# Patient Record
Sex: FEMALE | Race: WHITE | NOT HISPANIC OR LATINO | URBAN - METROPOLITAN AREA
[De-identification: names, ages, dates, MRNs, and addresses within clinical notes are randomized per-mention and may not be internally consistent; named-entity substitution may affect disease eponyms.]

---

## 2018-01-01 ENCOUNTER — INPATIENT (INPATIENT)
Facility: HOSPITAL | Age: 78
LOS: 15 days | Discharge: HOSPICE | DRG: 480 | End: 2019-01-11
Attending: INTERNAL MEDICINE | Admitting: INTERNAL MEDICINE
Payer: MEDICARE

## 2018-01-01 VITALS
HEIGHT: 61 IN | HEART RATE: 70 BPM | SYSTOLIC BLOOD PRESSURE: 112 MMHG | RESPIRATION RATE: 16 BRPM | DIASTOLIC BLOOD PRESSURE: 71 MMHG | TEMPERATURE: 98 F | OXYGEN SATURATION: 99 % | WEIGHT: 181 LBS

## 2018-01-01 DIAGNOSIS — A41.9 SEPSIS, UNSPECIFIED ORGANISM: ICD-10-CM

## 2018-01-01 DIAGNOSIS — S72.002A FRACTURE OF UNSPECIFIED PART OF NECK OF LEFT FEMUR, INITIAL ENCOUNTER FOR CLOSED FRACTURE: ICD-10-CM

## 2018-01-01 DIAGNOSIS — K72.00 ACUTE AND SUBACUTE HEPATIC FAILURE WITHOUT COMA: ICD-10-CM

## 2018-01-01 DIAGNOSIS — I10 ESSENTIAL (PRIMARY) HYPERTENSION: ICD-10-CM

## 2018-01-01 DIAGNOSIS — I95.9 HYPOTENSION, UNSPECIFIED: ICD-10-CM

## 2018-01-01 DIAGNOSIS — I51.9 HEART DISEASE, UNSPECIFIED: ICD-10-CM

## 2018-01-01 DIAGNOSIS — I47.1 SUPRAVENTRICULAR TACHYCARDIA: ICD-10-CM

## 2018-01-01 DIAGNOSIS — Z29.9 ENCOUNTER FOR PROPHYLACTIC MEASURES, UNSPECIFIED: ICD-10-CM

## 2018-01-01 DIAGNOSIS — I50.21 ACUTE SYSTOLIC (CONGESTIVE) HEART FAILURE: ICD-10-CM

## 2018-01-01 DIAGNOSIS — I50.20 UNSPECIFIED SYSTOLIC (CONGESTIVE) HEART FAILURE: ICD-10-CM

## 2018-01-01 DIAGNOSIS — G89.18 OTHER ACUTE POSTPROCEDURAL PAIN: ICD-10-CM

## 2018-01-01 DIAGNOSIS — R71.0 PRECIPITOUS DROP IN HEMATOCRIT: ICD-10-CM

## 2018-01-01 DIAGNOSIS — R57.0 CARDIOGENIC SHOCK: ICD-10-CM

## 2018-01-01 DIAGNOSIS — R50.9 FEVER, UNSPECIFIED: ICD-10-CM

## 2018-01-01 DIAGNOSIS — N17.9 ACUTE KIDNEY FAILURE, UNSPECIFIED: ICD-10-CM

## 2018-01-01 DIAGNOSIS — E11.9 TYPE 2 DIABETES MELLITUS WITHOUT COMPLICATIONS: ICD-10-CM

## 2018-01-01 DIAGNOSIS — I73.9 PERIPHERAL VASCULAR DISEASE, UNSPECIFIED: ICD-10-CM

## 2018-01-01 LAB
24R-OH-CALCIDIOL SERPL-MCNC: 15 NG/ML — LOW (ref 30–80)
ABO RH CONFIRMATION: SIGNIFICANT CHANGE UP
ALBUMIN SERPL ELPH-MCNC: 2.5 G/DL — LOW (ref 3.5–5)
ALBUMIN SERPL ELPH-MCNC: 2.6 G/DL — LOW (ref 3.5–5)
ALBUMIN SERPL ELPH-MCNC: 2.8 G/DL — LOW (ref 3.5–5)
ALBUMIN SERPL ELPH-MCNC: 3.5 G/DL — SIGNIFICANT CHANGE UP (ref 3.5–5)
ALBUMIN SERPL ELPH-MCNC: 3.7 G/DL — SIGNIFICANT CHANGE UP (ref 3.5–5)
ALP SERPL-CCNC: 79 U/L — SIGNIFICANT CHANGE UP (ref 40–120)
ALP SERPL-CCNC: 81 U/L — SIGNIFICANT CHANGE UP (ref 40–120)
ALP SERPL-CCNC: 81 U/L — SIGNIFICANT CHANGE UP (ref 40–120)
ALP SERPL-CCNC: 90 U/L — SIGNIFICANT CHANGE UP (ref 40–120)
ALP SERPL-CCNC: 98 U/L — SIGNIFICANT CHANGE UP (ref 40–120)
ALT FLD-CCNC: 13 U/L DA — SIGNIFICANT CHANGE UP (ref 10–60)
ALT FLD-CCNC: 1531 U/L DA — HIGH (ref 10–60)
ALT FLD-CCNC: 16 U/L DA — SIGNIFICANT CHANGE UP (ref 10–60)
ALT FLD-CCNC: 2372 U/L DA — HIGH (ref 10–60)
ALT FLD-CCNC: 2974 U/L DA — HIGH (ref 10–60)
ANION GAP SERPL CALC-SCNC: 10 MMOL/L — SIGNIFICANT CHANGE UP (ref 5–17)
ANION GAP SERPL CALC-SCNC: 10 MMOL/L — SIGNIFICANT CHANGE UP (ref 5–17)
ANION GAP SERPL CALC-SCNC: 14 MMOL/L — SIGNIFICANT CHANGE UP (ref 5–17)
ANION GAP SERPL CALC-SCNC: 21 MMOL/L — HIGH (ref 5–17)
ANION GAP SERPL CALC-SCNC: 22 MMOL/L — HIGH (ref 5–17)
ANION GAP SERPL CALC-SCNC: 22 MMOL/L — HIGH (ref 5–17)
ANION GAP SERPL CALC-SCNC: 8 MMOL/L — SIGNIFICANT CHANGE UP (ref 5–17)
ANION GAP SERPL CALC-SCNC: 9 MMOL/L — SIGNIFICANT CHANGE UP (ref 5–17)
APPEARANCE UR: ABNORMAL
APTT BLD: 24.6 SEC — LOW (ref 27.5–36.3)
APTT BLD: 29.2 SEC — SIGNIFICANT CHANGE UP (ref 27.5–36.3)
APTT BLD: 29.3 SEC — SIGNIFICANT CHANGE UP (ref 27.5–36.3)
APTT BLD: 29.3 SEC — SIGNIFICANT CHANGE UP (ref 27.5–36.3)
APTT BLD: 31.1 SEC — SIGNIFICANT CHANGE UP (ref 27.5–36.3)
APTT BLD: 31.4 SEC — SIGNIFICANT CHANGE UP (ref 27.5–36.3)
APTT BLD: 34 SEC — SIGNIFICANT CHANGE UP (ref 27.5–36.3)
AST SERPL-CCNC: 13 U/L — SIGNIFICANT CHANGE UP (ref 10–40)
AST SERPL-CCNC: 1949 U/L — HIGH (ref 10–40)
AST SERPL-CCNC: 5339 U/L — HIGH (ref 10–40)
AST SERPL-CCNC: 6372 U/L — HIGH (ref 10–40)
AST SERPL-CCNC: 9 U/L — LOW (ref 10–40)
BASE EXCESS BLDA CALC-SCNC: -14.9 MMOL/L — LOW (ref -2–2)
BASE EXCESS BLDA CALC-SCNC: -15.1 MMOL/L — LOW (ref -2–2)
BASE EXCESS BLDA CALC-SCNC: -19.4 MMOL/L — LOW (ref -2–2)
BASE EXCESS BLDA CALC-SCNC: -2.1 MMOL/L — LOW (ref -2–2)
BASE EXCESS BLDA CALC-SCNC: -3.6 MMOL/L — LOW (ref -2–2)
BASE EXCESS BLDA CALC-SCNC: -4.7 MMOL/L — LOW (ref -2–2)
BASE EXCESS BLDA CALC-SCNC: -5.7 MMOL/L — LOW (ref -2–2)
BASE EXCESS BLDA CALC-SCNC: -7.5 MMOL/L — LOW (ref -2–2)
BASE EXCESS BLDA CALC-SCNC: -8.7 MMOL/L — LOW (ref -2–2)
BASE EXCESS BLDV CALC-SCNC: -8 MMOL/L — LOW (ref -2–2)
BASOPHILS # BLD AUTO: 0 K/UL — SIGNIFICANT CHANGE UP (ref 0–0.2)
BASOPHILS # BLD AUTO: 0.1 K/UL — SIGNIFICANT CHANGE UP (ref 0–0.2)
BASOPHILS NFR BLD AUTO: 0.3 % — SIGNIFICANT CHANGE UP (ref 0–2)
BASOPHILS NFR BLD AUTO: 0.5 % — SIGNIFICANT CHANGE UP (ref 0–2)
BASOPHILS NFR BLD AUTO: 0.6 % — SIGNIFICANT CHANGE UP (ref 0–2)
BASOPHILS NFR BLD AUTO: 1 % — SIGNIFICANT CHANGE UP (ref 0–2)
BILIRUB SERPL-MCNC: 0.7 MG/DL — SIGNIFICANT CHANGE UP (ref 0.2–1.2)
BILIRUB SERPL-MCNC: 1.3 MG/DL — HIGH (ref 0.2–1.2)
BILIRUB SERPL-MCNC: 1.6 MG/DL — HIGH (ref 0.2–1.2)
BILIRUB SERPL-MCNC: 2 MG/DL — HIGH (ref 0.2–1.2)
BILIRUB SERPL-MCNC: 4.1 MG/DL — HIGH (ref 0.2–1.2)
BILIRUB UR-MCNC: NEGATIVE — SIGNIFICANT CHANGE UP
BLOOD GAS COMMENTS ARTERIAL: SIGNIFICANT CHANGE UP
BLOOD GAS COMMENTS, VENOUS: SIGNIFICANT CHANGE UP
BUN SERPL-MCNC: 32 MG/DL — HIGH (ref 7–18)
BUN SERPL-MCNC: 33 MG/DL — HIGH (ref 7–18)
BUN SERPL-MCNC: 34 MG/DL — HIGH (ref 7–18)
BUN SERPL-MCNC: 37 MG/DL — HIGH (ref 7–18)
BUN SERPL-MCNC: 47 MG/DL — HIGH (ref 7–18)
BUN SERPL-MCNC: 48 MG/DL — HIGH (ref 7–18)
BUN SERPL-MCNC: 51 MG/DL — HIGH (ref 7–18)
BUN SERPL-MCNC: 57 MG/DL — HIGH (ref 7–18)
CALCIUM SERPL-MCNC: 7.7 MG/DL — LOW (ref 8.4–10.5)
CALCIUM SERPL-MCNC: 8 MG/DL — LOW (ref 8.4–10.5)
CALCIUM SERPL-MCNC: 8 MG/DL — LOW (ref 8.4–10.5)
CALCIUM SERPL-MCNC: 8.2 MG/DL — LOW (ref 8.4–10.5)
CALCIUM SERPL-MCNC: 8.4 MG/DL — SIGNIFICANT CHANGE UP (ref 8.4–10.5)
CALCIUM SERPL-MCNC: 8.5 MG/DL — SIGNIFICANT CHANGE UP (ref 8.4–10.5)
CALCIUM SERPL-MCNC: 8.6 MG/DL — SIGNIFICANT CHANGE UP (ref 8.4–10.5)
CALCIUM SERPL-MCNC: 8.6 MG/DL — SIGNIFICANT CHANGE UP (ref 8.4–10.5)
CHLORIDE SERPL-SCNC: 102 MMOL/L — SIGNIFICANT CHANGE UP (ref 96–108)
CHLORIDE SERPL-SCNC: 107 MMOL/L — SIGNIFICANT CHANGE UP (ref 96–108)
CHLORIDE SERPL-SCNC: 109 MMOL/L — HIGH (ref 96–108)
CHLORIDE SERPL-SCNC: 110 MMOL/L — HIGH (ref 96–108)
CHLORIDE SERPL-SCNC: 112 MMOL/L — HIGH (ref 96–108)
CHLORIDE SERPL-SCNC: 112 MMOL/L — HIGH (ref 96–108)
CHLORIDE SERPL-SCNC: 116 MMOL/L — HIGH (ref 96–108)
CHLORIDE SERPL-SCNC: 98 MMOL/L — SIGNIFICANT CHANGE UP (ref 96–108)
CHOLEST SERPL-MCNC: 82 MG/DL — SIGNIFICANT CHANGE UP (ref 10–199)
CK MB BLD-MCNC: 4.4 % — HIGH (ref 0–3.5)
CK MB BLD-MCNC: 4.4 % — HIGH (ref 0–3.5)
CK MB CFR SERPL CALC: 2.1 NG/ML — SIGNIFICANT CHANGE UP (ref 0–3.6)
CK MB CFR SERPL CALC: 2.2 NG/ML — SIGNIFICANT CHANGE UP (ref 0–3.6)
CK SERPL-CCNC: 48 U/L — SIGNIFICANT CHANGE UP (ref 21–215)
CK SERPL-CCNC: 50 U/L — SIGNIFICANT CHANGE UP (ref 21–215)
CO2 SERPL-SCNC: 13 MMOL/L — LOW (ref 22–31)
CO2 SERPL-SCNC: 14 MMOL/L — LOW (ref 22–31)
CO2 SERPL-SCNC: 17 MMOL/L — LOW (ref 22–31)
CO2 SERPL-SCNC: 19 MMOL/L — LOW (ref 22–31)
CO2 SERPL-SCNC: 21 MMOL/L — LOW (ref 22–31)
CO2 SERPL-SCNC: 21 MMOL/L — LOW (ref 22–31)
CO2 SERPL-SCNC: 23 MMOL/L — SIGNIFICANT CHANGE UP (ref 22–31)
CO2 SERPL-SCNC: 23 MMOL/L — SIGNIFICANT CHANGE UP (ref 22–31)
COLOR SPEC: YELLOW — SIGNIFICANT CHANGE UP
CREAT SERPL-MCNC: 0.9 MG/DL — SIGNIFICANT CHANGE UP (ref 0.5–1.3)
CREAT SERPL-MCNC: 0.96 MG/DL — SIGNIFICANT CHANGE UP (ref 0.5–1.3)
CREAT SERPL-MCNC: 0.99 MG/DL — SIGNIFICANT CHANGE UP (ref 0.5–1.3)
CREAT SERPL-MCNC: 1.17 MG/DL — SIGNIFICANT CHANGE UP (ref 0.5–1.3)
CREAT SERPL-MCNC: 2.48 MG/DL — HIGH (ref 0.5–1.3)
CREAT SERPL-MCNC: 3.04 MG/DL — HIGH (ref 0.5–1.3)
CREAT SERPL-MCNC: 3.25 MG/DL — HIGH (ref 0.5–1.3)
CREAT SERPL-MCNC: 4.15 MG/DL — HIGH (ref 0.5–1.3)
DIFF PNL FLD: ABNORMAL
DIGOXIN SERPL-MCNC: 4.3 NG/ML — CRITICAL HIGH (ref 0.8–2)
EOSINOPHIL # BLD AUTO: 0.1 K/UL — SIGNIFICANT CHANGE UP (ref 0–0.5)
EOSINOPHIL NFR BLD AUTO: 0.4 % — SIGNIFICANT CHANGE UP (ref 0–6)
EOSINOPHIL NFR BLD AUTO: 0.5 % — SIGNIFICANT CHANGE UP (ref 0–6)
EOSINOPHIL NFR BLD AUTO: 0.9 % — SIGNIFICANT CHANGE UP (ref 0–6)
EOSINOPHIL NFR BLD AUTO: 0.9 % — SIGNIFICANT CHANGE UP (ref 0–6)
EOSINOPHIL NFR BLD AUTO: 1 % — SIGNIFICANT CHANGE UP (ref 0–6)
EOSINOPHIL NFR BLD AUTO: 2 % — SIGNIFICANT CHANGE UP (ref 0–6)
FERRITIN SERPL-MCNC: 19 NG/ML — SIGNIFICANT CHANGE UP (ref 15–150)
FIBRINOGEN PPP-MCNC: 377 MG/DL — SIGNIFICANT CHANGE UP (ref 350–510)
FIBRINOGEN PPP-MCNC: 382 MG/DL — SIGNIFICANT CHANGE UP (ref 350–510)
FOLATE SERPL-MCNC: 7.5 NG/ML — SIGNIFICANT CHANGE UP
GLUCOSE BLDC GLUCOMTR-MCNC: 156 MG/DL — HIGH (ref 70–99)
GLUCOSE BLDC GLUCOMTR-MCNC: 165 MG/DL — HIGH (ref 70–99)
GLUCOSE BLDC GLUCOMTR-MCNC: 166 MG/DL — HIGH (ref 70–99)
GLUCOSE SERPL-MCNC: 158 MG/DL — HIGH (ref 70–99)
GLUCOSE SERPL-MCNC: 161 MG/DL — HIGH (ref 70–99)
GLUCOSE SERPL-MCNC: 199 MG/DL — HIGH (ref 70–99)
GLUCOSE SERPL-MCNC: 217 MG/DL — HIGH (ref 70–99)
GLUCOSE SERPL-MCNC: 237 MG/DL — HIGH (ref 70–99)
GLUCOSE SERPL-MCNC: 310 MG/DL — HIGH (ref 70–99)
GLUCOSE SERPL-MCNC: 321 MG/DL — HIGH (ref 70–99)
GLUCOSE SERPL-MCNC: 329 MG/DL — HIGH (ref 70–99)
GLUCOSE UR QL: 100 MG/DL
HAPTOGLOB SERPL-MCNC: 61 MG/DL — SIGNIFICANT CHANGE UP (ref 34–200)
HAV IGM SER-ACNC: SIGNIFICANT CHANGE UP
HBA1C BLD-MCNC: 6.1 % — HIGH (ref 4–5.6)
HBA1C BLD-MCNC: 6.2 % — HIGH (ref 4–5.6)
HBV CORE IGM SER-ACNC: SIGNIFICANT CHANGE UP
HBV SURFACE AG SER-ACNC: SIGNIFICANT CHANGE UP
HCO3 BLDA-SCNC: 10 MMOL/L — LOW (ref 23–27)
HCO3 BLDA-SCNC: 11 MMOL/L — LOW (ref 23–27)
HCO3 BLDA-SCNC: 13 MMOL/L — LOW (ref 23–27)
HCO3 BLDA-SCNC: 15 MMOL/L — LOW (ref 23–27)
HCO3 BLDA-SCNC: 18 MMOL/L — LOW (ref 23–27)
HCO3 BLDA-SCNC: 18 MMOL/L — LOW (ref 23–27)
HCO3 BLDA-SCNC: 20 MMOL/L — LOW (ref 23–27)
HCO3 BLDA-SCNC: 21 MMOL/L — LOW (ref 23–27)
HCO3 BLDA-SCNC: 22 MMOL/L — LOW (ref 23–27)
HCO3 BLDV-SCNC: 20 MMOL/L — LOW (ref 21–29)
HCT VFR BLD CALC: 21.7 % — LOW (ref 34.5–45)
HCT VFR BLD CALC: 23.4 % — LOW (ref 34.5–45)
HCT VFR BLD CALC: 25.1 % — LOW (ref 34.5–45)
HCT VFR BLD CALC: 25.2 % — LOW (ref 34.5–45)
HCT VFR BLD CALC: 30.1 % — LOW (ref 34.5–45)
HCT VFR BLD CALC: 33.7 % — LOW (ref 34.5–45)
HCT VFR BLD CALC: 35.3 % — SIGNIFICANT CHANGE UP (ref 34.5–45)
HCT VFR BLD CALC: 35.5 % — SIGNIFICANT CHANGE UP (ref 34.5–45)
HCT VFR BLD CALC: 37.1 % — SIGNIFICANT CHANGE UP (ref 34.5–45)
HCT VFR BLD CALC: 39.5 % — SIGNIFICANT CHANGE UP (ref 34.5–45)
HCV AB S/CO SERPL IA: 0.3 S/CO — SIGNIFICANT CHANGE UP
HCV AB SERPL-IMP: SIGNIFICANT CHANGE UP
HDLC SERPL-MCNC: 40 MG/DL — LOW
HGB BLD-MCNC: 10.1 G/DL — LOW (ref 11.5–15.5)
HGB BLD-MCNC: 10.3 G/DL — LOW (ref 11.5–15.5)
HGB BLD-MCNC: 10.9 G/DL — LOW (ref 11.5–15.5)
HGB BLD-MCNC: 11.2 G/DL — LOW (ref 11.5–15.5)
HGB BLD-MCNC: 6.5 G/DL — CRITICAL LOW (ref 11.5–15.5)
HGB BLD-MCNC: 6.7 G/DL — CRITICAL LOW (ref 11.5–15.5)
HGB BLD-MCNC: 7.4 G/DL — LOW (ref 11.5–15.5)
HGB BLD-MCNC: 7.7 G/DL — LOW (ref 11.5–15.5)
HGB BLD-MCNC: 9.4 G/DL — LOW (ref 11.5–15.5)
HGB BLD-MCNC: 9.8 G/DL — LOW (ref 11.5–15.5)
HOROWITZ INDEX BLDA+IHG-RTO: 21 — SIGNIFICANT CHANGE UP
HOROWITZ INDEX BLDA+IHG-RTO: 50 — SIGNIFICANT CHANGE UP
HOROWITZ INDEX BLDA+IHG-RTO: 60 — SIGNIFICANT CHANGE UP
HOROWITZ INDEX BLDA+IHG-RTO: 80 — SIGNIFICANT CHANGE UP
HOROWITZ INDEX BLDV+IHG-RTO: 60 — SIGNIFICANT CHANGE UP
INR BLD: 1.09 RATIO — SIGNIFICANT CHANGE UP (ref 0.88–1.16)
INR BLD: 1.83 RATIO — HIGH (ref 0.88–1.16)
INR BLD: 1.86 RATIO — HIGH (ref 0.88–1.16)
INR BLD: 2.04 RATIO — HIGH (ref 0.88–1.16)
INR BLD: 2.28 RATIO — HIGH (ref 0.88–1.16)
INR BLD: 2.5 RATIO — HIGH (ref 0.88–1.16)
INR BLD: 2.77 RATIO — HIGH (ref 0.88–1.16)
INR BLD: 3.38 RATIO — HIGH (ref 0.88–1.16)
INR BLD: 3.64 RATIO — HIGH (ref 0.88–1.16)
IRON SATN MFR SERPL: 200 UG/DL — HIGH (ref 40–150)
IRON SATN MFR SERPL: 55 % — HIGH (ref 15–50)
KETONES UR-MCNC: ABNORMAL
LACTATE SERPL-SCNC: 10.3 MMOL/L — CRITICAL HIGH (ref 0.7–2)
LACTATE SERPL-SCNC: 5.3 MMOL/L — CRITICAL HIGH (ref 0.7–2)
LACTATE SERPL-SCNC: 6 MMOL/L — CRITICAL HIGH (ref 0.7–2)
LACTATE SERPL-SCNC: 8.9 MMOL/L — CRITICAL HIGH (ref 0.7–2)
LDH SERPL L TO P-CCNC: 178 U/L — SIGNIFICANT CHANGE UP (ref 120–225)
LEUKOCYTE ESTERASE UR-ACNC: ABNORMAL
LIPID PNL WITH DIRECT LDL SERPL: 22 MG/DL — SIGNIFICANT CHANGE UP
LYMPHOCYTES # BLD AUTO: 1.1 K/UL — SIGNIFICANT CHANGE UP (ref 1–3.3)
LYMPHOCYTES # BLD AUTO: 1.2 K/UL — SIGNIFICANT CHANGE UP (ref 1–3.3)
LYMPHOCYTES # BLD AUTO: 1.2 K/UL — SIGNIFICANT CHANGE UP (ref 1–3.3)
LYMPHOCYTES # BLD AUTO: 1.5 K/UL — SIGNIFICANT CHANGE UP (ref 1–3.3)
LYMPHOCYTES # BLD AUTO: 11 % — LOW (ref 13–44)
LYMPHOCYTES # BLD AUTO: 12.6 % — LOW (ref 13–44)
LYMPHOCYTES # BLD AUTO: 13.6 % — SIGNIFICANT CHANGE UP (ref 13–44)
LYMPHOCYTES # BLD AUTO: 15 % — SIGNIFICANT CHANGE UP (ref 13–44)
LYMPHOCYTES # BLD AUTO: 2 K/UL — SIGNIFICANT CHANGE UP (ref 1–3.3)
LYMPHOCYTES # BLD AUTO: 7.3 % — LOW (ref 13–44)
LYMPHOCYTES # BLD AUTO: 8.5 % — LOW (ref 13–44)
LYMPHOCYTES # BLD AUTO: 9.5 % — LOW (ref 13–44)
MAGNESIUM SERPL-MCNC: 1.8 MG/DL — SIGNIFICANT CHANGE UP (ref 1.6–2.6)
MAGNESIUM SERPL-MCNC: 1.9 MG/DL — SIGNIFICANT CHANGE UP (ref 1.6–2.6)
MAGNESIUM SERPL-MCNC: 2 MG/DL — SIGNIFICANT CHANGE UP (ref 1.6–2.6)
MAGNESIUM SERPL-MCNC: 2.1 MG/DL — SIGNIFICANT CHANGE UP (ref 1.6–2.6)
MAGNESIUM SERPL-MCNC: 2.3 MG/DL — SIGNIFICANT CHANGE UP (ref 1.6–2.6)
MCHC RBC-ENTMCNC: 18.2 PG — LOW (ref 27–34)
MCHC RBC-ENTMCNC: 18.3 PG — LOW (ref 27–34)
MCHC RBC-ENTMCNC: 18.5 PG — LOW (ref 27–34)
MCHC RBC-ENTMCNC: 18.5 PG — LOW (ref 27–34)
MCHC RBC-ENTMCNC: 18.6 PG — LOW (ref 27–34)
MCHC RBC-ENTMCNC: 18.7 PG — LOW (ref 27–34)
MCHC RBC-ENTMCNC: 18.8 PG — LOW (ref 27–34)
MCHC RBC-ENTMCNC: 18.9 PG — LOW (ref 27–34)
MCHC RBC-ENTMCNC: 20.5 PG — LOW (ref 27–34)
MCHC RBC-ENTMCNC: 20.8 PG — LOW (ref 27–34)
MCHC RBC-ENTMCNC: 28.3 GM/DL — LOW (ref 32–36)
MCHC RBC-ENTMCNC: 28.6 GM/DL — LOW (ref 32–36)
MCHC RBC-ENTMCNC: 28.7 GM/DL — LOW (ref 32–36)
MCHC RBC-ENTMCNC: 29 GM/DL — LOW (ref 32–36)
MCHC RBC-ENTMCNC: 29.2 GM/DL — LOW (ref 32–36)
MCHC RBC-ENTMCNC: 29.2 GM/DL — LOW (ref 32–36)
MCHC RBC-ENTMCNC: 29.5 GM/DL — LOW (ref 32–36)
MCHC RBC-ENTMCNC: 30 GM/DL — LOW (ref 32–36)
MCHC RBC-ENTMCNC: 30.8 GM/DL — LOW (ref 32–36)
MCHC RBC-ENTMCNC: 31.2 GM/DL — LOW (ref 32–36)
MCV RBC AUTO: 62.3 FL — LOW (ref 80–100)
MCV RBC AUTO: 63.2 FL — LOW (ref 80–100)
MCV RBC AUTO: 63.7 FL — LOW (ref 80–100)
MCV RBC AUTO: 63.8 FL — LOW (ref 80–100)
MCV RBC AUTO: 63.9 FL — LOW (ref 80–100)
MCV RBC AUTO: 63.9 FL — LOW (ref 80–100)
MCV RBC AUTO: 64.6 FL — LOW (ref 80–100)
MCV RBC AUTO: 65.7 FL — LOW (ref 80–100)
MCV RBC AUTO: 66.2 FL — LOW (ref 80–100)
MCV RBC AUTO: 67.3 FL — LOW (ref 80–100)
MONOCYTES # BLD AUTO: 0.5 K/UL — SIGNIFICANT CHANGE UP (ref 0–0.9)
MONOCYTES # BLD AUTO: 0.6 K/UL — SIGNIFICANT CHANGE UP (ref 0–0.9)
MONOCYTES # BLD AUTO: 0.6 K/UL — SIGNIFICANT CHANGE UP (ref 0–0.9)
MONOCYTES # BLD AUTO: 0.7 K/UL — SIGNIFICANT CHANGE UP (ref 0–0.9)
MONOCYTES # BLD AUTO: 0.9 K/UL — SIGNIFICANT CHANGE UP (ref 0–0.9)
MONOCYTES NFR BLD AUTO: 3.6 % — SIGNIFICANT CHANGE UP (ref 2–14)
MONOCYTES NFR BLD AUTO: 4 % — SIGNIFICANT CHANGE UP (ref 2–14)
MONOCYTES NFR BLD AUTO: 4.4 % — SIGNIFICANT CHANGE UP (ref 2–14)
MONOCYTES NFR BLD AUTO: 4.4 % — SIGNIFICANT CHANGE UP (ref 2–14)
MONOCYTES NFR BLD AUTO: 5 % — SIGNIFICANT CHANGE UP (ref 2–14)
MONOCYTES NFR BLD AUTO: 5.7 % — SIGNIFICANT CHANGE UP (ref 2–14)
MONOCYTES NFR BLD AUTO: 6.7 % — SIGNIFICANT CHANGE UP (ref 2–14)
NEUTROPHILS # BLD AUTO: 11.9 K/UL — HIGH (ref 1.8–7.4)
NEUTROPHILS # BLD AUTO: 13.1 K/UL — HIGH (ref 1.8–7.4)
NEUTROPHILS # BLD AUTO: 14.1 K/UL — HIGH (ref 1.8–7.4)
NEUTROPHILS # BLD AUTO: 8.6 K/UL — HIGH (ref 1.8–7.4)
NEUTROPHILS # BLD AUTO: 9.1 K/UL — HIGH (ref 1.8–7.4)
NEUTROPHILS NFR BLD AUTO: 72 % — SIGNIFICANT CHANGE UP (ref 43–77)
NEUTROPHILS NFR BLD AUTO: 80.1 % — HIGH (ref 43–77)
NEUTROPHILS NFR BLD AUTO: 81.7 % — HIGH (ref 43–77)
NEUTROPHILS NFR BLD AUTO: 83 % — HIGH (ref 43–77)
NEUTROPHILS NFR BLD AUTO: 85 % — HIGH (ref 43–77)
NEUTROPHILS NFR BLD AUTO: 85.5 % — HIGH (ref 43–77)
NEUTROPHILS NFR BLD AUTO: 86.2 % — HIGH (ref 43–77)
NITRITE UR-MCNC: NEGATIVE — SIGNIFICANT CHANGE UP
PCO2 BLDA: 26 MMHG — LOW (ref 32–46)
PCO2 BLDA: 27 MMHG — LOW (ref 32–46)
PCO2 BLDA: 28 MMHG — LOW (ref 32–46)
PCO2 BLDA: 35 MMHG — SIGNIFICANT CHANGE UP (ref 32–46)
PCO2 BLDA: 38 MMHG — SIGNIFICANT CHANGE UP (ref 32–46)
PCO2 BLDA: 38 MMHG — SIGNIFICANT CHANGE UP (ref 32–46)
PCO2 BLDA: 41 MMHG — SIGNIFICANT CHANGE UP (ref 32–46)
PCO2 BLDA: 41 MMHG — SIGNIFICANT CHANGE UP (ref 32–46)
PCO2 BLDA: 42 MMHG — SIGNIFICANT CHANGE UP (ref 32–46)
PCO2 BLDV: 52 MMHG — HIGH (ref 35–50)
PH BLDA: 7.07 — CRITICAL LOW (ref 7.35–7.45)
PH BLDA: 7.12 — CRITICAL LOW (ref 7.35–7.45)
PH BLDA: 7.23 — LOW (ref 7.35–7.45)
PH BLDA: 7.28 — LOW (ref 7.35–7.45)
PH BLDA: 7.3 — LOW (ref 7.35–7.45)
PH BLDA: 7.36 — SIGNIFICANT CHANGE UP (ref 7.35–7.45)
PH BLDA: 7.37 — SIGNIFICANT CHANGE UP (ref 7.35–7.45)
PH BLDA: 7.38 — SIGNIFICANT CHANGE UP (ref 7.35–7.45)
PH BLDA: 7.46 — HIGH (ref 7.35–7.45)
PH BLDV: 7.2 — CRITICAL LOW (ref 7.35–7.45)
PH UR: 5 — SIGNIFICANT CHANGE UP (ref 5–8)
PHOSPHATE SERPL-MCNC: 3.3 MG/DL — SIGNIFICANT CHANGE UP (ref 2.5–4.5)
PHOSPHATE SERPL-MCNC: 3.4 MG/DL — SIGNIFICANT CHANGE UP (ref 2.5–4.5)
PHOSPHATE SERPL-MCNC: 4.7 MG/DL — HIGH (ref 2.5–4.5)
PHOSPHATE SERPL-MCNC: 5 MG/DL — HIGH (ref 2.5–4.5)
PHOSPHATE SERPL-MCNC: 7.2 MG/DL — HIGH (ref 2.5–4.5)
PLATELET # BLD AUTO: 184 K/UL — SIGNIFICANT CHANGE UP (ref 150–400)
PLATELET # BLD AUTO: 187 K/UL — SIGNIFICANT CHANGE UP (ref 150–400)
PLATELET # BLD AUTO: 188 K/UL — SIGNIFICANT CHANGE UP (ref 150–400)
PLATELET # BLD AUTO: 191 K/UL — SIGNIFICANT CHANGE UP (ref 150–400)
PLATELET # BLD AUTO: 212 K/UL — SIGNIFICANT CHANGE UP (ref 150–400)
PLATELET # BLD AUTO: 48 K/UL — LOW (ref 150–400)
PLATELET # BLD AUTO: 55 K/UL — LOW (ref 150–400)
PLATELET # BLD AUTO: 64 K/UL — LOW (ref 150–400)
PLATELET # BLD AUTO: 71 K/UL — LOW (ref 150–400)
PLATELET # BLD AUTO: 77 K/UL — LOW (ref 150–400)
PO2 BLDA: 115 MMHG — HIGH (ref 74–108)
PO2 BLDA: 43 MMHG — CRITICAL LOW (ref 74–108)
PO2 BLDA: 56 MMHG — LOW (ref 74–108)
PO2 BLDA: 77 MMHG — SIGNIFICANT CHANGE UP (ref 74–108)
PO2 BLDA: 79 MMHG — SIGNIFICANT CHANGE UP (ref 74–108)
PO2 BLDA: 89 MMHG — SIGNIFICANT CHANGE UP (ref 74–108)
PO2 BLDA: 90 MMHG — SIGNIFICANT CHANGE UP (ref 74–108)
PO2 BLDA: 92 MMHG — SIGNIFICANT CHANGE UP (ref 74–108)
PO2 BLDA: 94 MMHG — SIGNIFICANT CHANGE UP (ref 74–108)
PO2 BLDV: 38 MMHG — SIGNIFICANT CHANGE UP (ref 25–45)
POTASSIUM SERPL-MCNC: 3.9 MMOL/L — SIGNIFICANT CHANGE UP (ref 3.5–5.3)
POTASSIUM SERPL-MCNC: 4.2 MMOL/L — SIGNIFICANT CHANGE UP (ref 3.5–5.3)
POTASSIUM SERPL-MCNC: 4.2 MMOL/L — SIGNIFICANT CHANGE UP (ref 3.5–5.3)
POTASSIUM SERPL-MCNC: 4.4 MMOL/L — SIGNIFICANT CHANGE UP (ref 3.5–5.3)
POTASSIUM SERPL-MCNC: 4.5 MMOL/L — SIGNIFICANT CHANGE UP (ref 3.5–5.3)
POTASSIUM SERPL-MCNC: 4.9 MMOL/L — SIGNIFICANT CHANGE UP (ref 3.5–5.3)
POTASSIUM SERPL-MCNC: 5.1 MMOL/L — SIGNIFICANT CHANGE UP (ref 3.5–5.3)
POTASSIUM SERPL-MCNC: 5.5 MMOL/L — HIGH (ref 3.5–5.3)
POTASSIUM SERPL-SCNC: 3.9 MMOL/L — SIGNIFICANT CHANGE UP (ref 3.5–5.3)
POTASSIUM SERPL-SCNC: 4.2 MMOL/L — SIGNIFICANT CHANGE UP (ref 3.5–5.3)
POTASSIUM SERPL-SCNC: 4.2 MMOL/L — SIGNIFICANT CHANGE UP (ref 3.5–5.3)
POTASSIUM SERPL-SCNC: 4.4 MMOL/L — SIGNIFICANT CHANGE UP (ref 3.5–5.3)
POTASSIUM SERPL-SCNC: 4.5 MMOL/L — SIGNIFICANT CHANGE UP (ref 3.5–5.3)
POTASSIUM SERPL-SCNC: 4.9 MMOL/L — SIGNIFICANT CHANGE UP (ref 3.5–5.3)
POTASSIUM SERPL-SCNC: 5.1 MMOL/L — SIGNIFICANT CHANGE UP (ref 3.5–5.3)
POTASSIUM SERPL-SCNC: 5.5 MMOL/L — HIGH (ref 3.5–5.3)
PROT SERPL-MCNC: 5.3 G/DL — LOW (ref 6–8.3)
PROT SERPL-MCNC: 5.6 G/DL — LOW (ref 6–8.3)
PROT SERPL-MCNC: 5.7 G/DL — LOW (ref 6–8.3)
PROT SERPL-MCNC: 6.6 G/DL — SIGNIFICANT CHANGE UP (ref 6–8.3)
PROT SERPL-MCNC: 6.8 G/DL — SIGNIFICANT CHANGE UP (ref 6–8.3)
PROT UR-MCNC: 500 MG/DL
PROTHROM AB SERPL-ACNC: 12.1 SEC — SIGNIFICANT CHANGE UP (ref 10–12.9)
PROTHROM AB SERPL-ACNC: 20.7 SEC — HIGH (ref 10–12.9)
PROTHROM AB SERPL-ACNC: 21.1 SEC — HIGH (ref 10–12.9)
PROTHROM AB SERPL-ACNC: 23.1 SEC — HIGH (ref 10–12.9)
PROTHROM AB SERPL-ACNC: 26 SEC — HIGH (ref 10–12.9)
PROTHROM AB SERPL-ACNC: 28.5 SEC — HIGH (ref 10–12.9)
PROTHROM AB SERPL-ACNC: 31.7 SEC — HIGH (ref 10–12.9)
PROTHROM AB SERPL-ACNC: 38.9 SEC — HIGH (ref 10–12.9)
PROTHROM AB SERPL-ACNC: 42.1 SEC — HIGH (ref 10–12.9)
RBC # BLD: 3.43 M/UL — LOW (ref 3.8–5.2)
RBC # BLD: 3.67 M/UL — LOW (ref 3.8–5.2)
RBC # BLD: 3.73 M/UL — LOW (ref 3.8–5.2)
RBC # BLD: 3.96 M/UL — SIGNIFICANT CHANGE UP (ref 3.8–5.2)
RBC # BLD: 4.58 M/UL — SIGNIFICANT CHANGE UP (ref 3.8–5.2)
RBC # BLD: 5.41 M/UL — HIGH (ref 3.8–5.2)
RBC # BLD: 5.42 M/UL — HIGH (ref 3.8–5.2)
RBC # BLD: 5.46 M/UL — HIGH (ref 3.8–5.2)
RBC # BLD: 5.56 M/UL — HIGH (ref 3.8–5.2)
RBC # BLD: 5.82 M/UL — HIGH (ref 3.8–5.2)
RBC # BLD: 5.96 M/UL — HIGH (ref 3.8–5.2)
RBC # FLD: 13.6 % — SIGNIFICANT CHANGE UP (ref 10.3–14.5)
RBC # FLD: 14.1 % — SIGNIFICANT CHANGE UP (ref 10.3–14.5)
RBC # FLD: 14.2 % — SIGNIFICANT CHANGE UP (ref 10.3–14.5)
RBC # FLD: 14.3 % — SIGNIFICANT CHANGE UP (ref 10.3–14.5)
RBC # FLD: 14.4 % — SIGNIFICANT CHANGE UP (ref 10.3–14.5)
RBC # FLD: 14.5 % — SIGNIFICANT CHANGE UP (ref 10.3–14.5)
RBC # FLD: 14.7 % — HIGH (ref 10.3–14.5)
RBC # FLD: 14.8 % — HIGH (ref 10.3–14.5)
RBC # FLD: 18.5 % — HIGH (ref 10.3–14.5)
RBC # FLD: 18.5 % — HIGH (ref 10.3–14.5)
REPTILASE TIME: 19.1 SEC — SIGNIFICANT CHANGE UP
RETICS #: 80.2 K/UL — SIGNIFICANT CHANGE UP (ref 25–125)
RETICS/RBC NFR: 1.5 % — SIGNIFICANT CHANGE UP (ref 0.5–2.5)
SAO2 % BLDA: 76 % — LOW (ref 92–96)
SAO2 % BLDA: 80 % — LOW (ref 92–96)
SAO2 % BLDA: 91 % — LOW (ref 92–96)
SAO2 % BLDA: 93 % — SIGNIFICANT CHANGE UP (ref 92–96)
SAO2 % BLDA: 94 % — SIGNIFICANT CHANGE UP (ref 92–96)
SAO2 % BLDA: 96 % — SIGNIFICANT CHANGE UP (ref 92–96)
SAO2 % BLDA: 97 % — HIGH (ref 92–96)
SAO2 % BLDA: 97 % — HIGH (ref 92–96)
SAO2 % BLDA: 98 % — HIGH (ref 92–96)
SAO2 % BLDV: 58 % — LOW (ref 67–88)
SODIUM SERPL-SCNC: 139 MMOL/L — SIGNIFICANT CHANGE UP (ref 135–145)
SODIUM SERPL-SCNC: 140 MMOL/L — SIGNIFICANT CHANGE UP (ref 135–145)
SODIUM SERPL-SCNC: 141 MMOL/L — SIGNIFICANT CHANGE UP (ref 135–145)
SODIUM SERPL-SCNC: 142 MMOL/L — SIGNIFICANT CHANGE UP (ref 135–145)
SODIUM SERPL-SCNC: 143 MMOL/L — SIGNIFICANT CHANGE UP (ref 135–145)
SODIUM SERPL-SCNC: 144 MMOL/L — SIGNIFICANT CHANGE UP (ref 135–145)
SP GR SPEC: 1.02 — SIGNIFICANT CHANGE UP (ref 1.01–1.02)
THROMBIN TIME: 30 SEC — HIGH (ref 16–25)
TIBC SERPL-MCNC: 363 UG/DL — SIGNIFICANT CHANGE UP (ref 250–450)
TOTAL CHOLESTEROL/HDL RATIO MEASUREMENT: 2 RATIO — LOW (ref 3.3–7.1)
TRANSFERRIN SERPL-MCNC: 301 MG/DL — SIGNIFICANT CHANGE UP (ref 200–360)
TRIGL SERPL-MCNC: 101 MG/DL — SIGNIFICANT CHANGE UP (ref 10–149)
TRIGL SERPL-MCNC: 165 MG/DL — HIGH (ref 10–149)
TROPONIN I SERPL-MCNC: <0.015 NG/ML — SIGNIFICANT CHANGE UP (ref 0–0.04)
TROPONIN I SERPL-MCNC: <0.015 NG/ML — SIGNIFICANT CHANGE UP (ref 0–0.04)
TSH SERPL-MCNC: 1.19 UU/ML — SIGNIFICANT CHANGE UP (ref 0.34–4.82)
UIBC SERPL-MCNC: 163 UG/DL — SIGNIFICANT CHANGE UP (ref 110–370)
UROBILINOGEN FLD QL: NEGATIVE — SIGNIFICANT CHANGE UP
VANCOMYCIN TROUGH SERPL-MCNC: 10.8 UG/ML — SIGNIFICANT CHANGE UP (ref 10–20)
VANCOMYCIN TROUGH SERPL-MCNC: 16.8 UG/ML — SIGNIFICANT CHANGE UP (ref 10–20)
VIT B12 SERPL-MCNC: 211 PG/ML — LOW (ref 232–1245)
WBC # BLD: 10.7 K/UL — HIGH (ref 3.8–10.5)
WBC # BLD: 11.1 K/UL — HIGH (ref 3.8–10.5)
WBC # BLD: 12.1 K/UL — HIGH (ref 3.8–10.5)
WBC # BLD: 13.6 K/UL — HIGH (ref 3.8–10.5)
WBC # BLD: 13.8 K/UL — HIGH (ref 3.8–10.5)
WBC # BLD: 15.8 K/UL — HIGH (ref 3.8–10.5)
WBC # BLD: 16.5 K/UL — HIGH (ref 3.8–10.5)
WBC # BLD: 18.9 K/UL — HIGH (ref 3.8–10.5)
WBC # BLD: 26.1 K/UL — HIGH (ref 3.8–10.5)
WBC # BLD: 26.4 K/UL — HIGH (ref 3.8–10.5)
WBC # FLD AUTO: 10.7 K/UL — HIGH (ref 3.8–10.5)
WBC # FLD AUTO: 11.1 K/UL — HIGH (ref 3.8–10.5)
WBC # FLD AUTO: 12.1 K/UL — HIGH (ref 3.8–10.5)
WBC # FLD AUTO: 13.6 K/UL — HIGH (ref 3.8–10.5)
WBC # FLD AUTO: 13.8 K/UL — HIGH (ref 3.8–10.5)
WBC # FLD AUTO: 15.8 K/UL — HIGH (ref 3.8–10.5)
WBC # FLD AUTO: 16.5 K/UL — HIGH (ref 3.8–10.5)
WBC # FLD AUTO: 18.9 K/UL — HIGH (ref 3.8–10.5)
WBC # FLD AUTO: 26.1 K/UL — HIGH (ref 3.8–10.5)
WBC # FLD AUTO: 26.4 K/UL — HIGH (ref 3.8–10.5)

## 2018-01-01 PROCEDURE — 71045 X-RAY EXAM CHEST 1 VIEW: CPT | Mod: 26,77

## 2018-01-01 PROCEDURE — 99285 EMERGENCY DEPT VISIT HI MDM: CPT

## 2018-01-01 PROCEDURE — 73552 X-RAY EXAM OF FEMUR 2/>: CPT | Mod: 26,LT

## 2018-01-01 PROCEDURE — 71045 X-RAY EXAM CHEST 1 VIEW: CPT | Mod: 26

## 2018-01-01 PROCEDURE — 73502 X-RAY EXAM HIP UNI 2-3 VIEWS: CPT | Mod: 26,LT

## 2018-01-01 PROCEDURE — 93010 ELECTROCARDIOGRAM REPORT: CPT

## 2018-01-01 PROCEDURE — 93306 TTE W/DOPPLER COMPLETE: CPT | Mod: 26

## 2018-01-01 RX ORDER — PHYTONADIONE (VIT K1) 5 MG
10 TABLET ORAL ONCE
Qty: 0 | Refills: 0 | Status: COMPLETED | OUTPATIENT
Start: 2018-01-01 | End: 2018-01-01

## 2018-01-01 RX ORDER — VANCOMYCIN HCL 1 G
1000 VIAL (EA) INTRAVENOUS EVERY 12 HOURS
Qty: 0 | Refills: 0 | Status: DISCONTINUED | OUTPATIENT
Start: 2018-01-01 | End: 2018-01-01

## 2018-01-01 RX ORDER — ACETYLCYSTEINE 200 MG/ML
4.1 VIAL (ML) MISCELLANEOUS ONCE
Qty: 0 | Refills: 0 | Status: COMPLETED | OUTPATIENT
Start: 2018-01-01 | End: 2018-01-01

## 2018-01-01 RX ORDER — INSULIN LISPRO 100/ML
VIAL (ML) SUBCUTANEOUS
Qty: 0 | Refills: 0 | Status: DISCONTINUED | OUTPATIENT
Start: 2018-01-01 | End: 2018-01-01

## 2018-01-01 RX ORDER — VANCOMYCIN HCL 1 G
1000 VIAL (EA) INTRAVENOUS EVERY 24 HOURS
Qty: 0 | Refills: 0 | Status: DISCONTINUED | OUTPATIENT
Start: 2018-01-01 | End: 2018-01-01

## 2018-01-01 RX ORDER — FENTANYL CITRATE 50 UG/ML
0.5 INJECTION INTRAVENOUS
Qty: 2500 | Refills: 0 | Status: DISCONTINUED | OUTPATIENT
Start: 2018-01-01 | End: 2019-01-01

## 2018-01-01 RX ORDER — ERYTHROPOIETIN 10000 [IU]/ML
10000 INJECTION, SOLUTION INTRAVENOUS; SUBCUTANEOUS
Qty: 0 | Refills: 0 | Status: DISCONTINUED | OUTPATIENT
Start: 2018-01-01 | End: 2019-01-01

## 2018-01-01 RX ORDER — OXYCODONE HYDROCHLORIDE 5 MG/1
5 TABLET ORAL EVERY 4 HOURS
Qty: 0 | Refills: 0 | Status: DISCONTINUED | OUTPATIENT
Start: 2018-01-01 | End: 2018-01-01

## 2018-01-01 RX ORDER — SODIUM CHLORIDE 9 MG/ML
1000 INJECTION INTRAMUSCULAR; INTRAVENOUS; SUBCUTANEOUS ONCE
Qty: 0 | Refills: 0 | Status: COMPLETED | OUTPATIENT
Start: 2018-01-01 | End: 2018-01-01

## 2018-01-01 RX ORDER — METOPROLOL TARTRATE 50 MG
5 TABLET ORAL ONCE
Qty: 0 | Refills: 0 | Status: COMPLETED | OUTPATIENT
Start: 2018-01-01 | End: 2018-01-01

## 2018-01-01 RX ORDER — DIGOXIN 250 MCG
0.12 TABLET ORAL DAILY
Qty: 0 | Refills: 0 | Status: DISCONTINUED | OUTPATIENT
Start: 2018-01-01 | End: 2018-01-01

## 2018-01-01 RX ORDER — NOREPINEPHRINE BITARTRATE/D5W 8 MG/250ML
0.05 PLASTIC BAG, INJECTION (ML) INTRAVENOUS
Qty: 32 | Refills: 0 | Status: DISCONTINUED | OUTPATIENT
Start: 2018-01-01 | End: 2019-01-01

## 2018-01-01 RX ORDER — SODIUM POLYSTYRENE SULFONATE 4.1 MEQ/G
30 POWDER, FOR SUSPENSION ORAL ONCE
Qty: 0 | Refills: 0 | Status: COMPLETED | OUTPATIENT
Start: 2018-01-01 | End: 2018-01-01

## 2018-01-01 RX ORDER — LINEZOLID 600 MG/300ML
600 INJECTION, SOLUTION INTRAVENOUS EVERY 12 HOURS
Qty: 0 | Refills: 0 | Status: DISCONTINUED | OUTPATIENT
Start: 2018-01-01 | End: 2019-01-01

## 2018-01-01 RX ORDER — SODIUM CHLORIDE 9 MG/ML
500 INJECTION, SOLUTION INTRAVENOUS ONCE
Qty: 0 | Refills: 0 | Status: COMPLETED | OUTPATIENT
Start: 2018-01-01 | End: 2018-01-01

## 2018-01-01 RX ORDER — PROTHROMBIN COMPLEX CONCENTRATE (HUMAN) 25.5; 16.5; 24; 22; 22; 26 [IU]/ML; [IU]/ML; [IU]/ML; [IU]/ML; [IU]/ML; [IU]/ML
2000 POWDER, FOR SOLUTION INTRAVENOUS ONCE
Qty: 0 | Refills: 0 | Status: COMPLETED | OUTPATIENT
Start: 2018-01-01 | End: 2018-01-01

## 2018-01-01 RX ORDER — VASOPRESSIN 20 [USP'U]/ML
0.04 INJECTION INTRAVENOUS
Qty: 100 | Refills: 0 | Status: DISCONTINUED | OUTPATIENT
Start: 2018-01-01 | End: 2018-01-01

## 2018-01-01 RX ORDER — DOCUSATE SODIUM 100 MG
100 CAPSULE ORAL
Qty: 0 | Refills: 0 | Status: DISCONTINUED | OUTPATIENT
Start: 2018-01-01 | End: 2019-01-01

## 2018-01-01 RX ORDER — ENOXAPARIN SODIUM 100 MG/ML
30 INJECTION SUBCUTANEOUS EVERY 12 HOURS
Qty: 0 | Refills: 0 | Status: DISCONTINUED | OUTPATIENT
Start: 2018-01-01 | End: 2018-01-01

## 2018-01-01 RX ORDER — VANCOMYCIN HCL 1 G
1000 VIAL (EA) INTRAVENOUS ONCE
Qty: 0 | Refills: 0 | Status: COMPLETED | OUTPATIENT
Start: 2018-01-01 | End: 2018-01-01

## 2018-01-01 RX ORDER — INSULIN LISPRO 100/ML
VIAL (ML) SUBCUTANEOUS EVERY 6 HOURS
Qty: 0 | Refills: 0 | Status: DISCONTINUED | OUTPATIENT
Start: 2018-01-01 | End: 2019-01-01

## 2018-01-01 RX ORDER — ACETAMINOPHEN 500 MG
650 TABLET ORAL EVERY 6 HOURS
Qty: 0 | Refills: 0 | Status: DISCONTINUED | OUTPATIENT
Start: 2018-01-01 | End: 2018-01-01

## 2018-01-01 RX ORDER — INSULIN GLARGINE 100 [IU]/ML
15 INJECTION, SOLUTION SUBCUTANEOUS AT BEDTIME
Qty: 0 | Refills: 0 | Status: DISCONTINUED | OUTPATIENT
Start: 2018-01-01 | End: 2019-01-01

## 2018-01-01 RX ORDER — INSULIN HUMAN 100 [IU]/ML
10 INJECTION, SOLUTION SUBCUTANEOUS ONCE
Qty: 0 | Refills: 0 | Status: COMPLETED | OUTPATIENT
Start: 2018-01-01 | End: 2018-01-01

## 2018-01-01 RX ORDER — SODIUM CHLORIDE 9 MG/ML
1000 INJECTION, SOLUTION INTRAVENOUS
Qty: 0 | Refills: 0 | Status: DISCONTINUED | OUTPATIENT
Start: 2018-01-01 | End: 2018-01-01

## 2018-01-01 RX ORDER — DOCUSATE SODIUM 100 MG
100 CAPSULE ORAL THREE TIMES A DAY
Qty: 0 | Refills: 0 | Status: DISCONTINUED | OUTPATIENT
Start: 2018-01-01 | End: 2018-01-01

## 2018-01-01 RX ORDER — FERROUS SULFATE 325(65) MG
300 TABLET ORAL
Qty: 0 | Refills: 0 | Status: DISCONTINUED | OUTPATIENT
Start: 2018-01-01 | End: 2018-01-01

## 2018-01-01 RX ORDER — SODIUM CHLORIDE 9 MG/ML
1000 INJECTION INTRAMUSCULAR; INTRAVENOUS; SUBCUTANEOUS
Qty: 0 | Refills: 0 | Status: DISCONTINUED | OUTPATIENT
Start: 2018-01-01 | End: 2018-01-01

## 2018-01-01 RX ORDER — OXYCODONE HYDROCHLORIDE 5 MG/1
10 TABLET ORAL EVERY 6 HOURS
Qty: 0 | Refills: 0 | Status: DISCONTINUED | OUTPATIENT
Start: 2018-01-01 | End: 2018-01-01

## 2018-01-01 RX ORDER — PHENYLEPHRINE HYDROCHLORIDE 10 MG/ML
1 INJECTION INTRAVENOUS
Qty: 160 | Refills: 0 | Status: DISCONTINUED | OUTPATIENT
Start: 2018-01-01 | End: 2018-01-01

## 2018-01-01 RX ORDER — SODIUM CHLORIDE 9 MG/ML
1000 INJECTION, SOLUTION INTRAVENOUS ONCE
Qty: 0 | Refills: 0 | Status: COMPLETED | OUTPATIENT
Start: 2018-01-01 | End: 2018-01-01

## 2018-01-01 RX ORDER — ONDANSETRON 8 MG/1
4 TABLET, FILM COATED ORAL EVERY 6 HOURS
Qty: 0 | Refills: 0 | Status: DISCONTINUED | OUTPATIENT
Start: 2018-01-01 | End: 2019-01-01

## 2018-01-01 RX ORDER — SODIUM POLYSTYRENE SULFONATE 4.1 MEQ/G
15 POWDER, FOR SUSPENSION ORAL ONCE
Qty: 0 | Refills: 0 | Status: COMPLETED | OUTPATIENT
Start: 2018-01-01 | End: 2018-01-01

## 2018-01-01 RX ORDER — FENTANYL CITRATE 50 UG/ML
0.5 INJECTION INTRAVENOUS
Qty: 5000 | Refills: 0 | Status: DISCONTINUED | OUTPATIENT
Start: 2018-01-01 | End: 2018-01-01

## 2018-01-01 RX ORDER — ACETAMINOPHEN 500 MG
1000 TABLET ORAL ONCE
Qty: 0 | Refills: 0 | Status: COMPLETED | OUTPATIENT
Start: 2018-01-01 | End: 2018-01-01

## 2018-01-01 RX ORDER — MEROPENEM 1 G/30ML
500 INJECTION INTRAVENOUS EVERY 12 HOURS
Qty: 0 | Refills: 0 | Status: DISCONTINUED | OUTPATIENT
Start: 2018-01-01 | End: 2019-01-01

## 2018-01-01 RX ORDER — CHLORHEXIDINE GLUCONATE 213 G/1000ML
1 SOLUTION TOPICAL
Qty: 0 | Refills: 0 | Status: DISCONTINUED | OUTPATIENT
Start: 2018-01-01 | End: 2019-01-01

## 2018-01-01 RX ORDER — FOLIC ACID 0.8 MG
1 TABLET ORAL DAILY
Qty: 0 | Refills: 0 | Status: DISCONTINUED | OUTPATIENT
Start: 2018-01-01 | End: 2019-01-01

## 2018-01-01 RX ORDER — FERROUS SULFATE 325(65) MG
325 TABLET ORAL
Qty: 0 | Refills: 0 | Status: DISCONTINUED | OUTPATIENT
Start: 2018-01-01 | End: 2018-01-01

## 2018-01-01 RX ORDER — FENTANYL CITRATE 50 UG/ML
0.5 INJECTION INTRAVENOUS
Qty: 2500 | Refills: 0 | Status: DISCONTINUED | OUTPATIENT
Start: 2018-01-01 | End: 2018-01-01

## 2018-01-01 RX ORDER — TRAMADOL HYDROCHLORIDE 50 MG/1
25 TABLET ORAL THREE TIMES A DAY
Qty: 0 | Refills: 0 | Status: DISCONTINUED | OUTPATIENT
Start: 2018-01-01 | End: 2018-01-01

## 2018-01-01 RX ORDER — NOREPINEPHRINE BITARTRATE/D5W 8 MG/250ML
0.05 PLASTIC BAG, INJECTION (ML) INTRAVENOUS
Qty: 16 | Refills: 0 | Status: DISCONTINUED | OUTPATIENT
Start: 2018-01-01 | End: 2018-01-01

## 2018-01-01 RX ORDER — DIGOXIN 250 MCG
0.12 TABLET ORAL EVERY OTHER DAY
Qty: 0 | Refills: 0 | Status: DISCONTINUED | OUTPATIENT
Start: 2018-01-01 | End: 2018-01-01

## 2018-01-01 RX ORDER — SODIUM BICARBONATE 1 MEQ/ML
0.18 SYRINGE (ML) INTRAVENOUS
Qty: 150 | Refills: 0 | Status: DISCONTINUED | OUTPATIENT
Start: 2018-01-01 | End: 2019-01-01

## 2018-01-01 RX ORDER — ASPIRIN/CALCIUM CARB/MAGNESIUM 324 MG
81 TABLET ORAL DAILY
Qty: 0 | Refills: 0 | Status: DISCONTINUED | OUTPATIENT
Start: 2018-01-01 | End: 2018-01-01

## 2018-01-01 RX ORDER — DEXTROSE 50 % IN WATER 50 %
15 SYRINGE (ML) INTRAVENOUS ONCE
Qty: 0 | Refills: 0 | Status: DISCONTINUED | OUTPATIENT
Start: 2018-01-01 | End: 2018-01-01

## 2018-01-01 RX ORDER — VASOPRESSIN 20 [USP'U]/ML
0.02 INJECTION INTRAVENOUS
Qty: 100 | Refills: 0 | Status: DISCONTINUED | OUTPATIENT
Start: 2018-01-01 | End: 2018-01-01

## 2018-01-01 RX ORDER — SODIUM CHLORIDE 9 MG/ML
500 INJECTION INTRAMUSCULAR; INTRAVENOUS; SUBCUTANEOUS ONCE
Qty: 0 | Refills: 0 | Status: COMPLETED | OUTPATIENT
Start: 2018-01-01 | End: 2018-01-01

## 2018-01-01 RX ORDER — ACETAMINOPHEN 500 MG
650 TABLET ORAL ONCE
Qty: 0 | Refills: 0 | Status: COMPLETED | OUTPATIENT
Start: 2018-01-01 | End: 2018-01-01

## 2018-01-01 RX ORDER — METOPROLOL TARTRATE 50 MG
2.5 TABLET ORAL ONCE
Qty: 0 | Refills: 0 | Status: DISCONTINUED | OUTPATIENT
Start: 2018-01-01 | End: 2018-01-01

## 2018-01-01 RX ORDER — MORPHINE SULFATE 50 MG/1
2 CAPSULE, EXTENDED RELEASE ORAL ONCE
Qty: 0 | Refills: 0 | Status: DISCONTINUED | OUTPATIENT
Start: 2018-01-01 | End: 2018-01-01

## 2018-01-01 RX ORDER — PIPERACILLIN AND TAZOBACTAM 4; .5 G/20ML; G/20ML
3.38 INJECTION, POWDER, LYOPHILIZED, FOR SOLUTION INTRAVENOUS EVERY 8 HOURS
Qty: 0 | Refills: 0 | Status: DISCONTINUED | OUTPATIENT
Start: 2018-01-01 | End: 2018-01-01

## 2018-01-01 RX ORDER — DEXTROSE 50 % IN WATER 50 %
50 SYRINGE (ML) INTRAVENOUS ONCE
Qty: 0 | Refills: 0 | Status: COMPLETED | OUTPATIENT
Start: 2018-01-01 | End: 2018-01-01

## 2018-01-01 RX ORDER — DIGOXIN 250 MCG
0.5 TABLET ORAL ONCE
Qty: 0 | Refills: 0 | Status: COMPLETED | OUTPATIENT
Start: 2018-01-01 | End: 2018-01-01

## 2018-01-01 RX ORDER — METOPROLOL TARTRATE 50 MG
12.5 TABLET ORAL ONCE
Qty: 0 | Refills: 0 | Status: COMPLETED | OUTPATIENT
Start: 2018-01-01 | End: 2018-01-01

## 2018-01-01 RX ORDER — PREGABALIN 225 MG/1
1000 CAPSULE ORAL DAILY
Qty: 0 | Refills: 0 | Status: COMPLETED | OUTPATIENT
Start: 2018-01-01 | End: 2019-01-01

## 2018-01-01 RX ORDER — FAMOTIDINE 10 MG/ML
20 INJECTION INTRAVENOUS EVERY 12 HOURS
Qty: 0 | Refills: 0 | Status: DISCONTINUED | OUTPATIENT
Start: 2018-01-01 | End: 2018-01-01

## 2018-01-01 RX ORDER — METOPROLOL TARTRATE 50 MG
2.5 TABLET ORAL ONCE
Qty: 0 | Refills: 0 | Status: COMPLETED | OUTPATIENT
Start: 2018-01-01 | End: 2018-01-01

## 2018-01-01 RX ORDER — DEXTROSE 50 % IN WATER 50 %
25 SYRINGE (ML) INTRAVENOUS ONCE
Qty: 0 | Refills: 0 | Status: DISCONTINUED | OUTPATIENT
Start: 2018-01-01 | End: 2018-01-01

## 2018-01-01 RX ORDER — ACETYLCYSTEINE 200 MG/ML
8 VIAL (ML) MISCELLANEOUS ONCE
Qty: 0 | Refills: 0 | Status: COMPLETED | OUTPATIENT
Start: 2018-01-01 | End: 2018-01-01

## 2018-01-01 RX ORDER — INSULIN HUMAN 100 [IU]/ML
10 INJECTION, SOLUTION SUBCUTANEOUS ONCE
Qty: 0 | Refills: 0 | Status: DISCONTINUED | OUTPATIENT
Start: 2018-01-01 | End: 2018-01-01

## 2018-01-01 RX ORDER — MORPHINE SULFATE 50 MG/1
2 CAPSULE, EXTENDED RELEASE ORAL EVERY 4 HOURS
Qty: 0 | Refills: 0 | Status: DISCONTINUED | OUTPATIENT
Start: 2018-01-01 | End: 2019-01-01

## 2018-01-01 RX ORDER — DIGOXIN 250 MCG
0.12 TABLET ORAL EVERY OTHER DAY
Qty: 0 | Refills: 0 | Status: DISCONTINUED | OUTPATIENT
Start: 2018-01-01 | End: 2019-01-01

## 2018-01-01 RX ORDER — PHYTONADIONE (VIT K1) 5 MG
10 TABLET ORAL DAILY
Qty: 0 | Refills: 0 | Status: COMPLETED | OUTPATIENT
Start: 2018-01-01 | End: 2019-01-01

## 2018-01-01 RX ORDER — GLUCAGON INJECTION, SOLUTION 0.5 MG/.1ML
1 INJECTION, SOLUTION SUBCUTANEOUS ONCE
Qty: 0 | Refills: 0 | Status: DISCONTINUED | OUTPATIENT
Start: 2018-01-01 | End: 2018-01-01

## 2018-01-01 RX ORDER — PIPERACILLIN AND TAZOBACTAM 4; .5 G/20ML; G/20ML
3.38 INJECTION, POWDER, LYOPHILIZED, FOR SOLUTION INTRAVENOUS EVERY 12 HOURS
Qty: 0 | Refills: 0 | Status: DISCONTINUED | OUTPATIENT
Start: 2018-01-01 | End: 2018-01-01

## 2018-01-01 RX ORDER — METOPROLOL TARTRATE 50 MG
25 TABLET ORAL
Qty: 0 | Refills: 0 | Status: DISCONTINUED | OUTPATIENT
Start: 2018-01-01 | End: 2018-01-01

## 2018-01-01 RX ORDER — CEFAZOLIN SODIUM 1 G
1000 VIAL (EA) INJECTION EVERY 8 HOURS
Qty: 0 | Refills: 0 | Status: COMPLETED | OUTPATIENT
Start: 2018-01-01 | End: 2018-01-01

## 2018-01-01 RX ORDER — PROPOFOL 10 MG/ML
5 INJECTION, EMULSION INTRAVENOUS
Qty: 1000 | Refills: 0 | Status: DISCONTINUED | OUTPATIENT
Start: 2018-01-01 | End: 2018-01-01

## 2018-01-01 RX ORDER — PANTOPRAZOLE SODIUM 20 MG/1
40 TABLET, DELAYED RELEASE ORAL DAILY
Qty: 0 | Refills: 0 | Status: DISCONTINUED | OUTPATIENT
Start: 2018-01-01 | End: 2019-01-01

## 2018-01-01 RX ORDER — METOPROLOL TARTRATE 50 MG
12.5 TABLET ORAL
Qty: 0 | Refills: 0 | Status: DISCONTINUED | OUTPATIENT
Start: 2018-01-01 | End: 2018-01-01

## 2018-01-01 RX ORDER — DIGOXIN 250 MCG
0.25 TABLET ORAL ONCE
Qty: 0 | Refills: 0 | Status: COMPLETED | OUTPATIENT
Start: 2018-01-01 | End: 2018-01-01

## 2018-01-01 RX ORDER — DEXTROSE 50 % IN WATER 50 %
12.5 SYRINGE (ML) INTRAVENOUS ONCE
Qty: 0 | Refills: 0 | Status: DISCONTINUED | OUTPATIENT
Start: 2018-01-01 | End: 2018-01-01

## 2018-01-01 RX ORDER — SODIUM BICARBONATE 1 MEQ/ML
50 SYRINGE (ML) INTRAVENOUS ONCE
Qty: 0 | Refills: 0 | Status: COMPLETED | OUTPATIENT
Start: 2018-01-01 | End: 2018-01-01

## 2018-01-01 RX ORDER — ASCORBIC ACID 60 MG
500 TABLET,CHEWABLE ORAL
Qty: 0 | Refills: 0 | Status: DISCONTINUED | OUTPATIENT
Start: 2018-01-01 | End: 2019-01-01

## 2018-01-01 RX ORDER — IRON SUCROSE 20 MG/ML
200 INJECTION, SOLUTION INTRAVENOUS EVERY 24 HOURS
Qty: 0 | Refills: 0 | Status: COMPLETED | OUTPATIENT
Start: 2018-01-01 | End: 2019-01-01

## 2018-01-01 RX ORDER — ACETYLCYSTEINE 200 MG/ML
12 VIAL (ML) MISCELLANEOUS ONCE
Qty: 0 | Refills: 0 | Status: COMPLETED | OUTPATIENT
Start: 2018-01-01 | End: 2018-01-01

## 2018-01-01 RX ADMIN — Medication 12.5 MILLIGRAM(S): at 05:36

## 2018-01-01 RX ADMIN — PIPERACILLIN AND TAZOBACTAM 25 GRAM(S): 4; .5 INJECTION, POWDER, LYOPHILIZED, FOR SOLUTION INTRAVENOUS at 23:17

## 2018-01-01 RX ADMIN — Medication 100 MILLIGRAM(S): at 05:41

## 2018-01-01 RX ADMIN — Medication 1: at 12:32

## 2018-01-01 RX ADMIN — Medication 400 MILLIGRAM(S): at 22:02

## 2018-01-01 RX ADMIN — Medication 0.12 MILLIGRAM(S): at 12:29

## 2018-01-01 RX ADMIN — Medication 100 MILLIGRAM(S): at 00:30

## 2018-01-01 RX ADMIN — PIPERACILLIN AND TAZOBACTAM 25 GRAM(S): 4; .5 INJECTION, POWDER, LYOPHILIZED, FOR SOLUTION INTRAVENOUS at 06:31

## 2018-01-01 RX ADMIN — Medication 0.12 MILLIGRAM(S): at 21:31

## 2018-01-01 RX ADMIN — FENTANYL CITRATE 4.11 MICROGRAM(S)/KG/HR: 50 INJECTION INTRAVENOUS at 18:28

## 2018-01-01 RX ADMIN — Medication 100 MILLIGRAM(S): at 08:01

## 2018-01-01 RX ADMIN — Medication 1: at 17:30

## 2018-01-01 RX ADMIN — Medication 300 MILLIGRAM(S): at 08:06

## 2018-01-01 RX ADMIN — INSULIN HUMAN 10 UNIT(S): 100 INJECTION, SOLUTION SUBCUTANEOUS at 11:17

## 2018-01-01 RX ADMIN — TRAMADOL HYDROCHLORIDE 25 MILLIGRAM(S): 50 TABLET ORAL at 03:30

## 2018-01-01 RX ADMIN — SODIUM CHLORIDE 250 MILLILITER(S): 9 INJECTION, SOLUTION INTRAVENOUS at 20:30

## 2018-01-01 RX ADMIN — Medication 3: at 10:49

## 2018-01-01 RX ADMIN — ENOXAPARIN SODIUM 30 MILLIGRAM(S): 100 INJECTION SUBCUTANEOUS at 05:28

## 2018-01-01 RX ADMIN — Medication 3.85 MICROGRAM(S)/KG/MIN: at 09:52

## 2018-01-01 RX ADMIN — Medication 1 MILLIGRAM(S): at 12:02

## 2018-01-01 RX ADMIN — Medication 1: at 05:26

## 2018-01-01 RX ADMIN — CHLORHEXIDINE GLUCONATE 1 APPLICATION(S): 213 SOLUTION TOPICAL at 05:42

## 2018-01-01 RX ADMIN — Medication 400 MILLIGRAM(S): at 05:34

## 2018-01-01 RX ADMIN — Medication 0.12 MILLIGRAM(S): at 11:57

## 2018-01-01 RX ADMIN — FAMOTIDINE 20 MILLIGRAM(S): 10 INJECTION INTRAVENOUS at 05:41

## 2018-01-01 RX ADMIN — Medication 3.85 MICROGRAM(S)/KG/MIN: at 16:12

## 2018-01-01 RX ADMIN — Medication 5 MILLIGRAM(S): at 22:02

## 2018-01-01 RX ADMIN — Medication 500 MILLIGRAM(S): at 17:33

## 2018-01-01 RX ADMIN — CHLORHEXIDINE GLUCONATE 1 APPLICATION(S): 213 SOLUTION TOPICAL at 17:00

## 2018-01-01 RX ADMIN — Medication 100 MILLIGRAM(S): at 17:31

## 2018-01-01 RX ADMIN — Medication 4: at 05:41

## 2018-01-01 RX ADMIN — IRON SUCROSE 110 MILLIGRAM(S): 20 INJECTION, SOLUTION INTRAVENOUS at 17:30

## 2018-01-01 RX ADMIN — Medication 1000 MILLIGRAM(S): at 22:50

## 2018-01-01 RX ADMIN — TRAMADOL HYDROCHLORIDE 25 MILLIGRAM(S): 50 TABLET ORAL at 20:30

## 2018-01-01 RX ADMIN — Medication 250 MILLIGRAM(S): at 05:27

## 2018-01-01 RX ADMIN — Medication 4: at 23:40

## 2018-01-01 RX ADMIN — PREGABALIN 1000 MICROGRAM(S): 225 CAPSULE ORAL at 12:02

## 2018-01-01 RX ADMIN — PHENYLEPHRINE HYDROCHLORIDE 15.39 MICROGRAM(S)/KG/MIN: 10 INJECTION INTRAVENOUS at 07:07

## 2018-01-01 RX ADMIN — Medication 10 MILLIGRAM(S): at 11:58

## 2018-01-01 RX ADMIN — Medication 400 MILLIGRAM(S): at 22:27

## 2018-01-01 RX ADMIN — SODIUM CHLORIDE 75 MILLILITER(S): 9 INJECTION, SOLUTION INTRAVENOUS at 08:02

## 2018-01-01 RX ADMIN — Medication 10 MILLIGRAM(S): at 23:39

## 2018-01-01 RX ADMIN — Medication 4: at 06:31

## 2018-01-01 RX ADMIN — Medication 1: at 12:08

## 2018-01-01 RX ADMIN — Medication 300 MILLIGRAM(S): at 11:59

## 2018-01-01 RX ADMIN — Medication 250 MILLIGRAM(S): at 21:55

## 2018-01-01 RX ADMIN — Medication 100 MILLIGRAM(S): at 14:34

## 2018-01-01 RX ADMIN — Medication 100 MEQ/KG/HR: at 09:52

## 2018-01-01 RX ADMIN — FENTANYL CITRATE 0.82 MICROGRAM(S)/KG/HR: 50 INJECTION INTRAVENOUS at 05:27

## 2018-01-01 RX ADMIN — PIPERACILLIN AND TAZOBACTAM 25 GRAM(S): 4; .5 INJECTION, POWDER, LYOPHILIZED, FOR SOLUTION INTRAVENOUS at 05:26

## 2018-01-01 RX ADMIN — PROTHROMBIN COMPLEX CONCENTRATE (HUMAN) 400 INTERNATIONAL UNIT(S): 25.5; 16.5; 24; 22; 22; 26 POWDER, FOR SOLUTION INTRAVENOUS at 16:51

## 2018-01-01 RX ADMIN — Medication 1000 MILLIGRAM(S): at 23:00

## 2018-01-01 RX ADMIN — SODIUM CHLORIDE 1000 MILLILITER(S): 9 INJECTION INTRAMUSCULAR; INTRAVENOUS; SUBCUTANEOUS at 22:00

## 2018-01-01 RX ADMIN — Medication 650 MILLIGRAM(S): at 13:50

## 2018-01-01 RX ADMIN — Medication 310 GRAM(S): at 13:15

## 2018-01-01 RX ADMIN — INSULIN GLARGINE 15 UNIT(S): 100 INJECTION, SOLUTION SUBCUTANEOUS at 22:01

## 2018-01-01 RX ADMIN — Medication 500 MILLIGRAM(S): at 18:09

## 2018-01-01 RX ADMIN — ENOXAPARIN SODIUM 30 MILLIGRAM(S): 100 INJECTION SUBCUTANEOUS at 05:34

## 2018-01-01 RX ADMIN — TRAMADOL HYDROCHLORIDE 25 MILLIGRAM(S): 50 TABLET ORAL at 20:45

## 2018-01-01 RX ADMIN — Medication 3.85 MICROGRAM(S)/KG/MIN: at 18:29

## 2018-01-01 RX ADMIN — Medication 100 MILLIGRAM(S): at 13:16

## 2018-01-01 RX ADMIN — Medication 0.25 MILLIGRAM(S): at 08:10

## 2018-01-01 RX ADMIN — LINEZOLID 300 MILLIGRAM(S): 600 INJECTION, SOLUTION INTRAVENOUS at 17:31

## 2018-01-01 RX ADMIN — SODIUM POLYSTYRENE SULFONATE 15 GRAM(S): 4.1 POWDER, FOR SUSPENSION ORAL at 23:40

## 2018-01-01 RX ADMIN — CHLORHEXIDINE GLUCONATE 1 APPLICATION(S): 213 SOLUTION TOPICAL at 05:28

## 2018-01-01 RX ADMIN — Medication 325 MILLIGRAM(S): at 05:36

## 2018-01-01 RX ADMIN — Medication 500 MILLIGRAM(S): at 06:31

## 2018-01-01 RX ADMIN — Medication 100 MEQ/KG/HR: at 09:21

## 2018-01-01 RX ADMIN — SODIUM CHLORIDE 1333.33 MILLILITER(S): 9 INJECTION, SOLUTION INTRAVENOUS at 09:52

## 2018-01-01 RX ADMIN — Medication 500 MILLIGRAM(S): at 05:41

## 2018-01-01 RX ADMIN — Medication 1 MILLIGRAM(S): at 12:28

## 2018-01-01 RX ADMIN — Medication 2.5 MILLIGRAM(S): at 05:35

## 2018-01-01 RX ADMIN — Medication 50 MILLILITER(S): at 09:51

## 2018-01-01 RX ADMIN — SODIUM POLYSTYRENE SULFONATE 30 GRAM(S): 4.1 POWDER, FOR SUSPENSION ORAL at 11:57

## 2018-01-01 RX ADMIN — ERYTHROPOIETIN 10000 UNIT(S): 10000 INJECTION, SOLUTION INTRAVENOUS; SUBCUTANEOUS at 12:27

## 2018-01-01 RX ADMIN — ENOXAPARIN SODIUM 30 MILLIGRAM(S): 100 INJECTION SUBCUTANEOUS at 17:25

## 2018-01-01 RX ADMIN — Medication 4: at 18:10

## 2018-01-01 RX ADMIN — Medication 300 MILLIGRAM(S): at 12:01

## 2018-01-01 RX ADMIN — Medication 300 MILLIGRAM(S): at 18:08

## 2018-01-01 RX ADMIN — Medication 650 MILLIGRAM(S): at 05:15

## 2018-01-01 RX ADMIN — PIPERACILLIN AND TAZOBACTAM 25 GRAM(S): 4; .5 INJECTION, POWDER, LYOPHILIZED, FOR SOLUTION INTRAVENOUS at 17:37

## 2018-01-01 RX ADMIN — IRON SUCROSE 110 MILLIGRAM(S): 20 INJECTION, SOLUTION INTRAVENOUS at 18:29

## 2018-01-01 RX ADMIN — Medication 130.13 GRAM(S): at 14:15

## 2018-01-01 RX ADMIN — Medication 2: at 16:53

## 2018-01-01 RX ADMIN — PREGABALIN 1000 MICROGRAM(S): 225 CAPSULE ORAL at 12:08

## 2018-01-01 RX ADMIN — CHLORHEXIDINE GLUCONATE 1 APPLICATION(S): 213 SOLUTION TOPICAL at 06:32

## 2018-01-01 RX ADMIN — Medication 325 MILLIGRAM(S): at 19:36

## 2018-01-01 RX ADMIN — Medication 12.5 MILLIGRAM(S): at 22:52

## 2018-01-01 RX ADMIN — FENTANYL CITRATE 0.82 MICROGRAM(S)/KG/HR: 50 INJECTION INTRAVENOUS at 18:04

## 2018-01-01 RX ADMIN — Medication 1 MILLIGRAM(S): at 12:14

## 2018-01-01 RX ADMIN — FENTANYL CITRATE 4.11 MICROGRAM(S)/KG/HR: 50 INJECTION INTRAVENOUS at 18:09

## 2018-01-01 RX ADMIN — Medication 650 MILLIGRAM(S): at 04:44

## 2018-01-01 RX ADMIN — SODIUM CHLORIDE 1000 MILLILITER(S): 9 INJECTION INTRAMUSCULAR; INTRAVENOUS; SUBCUTANEOUS at 22:52

## 2018-01-01 RX ADMIN — PIPERACILLIN AND TAZOBACTAM 25 GRAM(S): 4; .5 INJECTION, POWDER, LYOPHILIZED, FOR SOLUTION INTRAVENOUS at 18:11

## 2018-01-01 RX ADMIN — SODIUM CHLORIDE 500 MILLILITER(S): 9 INJECTION, SOLUTION INTRAVENOUS at 01:38

## 2018-01-01 RX ADMIN — CHLORHEXIDINE GLUCONATE 1 APPLICATION(S): 213 SOLUTION TOPICAL at 17:36

## 2018-01-01 RX ADMIN — Medication 500 MILLIGRAM(S): at 17:36

## 2018-01-01 RX ADMIN — Medication 10 MILLIGRAM(S): at 16:53

## 2018-01-01 RX ADMIN — PANTOPRAZOLE SODIUM 40 MILLIGRAM(S): 20 TABLET, DELAYED RELEASE ORAL at 12:27

## 2018-01-01 RX ADMIN — SODIUM CHLORIDE 60 MILLILITER(S): 9 INJECTION INTRAMUSCULAR; INTRAVENOUS; SUBCUTANEOUS at 19:44

## 2018-01-01 RX ADMIN — Medication 5 MILLIGRAM(S): at 09:50

## 2018-01-01 RX ADMIN — PREGABALIN 1000 MICROGRAM(S): 225 CAPSULE ORAL at 12:37

## 2018-01-01 RX ADMIN — MEROPENEM 100 MILLIGRAM(S): 1 INJECTION INTRAVENOUS at 17:30

## 2018-01-01 RX ADMIN — Medication 65 GRAM(S): at 18:15

## 2018-01-01 RX ADMIN — Medication 0.5 MILLIGRAM(S): at 13:12

## 2018-01-01 RX ADMIN — Medication 50 MILLIEQUIVALENT(S): at 09:51

## 2018-01-01 RX ADMIN — Medication 0.25 MILLIGRAM(S): at 16:42

## 2018-01-01 RX ADMIN — FENTANYL CITRATE 4.11 MICROGRAM(S)/KG/HR: 50 INJECTION INTRAVENOUS at 08:06

## 2018-01-01 RX ADMIN — CHLORHEXIDINE GLUCONATE 1 APPLICATION(S): 213 SOLUTION TOPICAL at 17:31

## 2018-01-01 RX ADMIN — SODIUM CHLORIDE 250 MILLILITER(S): 9 INJECTION, SOLUTION INTRAVENOUS at 05:36

## 2018-01-01 RX ADMIN — CHLORHEXIDINE GLUCONATE 1 APPLICATION(S): 213 SOLUTION TOPICAL at 18:11

## 2018-01-01 RX ADMIN — SODIUM CHLORIDE 2000 MILLILITER(S): 9 INJECTION INTRAMUSCULAR; INTRAVENOUS; SUBCUTANEOUS at 07:45

## 2018-01-01 RX ADMIN — Medication 3: at 17:36

## 2018-01-01 RX ADMIN — Medication 250 MILLIGRAM(S): at 22:00

## 2018-01-01 RX ADMIN — Medication 3: at 12:02

## 2018-01-01 RX ADMIN — FENTANYL CITRATE 0.82 MICROGRAM(S)/KG/HR: 50 INJECTION INTRAVENOUS at 05:37

## 2018-01-01 RX ADMIN — Medication 4: at 23:46

## 2018-01-01 RX ADMIN — PANTOPRAZOLE SODIUM 40 MILLIGRAM(S): 20 TABLET, DELAYED RELEASE ORAL at 16:52

## 2018-01-01 RX ADMIN — VASOPRESSIN 2.4 UNIT(S)/MIN: 20 INJECTION INTRAVENOUS at 17:37

## 2018-01-01 RX ADMIN — Medication 10 MILLIGRAM(S): at 12:26

## 2018-01-01 RX ADMIN — PREGABALIN 1000 MICROGRAM(S): 225 CAPSULE ORAL at 12:26

## 2018-01-01 RX ADMIN — FAMOTIDINE 20 MILLIGRAM(S): 10 INJECTION INTRAVENOUS at 18:10

## 2018-01-01 RX ADMIN — PIPERACILLIN AND TAZOBACTAM 25 GRAM(S): 4; .5 INJECTION, POWDER, LYOPHILIZED, FOR SOLUTION INTRAVENOUS at 05:41

## 2018-12-26 NOTE — ED PROVIDER NOTE - MEDICAL DECISION MAKING DETAILS
79 y/o F pt with probably hip fx. Pt does not want anything stronger than Tylenol at this time. Will check X-ray and preop labs.

## 2018-12-26 NOTE — H&P ADULT - COMMENTS
EYES: No acute visual disturbances  RESPIRATORY: No cough; No shortness of breath  CARDIOVASCULAR: No chest pain, no palpitations  GASTROINTESTINAL: No pain. No nausea or vomiting; No diarrhea   NEUROLOGICAL: No headache or numbness, no tremors  MUSCULOSKELETAL: L hip per HPI

## 2018-12-26 NOTE — ED PROVIDER NOTE - OBJECTIVE STATEMENT
79 y/o F pt with no significant PMHx reported presents to ED c/o L hip pain s/p trip and fall from a curb x PTA. Pt describes pain as sharp, mild and constant. Patient denies LOC, chest pain, shortness of breath, or any other complaints. 77 y/o F pt with no significant PMHx reported presents to ED c/o L hip pain s/p trip and fall from a curb x PTA. Pt describes pain as sharp, mild and constant. Patient denies head strike, LOC, chest pain, shortness of breath, or any other complaints. 79 y/o F pt with htn, dm2, PMHx reported presents to ED c/o L hip pain s/p trip and fall from a curb x PTA. Pt describes pain as sharp, mild and constant. Patient denies head strike, LOC, chest pain, shortness of breath, or any other complaints.

## 2018-12-26 NOTE — H&P ADULT - HISTORY OF PRESENT ILLNESS
77 y/o F pt with HTN, DM2, PMHx presents to ED c/o L hip pain s/p trip and fall from a curb today. Pt describes pain as sharp, mild and constant, 10/10 in severity. Pain of the left hip increases with ROM. Pt unable to ambulate nor bear weight. Patient denies head strike, LOC, N/V, vision changes, neurological deficits chest pain, palpitations, shortness of breath, or any other complaints. XR hip showed nondisplaced intertrochanteric fracture left femoral neck and marked osteoporosis. XR hip showed nondisplaced intertrochanteric fracture left femoral neck and marked osteoporosis.

## 2018-12-26 NOTE — ED ADULT NURSE REASSESSMENT NOTE - GENERAL PATIENT STATE
patient care endorsed to Rn Elysse/resting/sleeping/smiling/interactive/comfortable appearance/cooperative

## 2018-12-26 NOTE — H&P ADULT - PROBLEM SELECTOR PLAN 1
-XR hip showed nondisplaced intertrochanteric fracture left femoral neck and marked osteoporosis.  -Pain management  -DVT prophylaxis with SCD  -NPO after midnight for possible L hip IM nailing by Ortho tomorrow by Dr. Bruce  -Dc planning tomorrow after procedure

## 2018-12-26 NOTE — CONSULT NOTE ADULT - SUBJECTIVE AND OBJECTIVE BOX
MARICEL SLAUGHTER  MRN-[id number]     ORTHOPEDIC CONSULT:    Diagnosis:  Left_ Hip Intertrochanteric Fracture     78yFemale was seen s/p trip and fall on curb this morning around 10am where she sustained a left hip fracture.   community ambulator, uses no assisted device. Pain of the left hip increases with ROM. Pt unable to ambulate nor bear weight.  Patient was seen and evaluated at bedside. Patient with no acute complaints.   Denies CP/SOB, dyspnea, paresthesias, N/V/D, palpitations.     Vital Signs Last 24 Hrs  T(C): 36.7 (26 Dec 2018 12:22), Max: 36.7 (26 Dec 2018 12:22)  T(F): 98.1 (26 Dec 2018 12:22), Max: 98.1 (26 Dec 2018 12:22)  HR: 70 (26 Dec 2018 12:22) (70 - 70)  BP: 112/71 (26 Dec 2018 12:22) (112/71 - 112/71)  BP(mean): --  RR: 16 (26 Dec 2018 12:22) (16 - 16)  SpO2: 99% (26 Dec 2018 12:22) (99% - 99%)  I&O's Detail      Physical Exam:    General: AAOx3, NAD, resting comfortably in bed.    Left_ Hip:   Skin is pink and warm. Tender at the fracture site. Decreased ROM of the affected hip due to pain. SILT.  Positive logroll test.  Lower extremity:  No calf tenderness, calves are soft. 2+pulses. NVI. 5/5 Strength of EHL/TA/gastrocnemius B/L.  Good capillary refill. Warm, well-perfused.                           10.9   10.7  )-----------( 184      ( 26 Dec 2018 13:10 )             37.1     12-26    141  |  110<H>  |  33<H>  ----------------------------<  161<H>  4.5   |  23  |  0.90    Ca    8.6      26 Dec 2018 13:10    TPro  6.8  /  Alb  3.7  /  TBili  0.7  /  DBili  x   /  AST  13  /  ALT  16  /  AlkPhos  81  12-26    PT/INR - ( 26 Dec 2018 14:06 )   PT: 12.1 sec;   INR: 1.09 ratio         PTT - ( 26 Dec 2018 14:06 )  PTT:24.6 sec    < from: Xray Hip 2-3 Views, Left (12.26.18 @ 13:32) >    EXAM:  XR FEMUR 2 VIEWS LT                          EXAM:  XR HIP 2-3V LT                            PROCEDURE DATE:  12/26/2018          INTERPRETATION:  LEFT HIP AND LEFT FEMUR X-RAY:    CLINICAL HISTORY: 78 years year old Female with l hip pain.    COMPARISON: None    AP and frog-leg lateral views of the left hip were obtained with AP and   lateral views of the left femur and demonstrate a nondisplaced   intertrochanteric fracture of the left femoral neck. No other fracture or   lytic or blastic lesion is seen. Acetabular osteophytes are present   secondary to hip osteoarthritis.    Dense vascular calcifications are present throughout the soft tissues of   the leg.    There are degenerative changes about the knee incidentally noted with   narrowing of the medial lateral compartments and lateral osteophytes as   well as chondrocalcinosis of the medial meniscus.    Bones are diffusely osteoporotic    IMPRESSION:    Nondisplaced intertrochanteric fracture left femoral neck. Marked   osteoporosis.    Other nonurgent findings as above.    Findings discussed with Dr. Hoang Tong in the emergency room at   12/26/2018 2:38 PM with readback.                 MELVINA MARTEL M.D., ATTENDING RADIOLOGIST  This document has been electronically signed. Dec 26 2018  2:42PM                < end of copied text >      Impression:  78yFemale with Left Hip Intertrochanteric Fracture    Plan:  -  Pain management  -  Dvt prophylaxis with Venodynes  -  Keep NPO after midnight tonight  -  Surgeon:  Kendrick Yi (ortho)  -  Procedure:  _Let hip IM nailing  -  For Surgery on 12/27/2018  -  Medical Optimization  -  Consent: Pending  -  Case d/w   - Fracture care, bedrest. NWB of the affected extremity.

## 2018-12-26 NOTE — H&P ADULT - NSHPPHYSICALEXAM_GEN_ALL_CORE
GENERAL: NAD  HEENT: Normocephalic;  conjunctivae and sclerae clear; moist mucous membranes;   NECK : Supple  CHEST/LUNG: Clear to auscultation bilaterally with good air entry   HEART: S1 S2  regular  ABDOMEN: Soft, Nontender, Nondistended; Bowel sounds present  EXTREMITIES: Unable to assess secondary to severe L hip pain  NERVOUS SYSTEM:  Awake and alert; no new deficits

## 2018-12-26 NOTE — H&P ADULT - ASSESSMENT
79 y/o F pt with HTN, DM2, PMHx presents to ED c/o L hip pain s/p trip and fall from a curb today. Pt describes pain as sharp, mild and constant, 10/10 in severity. Pain of the left hip increases with ROM. Pt unable to ambulate nor bear weight. Patient denies head strike, LOC, N/V, vision changes, neurological deficits chest pain, palpitations, shortness of breath, or any other complaints. XR hip showed nondisplaced intertrochanteric fracture left femoral neck and marked osteoporosis. XR hip showed nondisplaced intertrochanteric fracture left femoral neck and marked osteoporosis.

## 2018-12-26 NOTE — H&P ADULT - PROBLEM SELECTOR PLAN 2
IMPROVE VTE Individual Risk Assessment          RISK                                                          Points  [  ] Previous VTE                                                3  [  ] Thrombophilia                                             2  [  ] Lower limb paralysis                                   2        (unable to hold up >15 seconds)    [  ] Current Cancer                                             2         (within 6 months)  [ x ] Immobilization > 24 hrs                              1  [  ] ICU/CCU stay > 24 hours                             1  [x  ] Age > 60                                                         1    IMPROVE VTE Score: 2   Lovenox held for now for likely procedure tomorrow, put on SCD  Protonix for gi ppx

## 2018-12-27 NOTE — PROGRESS NOTE ADULT - PROBLEM SELECTOR PLAN 3
Elevated heart rate was noted on telemetry.  Cardiology recommendations noted.  Continue beta blocker, aspirin, digoxin

## 2018-12-27 NOTE — PROGRESS NOTE ADULT - SUBJECTIVE AND OBJECTIVE BOX
PGY 1 Note discussed with supervising resident and primary attending    Patient is a 78y old  Female who presents with a chief complaint of Fall (27 Dec 2018 10:52)      INTERVAL HPI/OVERNIGHT EVENTS: Pt seen and examined at bedside. Pt has no new complains other than mild pain at left hip.    MEDICATIONS  (STANDING):  aspirin enteric coated 81 milliGRAM(s) Oral daily  dextrose 50% Injectable 12.5 Gram(s) IV Push once  dextrose 50% Injectable 25 Gram(s) IV Push once  dextrose 50% Injectable 25 Gram(s) IV Push once  digoxin  Injectable 0.5 milliGRAM(s) IV Push once  ferrous    sulfate 325 milliGRAM(s) Oral two times a day  insulin lispro (HumaLOG) corrective regimen sliding scale   SubCutaneous three times a day before meals  metoprolol tartrate 25 milliGRAM(s) Oral two times a day    MEDICATIONS  (PRN):  acetaminophen   Tablet .. 650 milliGRAM(s) Oral every 6 hours PRN Mild Pain (1 - 3)  dextrose 40% Gel 15 Gram(s) Oral once PRN Blood Glucose LESS THAN 70 milliGRAM(s)/deciLiter  glucagon  Injectable 1 milliGRAM(s) IntraMuscular once PRN Glucose <70 milliGRAM(s)/deciLiter  traMADol 25 milliGRAM(s) Oral three times a day PRN Moderate Pain (4 - 6)      __________________________________________________  REVIEW OF SYSTEMS:    CONSTITUTIONAL: No fever,   EYES: no acute visual disturbances  NECK: No pain or stiffness  RESPIRATORY: No cough; No shortness of breath  CARDIOVASCULAR: No chest pain, no palpitations  GASTROINTESTINAL: No pain. No nausea or vomiting; No diarrhea   NEUROLOGICAL: No headache or numbness, no tremors  MUSCULOSKELETAL: No joint pain, no muscle pain  GENITOURINARY: no dysuria, no frequency, no hesitancy  PSYCHIATRY: no depression , no anxiety  ALL OTHER  ROS negative        Vital Signs Last 24 Hrs  T(C): 36.8 (27 Dec 2018 12:10), Max: 37.4 (27 Dec 2018 01:29)  T(F): 98.3 (27 Dec 2018 12:10), Max: 99.3 (27 Dec 2018 01:29)  HR: 81 (27 Dec 2018 12:10) (79 - 91)  BP: 132/57 (27 Dec 2018 12:10) (114/58 - 132/57)  BP(mean): --  RR: 20 (27 Dec 2018 12:10) (17 - 20)  SpO2: 92% (27 Dec 2018 12:10) (92% - 98%)    ________________________________________________  PHYSICAL EXAM:  GENERAL: NAD  HEENT: Normocephalic;  conjunctivae and sclerae clear; moist mucous membranes;   NECK : supple  CHEST/LUNG: Clear to auscultation bilaterally with good air entry   HEART: S1 S2  regular; no murmurs, gallops or rubs  ABDOMEN: Soft, Nontender, Nondistended; Bowel sounds present  EXTREMITIES: no cyanosis; no edema; no calf tenderness. Left hip shortened and externally rotated.  SKIN: warm and dry; no rash  NERVOUS SYSTEM:  Awake and alert; Oriented  to place, person and time ; no new deficits    _________________________________________________  LABS:                        10.3   11.1  )-----------( 191      ( 27 Dec 2018 06:50 )             35.5     12-27    142  |  109<H>  |  37<H>  ----------------------------<  158<H>  4.2   |  23  |  0.99    Ca    8.6      27 Dec 2018 06:50  Phos  3.3     12-27  Mg     2.1     12-27    TPro  6.6  /  Alb  3.5  /  TBili  1.6<H>  /  DBili  x   /  AST  9<L>  /  ALT  13  /  AlkPhos  79  12-27    PT/INR - ( 26 Dec 2018 14:06 )   PT: 12.1 sec;   INR: 1.09 ratio         PTT - ( 26 Dec 2018 14:06 )  PTT:24.6 sec    CAPILLARY BLOOD GLUCOSE      POCT Blood Glucose.: 156 mg/dL (27 Dec 2018 12:27)  POCT Blood Glucose.: 166 mg/dL (27 Dec 2018 09:03)  POCT Blood Glucose.: 165 mg/dL (26 Dec 2018 21:43)            Plan of care was discussed with patient and /or primary care giver; all questions and concerns were addressed and care was aligned with patient's wishes.

## 2018-12-27 NOTE — PROGRESS NOTE ADULT - PROBLEM SELECTOR PLAN 1
-XR hip showed nondisplaced intertrochanteric fracture left femoral neck and marked osteoporosis.  -Pain management  -DVT prophylaxis with SCD  -Plan for Lt. him IM nailing -XR hip showed nondisplaced intertrochanteric fracture left femoral neck and marked osteoporosis.  -Pain management  -Plan for Lt. hip IM nailing today.  -DVT prophylaxis with SCD  -Plan for Lt. him IM nailing

## 2018-12-27 NOTE — CONSULT NOTE ADULT - ASSESSMENT
77 y/o F pt with PMHX of  HTN, DM2, who presents to ED c/o L hip pain s/p trip and fall from a curb,SVT.  1.Tele monitoring.  2.Echocardiogram.  3.PSVT-asa,b blocker.  4.DM-Insulin.  5.If echo-wnl, proceed at moderate risk for ridge-operative cardiac complication.  6.Ortho eval noted.  7.GI and DVT prophylaxis. 79 y/o F pt with PMHX of  HTN, DM2, who presents to ED c/o L hip pain s/p trip and fall from a curb, SVT and severe lv dysfunction.  1.Tele monitoring.  2.Echocardiogram reveals severe lv dysfunction, pt at moderate risk for ridge-operative cardiac complication. Will need post-op tele monitoring.  3.PSVT-add asa,inc b blocker to 25mg bid ,and dig .5mg ivx1.  4.DM-Insulin.  5.Ortho eval noted.  6.GI and DVT prophylaxis.

## 2018-12-27 NOTE — CONSULT NOTE ADULT - SUBJECTIVE AND OBJECTIVE BOX
Patient is a 78y old  Female who presents with a chief complaint of fall (27 Dec 2018 12:47)      Initial HPI on admission:  HPI:  77 y/o F pt with HTN, DM2, PMHx presents to ED c/o L hip pain s/p trip and fall from a curb today. Pt describes pain as sharp, mild and constant, 10/10 in severity. Pain of the left hip increases with ROM. Pt unable to ambulate nor bear weight. Patient denies head strike, LOC, N/V, vision changes, neurological deficits chest pain, palpitations, shortness of breath, or any other complaints. XR hip showed nondisplaced intertrochanteric fracture left femoral neck and marked osteoporosis. XR hip showed nondisplaced intertrochanteric fracture left femoral neck and marked osteoporosis. (26 Dec 2018 19:35)      BRIEF HOSPITAL COURSE:   Patient admitted to medicine floor for left hip fracture. Ortho was consulted. Patient cleared by cardiology at moderate risk. OR today for left IM nailing. Patient was given general anesthesia and intubated for the procedure. ABG prior to intubation on room air 7.38/38/43. Patient received 400 cc IV fluids and EBL was 100 cc. Singh was placed and urine output noted to be around 400 cc. Left A line in place.  Patient brought to ICU for post op monitoring.       PAST MEDICAL & SURGICAL HISTORY:  No pertinent past medical history  No significant past surgical history    Allergies    No Known Allergies    Intolerances      FAMILY HISTORY:  No pertinent family history in first degree relatives    Social history reviewed:   No tobacco, alcohol or illicit drug use.     Review of Systems:    Unable to obtain as patient is intubated.       Medications:  acetaminophen   Tablet .. 650 milliGRAM(s) Oral every 6 hours PRN  aspirin enteric coated 81 milliGRAM(s) Oral daily  dextrose 40% Gel 15 Gram(s) Oral once PRN  dextrose 50% Injectable 12.5 Gram(s) IV Push once  dextrose 50% Injectable 25 Gram(s) IV Push once  dextrose 50% Injectable 25 Gram(s) IV Push once  fentaNYL   Infusion. 0.5 MICROgram(s)/kG/Hr IV Continuous <Continuous>  ferrous    sulfate 325 milliGRAM(s) Oral two times a day  glucagon  Injectable 1 milliGRAM(s) IntraMuscular once PRN  insulin lispro (HumaLOG) corrective regimen sliding scale   SubCutaneous three times a day before meals  metoprolol tartrate 25 milliGRAM(s) Oral two times a day  propofol Infusion 5 MICROgram(s)/kG/Min IV Continuous <Continuous>  traMADol 25 milliGRAM(s) Oral three times a day PRN      vent settings  Mode: AC/ CMV (Assist Control/ Continuous Mandatory Ventilation)  RR (machine): 16  TV (machine): 450  FiO2: 60  PEEP: 5  ITime: 1  MAP: 15  PIP: 33      Vital Signs Last 24 Hrs  T(C): 36.8 (27 Dec 2018 14:02), Max: 37.4 (27 Dec 2018 01:29)  T(F): 98.3 (27 Dec 2018 14:02), Max: 99.3 (27 Dec 2018 01:29)  HR: 81 (27 Dec 2018 14:02) (81 - 91)  BP: 132/57 (27 Dec 2018 14:02) (114/58 - 132/57)  BP(mean): --  RR: 20 (27 Dec 2018 12:10) (17 - 20)  SpO2: 93% (27 Dec 2018 14:02) (92% - 95%)    ABG - ( 27 Dec 2018 16:27 )  pH, Arterial: 7.38  pH, Blood: x     /  pCO2: 38    /  pO2: 43    / HCO3: 22    / Base Excess: -2.1  /  SaO2: 76                        LABS:                        10.3   11.1  )-----------( 191      ( 27 Dec 2018 06:50 )             35.5     12-27    142  |  109<H>  |  37<H>  ----------------------------<  158<H>  4.2   |  23  |  0.99    Ca    8.6      27 Dec 2018 06:50  Phos  3.3     12-27  Mg     2.1     12-27    TPro  6.6  /  Alb  3.5  /  TBili  1.6<H>  /  DBili  x   /  AST  9<L>  /  ALT  13  /  AlkPhos  79  12-27      CARDIAC MARKERS ( 27 Dec 2018 11:50 )  <0.015 ng/mL / x     / 48 U/L / x     / 2.1 ng/mL  CARDIAC MARKERS ( 26 Dec 2018 22:54 )  <0.015 ng/mL / x     / 50 U/L / x     / 2.2 ng/mL      CAPILLARY BLOOD GLUCOSE      POCT Blood Glucose.: 156 mg/dL (27 Dec 2018 12:27)    PT/INR - ( 26 Dec 2018 14:06 )   PT: 12.1 sec;   INR: 1.09 ratio         PTT - ( 26 Dec 2018 14:06 )  PTT:24.6 sec        Physical Examination:    General: intubated and sedated   HEENT: Pupils equal, reactive to light.  PULM: Clear to auscultation bilaterally, no significant sputum production  CVS: simus tachycardia, no murmurs, rubs, or gallops  ABD: Soft, nondistended, nontender, normoactive bowel sounds  EXT: No edema, nontender  SKIN: Warm and well perfused  NEURO: sedated           CRITICAL CARE TIME SPENT: 35 minutes

## 2018-12-27 NOTE — PROGRESS NOTE ADULT - SUBJECTIVE AND OBJECTIVE BOX
Pt Name: MARICEL SLAUGHTER  MRN: [id number]    ORTHOPEDICS PRE-OP NOTE    Orthopedic diagnosis: Lt hip IT fx    78yFemale evaluated at bedside today with no acute complaints. Pain controlled today.  Pt denies Chest pain, SOB, dyspnea, paresthesias, N/V/D, abdominal pain, syncope, or pain anywhere else.   For surgery today. On add-on.       T(C): 36.7 (12-27-18 @ 05:29), Max: 37.4 (12-27-18 @ 01:29)  HR: 87 (12-27-18 @ 05:29) (70 - 91)  BP: 119/63 (12-27-18 @ 05:29) (112/71 - 121/55)  RR: 19 (12-27-18 @ 05:29) (16 - 19)  SpO2: 93% (12-27-18 @ 05:29) (93% - 99%)    PHYSICAL EXAM:    Gen: well developed, well nourished, comfortable  Musculoskeletal:    [   ] RIGHT    [  X  ] LEFT       [   ] UPPER EXTREMITY   [ X  ] LOWER EXTREMITY  Left hip skin intact, warm. no open lesions  SILT. Shortened  and externally rotated. NVI  compartments soft. Calves soft no ct.   2+ pedal pulses, brisk. <2sec cap refill.       LABS:                        10.3   11.1  )-----------( 191      ( 27 Dec 2018 06:50 )             35.5     12-27    142  |  109<H>  |  37<H>  ----------------------------<  158<H>  4.2   |  23  |  0.99    Ca    8.6      27 Dec 2018 06:50  Phos  3.3     12-27  Mg     2.1     12-27    TPro  6.6  /  Alb  3.5  /  TBili  1.6<H>  /  DBili  x   /  AST  9<L>  /  ALT  13  /  AlkPhos  79  12-27    PT/INR - ( 26 Dec 2018 14:06 )   PT: 12.1 sec;   INR: 1.09 ratio         PTT - ( 26 Dec 2018 14:06 )  PTT:24.6 sec    IMPRESSION: Pt is a  78y Female with Lt hip IT fx    PLAN:  -  Recommendation:  Surgical treatment/fixation today  --> On add-on for today  -   Pain control  -   DVT prophylaxis with venodynes  -   Hold anticoagulants today  -   Surgeon: Dr Kendrick Bruce  -   Procedure: Left hip IM nailing  -   Case d/w   -   Needs medical clearance prior to OR today at 2pm.  Dr Castillo aware.   -   Fracture care, bedrest. Pt Name: MARICEL SLAUGHTER  MRN: [id number]    ORTHOPEDICS PRE-OP NOTE    Orthopedic diagnosis: Lt hip IT fx    78yFemale evaluated at bedside today with no acute complaints. Pain controlled today.  Pt denies Chest pain, SOB, dyspnea, paresthesias, N/V/D, abdominal pain, syncope, or pain anywhere else.   For surgery today. On add-on.       T(C): 36.7 (12-27-18 @ 05:29), Max: 37.4 (12-27-18 @ 01:29)  HR: 87 (12-27-18 @ 05:29) (70 - 91)  BP: 119/63 (12-27-18 @ 05:29) (112/71 - 121/55)  RR: 19 (12-27-18 @ 05:29) (16 - 19)  SpO2: 93% (12-27-18 @ 05:29) (93% - 99%)    PHYSICAL EXAM:    Gen: well developed, well nourished, comfortable  Musculoskeletal:    [   ] RIGHT    [  X  ] LEFT       [   ] UPPER EXTREMITY   [ X  ] LOWER EXTREMITY  Left hip skin intact, warm. no open lesions  SILT. Shortened  and externally rotated. NVI  compartments soft. Calves soft no ct.   2+ pedal pulses, brisk. <2sec cap refill.       LABS:                        10.3   11.1  )-----------( 191      ( 27 Dec 2018 06:50 )             35.5     12-27    142  |  109<H>  |  37<H>  ----------------------------<  158<H>  4.2   |  23  |  0.99    Ca    8.6      27 Dec 2018 06:50  Phos  3.3     12-27  Mg     2.1     12-27    TPro  6.6  /  Alb  3.5  /  TBili  1.6<H>  /  DBili  x   /  AST  9<L>  /  ALT  13  /  AlkPhos  79  12-27    PT/INR - ( 26 Dec 2018 14:06 )   PT: 12.1 sec;   INR: 1.09 ratio         PTT - ( 26 Dec 2018 14:06 )  PTT:24.6 sec    IMPRESSION: Pt is a  78y Female with Lt hip IT fx    PLAN:  -  Recommendation:  Surgical treatment/fixation today  --> On add-on for today  -   Pain control  -   DVT prophylaxis with venodynes  -   Hold anticoagulants today  -   Surgeon: Dr Kendrick Bruce  -   Procedure: Left hip IM nailing  -   Case d/w   -   Needs medical clearance prior to OR today at 2pm.  Dr Castillo aware.   -   Fracture care, bedrest.

## 2018-12-27 NOTE — CONSULT NOTE ADULT - SUBJECTIVE AND OBJECTIVE BOX
CHIEF COMPLAINT:Patient is a 78y old  Female who presents with a chief complaint of fall.      HPI:  79 y/o F pt with PMHX of  HTN, DM2, who presents to ED c/o L hip pain s/p trip and fall from a curb today. Pt describes pain as sharp, mild and constant, 10/10 in severity. Pain of the left hip increases with ROM. Pt unable to ambulate nor bear weight. Patient denies head strike, LOC, N/V, vision changes, neurological deficits chest pain, palpitations, shortness of breath, or any other complaints. XR hip showed nondisplaced intertrochanteric fracture left femoral neck and marked osteoporosis. XR hip showed nondisplaced intertrochanteric fracture left femoral neck and marked osteoporosis. (26 Dec 2018 19:35)      PAST MEDICAL & SURGICAL HISTORY:  DM  HTN    MEDICATIONS  (STANDING):  dextrose 5%. 1000 milliLiter(s) (50 mL/Hr) IV Continuous <Continuous>  dextrose 50% Injectable 12.5 Gram(s) IV Push once  dextrose 50% Injectable 25 Gram(s) IV Push once  dextrose 50% Injectable 25 Gram(s) IV Push once  ferrous    sulfate 325 milliGRAM(s) Oral two times a day  insulin lispro (HumaLOG) corrective regimen sliding scale   SubCutaneous three times a day before meals  metoprolol tartrate 12.5 milliGRAM(s) Oral two times a day  sodium chloride 0.9%. 1000 milliLiter(s) (60 mL/Hr) IV Continuous <Continuous>  sodium chloride 0.9%. 1000 milliLiter(s) (60 mL/Hr) IV Continuous <Continuous>    MEDICATIONS  (PRN):  acetaminophen   Tablet .. 650 milliGRAM(s) Oral every 6 hours PRN Mild Pain (1 - 3)  dextrose 40% Gel 15 Gram(s) Oral once PRN Blood Glucose LESS THAN 70 milliGRAM(s)/deciLiter  glucagon  Injectable 1 milliGRAM(s) IntraMuscular once PRN Glucose <70 milliGRAM(s)/deciLiter  traMADol 25 milliGRAM(s) Oral three times a day PRN Moderate Pain (4 - 6)      FAMILY HISTORY: No hx of CAD      SOCIAL HISTORY:    [ x] Non-smoker    [x ] Alcohol-denies    Allergies    No Known Allergies    Intolerances    	    REVIEW OF SYSTEMS:  CONSTITUTIONAL: No fever, weight loss, or fatigue  EYES: No eye pain, visual disturbances, or discharge  ENT:  No difficulty hearing, tinnitus, vertigo; No sinus or throat pain  NECK: No pain or stiffness  RESPIRATORY: No cough, wheezing, chills or hemoptysis; No Shortness of Breath  CARDIOVASCULAR: No chest pain, palpitations, passing out, dizziness, or leg swelling  GASTROINTESTINAL: No abdominal or epigastric pain. No nausea, vomiting, or hematemesis; No diarrhea or constipation. No melena or hematochezia.  GENITOURINARY: No dysuria, frequency, hematuria, or incontinence  NEUROLOGICAL: No headaches, memory loss, loss of strength, numbness, or tremors  SKIN: No itching, burning, rashes, or lesions   LYMPH Nodes: No enlarged glands  ENDOCRINE: No heat or cold intolerance; No hair loss  MUSCULOSKELETAL: No joint pain or swelling; No muscle, back, + extremity pain  PSYCHIATRIC: No depression, anxiety, mood swings, or difficulty sleeping  HEME/LYMPH: No easy bruising, or bleeding gums  ALLERGY AND IMMUNOLOGIC: No hives or eczema	    PHYSICAL EXAM:  T(C): 36.7 (12-27-18 @ 05:29), Max: 37.4 (12-27-18 @ 01:29)  HR: 87 (12-27-18 @ 05:29) (70 - 91)  BP: 119/63 (12-27-18 @ 05:29) (112/71 - 121/55)  RR: 19 (12-27-18 @ 05:29) (16 - 19)  SpO2: 93% (12-27-18 @ 05:29) (93% - 99%)  Wt(kg): --  I&O's Summary      Appearance: Normal	  HEENT:   Normal oral mucosa, PERRL, EOMI	  Lymphatic: No lymphadenopathy  Cardiovascular: Normal S1 S2, No JVD, No murmurs, No edema  Respiratory: Lungs clear to auscultation	  Psychiatry: A & O x 3, Mood & affect appropriate  Gastrointestinal:  Soft, Non-tender, + BS	  Skin: No rashes, No ecchymoses, No cyanosis	  Neurologic: Non-focal  Extremities: Normal range of motion, No clubbing, cyanosis or edema  Vascular: Peripheral pulses palpable 2+ bilaterally    TELEMETRY: 	nsr with brief SVT    ECG:  	SVT with LBBB    	  LABS:	 	    CARDIAC MARKERS:  CARDIAC MARKERS ( 26 Dec 2018 22:54 )  <0.015 ng/mL / x     / 50 U/L / x     / 2.2 ng/mL                         10.3   11.1  )-----------( 191      ( 27 Dec 2018 06:50 )             35.5     12-27    142  |  109<H>  |  37<H>  ----------------------------<  158<H>  4.2   |  23  |  0.99    Ca    8.6      27 Dec 2018 06:50  Phos  3.3     12-27  Mg     2.1     12-27    TPro  6.6  /  Alb  3.5  /  TBili  1.6<H>  /  DBili  x   /  AST  9<L>  /  ALT  13  /  AlkPhos  79  12-27      Lipid Profile: Cholesterol 82  LDL 22  HDL 40      HgA1c: Hemoglobin A1C, Whole Blood: 6.1 % (12-27 @ 09:51)  Hemoglobin A1C, Whole Blood: 6.2 % (12-26 @ 23:12)    TSH: Thyroid Stimulating Hormone, Serum: 1.19 uU/mL (12-27 @ 06:50)        EXAM:  XR FEMUR 2 VIEWS LT                          EXAM:  XR HIP 2-3V LT                            PROCEDURE DATE:  12/26/2018          INTERPRETATION:  LEFT HIP AND LEFT FEMUR X-RAY:    CLINICAL HISTORY: 78 years year old Female with l hip pain.    COMPARISON: None    AP and frog-leg lateral views of the left hip were obtained with AP and   lateral views of the left femur and demonstrate a nondisplaced   intertrochanteric fracture of the left femoral neck. No other fracture or   lytic or blastic lesion is seen. Acetabular osteophytes are present   secondary to hip osteoarthritis.    Dense vascular calcifications are present throughout the soft tissues of   the leg.    There are degenerative changes about the knee incidentally noted with   narrowing of the medial lateral compartments and lateral osteophytes as   well as chondrocalcinosis of the medial meniscus.    Bones are diffusely osteoporotic    IMPRESSION:    Nondisplaced intertrochanteric fracture left femoral neck. Marked   osteoporosis.    Other nonurgent findings as above.    Findings discussed with Dr. Hoang Tong in the emergency room at   12/26/2018 2:38 PM with readback. CHIEF COMPLAINT:Patient is a 78y old  Female who presents with a chief complaint of fall.      HPI:  77 y/o F pt with PMHX of  HTN, DM2, who presents to ED c/o L hip pain s/p trip and fall from a curb today. Pt describes pain as sharp, mild and constant, 10/10 in severity. Pain of the left hip increases with ROM. Pt unable to ambulate nor bear weight. Patient denies head strike, LOC, N/V, vision changes, neurological deficits chest pain, palpitations, shortness of breath, or any other complaints. XR hip showed nondisplaced intertrochanteric fracture left femoral neck and marked osteoporosis. XR hip showed nondisplaced intertrochanteric fracture left femoral neck and marked osteoporosis. (26 Dec 2018 19:35)      PAST MEDICAL & SURGICAL HISTORY:  DM  HTN    MEDICATIONS  (STANDING):  dextrose 5%. 1000 milliLiter(s) (50 mL/Hr) IV Continuous <Continuous>  dextrose 50% Injectable 12.5 Gram(s) IV Push once  dextrose 50% Injectable 25 Gram(s) IV Push once  dextrose 50% Injectable 25 Gram(s) IV Push once  ferrous    sulfate 325 milliGRAM(s) Oral two times a day  insulin lispro (HumaLOG) corrective regimen sliding scale   SubCutaneous three times a day before meals  metoprolol tartrate 12.5 milliGRAM(s) Oral two times a day  sodium chloride 0.9%. 1000 milliLiter(s) (60 mL/Hr) IV Continuous <Continuous>  sodium chloride 0.9%. 1000 milliLiter(s) (60 mL/Hr) IV Continuous <Continuous>    MEDICATIONS  (PRN):  acetaminophen   Tablet .. 650 milliGRAM(s) Oral every 6 hours PRN Mild Pain (1 - 3)  dextrose 40% Gel 15 Gram(s) Oral once PRN Blood Glucose LESS THAN 70 milliGRAM(s)/deciLiter  glucagon  Injectable 1 milliGRAM(s) IntraMuscular once PRN Glucose <70 milliGRAM(s)/deciLiter  traMADol 25 milliGRAM(s) Oral three times a day PRN Moderate Pain (4 - 6)      FAMILY HISTORY: No hx of CAD      SOCIAL HISTORY:    [ x] Non-smoker    [x ] Alcohol-denies    Allergies    No Known Allergies    Intolerances    	    REVIEW OF SYSTEMS:  CONSTITUTIONAL: No fever, weight loss, or fatigue  EYES: No eye pain, visual disturbances, or discharge  ENT:  No difficulty hearing, tinnitus, vertigo; No sinus or throat pain  NECK: No pain or stiffness  RESPIRATORY: No cough, wheezing, chills or hemoptysis; No Shortness of Breath  CARDIOVASCULAR: No chest pain, palpitations, passing out, dizziness, or leg swelling  GASTROINTESTINAL: No abdominal or epigastric pain. No nausea, vomiting, or hematemesis; No diarrhea or constipation. No melena or hematochezia.  GENITOURINARY: No dysuria, frequency, hematuria, or incontinence  NEUROLOGICAL: No headaches, memory loss, loss of strength, numbness, or tremors  SKIN: No itching, burning, rashes, or lesions   LYMPH Nodes: No enlarged glands  ENDOCRINE: No heat or cold intolerance; No hair loss  MUSCULOSKELETAL: No joint pain or swelling; No muscle, back, + extremity pain  PSYCHIATRIC: No depression, anxiety, mood swings, or difficulty sleeping  HEME/LYMPH: No easy bruising, or bleeding gums  ALLERGY AND IMMUNOLOGIC: No hives or eczema	    PHYSICAL EXAM:  T(C): 36.7 (12-27-18 @ 05:29), Max: 37.4 (12-27-18 @ 01:29)  HR: 87 (12-27-18 @ 05:29) (70 - 91)  BP: 119/63 (12-27-18 @ 05:29) (112/71 - 121/55)  RR: 19 (12-27-18 @ 05:29) (16 - 19)  SpO2: 93% (12-27-18 @ 05:29) (93% - 99%)  Wt(kg): --  I&O's Summary      Appearance: Normal	  HEENT:   Normal oral mucosa, PERRL, EOMI	  Lymphatic: No lymphadenopathy  Cardiovascular: Normal S1 S2, No JVD, No murmurs, No edema  Respiratory: Lungs clear to auscultation	  Psychiatry: A & O x 3, Mood & affect appropriate  Gastrointestinal:  Soft, Non-tender, + BS	  Skin: No rashes, No ecchymoses, No cyanosis	  Neurologic: Non-focal  Extremities: Normal range of motion, No clubbing, cyanosis or edema  Vascular: Peripheral pulses palpable 2+ bilaterally    TELEMETRY: 	nsr with brief SVT    ECG:  	SVT with LBBB    	  LABS:	 	    CARDIAC MARKERS:  CARDIAC MARKERS ( 26 Dec 2018 22:54 )  <0.015 ng/mL / x     / 50 U/L / x     / 2.2 ng/mL                         10.3   11.1  )-----------( 191      ( 27 Dec 2018 06:50 )             35.5     12-27    142  |  109<H>  |  37<H>  ----------------------------<  158<H>  4.2   |  23  |  0.99    Ca    8.6      27 Dec 2018 06:50  Phos  3.3     12-27  Mg     2.1     12-27    TPro  6.6  /  Alb  3.5  /  TBili  1.6<H>  /  DBili  x   /  AST  9<L>  /  ALT  13  /  AlkPhos  79  12-27      Lipid Profile: Cholesterol 82  LDL 22  HDL 40      HgA1c: Hemoglobin A1C, Whole Blood: 6.1 % (12-27 @ 09:51)  Hemoglobin A1C, Whole Blood: 6.2 % (12-26 @ 23:12)    TSH: Thyroid Stimulating Hormone, Serum: 1.19 uU/mL (12-27 @ 06:50)        EXAM:  XR FEMUR 2 VIEWS LT                          EXAM:  XR HIP 2-3V LT                            PROCEDURE DATE:  12/26/2018          INTERPRETATION:  LEFT HIP AND LEFT FEMUR X-RAY:    CLINICAL HISTORY: 78 years year old Female with l hip pain.    COMPARISON: None    AP and frog-leg lateral views of the left hip were obtained with AP and   lateral views of the left femur and demonstrate a nondisplaced   intertrochanteric fracture of the left femoral neck. No other fracture or   lytic or blastic lesion is seen. Acetabular osteophytes are present   secondary to hip osteoarthritis.    Dense vascular calcifications are present throughout the soft tissues of   the leg.    There are degenerative changes about the knee incidentally noted with   narrowing of the medial lateral compartments and lateral osteophytes as   well as chondrocalcinosis of the medial meniscus.    Bones are diffusely osteoporotic    IMPRESSION:    Nondisplaced intertrochanteric fracture left femoral neck. Marked   osteoporosis.    Other nonurgent findings as above.    Findings discussed with Dr. Hoang Tong in the emergency room at   12/26/2018 2:38 PM with readback.         OBSERVATIONS:  Mitral Valve: Mild posterior mitral annular calcification.  Trace mitral regurgitation.  Aortic Root: Normal aortic root.  Aortic Valve: Aortic valve not well visualized. No aortic  stenosis. Mild aortic regurgitation.  Left Atrium: Mildly dilated left atrium.  LA volume index =  39 cc/m2.  Left Ventricle: Severe global left ventricular systolic  dysfunction (EF 20-25% by visual estimation). Small  hyperechoic area near apex probably consistent with  trabeculation, less supicion for thrombus since there is  flow on color Doppler.  Paradoxical septal motion is seen,  consistent with conduction delay. Not all LV wall segments  were seen. Moderate left ventricular enlargement. Grade I  diastolic dysfunction (Impaired relaxation).  Right Heart: Normal right atrium. Normal right ventricular  size and systolic function (RV tissue Doppler 14 cm/s).  Normal tricuspid valve. Trace tricuspid regurgitation.  Pulmonic valve not well seen. Trace pulmonic insufficiency  is noted.  Pericardium/PleuraNo pericardial effusion. A prominent  epicardial fat pad is noted.  Hemodynamic: RA Pressure is 8 mm Hg. RV systolic pressure  is normal at  29 mm Hg.

## 2018-12-27 NOTE — PROGRESS NOTE ADULT - ASSESSMENT
79 y/o F pt with HTN, DM2, PMHx presents to ED c/o L hip pain s/p trip and fall from a curb. Plan for left hip IM nailing today.

## 2018-12-27 NOTE — CONSULT NOTE ADULT - PROBLEM SELECTOR RECOMMENDATION 9
- s/p Left IM nailing today  - admit to ICU for post op monitoring.   - continue with vent support  - sedation with propofol  - pain control with fentanyl   - f/u post op labs, abg and CXR  - Cefazolin x 2 doses post op  - IV hydration, monitor I/Os.   - Resume anticoagulation from tomorrow.   - Ortho follow up and PT - s/p Left IM nailing today  - admit to ICU for post op monitoring.   - continue with vent support  - sedation with propofol  - pain control with fentanyl   - f/u post op labs, abg and CXR  - Cefazolin x 2 doses post op  - npo, IV hydration, monitor I/Os.   - Resume anticoagulation from tomorrow.   - Ortho follow up and PT

## 2018-12-27 NOTE — CONSULT NOTE ADULT - ASSESSMENT
79 y/o F pt with HTN, DM2, PMHx presents to ED c/o L hip pain s/p trip and fall. Patient admitted to medicine floor for left hip fracture. OR today for left IM nailing. Patient brought to ICU for post op monitoring.

## 2018-12-27 NOTE — CONSULT NOTE ADULT - PROBLEM SELECTOR RECOMMENDATION 6
IMPROVE VTE Individual Risk Assessment          RISK                                                          Points  [  ] Previous VTE                                                3  [  ] Thrombophilia                                             2  [  ] Lower limb paralysis                                   2        (unable to hold up >15 seconds)    [  ] Current Cancer                                             2         (within 6 months)  [ x ] Immobilization > 24 hrs                              1  [ x ] ICU/CCU stay > 24 hours                             1  [ x ] Age > 60                                                         1    IMPROVE VTE Score: 3  Start Lovenox 30 mg bid from tomorrow.

## 2018-12-28 NOTE — PROGRESS NOTE ADULT - SUBJECTIVE AND OBJECTIVE BOX
CHIEF COMPLAINT:Patient is a 78y old  Female who presents with a chief complaint of fall.Pt remains intubated.    	  REVIEW OF SYSTEMS:  unable to obtain    PHYSICAL EXAM:  T(C): 37.3 (12-28-18 @ 07:00), Max: 38.3 (12-28-18 @ 04:00)  HR: 108 (12-28-18 @ 11:00) (80 - 145)  BP: 98/53 (12-28-18 @ 10:00) (77/40 - 132/57)  RR: 16 (12-28-18 @ 11:00) (6 - 25)  SpO2: 100% (12-28-18 @ 11:00) (80% - 100%)    I&O's Summary    27 Dec 2018 07:01  -  28 Dec 2018 07:00  --------------------------------------------------------  IN: 3367.5 mL / OUT: 430 mL / NET: 2937.5 mL    28 Dec 2018 07:01  -  28 Dec 2018 11:56  --------------------------------------------------------  IN: 236.8 mL / OUT: 50 mL / NET: 186.8 mL        Appearance: Normal	  HEENT:   Normal oral mucosa, PERRL, EOMI	  Lymphatic: No lymphadenopathy  Cardiovascular: Normal S1 S2, No JVD, No murmurs, No edema  Respiratory: Lungs clear to auscultation	  Gastrointestinal:  Soft, Non-tender, + BS	  Skin: No rashes, No ecchymoses, No cyanosis	  Extremities: Normal range of motion, No clubbing, cyanosis or edema  Vascular: Peripheral pulses palpable 2+ bilaterally    MEDICATIONS  (STANDING):  ascorbic acid 500 milliGRAM(s) Oral two times a day  aspirin enteric coated 81 milliGRAM(s) Oral daily  cyanocobalamin Injectable 1000 MICROGram(s) SubCutaneous daily  dextrose 5% + sodium chloride 0.9%. 1000 milliLiter(s) (75 mL/Hr) IV Continuous <Continuous>  digoxin     Tablet 0.125 milliGRAM(s) Oral daily  digoxin  Injectable 0.25 milliGRAM(s) IV Push once  docusate sodium 100 milliGRAM(s) Oral three times a day  enoxaparin Injectable 30 milliGRAM(s) SubCutaneous every 12 hours  famotidine    Tablet 20 milliGRAM(s) Oral every 12 hours  fentaNYL   Infusion. 0.5 MICROgram(s)/kG/Hr (0.821 mL/Hr) IV Continuous <Continuous>  ferrous    sulfate 325 milliGRAM(s) Oral three times a day with meals  ferrous    sulfate 325 milliGRAM(s) Oral two times a day  folic acid 1 milliGRAM(s) Oral daily  insulin lispro (HumaLOG) corrective regimen sliding scale   SubCutaneous three times a day before meals  multivitamin 1 Tablet(s) Oral daily  phenylephrine    Infusion 1 MICROgram(s)/kG/Min (15.394 mL/Hr) IV Continuous <Continuous>      LABS:	 	    CARDIAC MARKERS:  CARDIAC MARKERS ( 27 Dec 2018 11:50 )  <0.015 ng/mL / x     / 48 U/L / x     / 2.1 ng/mL  CARDIAC MARKERS ( 26 Dec 2018 22:54 )  <0.015 ng/mL / x     / 50 U/L / x     / 2.2 ng/mL                          9.8    18.9  )-----------( 212      ( 28 Dec 2018 06:22 )             33.7     12-28    142  |  112<H>  |  34<H>  ----------------------------<  217<H>  4.9   |  21<L>  |  1.17    Ca    8.5      28 Dec 2018 06:22  Phos  3.4     12-28  Mg     2.0     12-28    TPro  6.6  /  Alb  3.5  /  TBili  1.6<H>  /  DBili  x   /  AST  9<L>  /  ALT  13  /  AlkPhos  79  12-27      Lipid Profile: Cholesterol 82  LDL 22  HDL 40      HgA1c: Hemoglobin A1C, Whole Blood: 6.1 % (12-27 @ 09:51)  Hemoglobin A1C, Whole Blood: 6.2 % (12-26 @ 23:12)    TSH: Thyroid Stimulating Hormone, Serum: 1.19 uU/mL (12-27 @ 06:50)      Vitamin B12, Serum (12.27.18 @ 09:54)    Vitamin B12, Serum: 211: Note: Reference Range Change on 12/18/2017. pg/mL

## 2018-12-28 NOTE — CONSULT NOTE ADULT - SUBJECTIVE AND OBJECTIVE BOX
MARICEL SLAUGHTER  78y  Female      Patient is a 78y old  Female who presents with a chief complaint of fall (28 Dec 2018 11:56).  77 y/o F pt with HTN, DM2, PMHx presents to ED c/o L hip pain s/p trip and fall from a curb today. Pt describes pain as sharp, mild and constant, 10/10 in severity. Pain of the left hip increases with ROM. Pt unable to ambulate nor bear weight. Patient denies head strike, LOC, N/V, vision changes, neurological deficits chest pain, palpitations, shortness of breath, or any other complaints. XR hip showed nondisplaced intertrochanteric fracture left femoral neck and marked osteoporosis. XR hip showed nondisplaced intertrochanteric fracture left femoral neck and marked osteoporosis.    78 year old female s/p left hip im nailing pod#1.  Pt intubated on pheny gttp and fentanyl at this time.  Pt was tachycardic, hypotensive and agitated.  Pt continues to be closely monitored.  Pt does open eyes and responds to stimuli.  Pt appears comfortable.  To continue fentanyl gttp at this time.      PAST MEDICAL/SURGICAL HISTORY  PAST MEDICAL & SURGICAL HISTORY:  No pertinent past medical history  No significant past surgical history      REVIEW OF SYSTEMS:  No able to obtain due to pt sedated    T(C): 37.1 (12-28-18 @ 15:55), Max: 38.3 (12-28-18 @ 04:00)  HR: 85 (12-28-18 @ 16:00) (80 - 145)  BP: 85/36 (12-28-18 @ 16:00) (70/44 - 137/47)  RR: 18 (12-28-18 @ 16:00) (6 - 27)  SpO2: 100% (12-28-18 @ 16:00) (80% - 100%)  Wt(kg): --Vital Signs Last 24 Hrs  T(C): 37.1 (28 Dec 2018 15:55), Max: 38.3 (28 Dec 2018 04:00)  T(F): 98.8 (28 Dec 2018 15:55), Max: 101 (28 Dec 2018 04:00)  HR: 85 (28 Dec 2018 16:00) (80 - 145)  BP: 85/36 (28 Dec 2018 16:00) (70/44 - 137/47)  BP(mean): 48 (28 Dec 2018 16:00) (46 - 86)  RR: 18 (28 Dec 2018 16:00) (6 - 27)  SpO2: 100% (28 Dec 2018 16:00) (80% - 100%)    PHYSICAL EXAM:  GENERAL: sedated, opens eyes  NERVOUS SYSTEM: opens yes on command  CHEST/LUNG: decreased breath sounds   HEART: Regular rate and rhythm; No murmurs, rubs, or gallops  ABDOMEN: Soft, Nontender, Nondistended; Bowel sounds present  EXTREMITIES:  2+ Peripheral Pulses, + lower extremity edema  MUSCULOSKELETAL:  decreased rom       Consultant(s) Notes Reviewed:  [x ] YES  [ ] NO  Care Discussed with Consultants/Other Providers [ x] YES  [ ] NO  ISTOP [ ] Yes  [  ] No      LABS:  CBC   12-28-18 @ 06:22  Hematcorit 33.7  Hemoglobin 9.8  Mean Cell Hemoglobin 18.2  Platelet Count-Automated 212  RBC Count 5.41  Red Cell Distrib Width 14.1  Wbc Count 18.9  12-27-18 @ 19:20  Hematcorit 35.3  Hemoglobin 10.1  Mean Cell Hemoglobin 18.5  Platelet Count-Automated 188  RBC Count 5.46  Red Cell Distrib Width 14.7  Wbc Count 16.5      BMP  12-28-18 @ 06:22  Anion Gap. Serum 9  Blood Urea Nitrogen,Serm 34  Calcium, Total Serum 8.5  Carbon Dioxide, Serum 21  Chloride, Serum 112  Creatinine, Serum 1.17  eGFR in  51  eGFR in Non Afican American 44  Gloucose, serum 217  Potassium, Serum 4.9  Sodium, Serum 142              12-27-18 @ 19:20  Anion Gap. Serum 10  Blood Urea Nitrogen,Serm 32  Calcium, Total Serum 8.4  Carbon Dioxide, Serum 21  Chloride, Serum 112  Creatinine, Serum 0.96  eGFR in  65  eGFR in Non Afican American 56  Gloucose, serum 199  Potassium, Serum 4.2  Sodium, Serum 143              12-27-18 @ 06:50  Anion Gap. Serum 10  Blood Urea Nitrogen,Serm 37  Calcium, Total Serum 8.6  Carbon Dioxide, Serum 23  Chloride, Serum 109  Creatinine, Serum 0.99  eGFR in  62  eGFR in Non Afican American 54  Gloucose, serum 158  Potassium, Serum 4.2  Sodium, Serum 142              12-26-18 @ 13:10  Anion Gap. Serum 8  Blood Urea Nitrogen,Serm 33  Calcium, Total Serum 8.6  Carbon Dioxide, Serum 23  Chloride, Serum 110  Creatinine, Serum 0.90  eGFR in African American 70  eGFR in Non Afican American 60  Gloucose, serum 161  Potassium, Serum 4.5  Sodium, Serum 141                  CMP  12-28-18 @ 06:22  Lisseth Aminotransferase(ALT/SGPT)--  Albumin, Serum --  Alkaline Phosphatase, Serum --  Anion Gap, Serum 9  Aspartate Aminotransferase (AST/SGOT)--  Bilirubin Total, Serum --  Blood Urea Nitrogen, Serum 34  Calcium,Total Serum 8.5  Carbon Dioxide, Serum 21  Chloride, Serum 112  Creatinine, Serum 1.17  eGFR if  51  eGFR if Non African American 44  Glucose, Serum 217  Potassium, Serum 4.9  Protein Total, Serum --  Sodium, Serum 142                      12-27-18 @ 19:20  Lisseth Aminotransferase(ALT/SGPT)--  Albumin, Serum --  Alkaline Phosphatase, Serum --  Anion Gap, Serum 10  Aspartate Aminotransferase (AST/SGOT)--  Bilirubin Total, Serum --  Blood Urea Nitrogen, Serum 32  Calcium,Total Serum 8.4  Carbon Dioxide, Serum 21  Chloride, Serum 112  Creatinine, Serum 0.96  eGFR if  65  eGFR if Non African American 56  Glucose, Serum 199  Potassium, Serum 4.2  Protein Total, Serum --  Sodium, Serum 143                      12-27-18 @ 06:50  Lisseth Aminotransferase(ALT/SGPT)13  Albumin, Serum 3.5  Alkaline Phosphatase, Serum 79  Anion Gap, Serum 10  Aspartate Aminotransferase (AST/SGOT)9  Bilirubin Total, Serum 1.6  Blood Urea Nitrogen, Serum 37  Calcium,Total Serum 8.6  Carbon Dioxide, Serum 23  Chloride, Serum 109  Creatinine, Serum 0.99  eGFR if  62  eGFR if Non African American 54  Glucose, Serum 158  Potassium, Serum 4.2  Protein Total, Serum 6.6  Sodium, Serum 142                      12-26-18 @ 13:10  Lisseth Aminotransferase(ALT/SGPT)16  Albumin, Serum 3.7  Alkaline Phosphatase, Serum 81  Anion Gap, Serum 8  Aspartate Aminotransferase (AST/SGOT)13  Bilirubin Total, Serum 0.7  Blood Urea Nitrogen, Serum 33  Calcium,Total Serum 8.6  Carbon Dioxide, Serum 23  Chloride, Serum 110  Creatinine, Serum 0.90  eGFR if  70  eGFR if Non African American 60  Glucose, Serum 161  Potassium, Serum 4.5  Protein Total, Serum 6.8  Sodium, Serum 141                          PT/INR  PT/INR  12-26-18 @ 14:06  INR 1.09  Prothrombin Time Comment --  Prothrobin Time, [id number]      Amylase/Lipase            RADIOLOGY & ADDITIONAL TESTS:    Imaging Personally Reviewed:  [ ] YES  [ ] NO

## 2018-12-28 NOTE — PROGRESS NOTE ADULT - SUBJECTIVE AND OBJECTIVE BOX
INTERVAL HPI/OVERNIGHT EVENTS:       Antimicrobial:    Cardiovascular:  phenylephrine    Infusion 1 MICROgram(s)/kG/Min IV Continuous <Continuous>    Pulmonary:    Hematalogic:  aspirin enteric coated 81 milliGRAM(s) Oral daily  enoxaparin Injectable 30 milliGRAM(s) SubCutaneous every 12 hours    Other:  acetaminophen   Tablet .. 650 milliGRAM(s) Oral every 6 hours PRN  ascorbic acid 500 milliGRAM(s) Oral two times a day  dextrose 40% Gel 15 Gram(s) Oral once PRN  dextrose 5% + sodium chloride 0.9%. 1000 milliLiter(s) IV Continuous <Continuous>  dextrose 50% Injectable 12.5 Gram(s) IV Push once  dextrose 50% Injectable 25 Gram(s) IV Push once  dextrose 50% Injectable 25 Gram(s) IV Push once  docusate sodium 100 milliGRAM(s) Oral three times a day  famotidine    Tablet 20 milliGRAM(s) Oral every 12 hours  fentaNYL   Infusion. 0.5 MICROgram(s)/kG/Hr IV Continuous <Continuous>  ferrous    sulfate 325 milliGRAM(s) Oral three times a day with meals  ferrous    sulfate 325 milliGRAM(s) Oral two times a day  folic acid 1 milliGRAM(s) Oral daily  glucagon  Injectable 1 milliGRAM(s) IntraMuscular once PRN  insulin lispro (HumaLOG) corrective regimen sliding scale   SubCutaneous three times a day before meals  morphine  - Injectable 2 milliGRAM(s) IV Push every 4 hours PRN  multivitamin 1 Tablet(s) Oral daily  ondansetron Injectable 4 milliGRAM(s) IV Push every 6 hours PRN  oxyCODONE    IR 10 milliGRAM(s) Oral every 6 hours PRN  oxyCODONE    IR 5 milliGRAM(s) Oral every 4 hours PRN      Drug Dosing Weight  Height (cm): 154.94 (28 Dec 2018 01:00)  Weight (kg): 82.1 (28 Dec 2018 01:00)  BMI (kg/m2): 34.2 (28 Dec 2018 01:00)  BSA (m2): 1.81 (28 Dec 2018 01:00)    CENTRAL LINE: [ ] YES [ ] NO  LOCATION:   DATE INSERTED:    WEBER: [ x] YES [ ] NO    DATE INSERTED:    A-LINE:  [ x] YES [ ] NO  LOCATION:   DATE INSERTED:    PMH/Social Hx/Fam Hx -reviewed admission note, no change since admission  PAST MEDICAL & SURGICAL HISTORY:  No pertinent past medical history  No significant past surgical history      T(C): 37.3 (12-28-18 @ 07:00), Max: 38.3 (12-28-18 @ 04:00)  HR: 115 (12-28-18 @ 10:30)  BP: 98/53 (12-28-18 @ 10:00)  BP(mean): 61 (12-28-18 @ 10:00)  ABP: 115/58 (12-28-18 @ 10:30)  ABP(mean): 82 (12-28-18 @ 10:30)  RR: 18 (12-28-18 @ 10:30)  SpO2: 100% (12-28-18 @ 10:30)  Wt(kg): --    ABG - ( 27 Dec 2018 18:07 )  pH, Arterial: 7.30  pH, Blood: x     /  pCO2: 41    /  pO2: 115   / HCO3: 20    / Base Excess: -5.7  /  SaO2: 98                    12-27 @ 07:01  -  12-28 @ 07:00  --------------------------------------------------------  IN: 3367.5 mL / OUT: 430 mL / NET: 2937.5 mL        Mode: AC/ CMV (Assist Control/ Continuous Mandatory Ventilation)  RR (machine): 18  TV (machine): 450  FiO2: 60  PEEP: 6  ITime: 1  MAP: 13  PIP: 26      PHYSICAL EXAM:    GENERAL: No signs of distress, comfortable  HEAD: Atraumatic, Normocephalic  EYES: EOMI, PERRLA  ENMT: No erythema, exudates, or enlargement, Moist mucous membranes +ETT  NECK: Supple, normal appearance, No JVD; [  ] central line (if applicable)  CHEST/LUNG: No chest deformity, fair bilateral air entry; No rales, rhonchi, wheezing; crackles  HEART: Regular rate and rhythm; No murmurs, rubs, or gallops;   ABDOMEN: Soft, Nontender, Nondistended;   EXTREMITIES:  + Peripheral Pulses, No clubbing, cyanosis, or edema + right  hip dressing clean. no sign of bleeding  NERVOUS SYSTEM: sedated  LYMPH: No lymphadenopathy noted  SKIN: No rashes or lesions; good turgor, warm, dry      LABS:  CBC Full  -  ( 28 Dec 2018 06:22 )  WBC Count : 18.9 K/uL  Hemoglobin : 9.8 g/dL  Hematocrit : 33.7 %  Platelet Count - Automated : 212 K/uL  Mean Cell Volume : 62.3 fl  Mean Cell Hemoglobin : 18.2 pg  Mean Cell Hemoglobin Concentration : 29.2 gm/dL  Auto Neutrophil # : x  Auto Lymphocyte # : x  Auto Monocyte # : x  Auto Eosinophil # : x  Auto Basophil # : x  Auto Neutrophil % : x  Auto Lymphocyte % : x  Auto Monocyte % : x  Auto Eosinophil % : x  Auto Basophil % : x    12-28    142  |  112<H>  |  34<H>  ----------------------------<  217<H>  4.9   |  21<L>  |  1.17    Ca    8.5      28 Dec 2018 06:22  Phos  3.4     12-28  Mg     2.0     12-28    TPro  6.6  /  Alb  3.5  /  TBili  1.6<H>  /  DBili  x   /  AST  9<L>  /  ALT  13  /  AlkPhos  79  12-27    PT/INR - ( 26 Dec 2018 14:06 )   PT: 12.1 sec;   INR: 1.09 ratio         PTT - ( 26 Dec 2018 14:06 )  PTT:24.6 sec        RADIOLOGY & ADDITIONAL STUDIES REVIEWED     CXR:< from: Xray Chest 1 View- PORTABLE-Routine (12.28.18 @ 09:37) >  EXAM:  XR CHEST PORTABLE ROUTINE 1V                            PROCEDURE DATE:  12/28/2018          INTERPRETATION:  CLINICAL STATEMENT: Chest pain.    TECHNIQUE: AP view of the chest.    COMPARISON: 12/27/2018    FINDINGS/  IMPRESSION:  ET tube again noted. No pneumothorax.    Increased interstitial markings without significant change. New small   right pleural effusion with adjacent airspace opacity. Mild opacity left   lung base improving.    Heart size cannot be accurately assessed in this projection, but appear   enlarged.      < end of copied text >      IMPRESSION:  PAST MEDICAL & SURGICAL HISTORY:  No pertinent past medical history  No significant past surgical history   p/w     IMP: This is an 78 yr old woman with CAD, CHF with decrease EF,  and prior mentioned medical condition s/p fall with right hip fx. S/P Right hip IM nailing 12/27. Post op resp failure and cardiogenic shock on pressors to maintain BP.           Plan:      CNS: sedated  for vent     PULMONARY: continue vent support. no weaning due to shock    CARDIAC: start dobutamine after TLC placement    GI: NPO    RENAL: weber,     SKIN: wound care    MUSCULOSKELETAL: boots    ID:no issue    HEME: monitor    DVT and GI Prophylaxis    GOALS OF CARE DISCUSSION WITH PATIENT/FAMILY/PROXY/ icu team    CRITICAL CARE TIME SPENT: 35 minutes

## 2018-12-28 NOTE — PROGRESS NOTE ADULT - ASSESSMENT
78 F pt with HTN, DM2, PMHx presents to ED c/o L hip pain s/p trip and fall. Patient admitted to medicine floor for left hip fracture. Underwent left IM nailing on 12/27/18. Patient brought to ICU for post op monitoring.

## 2018-12-28 NOTE — PROGRESS NOTE ADULT - SUBJECTIVE AND OBJECTIVE BOX
78yFemale    Diagnosis:  S/p L Hip IM Nailing POD#1    Patient was seen and evaluated at bedside in ICU. Patient is intubated, alert with stimuli.     Vital Signs Last 24 Hrs  T(C): 37.3 (28 Dec 2018 07:00), Max: 38.3 (28 Dec 2018 04:00)  T(F): 99.1 (28 Dec 2018 07:00), Max: 101 (28 Dec 2018 04:00)  HR: 115 (28 Dec 2018 10:00) (80 - 145)  BP: 98/53 (28 Dec 2018 10:00) (77/40 - 132/57)  BP(mean): 61 (28 Dec 2018 10:00) (46 - 86)  RR: 18 (28 Dec 2018 10:00) (6 - 25)  SpO2: 100% (28 Dec 2018 10:00) (80% - 100%)  I&O's Detail    27 Dec 2018 07:01  -  28 Dec 2018 07:00  --------------------------------------------------------  IN:    dextrose 5% + sodium chloride 0.9%.: 975 mL    fentaNYL Infusion.: 219.5 mL    IV PiggyBack: 150 mL    Lactated Ringers IV Bolus: 2000 mL    propofol Infusion: 23 mL  Total IN: 3367.5 mL    OUT:    Intermittent Catheterization - Urethral: 430 mL  Total OUT: 430 mL    Total NET: 2937.5 mL      28 Dec 2018 07:01  -  28 Dec 2018 10:25  --------------------------------------------------------  IN:    dextrose 5% + sodium chloride 0.9%.: 150 mL    fentaNYL Infusion.: 30.8 mL    phenylephrine   Infusion: 56 mL  Total IN: 236.8 mL    OUT:    Intermittent Catheterization - Urethral: 50 mL  Total OUT: 50 mL    Total NET: 186.8 mL          Physical Exam:      L Hip:  Dressing is C/D/I. Skin is pink and warm. Staples intact. No erythema. SILT.  Wound with no drainage, healing well.   Lower extremity:  No calf tenderness, calves are soft. 1+pulses. NVI. EHL/TA/gastrocnemius intact B/L.  Good capillary refill. Warm, well-perfused.                           9.8    18.9  )-----------( 212      ( 28 Dec 2018 06:22 )             33.7     12-28    142  |  112<H>  |  34<H>  ----------------------------<  217<H>  4.9   |  21<L>  |  1.17    Ca    8.5      28 Dec 2018 06:22  Phos  3.4     12-28  Mg     2.0     12-28    TPro  6.6  /  Alb  3.5  /  TBili  1.6<H>  /  DBili  x   /  AST  9<L>  /  ALT  13  /  AlkPhos  79  12-27      Impression:  78yFemale S/p L Hip IM Nailing POD#1  Plan:  -  Pain management  -  Dvt prophylaxis with Lovenox  -  Daily Physical Theapy:  WBAT of left lower extremity  -  Discharge planning: Home Vs. Rehab pending Physical therapy eval.  -  Continue with Post-op Antibiotics x 24hrs  -  Continue care as per ICU

## 2018-12-28 NOTE — PROGRESS NOTE ADULT - ASSESSMENT
77 y/o F pt with PMHX of  HTN, DM2, who presents to ED c/o L hip pain s/p trip and fall from a curb, SVT and severe lv dysfunction.  1.ICU monitoring.  2.Vent and pressor support as per iCU.  3.PSVT-asa,and dig .5mg ivx1.  4.DM-Insulin.  5.Ortho f/u.  6.Replace vit b12.  7.GI and DVT prophylaxis.

## 2018-12-28 NOTE — PROGRESS NOTE ADULT - SUBJECTIVE AND OBJECTIVE BOX
MARICEL SLAUGHTER  MR# [id number]  78yFemale        Patient is a 78y old  Female who presents with a chief complaint of fall (28 Dec 2018 11:56)      INTERVAL HPI/OVERNIGHT EVENTS:  Patient seen and examined at bedside. No notations of chest pain, palpitation, SOB, orthopnea, nausea, vomiting or abdominal pain.    ALLERGIES  No Known Allergies      MEDICATIONS  acetaminophen   Tablet .. 650 milliGRAM(s) Oral every 6 hours PRN Mild Pain (1 - 3)  ascorbic acid 500 milliGRAM(s) Oral two times a day  aspirin enteric coated 81 milliGRAM(s) Oral daily  chlorhexidine 4% Liquid 1 Application(s) Topical two times a day  cyanocobalamin Injectable 1000 MICROGram(s) SubCutaneous daily  dextrose 5% + sodium chloride 0.9%. 1000 milliLiter(s) IV Continuous <Continuous>  digoxin     Tablet 0.125 milliGRAM(s) Oral daily  digoxin  Injectable 0.25 milliGRAM(s) IV Push once  docusate sodium 100 milliGRAM(s) Oral three times a day  enoxaparin Injectable 30 milliGRAM(s) SubCutaneous every 12 hours  famotidine    Tablet 20 milliGRAM(s) Oral every 12 hours  fentaNYL   Infusion. 0.5 MICROgram(s)/kG/Hr IV Continuous <Continuous>  ferrous    sulfate 325 milliGRAM(s) Oral three times a day with meals  ferrous    sulfate 325 milliGRAM(s) Oral two times a day  folic acid 1 milliGRAM(s) Oral daily  insulin lispro (HumaLOG) corrective regimen sliding scale   SubCutaneous three times a day before meals  morphine  - Injectable 2 milliGRAM(s) IV Push every 4 hours PRN Severe Pain (7 - 10)  multivitamin 1 Tablet(s) Oral daily  norepinephrine Infusion 0.05 MICROgram(s)/kG/Min IV Continuous <Continuous>  ondansetron Injectable 4 milliGRAM(s) IV Push every 6 hours PRN Nausea and/or Vomiting  phenylephrine    Infusion 1 MICROgram(s)/kG/Min IV Continuous <Continuous>              REVIEW OF SYSTEMS:  CONSTITUTIONAL: No fever, weight loss, or fatigue  EYES: No eye pain, visual disturbances, or discharge  ENT:  No difficulty hearing, tinnitus, vertigo; No sinus or throat pain  NECK: No pain or stiffness  RESPIRATORY: No cough, wheezing, chills or hemoptysis; No Shortness of Breath  CARDIOVASCULAR: No chest pain, palpitations, passing out, dizziness, or leg swelling  GASTROINTESTINAL: No abdominal or epigastric pain. No nausea, vomiting, or hematemesis; No diarrhea or constipation. No melena or hematochezia.  GENITOURINARY: No dysuria, frequency, hematuria, or incontinence  NEUROLOGICAL: No headaches, memory loss, loss of strength, numbness, or tremors  SKIN: No itching, burning, rashes, or lesions   LYMPH Nodes: No enlarged glands  ENDOCRINE: No heat or cold intolerance; No hair loss  MUSCULOSKELETAL: No joint pain or swelling; No muscle, back, or extremity pain  PSYCHIATRIC: No depression, anxiety, mood swings, or difficulty sleeping  HEME/LYMPH: No easy bruising, or bleeding gums  ALLERGY AND IMMUNOLOGIC: No hives or eczema	    [ ] All others negative	  [ ] Unable to obtain      T(C): 37.1 (12-28-18 @ 15:55), Max: 38.3 (12-28-18 @ 04:00)  T(F): 98.8 (12-28-18 @ 15:55), Max: 101 (12-28-18 @ 04:00)  HR: 85 (12-28-18 @ 16:10) (80 - 145)  BP: 85/36 (12-28-18 @ 16:00) (70/44 - 137/47)  RR: 18 (12-28-18 @ 16:00) (6 - 27)  SpO2: 100% (12-28-18 @ 16:10) (80% - 100%)  Wt(kg): --  Height (cm): 154.94 (12-28 @ 01:00)  Weight (kg): 82.1 (12-28 @ 01:00)  BMI (kg/m2): 34.2 (12-28 @ 01:00)  BSA (m2): 1.81 (12-28 @ 01:00)  I&O's Summary    27 Dec 2018 07:01  -  28 Dec 2018 07:00  --------------------------------------------------------  IN: 3367.5 mL / OUT: 430 mL / NET: 2937.5 mL    28 Dec 2018 07:01  -  28 Dec 2018 16:23  --------------------------------------------------------  IN: 1188.8 mL / OUT: 225 mL / NET: 963.8 mL          PHYSICAL EXAM:  A X O x  HEAD:  Atraumatic, Normocephalic  EYES: EOMI, PERRLA, conjunctiva and sclera clear  NECK: Supple, No JVD, Normal thyroid  Resp: CTAB, No crackles, wheezing,   CVS: Regular rate and rhythm; No discernable murmurs, rubs, or gallops  ABD: Soft, Nontender, Nondistended; Bowel sounds present  EXTREMITIES:  2+ Peripheral Pulses, No edema  LYMPH: No dicernable lymphadenopathy noted  GENERAL: NAD, well-groomed, well-developed      LABS:                        9.8    18.9  )-----------( 212      ( 28 Dec 2018 06:22 )             33.7     12-28    142  |  112<H>  |  34<H>  ----------------------------<  217<H>  4.9   |  21<L>  |  1.17    Ca    8.5      28 Dec 2018 06:22  Phos  3.4     12-28  Mg     2.0     12-28    TPro  6.6  /  Alb  3.5  /  TBili  1.6<H>  /  DBili  x   /  AST  9<L>  /  ALT  13  /  AlkPhos  79  12-27        CAPILLARY BLOOD GLUCOSE      POCT Blood Glucose.: 281 mg/dL (28 Dec 2018 10:46)  POCT Blood Glucose.: 277 mg/dL (28 Dec 2018 06:52)      Troponins:  ProBNP:  Lipid Profile:   HgA1c:  TSH:     ABG - ( 27 Dec 2018 18:07 )  pH, Arterial: 7.30  pH, Blood: x     /  pCO2: 41    /  pO2: 115   / HCO3: 20    / Base Excess: -5.7  /  SaO2: 98                    RADIOLOGY & ADDITIONAL TESTS:    Imaging Personally Reviewed:  [ ] YES  [ ] NO      Consultant(s) Notes Reviewed:  [x ] YES  [ ] NO    Care Discussed with Consultants/Other Providers [ x] YES  [ ] NO          PAST MEDICAL & SURGICAL HISTORY:  No pertinent past medical history  No significant past surgical history        Closed fracture of neck of left femur  No pertinent family history in first degree relatives  Handoff  MEWS Score  No pertinent past medical history  Closed fracture of left hip, initial encounter  Hypotension  Fever  Acute systolic congestive heart failure  Cardiogenic shock  Prophylactic measure  Diabetes mellitus  HTN (hypertension)  Heart failure with reduced ejection fraction  PSVT (paroxysmal supraventricular tachycardia)  Left ventricular dysfunction  Need for prophylactic measure  Closed fracture of left hip, initial encounter  No significant past surgical history  A S/P FALL MARICEL SLAUGHTER  MR# [id number]  78yFemale        Patient is a 78y old  Female who presents with a chief complaint of fall (28 Dec 2018 11:56)      INTERVAL HPI/OVERNIGHT EVENTS:  Patient seen and examined at bedside.     ALLERGIES  No Known Allergies      MEDICATIONS  acetaminophen   Tablet .. 650 milliGRAM(s) Oral every 6 hours PRN Mild Pain (1 - 3)  ascorbic acid 500 milliGRAM(s) Oral two times a day  aspirin enteric coated 81 milliGRAM(s) Oral daily  chlorhexidine 4% Liquid 1 Application(s) Topical two times a day  cyanocobalamin Injectable 1000 MICROGram(s) SubCutaneous daily  dextrose 5% + sodium chloride 0.9%. 1000 milliLiter(s) IV Continuous <Continuous>  digoxin     Tablet 0.125 milliGRAM(s) Oral daily  digoxin  Injectable 0.25 milliGRAM(s) IV Push once  docusate sodium 100 milliGRAM(s) Oral three times a day  enoxaparin Injectable 30 milliGRAM(s) SubCutaneous every 12 hours  famotidine    Tablet 20 milliGRAM(s) Oral every 12 hours  fentaNYL   Infusion. 0.5 MICROgram(s)/kG/Hr IV Continuous <Continuous>  ferrous    sulfate 325 milliGRAM(s) Oral three times a day with meals  ferrous    sulfate 325 milliGRAM(s) Oral two times a day  folic acid 1 milliGRAM(s) Oral daily  insulin lispro (HumaLOG) corrective regimen sliding scale   SubCutaneous three times a day before meals  morphine  - Injectable 2 milliGRAM(s) IV Push every 4 hours PRN Severe Pain (7 - 10)  multivitamin 1 Tablet(s) Oral daily  norepinephrine Infusion 0.05 MICROgram(s)/kG/Min IV Continuous <Continuous>  ondansetron Injectable 4 milliGRAM(s) IV Push every 6 hours PRN Nausea and/or Vomiting  phenylephrine    Infusion 1 MICROgram(s)/kG/Min IV Continuous <Continuous>              REVIEW OF SYSTEMS:  CONSTITUTIONAL: No fever, weight loss, or fatigue  EYES: No eye pain, visual disturbances, or discharge  ENT:  No difficulty hearing, tinnitus, vertigo; No sinus or throat pain  NECK: No pain or stiffness  RESPIRATORY: No cough, wheezing, chills or hemoptysis; No Shortness of Breath  CARDIOVASCULAR: No chest pain, palpitations, passing out, dizziness, or leg swelling  GASTROINTESTINAL: No abdominal or epigastric pain. No nausea, vomiting, or hematemesis; No diarrhea or constipation. No melena or hematochezia.  GENITOURINARY: No dysuria, frequency, hematuria, or incontinence  NEUROLOGICAL: No headaches, memory loss, loss of strength, numbness, or tremors  SKIN: No itching, burning, rashes, or lesions   LYMPH Nodes: No enlarged glands  ENDOCRINE: No heat or cold intolerance; No hair loss  MUSCULOSKELETAL: No joint pain or swelling; No muscle, back, or extremity pain  PSYCHIATRIC: No depression, anxiety, mood swings, or difficulty sleeping  HEME/LYMPH: No easy bruising, or bleeding gums  ALLERGY AND IMMUNOLOGIC: No hives or eczema	    [ ] All others negative	  [ ] Unable to obtain      T(C): 37.1 (12-28-18 @ 15:55), Max: 38.3 (12-28-18 @ 04:00)  T(F): 98.8 (12-28-18 @ 15:55), Max: 101 (12-28-18 @ 04:00)  HR: 85 (12-28-18 @ 16:10) (80 - 145)  BP: 85/36 (12-28-18 @ 16:00) (70/44 - 137/47)  RR: 18 (12-28-18 @ 16:00) (6 - 27)  SpO2: 100% (12-28-18 @ 16:10) (80% - 100%)  Wt(kg): --  Height (cm): 154.94 (12-28 @ 01:00)  Weight (kg): 82.1 (12-28 @ 01:00)  BMI (kg/m2): 34.2 (12-28 @ 01:00)  BSA (m2): 1.81 (12-28 @ 01:00)  I&O's Summary    27 Dec 2018 07:01  -  28 Dec 2018 07:00  --------------------------------------------------------  IN: 3367.5 mL / OUT: 430 mL / NET: 2937.5 mL    28 Dec 2018 07:01  -  28 Dec 2018 16:23  --------------------------------------------------------  IN: 1188.8 mL / OUT: 225 mL / NET: 963.8 mL          PHYSICAL EXAM:  A X O x  HEAD:  Atraumatic, Normocephalic  EYES: EOMI, PERRLA, conjunctiva and sclera clear  NECK: Supple, No JVD, Normal thyroid  Resp: CTAB, No crackles, wheezing,   CVS: Regular rate and rhythm; No discernable murmurs, rubs, or gallops  ABD: Soft, Nontender, Nondistended; Bowel sounds present  EXTREMITIES:  2+ Peripheral Pulses, No edema  LYMPH: No dicernable lymphadenopathy noted  GENERAL: NAD, well-groomed, well-developed      LABS:                        9.8    18.9  )-----------( 212      ( 28 Dec 2018 06:22 )             33.7     12-28    142  |  112<H>  |  34<H>  ----------------------------<  217<H>  4.9   |  21<L>  |  1.17    Ca    8.5      28 Dec 2018 06:22  Phos  3.4     12-28  Mg     2.0     12-28    TPro  6.6  /  Alb  3.5  /  TBili  1.6<H>  /  DBili  x   /  AST  9<L>  /  ALT  13  /  AlkPhos  79  12-27        CAPILLARY BLOOD GLUCOSE      POCT Blood Glucose.: 281 mg/dL (28 Dec 2018 10:46)  POCT Blood Glucose.: 277 mg/dL (28 Dec 2018 06:52)      Troponins:  ProBNP:  Lipid Profile:   HgA1c:  TSH:     ABG - ( 27 Dec 2018 18:07 )  pH, Arterial: 7.30  pH, Blood: x     /  pCO2: 41    /  pO2: 115   / HCO3: 20    / Base Excess: -5.7  /  SaO2: 98                    RADIOLOGY & ADDITIONAL TESTS:    Imaging Personally Reviewed:  [ ] YES  [ ] NO      Consultant(s) Notes Reviewed:  [x ] YES  [ ] NO    Care Discussed with Consultants/Other Providers [ x] YES  [ ] NO          PAST MEDICAL & SURGICAL HISTORY:  No pertinent past medical history  No significant past surgical history        Closed fracture of neck of left femur  No pertinent family history in first degree relatives  Handoff  MEWS Score  No pertinent past medical history  Closed fracture of left hip, initial encounter  Hypotension  Fever  Acute systolic congestive heart failure  Cardiogenic shock  Prophylactic measure  Diabetes mellitus  HTN (hypertension)  Heart failure with reduced ejection fraction  PSVT (paroxysmal supraventricular tachycardia)  Left ventricular dysfunction  Need for prophylactic measure  Closed fracture of left hip, initial encounter  No significant past surgical history  A S/P FALL

## 2018-12-28 NOTE — PROGRESS NOTE ADULT - PROBLEM SELECTOR PLAN 3
Echo showed EF of 20% with G1 DD  Keep net negative  Unable to administer diuretics due to hypotension  C/w Digoxin and Aspirin

## 2018-12-28 NOTE — PROGRESS NOTE ADULT - SUBJECTIVE AND OBJECTIVE BOX
INTERVAL HPI/OVERNIGHT EVENTS: Pt admitted last evening post op for hip surgery. Pt was hypotensive and tachycardic. Started on Peripheral pheny after giving 2 liter bolus.     PRESSORS: [x] YES [ ] NO  WHICH: Pheny    ANTIBIOTICS: Ancep                 DATE STARTED: 12/28    Cardiovascular:  phenylephrine    Infusion 1 MICROgram(s)/kG/Min IV Continuous <Continuous>    Hematalogic:  aspirin enteric coated 81 milliGRAM(s) Oral daily  enoxaparin Injectable 30 milliGRAM(s) SubCutaneous every 12 hours    Other:  acetaminophen   Tablet .. 650 milliGRAM(s) Oral every 6 hours PRN  ascorbic acid 500 milliGRAM(s) Oral two times a day  dextrose 40% Gel 15 Gram(s) Oral once PRN  dextrose 5% + sodium chloride 0.9%. 1000 milliLiter(s) IV Continuous <Continuous>  dextrose 50% Injectable 12.5 Gram(s) IV Push once  dextrose 50% Injectable 25 Gram(s) IV Push once  dextrose 50% Injectable 25 Gram(s) IV Push once  docusate sodium 100 milliGRAM(s) Oral three times a day  famotidine    Tablet 20 milliGRAM(s) Oral every 12 hours  fentaNYL   Infusion. 0.5 MICROgram(s)/kG/Hr IV Continuous <Continuous>  ferrous    sulfate 325 milliGRAM(s) Oral three times a day with meals  ferrous    sulfate 325 milliGRAM(s) Oral two times a day  folic acid 1 milliGRAM(s) Oral daily  glucagon  Injectable 1 milliGRAM(s) IntraMuscular once PRN  insulin lispro (HumaLOG) corrective regimen sliding scale   SubCutaneous three times a day before meals  morphine  - Injectable 2 milliGRAM(s) IV Push every 4 hours PRN  multivitamin 1 Tablet(s) Oral daily  ondansetron Injectable 4 milliGRAM(s) IV Push every 6 hours PRN  oxyCODONE    IR 10 milliGRAM(s) Oral every 6 hours PRN  oxyCODONE    IR 5 milliGRAM(s) Oral every 4 hours PRN    acetaminophen   Tablet .. 650 milliGRAM(s) Oral every 6 hours PRN  ascorbic acid 500 milliGRAM(s) Oral two times a day  aspirin enteric coated 81 milliGRAM(s) Oral daily  dextrose 40% Gel 15 Gram(s) Oral once PRN  dextrose 5% + sodium chloride 0.9%. 1000 milliLiter(s) IV Continuous <Continuous>  dextrose 50% Injectable 12.5 Gram(s) IV Push once  dextrose 50% Injectable 25 Gram(s) IV Push once  dextrose 50% Injectable 25 Gram(s) IV Push once  docusate sodium 100 milliGRAM(s) Oral three times a day  enoxaparin Injectable 30 milliGRAM(s) SubCutaneous every 12 hours  famotidine    Tablet 20 milliGRAM(s) Oral every 12 hours  fentaNYL   Infusion. 0.5 MICROgram(s)/kG/Hr IV Continuous <Continuous>  ferrous    sulfate 325 milliGRAM(s) Oral three times a day with meals  ferrous    sulfate 325 milliGRAM(s) Oral two times a day  folic acid 1 milliGRAM(s) Oral daily  glucagon  Injectable 1 milliGRAM(s) IntraMuscular once PRN  insulin lispro (HumaLOG) corrective regimen sliding scale   SubCutaneous three times a day before meals  morphine  - Injectable 2 milliGRAM(s) IV Push every 4 hours PRN  multivitamin 1 Tablet(s) Oral daily  ondansetron Injectable 4 milliGRAM(s) IV Push every 6 hours PRN  oxyCODONE    IR 10 milliGRAM(s) Oral every 6 hours PRN  oxyCODONE    IR 5 milliGRAM(s) Oral every 4 hours PRN  phenylephrine    Infusion 1 MICROgram(s)/kG/Min IV Continuous <Continuous>    Drug Dosing Weight  Height (cm): 154.94 (28 Dec 2018 01:00)  Weight (kg): 82.1 (28 Dec 2018 01:00)  BMI (kg/m2): 34.2 (28 Dec 2018 01:00)  BSA (m2): 1.81 (28 Dec 2018 01:00)    CENTRAL LINE: [ ] YES [x] NO  LOCATION:   DATE INSERTED:    DE SOUZA: [x] YES [ ] NO    DATE INSERTED:    A-LINE:  [ ] YES [x] NO  LOCATION:   DATE INSERTED:    ICU Vital Signs Last 24 Hrs  T(C): 37.3 (28 Dec 2018 07:00), Max: 38.3 (28 Dec 2018 04:00)  T(F): 99.1 (28 Dec 2018 07:00), Max: 101 (28 Dec 2018 04:00)  HR: 124 (28 Dec 2018 08:00) (80 - 145)  BP: 128/45 (28 Dec 2018 08:00) (77/40 - 132/57)  BP(mean): 65 (28 Dec 2018 08:00) (46 - 74)  ABP: 96/68 (28 Dec 2018 08:00) (52/42 - 133/54)  ABP(mean): 81 (28 Dec 2018 08:00) (46 - 89)  RR: 16 (28 Dec 2018 08:00) (6 - 25)  SpO2: 100% (28 Dec 2018 08:00) (80% - 100%)      ABG - ( 27 Dec 2018 18:07 )  pH, Arterial: 7.30  pH, Blood: x     /  pCO2: 41    /  pO2: 115   / HCO3: 20    / Base Excess: -5.7  /  SaO2: 98                    12-27 @ 07:01  -  12-28 @ 07:00  --------------------------------------------------------  IN: 3292.5 mL / OUT: 415 mL / NET: 2877.5 mL        Mode: AC/ CMV (Assist Control/ Continuous Mandatory Ventilation)  RR (machine): 18  TV (machine): 450  FiO2: 70  PEEP: 6  ITime: 1  MAP: 14  PIP: 30      PHYSICAL EXAM:    GENERAL: Well developed, Well nourished white female, sedated  HEENT:  Normocephalic/Atraumatic, reactive light reflex, moist mucous membranes  NECK: Supple, no JVD  RESP: Symmetric movement of the chest, clear to auscultation bilaterally, minimal bilateral basal rales   CVS: Tachycardic, S1 and S2 audible, no murmur, rubs or gallops  GI: Normal active bowel sounds present, abdomen soft, non tender, non distended  EXTREMITIES:  No edema, no clubbing, cyanosis  MSK: 0/5 strength bilateral upper and lower extremities  PSYCH: Sedated  NEURO: Alert and oriented x 0      LABS:  CBC Full  -  ( 28 Dec 2018 06:22 )  WBC Count : 18.9 K/uL  Hemoglobin : 9.8 g/dL  Hematocrit : 33.7 %  Platelet Count - Automated : 212 K/uL  Mean Cell Volume : 62.3 fl  Mean Cell Hemoglobin : 18.2 pg  Mean Cell Hemoglobin Concentration : 29.2 gm/dL  Auto Neutrophil # : x  Auto Lymphocyte # : x  Auto Monocyte # : x  Auto Eosinophil # : x  Auto Basophil # : x  Auto Neutrophil % : x  Auto Lymphocyte % : x  Auto Monocyte % : x  Auto Eosinophil % : x  Auto Basophil % : x    12-28    142  |  112<H>  |  34<H>  ----------------------------<  217<H>  4.9   |  21<L>  |  1.17    Ca    8.5      28 Dec 2018 06:22  Phos  3.4     12-28  Mg     2.0     12-28    TPro  6.6  /  Alb  3.5  /  TBili  1.6<H>  /  DBili  x   /  AST  9<L>  /  ALT  13  /  AlkPhos  79  12-27    PT/INR - ( 26 Dec 2018 14:06 )   PT: 12.1 sec;   INR: 1.09 ratio         PTT - ( 26 Dec 2018 14:06 )  PTT:24.6 sec      [ ]GOALS OF CARE DISCUSSION WITH PATIENT/FAMILY/PROXY: Full Code    CRITICAL CARE TIME SPENT: 35 minutes INTERVAL HPI/OVERNIGHT EVENTS: Pt admitted last evening post op for hip surgery. Pt was hypotensive and tachycardic. Started on Peripheral pheny after giving 2 liter bolus. Digoxin 0.25 was also given for tachycardia.     PRESSORS: [x] YES [ ] NO  WHICH: Pheny    ANTIBIOTICS: Ancep                 DATE STARTED: 12/28    Cardiovascular:  phenylephrine    Infusion 1 MICROgram(s)/kG/Min IV Continuous <Continuous>    Hematalogic:  aspirin enteric coated 81 milliGRAM(s) Oral daily  enoxaparin Injectable 30 milliGRAM(s) SubCutaneous every 12 hours    Other:  acetaminophen   Tablet .. 650 milliGRAM(s) Oral every 6 hours PRN  ascorbic acid 500 milliGRAM(s) Oral two times a day  dextrose 40% Gel 15 Gram(s) Oral once PRN  dextrose 5% + sodium chloride 0.9%. 1000 milliLiter(s) IV Continuous <Continuous>  dextrose 50% Injectable 12.5 Gram(s) IV Push once  dextrose 50% Injectable 25 Gram(s) IV Push once  dextrose 50% Injectable 25 Gram(s) IV Push once  docusate sodium 100 milliGRAM(s) Oral three times a day  famotidine    Tablet 20 milliGRAM(s) Oral every 12 hours  fentaNYL   Infusion. 0.5 MICROgram(s)/kG/Hr IV Continuous <Continuous>  ferrous    sulfate 325 milliGRAM(s) Oral three times a day with meals  ferrous    sulfate 325 milliGRAM(s) Oral two times a day  folic acid 1 milliGRAM(s) Oral daily  glucagon  Injectable 1 milliGRAM(s) IntraMuscular once PRN  insulin lispro (HumaLOG) corrective regimen sliding scale   SubCutaneous three times a day before meals  morphine  - Injectable 2 milliGRAM(s) IV Push every 4 hours PRN  multivitamin 1 Tablet(s) Oral daily  ondansetron Injectable 4 milliGRAM(s) IV Push every 6 hours PRN  oxyCODONE    IR 10 milliGRAM(s) Oral every 6 hours PRN  oxyCODONE    IR 5 milliGRAM(s) Oral every 4 hours PRN    acetaminophen   Tablet .. 650 milliGRAM(s) Oral every 6 hours PRN  ascorbic acid 500 milliGRAM(s) Oral two times a day  aspirin enteric coated 81 milliGRAM(s) Oral daily  dextrose 40% Gel 15 Gram(s) Oral once PRN  dextrose 5% + sodium chloride 0.9%. 1000 milliLiter(s) IV Continuous <Continuous>  dextrose 50% Injectable 12.5 Gram(s) IV Push once  dextrose 50% Injectable 25 Gram(s) IV Push once  dextrose 50% Injectable 25 Gram(s) IV Push once  docusate sodium 100 milliGRAM(s) Oral three times a day  enoxaparin Injectable 30 milliGRAM(s) SubCutaneous every 12 hours  famotidine    Tablet 20 milliGRAM(s) Oral every 12 hours  fentaNYL   Infusion. 0.5 MICROgram(s)/kG/Hr IV Continuous <Continuous>  ferrous    sulfate 325 milliGRAM(s) Oral three times a day with meals  ferrous    sulfate 325 milliGRAM(s) Oral two times a day  folic acid 1 milliGRAM(s) Oral daily  glucagon  Injectable 1 milliGRAM(s) IntraMuscular once PRN  insulin lispro (HumaLOG) corrective regimen sliding scale   SubCutaneous three times a day before meals  morphine  - Injectable 2 milliGRAM(s) IV Push every 4 hours PRN  multivitamin 1 Tablet(s) Oral daily  ondansetron Injectable 4 milliGRAM(s) IV Push every 6 hours PRN  oxyCODONE    IR 10 milliGRAM(s) Oral every 6 hours PRN  oxyCODONE    IR 5 milliGRAM(s) Oral every 4 hours PRN  phenylephrine    Infusion 1 MICROgram(s)/kG/Min IV Continuous <Continuous>    Drug Dosing Weight  Height (cm): 154.94 (28 Dec 2018 01:00)  Weight (kg): 82.1 (28 Dec 2018 01:00)  BMI (kg/m2): 34.2 (28 Dec 2018 01:00)  BSA (m2): 1.81 (28 Dec 2018 01:00)    CENTRAL LINE: [ ] YES [x] NO  LOCATION:   DATE INSERTED:    DE SOUZA: [x] YES [ ] NO    DATE INSERTED:    A-LINE:  [x] YES [ ] NO  LOCATION:   DATE INSERTED:    ICU Vital Signs Last 24 Hrs  T(C): 37.3 (28 Dec 2018 07:00), Max: 38.3 (28 Dec 2018 04:00)  T(F): 99.1 (28 Dec 2018 07:00), Max: 101 (28 Dec 2018 04:00)  HR: 124 (28 Dec 2018 08:00) (80 - 145)  BP: 128/45 (28 Dec 2018 08:00) (77/40 - 132/57)  BP(mean): 65 (28 Dec 2018 08:00) (46 - 74)  ABP: 96/68 (28 Dec 2018 08:00) (52/42 - 133/54)  ABP(mean): 81 (28 Dec 2018 08:00) (46 - 89)  RR: 16 (28 Dec 2018 08:00) (6 - 25)  SpO2: 100% (28 Dec 2018 08:00) (80% - 100%)      ABG - ( 27 Dec 2018 18:07 )  pH, Arterial: 7.30  pH, Blood: x     /  pCO2: 41    /  pO2: 115   / HCO3: 20    / Base Excess: -5.7  /  SaO2: 98                    12-27 @ 07:01  -  12-28 @ 07:00  --------------------------------------------------------  IN: 3292.5 mL / OUT: 415 mL / NET: 2877.5 mL        Mode: AC/ CMV (Assist Control/ Continuous Mandatory Ventilation)  RR (machine): 18  TV (machine): 450  FiO2: 70  PEEP: 6  ITime: 1  MAP: 14  PIP: 30      PHYSICAL EXAM:    GENERAL: Well developed, Well nourished white female, sedated  HEENT:  Normocephalic/Atraumatic, reactive light reflex, moist mucous membranes  NECK: Supple, no JVD  RESP: Symmetric movement of the chest, clear to auscultation bilaterally, minimal bilateral basal rales   CVS: Tachycardic, S1 and S2 audible, no murmur, rubs or gallops  GI: Normal active bowel sounds present, abdomen soft, non tender, non distended  EXTREMITIES:  No edema, no clubbing, cyanosis  MSK: 0/5 strength bilateral upper and lower extremities, Left hip scar/band   PSYCH: Sedated  NEURO: Alert and oriented x 0      LABS:  CBC Full  -  ( 28 Dec 2018 06:22 )  WBC Count : 18.9 K/uL  Hemoglobin : 9.8 g/dL  Hematocrit : 33.7 %  Platelet Count - Automated : 212 K/uL  Mean Cell Volume : 62.3 fl  Mean Cell Hemoglobin : 18.2 pg  Mean Cell Hemoglobin Concentration : 29.2 gm/dL  Auto Neutrophil # : x  Auto Lymphocyte # : x  Auto Monocyte # : x  Auto Eosinophil # : x  Auto Basophil # : x  Auto Neutrophil % : x  Auto Lymphocyte % : x  Auto Monocyte % : x  Auto Eosinophil % : x  Auto Basophil % : x    12-28    142  |  112<H>  |  34<H>  ----------------------------<  217<H>  4.9   |  21<L>  |  1.17    Ca    8.5      28 Dec 2018 06:22  Phos  3.4     12-28  Mg     2.0     12-28    TPro  6.6  /  Alb  3.5  /  TBili  1.6<H>  /  DBili  x   /  AST  9<L>  /  ALT  13  /  AlkPhos  79  12-27    PT/INR - ( 26 Dec 2018 14:06 )   PT: 12.1 sec;   INR: 1.09 ratio         PTT - ( 26 Dec 2018 14:06 )  PTT:24.6 sec      [ ]GOALS OF CARE DISCUSSION WITH PATIENT/FAMILY/PROXY: Full Code    CRITICAL CARE TIME SPENT: 35 minutes

## 2018-12-28 NOTE — CONSULT NOTE ADULT - PROBLEM SELECTOR RECOMMENDATION 9
- pt s/p left hip im nailing - pt is sedated, on pressors and intubated  - continue on fentanyl gttp  - will transition to alternative opioids once pt is extubated and off pressors  - spoke to family regarding plan

## 2018-12-28 NOTE — PROGRESS NOTE ADULT - PROBLEM SELECTOR PLAN 1
Hypotensive and tachycardic  S/p 2 bolus overnight  Started Peripheral Pheny  Will obtain central line access for dobutamine drip  A-line in place which is positional

## 2018-12-29 NOTE — PROGRESS NOTE ADULT - ASSESSMENT
77 y/o F pt with PMHX of  HTN, DM2, who presents to ED c/o L hip pain s/p trip and fall from a curb, SVT and severe lv dysfunction,post-op septic shock,MARIA G,LLL pneumonia,and shock liver.  1.ICU monitoring.  2.Vent and pressor support as per iCU.  3.PSVT-change dig .125mg qod.  4.DM-Insulin.  5.Ortho f/u.  6.Replace vit b12.  7.ID eval, s/p pan cx, adjust ABX for Cr .  8.MARIA G with metabolic acidosis-Renal eval called.  9.GI and DVT prophylaxis.

## 2018-12-29 NOTE — PHYSICAL THERAPY INITIAL EVALUATION ADULT - IMPAIRMENTS FOUND, PT EVAL
muscle strength/gait, locomotion, and balance/aerobic capacity/endurance/ventilation and respiration/gas exchange/arousal, attention, and cognition

## 2018-12-29 NOTE — PROGRESS NOTE ADULT - SUBJECTIVE AND OBJECTIVE BOX
INTERVAL HPI/OVERNIGHT EVENTS:  shock , on pressors. MOSF      Antimicrobial:  piperacillin/tazobactam IVPB. 3.375 Gram(s) IV Intermittent every 12 hours  vancomycin  IVPB 1000 milliGRAM(s) IV Intermittent every 12 hours    Cardiovascular:  digoxin  Injectable 0.125 milliGRAM(s) IV Push daily  norepinephrine Infusion 0.05 MICROgram(s)/kG/Min IV Continuous <Continuous>    Pulmonary:    Hematalogic:  aspirin enteric coated 81 milliGRAM(s) Oral daily  enoxaparin Injectable 30 milliGRAM(s) SubCutaneous every 12 hours    Other:  acetaminophen   Tablet .. 650 milliGRAM(s) Oral every 6 hours PRN  ascorbic acid 500 milliGRAM(s) Oral two times a day  chlorhexidine 4% Liquid 1 Application(s) Topical two times a day  cyanocobalamin Injectable 1000 MICROGram(s) SubCutaneous daily  docusate sodium 100 milliGRAM(s) Oral three times a day  famotidine    Tablet 20 milliGRAM(s) Oral every 12 hours  fentaNYL   Infusion. 0.5 MICROgram(s)/kG/Hr IV Continuous <Continuous>  ferrous    sulfate 325 milliGRAM(s) Oral three times a day with meals  ferrous    sulfate 325 milliGRAM(s) Oral two times a day  folic acid 1 milliGRAM(s) Oral daily  insulin lispro (HumaLOG) corrective regimen sliding scale   SubCutaneous every 6 hours  insulin regular  human recombinant. 10 Unit(s) IV Push once  morphine  - Injectable 2 milliGRAM(s) IV Push every 4 hours PRN  multivitamin 1 Tablet(s) Oral daily  ondansetron Injectable 4 milliGRAM(s) IV Push every 6 hours PRN  phytonadione   Solution 10 milliGRAM(s) Oral once  sodium bicarbonate  Infusion 0.183 mEq/kG/Hr IV Continuous <Continuous>  sodium polystyrene sulfonate Suspension 30 Gram(s) Oral once  vasopressin Infusion 0.04 Unit(s)/Min IV Continuous <Continuous>      Drug Dosing Weight  Height (cm): 154.94 (28 Dec 2018 01:00)  Weight (kg): 82.1 (28 Dec 2018 01:00)  BMI (kg/m2): 34.2 (28 Dec 2018 01:00)  BSA (m2): 1.81 (28 Dec 2018 01:00)    CENTRAL LINE: [ x] YES [ ] NO  LOCATION:   DATE INSERTED:    DE SOUZA: [x ] YES [ ] NO    DATE INSERTED:    A-LINE:  [x ] YES [ ] NO  LOCATION:   DATE INSERTED: 12/28. left groin    PMH/Social Hx/Fam Hx -reviewed admission note, no change since admission  PAST MEDICAL & SURGICAL HISTORY:  No pertinent past medical history  No significant past surgical history      T(C): 37.6 (12-29-18 @ 07:00), Max: 40.4 (12-28-18 @ 21:52)  HR: 80 (12-29-18 @ 10:00)  BP: 86/45 (12-29-18 @ 10:00)  BP(mean): 55 (12-29-18 @ 10:00)  ABP: 121/53 (12-29-18 @ 10:00)  ABP(mean): 72 (12-29-18 @ 10:00)  RR: 30 (12-29-18 @ 10:00)  SpO2: 95% (12-29-18 @ 10:00)  Wt(kg): --    ABG - ( 29 Dec 2018 07:47 )  pH, Arterial: 7.07  pH, Blood: x     /  pCO2: 35    /  pO2: 90    / HCO3: 10    / Base Excess: -19.4 /  SaO2: 93                    12-28 @ 07:01  -  12-29 @ 07:00  --------------------------------------------------------  IN: 3789.4 mL / OUT: 405 mL / NET: 3384.4 mL        Mode: AC/ CMV (Assist Control/ Continuous Mandatory Ventilation)  RR (machine): 22  TV (machine): 450  FiO2: 50  PEEP: 60  ITime: 1  MAP: 13  PIP: 26  CVP 12    PHYSICAL EXAM:    GENERAL: No signs of distress, comfortable  HEAD: Atraumatic, Normocephalic  EYES: EOMI, PERRLA  ENMT: No erythema, exudates, or enlargement, Moist mucous membranes +ETT  NECK: Supple, normal appearance, No JVD; [ x ] central line (if applicable)  CHEST/LUNG: No chest deformity, fair bilateral air entry; No rales, rhonchi, wheezing; crackles  HEART: Regular rate and rhythm; No murmurs, rubs, or gallops;   ABDOMEN: Soft, Nontender, Nondistended; Bowel sounds present  EXTREMITIES:  + Peripheral Pulses, No clubbing, cyanosis, Left groin A-line.  NERVOUS SYSTEM: sedated  LYMPH: No lymphadenopathy noted  SKIN: No rashes or lesions; good turgor, warm, dry      LABS:  CBC Full  -  ( 29 Dec 2018 04:43 )  WBC Count : 26.4 K/uL  Hemoglobin : 11.2 g/dL  Hematocrit : 39.5 %  Platelet Count - Automated : 187 K/uL  Mean Cell Volume : 66.2 fl  Mean Cell Hemoglobin : 18.7 pg  Mean Cell Hemoglobin Concentration : 28.3 gm/dL  Auto Neutrophil # : x  Auto Lymphocyte # : x  Auto Monocyte # : x  Auto Eosinophil # : x  Auto Basophil # : x  Auto Neutrophil % : x  Auto Lymphocyte % : x  Auto Monocyte % : x  Auto Eosinophil % : x  Auto Basophil % : x    12-29    144  |  116<H>  |  47<H>  ----------------------------<  237<H>  5.5<H>   |  14<L>  |  2.48<H>    Ca    8.2<L>      29 Dec 2018 04:43  Phos  7.2     12-29  Mg     2.3     12-29    TPro  5.7<L>  /  Alb  2.6<L>  /  TBili  1.3<H>  /  DBili  x   /  AST  5339<H>  /  ALT  2372<H>  /  AlkPhos  81  12-29  Lactate:Lactate, Blood (12.29.18 @ 09:36)    Lactate, Blood: 8.9: TYPE:(C=Critical, N=Notification, A=Abnormal) C  TESTS: LA  DATE/TIME CALLED: 12/29/18 10:17  CALLED TO: ZACKARY DEGROOT  READ BACK (2 Patient Identifiers)(Y/N): Y  READ BACK VALUES (Y/N): Y  CALLED BY: FPNYGM51/29/18 10:18 mmol/L        PT/INR - ( 29 Dec 2018 09:36 )   PT: 42.1 sec;   INR: 3.64 ratio       ABG:Blood Gas Profile - Arterial (12.29.18 @ 07:47)    pH, Arterial: 7.07: TYPE:(C=Critical, N=Notification, A=Abnormal) C  TESTS: _PH 7.07  DATE/TIME CALLED: _12/29/18 07:49  CALLED TO: _DR CARVAJAL Y  READ BACK (2 Patient Identifiers)(Y/N): Y_  READ BACK VALUES (Y/N):Y _  CALLED BY: _LISETUGHTIMOTHY RRT    pCO2, Arterial: 35 mmHg    pO2, Arterial: 90 mmHg    HCO3, Arterial: 10 mmol/L    Base Excess, Arterial: -19.4 mmol/L    Oxygen Saturation, Arterial: 93 %    FIO2, Arterial: 50    Blood Gas Comments Arterial: ABG ON AC22/450/50%/P5              RADIOLOGY & ADDITIONAL STUDIES REVIEWED     CXR;< from: Xray Chest 1 View- PORTABLE-Urgent (12.28.18 @ 15:50) >  EXAM:  XR CHEST PORTABLE URGENT 1V                            PROCEDURE DATE:  12/28/2018          INTERPRETATION:  AP semisupine chest on December 28, 2018 at 3:24 PM.   Patient had central line insertion.    Heart enlargement, dilated tortuous aorta, and endotracheal tube remain.    There is a significant infiltrate at the left base showing increase from   December 28, earlier study.    Slight right base atelectasis is roughly similar.    Present film shows a left jugular line inserted with the tip pointing   laterally in the superior vena caval area.    IMPRESSION: Left jugular line as above. Increasing left lower lung field   infiltrate.      < end of copied text >      IMPRESSION:  PAST MEDICAL & SURGICAL HISTORY:  No pertinent past medical history  No significant past surgical history   p/w     IMP: This is an 78 yr old woman with CAD, CHF with decrease EF,  and prior mentioned medical condition s/p fall with right hip fx. S/P Right hip IM nailing 12/27. Post op resp failure and cardiogenic shock  vs septic on pressors to maintain BP.  Pat is in MOSF.  Severe life threatening metabolic acidosis due to MARIA G, MOSF. Coagulopathy due to liver failure ( shock liver). Started on iv antibx over night.              Plan:      CNS: sedate    PULMONARY: keep on vent support, decrease FiO2, f/u cxr    CARDIAC: keep MAP>65, hemodynamic monitor, no anticoag due to auto anticoag secondary to liver failure    GI: npo    RENAL: renal eval, will need dialysis if family agrees. bicarb  x 2 amp ivp and start bicarb gtt    SKIN: wound care    MUSCULOSKELETAL: pt/boots    ID: continue antibx    HEME: FFP and reverse coagulopathy for dialysis cath placement. start NAC    DVT and GI Prophylaxis none due to auto antocoag    GOALS OF CARE DISCUSSION WITH PATIENT/FAMILY/PROXY/ icu team. renal team     CRITICAL CARE TIME SPENT: 35 minutes

## 2018-12-29 NOTE — PROCEDURE NOTE - NSPROCDETAILS_GEN_ALL_CORE
sutured in place/location identified, draped/prepped, sterile technique used, needle inserted/introduced

## 2018-12-29 NOTE — CONSULT NOTE ADULT - ASSESSMENT
77 y/o F pt with HTN, DM2, PMHx presents to ED c/o L hip pain s/p trip and fall from a curb today. Pt describes pain as sharp, mild and constant, 10/10 in severity. Pain of the left hip increases with ROM. Pt unable to ambulate nor bear weight. Patient denies head strike, LOC, N/V, vision changes, neurological deficits chest pain, palpitations, shortness of breath, or any other complaints. XR hip showed nondisplaced intertrochanteric fracture left femoral neck and marked osteoporosis. XR hip showed nondisplaced intertrochanteric fracture left femoral neck and marked osteoporosis. (26 Dec 2018 19:35)s/p nailing 12/27 with post op hypotension , septic vs cardiogenic shock, also found to have shock liver . on pressors and sedation. on vanco and zosyn. worsening kidney function with worsening ph and metabolic acidosis    # s/p fall with subsequent left hip fx s/p im nailing 12/27 with post op resp failure and MOSF including shock liver     # resp failure sec to asp pna     # septic/cardiogenic shock with MOSF . echo with very low LVEF     plan  blood cx times 2   u/a and urine cx   vanco random levels daily , if less than 20 can be supplemented with 1 gm stat   cont zosyn for now , pl monitor serum na on zosyn   for dialysis as per renal     poor prognosis   pt is full code   thnx will f/u   d/w family and intern

## 2018-12-29 NOTE — PHYSICAL THERAPY INITIAL EVALUATION ADULT - PERTINENT HX OF CURRENT PROBLEM, REHAB EVAL
Patient s/p left hip IM nailing, admitted to ICU: post-op septic shock and severe metabolic acidosis.

## 2018-12-29 NOTE — PHYSICAL THERAPY INITIAL EVALUATION ADULT - GENERAL OBSERVATIONS, REHAB EVAL
Patient received supine, HOB elevated, sedated and intubated, RASS: -2, CAM: pos, +arterial line, weber, cardiac monitor, NAD.

## 2018-12-29 NOTE — DIETITIAN INITIAL EVALUATION ADULT. - OTHER INFO
nutrition assessment for ICU length of stay and NPO status; lives home PTA; skin wound noted; s/p surgery for hip fracture, in ICU for post-op monitoring per MD; NPO x 2 to 3d; Limited intake/wt change history data available at present

## 2018-12-29 NOTE — CONSULT NOTE ADULT - ASSESSMENT
1. MARIA G due to ATN from hypotensive and spetic shock with multiorgan failure  -Plan for HD today for acidosis, mild hyperkalemia. awaiting shiley placement once INR is optimal. daughter gave consent for HD. Patient's daughter was explained in detail the risks (hypotension, angina, MI, and cardiac arrest), benefits and alternative modalities (no dialysis and conseravative medical management) for dialysis. Patient' daughter given time to ask questions and all questions were answered. Patient gave consent for renal replacement therapy.  -recommend: urinalysis, urine lytes (uosm, urine sodium, urine creatinine, chloride, potassium), spot protein to creatinine ratio  -Adjust meds to eGFR and avoid IV Gadolinium contrast,NSAIDs, and phosphate enema.  -Monitor I/O's daily.   -Monitor SMA daily.  2. Acidosis due shock  -continue iv hoc3 and bicarbonate drip.   3. Hyperkalemia:   give Kayexalate and plan for HD with 2K bath.  4. Multiorgan failure: plan as per MICU.

## 2018-12-29 NOTE — PHYSICAL THERAPY INITIAL EVALUATION ADULT - MANUAL MUSCLE TESTING RESULTS, REHAB EVAL
grossly assessed due to/Patient unable to follow commands at this time. Groslly assesed to 4/5 through patient's resistance to PT when assessing PROM.

## 2018-12-29 NOTE — PROGRESS NOTE ADULT - SUBJECTIVE AND OBJECTIVE BOX
Ortho Note POD# 2  78yFemale    Diagnosis:  S/p IM nailing Left hip fx POD# 2    Patient is seen and evaluated at ICU, currently intubated.   Unable to obtain history. Pt is responsive to touch and pain. ;  In ICU for cardiogenic shock vs septic shock , on vasopressors. MOSF.     Vital Signs Last 24 Hrs  T(C): 37.6 (29 Dec 2018 07:00), Max: 40.4 (28 Dec 2018 21:52)  T(F): 99.7 (29 Dec 2018 07:00), Max: 104.8 (28 Dec 2018 21:52)  HR: 89 (29 Dec 2018 11:48) (80 - 123)  BP: 95/65 (29 Dec 2018 11:14) (70/44 - 153/57)  BP(mean): 73 (29 Dec 2018 11:14) (48 - 81)  RR: 25 (29 Dec 2018 11:30) (0 - 30)  SpO2: 93% (29 Dec 2018 11:48) (73% - 100%)    Physical Exam:    General: AAOx3, in NAD, resting in bed.    Left hip:  In nl. alignment. Wound C/D/I; healing well.  Staples intact. Calves are soft, non-tender. Unable to assess pulse. Cyanotic toes of the b/l lower ext. Compartments soft. no oozing or drainage from wound.                           11.2   26.4  )-----------( 187      ( 29 Dec 2018 04:43 )             39.5     12-29    144  |  116<H>  |  47<H>  ----------------------------<  237<H>  5.5<H>   |  14<L>  |  2.48<H>    Ca    8.2<L>      29 Dec 2018 04:43  Phos  7.2     12-29  Mg     2.3     12-29    TPro  5.7<L>  /  Alb  2.6<L>  /  TBili  1.3<H>  /  DBili  x   /  AST  5339<H>  /  ALT  2372<H>  /  AlkPhos  81  12-29      Impression:  78yFemale S/p Im nailing Left hip fx POD# 2  Plan:  -  Continue pain management and home meds  -  DVT prophylaxis with Lovenox. Please D/C LOVENOX and ASA. INR is supra-therapeutic.   -  Daily Physical Therapy:  WBAT on LLE with walker  -  Dressing changed  -  d/c staples in 12 days  - Plan as per ICU.  -  On vasopressors for hypotension.  -  Encouraged use of incentive spirometer  -  Case d/w Kendrick Hoang. Ortho Note POD# 2  78yFemale    Diagnosis:  S/p IM nailing Left hip fx POD# 2    Patient is seen and evaluated at ICU, currently intubated.   Unable to obtain history. Pt is responsive to touch and pain. ;  In ICU for cardiogenic shock vs septic shock , on vasopressors. MOSF.     Vital Signs Last 24 Hrs  T(C): 37.6 (29 Dec 2018 07:00), Max: 40.4 (28 Dec 2018 21:52)  T(F): 99.7 (29 Dec 2018 07:00), Max: 104.8 (28 Dec 2018 21:52)  HR: 89 (29 Dec 2018 11:48) (80 - 123)  BP: 95/65 (29 Dec 2018 11:14) (70/44 - 153/57)  BP(mean): 73 (29 Dec 2018 11:14) (48 - 81)  RR: 25 (29 Dec 2018 11:30) (0 - 30)  SpO2: 93% (29 Dec 2018 11:48) (73% - 100%)    Physical Exam:    General: AAOx3, in NAD, resting in bed.    Left hip:  In nl. alignment. Wound C/D/I; healing well.  Staples intact. Calves are soft, non-tender. Unable to assess pulse. Cyanotic toes of the b/l lower ext. Compartments soft. no oozing or drainage from wound.                           11.2   26.4  )-----------( 187      ( 29 Dec 2018 04:43 )             39.5     12-29    144  |  116<H>  |  47<H>  ----------------------------<  237<H>  5.5<H>   |  14<L>  |  2.48<H>    Ca    8.2<L>      29 Dec 2018 04:43  Phos  7.2     12-29  Mg     2.3     12-29    TPro  5.7<L>  /  Alb  2.6<L>  /  TBili  1.3<H>  /  DBili  x   /  AST  5339<H>  /  ALT  2372<H>  /  AlkPhos  81  12-29      Impression:  78yFemale S/p Im nailing Left hip fx POD# 2  Plan:  -  Continue pain management and home meds  -  DVT prophylaxis with venodynes only for now. Please D/C LOVENOX and ASA. INR is supra-therapeutic.   -  Daily Physical Therapy:  WBAT on LLE with walker  -  Dressing changed  -  d/c staples in 12 days  - Plan as per ICU.  -  On vasopressors for hypotension.  -  Encouraged use of incentive spirometer  -  Case d/w Kendrick Hoang. Ortho Note POD# 2  78yFemale    Diagnosis:  S/p IM nailing Left hip fx POD# 2    Patient is seen and evaluated at ICU, currently intubated.   Unable to obtain history. Pt is responsive to touch and pain. ;  In ICU for cardiogenic shock vs septic shock , on vasopressors. MOSF.     Vital Signs Last 24 Hrs  T(C): 37.6 (29 Dec 2018 07:00), Max: 40.4 (28 Dec 2018 21:52)  T(F): 99.7 (29 Dec 2018 07:00), Max: 104.8 (28 Dec 2018 21:52)  HR: 89 (29 Dec 2018 11:48) (80 - 123)  BP: 95/65 (29 Dec 2018 11:14) (70/44 - 153/57)  BP(mean): 73 (29 Dec 2018 11:14) (48 - 81)  RR: 25 (29 Dec 2018 11:30) (0 - 30)  SpO2: 93% (29 Dec 2018 11:48) (73% - 100%)    Physical Exam:    General: Awake, not alert. In mild distress. Intubated. Unable to follow verbal commands.     Left hip:  In nl. alignment. Wound C/D/I; healing well.  Staples intact. Calves are soft, non-tender. Unable to assess pulse. Cyanotic toes of the b/l lower ext. Compartments soft. no oozing or drainage from wound.                           11.2   26.4  )-----------( 187      ( 29 Dec 2018 04:43 )             39.5     12-29    144  |  116<H>  |  47<H>  ----------------------------<  237<H>  5.5<H>   |  14<L>  |  2.48<H>    Ca    8.2<L>      29 Dec 2018 04:43  Phos  7.2     12-29  Mg     2.3     12-29    TPro  5.7<L>  /  Alb  2.6<L>  /  TBili  1.3<H>  /  DBili  x   /  AST  5339<H>  /  ALT  2372<H>  /  AlkPhos  81  12-29      Impression:  78yFemale S/p Im nailing Left hip fx POD# 2  Plan:  -  Continue pain management and home meds  -  DVT prophylaxis with venodynes only for now. Please D/C LOVENOX and ASA. INR is supra-therapeutic.   -  Daily Physical Therapy:  WBAT on LLE with walker  -  Dressing changed  -  d/c staples in 12 days  - Plan as per ICU.  -  On vasopressors for hypotension.  -  Encouraged use of incentive spirometer  -  Case d/w Kendrick Hoang.

## 2018-12-29 NOTE — PROGRESS NOTE ADULT - SUBJECTIVE AND OBJECTIVE BOX
INTERVAL HPI/OVERNIGHT EVENTS: A-line was placed overnight for close hemodynamic monitoring. Antibiotics were started for bilateral pneumonia.     PRESSORS: [ ] YES [x] NO  WHICH: Levophed    ANTIBIOTICS: Vanc, Zosyn                 DATE STARTED: 12/29    Antimicrobial:  piperacillin/tazobactam IVPB. 3.375 Gram(s) IV Intermittent every 8 hours  vancomycin  IVPB 1000 milliGRAM(s) IV Intermittent every 12 hours    Cardiovascular:  digoxin  Injectable 0.125 milliGRAM(s) IV Push daily  norepinephrine Infusion 0.05 MICROgram(s)/kG/Min IV Continuous <Continuous>    Hematalogic:  aspirin enteric coated 81 milliGRAM(s) Oral daily  enoxaparin Injectable 30 milliGRAM(s) SubCutaneous every 12 hours    Other:  acetaminophen   Tablet .. 650 milliGRAM(s) Oral every 6 hours PRN  ascorbic acid 500 milliGRAM(s) Oral two times a day  chlorhexidine 4% Liquid 1 Application(s) Topical two times a day  cyanocobalamin Injectable 1000 MICROGram(s) SubCutaneous daily  dextrose 5% + sodium chloride 0.9%. 1000 milliLiter(s) IV Continuous <Continuous>  docusate sodium 100 milliGRAM(s) Oral three times a day  famotidine    Tablet 20 milliGRAM(s) Oral every 12 hours  fentaNYL   Infusion. 0.5 MICROgram(s)/kG/Hr IV Continuous <Continuous>  fentaNYL   Infusion. 0.5 MICROgram(s)/kG/Hr IV Continuous <Continuous>  ferrous    sulfate 325 milliGRAM(s) Oral three times a day with meals  ferrous    sulfate 325 milliGRAM(s) Oral two times a day  folic acid 1 milliGRAM(s) Oral daily  insulin lispro (HumaLOG) corrective regimen sliding scale   SubCutaneous three times a day before meals  morphine  - Injectable 2 milliGRAM(s) IV Push every 4 hours PRN  multivitamin 1 Tablet(s) Oral daily  ondansetron Injectable 4 milliGRAM(s) IV Push every 6 hours PRN    acetaminophen   Tablet .. 650 milliGRAM(s) Oral every 6 hours PRN  ascorbic acid 500 milliGRAM(s) Oral two times a day  aspirin enteric coated 81 milliGRAM(s) Oral daily  chlorhexidine 4% Liquid 1 Application(s) Topical two times a day  cyanocobalamin Injectable 1000 MICROGram(s) SubCutaneous daily  dextrose 5% + sodium chloride 0.9%. 1000 milliLiter(s) IV Continuous <Continuous>  digoxin  Injectable 0.125 milliGRAM(s) IV Push daily  docusate sodium 100 milliGRAM(s) Oral three times a day  enoxaparin Injectable 30 milliGRAM(s) SubCutaneous every 12 hours  famotidine    Tablet 20 milliGRAM(s) Oral every 12 hours  fentaNYL   Infusion. 0.5 MICROgram(s)/kG/Hr IV Continuous <Continuous>  fentaNYL   Infusion. 0.5 MICROgram(s)/kG/Hr IV Continuous <Continuous>  ferrous    sulfate 325 milliGRAM(s) Oral three times a day with meals  ferrous    sulfate 325 milliGRAM(s) Oral two times a day  folic acid 1 milliGRAM(s) Oral daily  insulin lispro (HumaLOG) corrective regimen sliding scale   SubCutaneous three times a day before meals  morphine  - Injectable 2 milliGRAM(s) IV Push every 4 hours PRN  multivitamin 1 Tablet(s) Oral daily  norepinephrine Infusion 0.05 MICROgram(s)/kG/Min IV Continuous <Continuous>  ondansetron Injectable 4 milliGRAM(s) IV Push every 6 hours PRN  piperacillin/tazobactam IVPB. 3.375 Gram(s) IV Intermittent every 8 hours  vancomycin  IVPB 1000 milliGRAM(s) IV Intermittent every 12 hours    Drug Dosing Weight  Height (cm): 154.94 (28 Dec 2018 01:00)  Weight (kg): 82.1 (28 Dec 2018 01:00)  BMI (kg/m2): 34.2 (28 Dec 2018 01:00)  BSA (m2): 1.81 (28 Dec 2018 01:00)    CENTRAL LINE: [ ] YES [x] NO  LOCATION:   DATE INSERTED:    DE SOUZA: [x] YES [ ] NO    DATE INSERTED:    A-LINE:  [x] YES [ ] NO  LOCATION:   DATE INSERTED:    ICU Vital Signs Last 24 Hrs  T(C): 39.4 (29 Dec 2018 00:00), Max: 40.4 (28 Dec 2018 21:52)  T(F): 103 (29 Dec 2018 00:00), Max: 104.8 (28 Dec 2018 21:52)  HR: 99 (29 Dec 2018 01:00) (85 - 145)  BP: 84/47 (29 Dec 2018 00:30) (70/44 - 153/57)  BP(mean): 55 (29 Dec 2018 00:30) (46 - 86)  ABP: 92/44 (29 Dec 2018 01:00) (52/42 - 125/106)  ABP(mean): 54 (29 Dec 2018 01:00) (46 - 115)  RR: 8 (29 Dec 2018 01:00) (6 - 27)  SpO2: 95% (29 Dec 2018 01:00) (91% - 100%)      ABG - ( 27 Dec 2018 18:07 )  pH, Arterial: 7.30  pH, Blood: x     /  pCO2: 41    /  pO2: 115   / HCO3: 20    / Base Excess: -5.7  /  SaO2: 98                    12-27 @ 07:01  -  12-28 @ 07:00  --------------------------------------------------------  IN: 3367.5 mL / OUT: 430 mL / NET: 2937.5 mL        Mode: AC/ CMV (Assist Control/ Continuous Mandatory Ventilation)  RR (machine): 18  TV (machine): 450  FiO2: 60  PEEP: 6  ITime: 1  MAP: 13  PIP: 23      PHYSICAL EXAM:    GENERAL: Well developed, Well nourished white female, sedated  HEENT:  Normocephalic/Atraumatic, reactive light reflex, moist mucous membranes  NECK: Supple, no JVD  RESP: Symmetric movement of the chest, clear to auscultation bilaterally, minimal bilateral basal rales   CVS: Tachycardic, S1 and S2 audible, no murmur, rubs or gallops  GI: Normal active bowel sounds present, abdomen soft, non tender, non distended  EXTREMITIES:  No edema, no clubbing, cyanosis  MSK: 0/5 strength bilateral upper and lower extremities, Left hip scar/band   PSYCH: Sedated  NEURO: Alert and oriented x 0      LABS:  CBC Full  -  ( 28 Dec 2018 06:22 )  WBC Count : 18.9 K/uL  Hemoglobin : 9.8 g/dL  Hematocrit : 33.7 %  Platelet Count - Automated : 212 K/uL  Mean Cell Volume : 62.3 fl  Mean Cell Hemoglobin : 18.2 pg  Mean Cell Hemoglobin Concentration : 29.2 gm/dL  Auto Neutrophil # : x  Auto Lymphocyte # : x  Auto Monocyte # : x  Auto Eosinophil # : x  Auto Basophil # : x  Auto Neutrophil % : 72.0 %  Auto Lymphocyte % : 15.0 %  Auto Monocyte % : 5.0 %  Auto Eosinophil % : 2.0 %  Auto Basophil % : 1.0 %    12-28    142  |  112<H>  |  34<H>  ----------------------------<  217<H>  4.9   |  21<L>  |  1.17    Ca    8.5      28 Dec 2018 06:22  Phos  3.4     12-28  Mg     2.0     12-28    TPro  6.6  /  Alb  3.5  /  TBili  1.6<H>  /  DBili  x   /  AST  9<L>  /  ALT  13  /  AlkPhos  79  12-27          [ ]GOALS OF CARE DISCUSSION WITH PATIENT/FAMILY/PROXY: Full Code    CRITICAL CARE TIME SPENT: 35 minutes

## 2018-12-29 NOTE — PHYSICAL THERAPY INITIAL EVALUATION ADULT - CRITERIA FOR SKILLED THERAPEUTIC INTERVENTIONS
impairments found/risk reduction/prevention/therapy frequency/functional limitations in following categories/predicted duration of therapy intervention/rehab potential/anticipated equipment needs at discharge/anticipated discharge recommendation

## 2018-12-29 NOTE — PROCEDURE NOTE - ADDITIONAL PROCEDURE DETAILS
A time-out was completed verifying correct patient, procedure and site with nursing staff. The area was prepped and anesthetized. The left femoral artery was approached and A-line was placed by Dr Valdes. Placement confirmed with Arterial line monitor.

## 2018-12-29 NOTE — PHYSICAL THERAPY INITIAL EVALUATION ADULT - ADDITIONAL COMMENTS
Social history and PLF information obtained from ortho and SW notes. Patient community ambulator with no AD.

## 2018-12-29 NOTE — PROGRESS NOTE ADULT - PROBLEM SELECTOR PLAN 3
S/p L Hip IM Nailing on Drc 27  Pain control with fentanyl drip and Morphine S/p L Hip IM Nailing on Dec 27  Pain control with fentanyl drip and Morphine

## 2018-12-29 NOTE — CONSULT NOTE ADULT - SUBJECTIVE AND OBJECTIVE BOX
HPI:  79 y/o F pt with HTN, DM2, PMHx presents to ED c/o L hip pain s/p trip and fall from a curb today. Pt describes pain as sharp, mild and constant, 10/10 in severity. Pain of the left hip increases with ROM. Pt unable to ambulate nor bear weight. Patient denies head strike, LOC, N/V, vision changes, neurological deficits chest pain, palpitations, shortness of breath, or any other complaints. XR hip showed nondisplaced intertrochanteric fracture left femoral neck and marked osteoporosis. XR hip showed nondisplaced intertrochanteric fracture left femoral neck and marked osteoporosis. (26 Dec 2018 19:35)s/p nailing 12/27 with post op hypotension , septic vs cardiogenic shock, also found to have shock liver . on pressors and sedation. on vanco and zosyn. worsening kidney function with worsening ph and metabolic acidosis . ID called for eval of appr ab       PAST MEDICAL & SURGICAL HISTORY:  No pertinent past medical history  No significant past surgical history    allergy  No Known Allergies      acetaminophen   Tablet .. 650 milliGRAM(s) Oral every 6 hours PRN  acetylcysteine IVPB 4.1 Gram(s) IV Intermittent once  acetylcysteine IVPB 8 Gram(s) IV Intermittent once  ascorbic acid 500 milliGRAM(s) Oral two times a day  chlorhexidine 4% Liquid 1 Application(s) Topical two times a day  cyanocobalamin Injectable 1000 MICROGram(s) SubCutaneous daily  digoxin  Injectable 0.125 milliGRAM(s) IV Push every other day  docusate sodium 100 milliGRAM(s) Oral three times a day  famotidine    Tablet 20 milliGRAM(s) Oral every 12 hours  fentaNYL   Infusion. 0.5 MICROgram(s)/kG/Hr IV Continuous <Continuous>  ferrous    sulfate Liquid 300 milliGRAM(s) Enteral Tube three times a day with meals  folic acid 1 milliGRAM(s) Oral daily  insulin lispro (HumaLOG) corrective regimen sliding scale   SubCutaneous every 6 hours  morphine  - Injectable 2 milliGRAM(s) IV Push every 4 hours PRN  norepinephrine Infusion 0.05 MICROgram(s)/kG/Min IV Continuous <Continuous>  ondansetron Injectable 4 milliGRAM(s) IV Push every 6 hours PRN  piperacillin/tazobactam IVPB. 3.375 Gram(s) IV Intermittent every 12 hours  sodium bicarbonate  Infusion 0.183 mEq/kG/Hr IV Continuous <Continuous>  vasopressin Infusion 0.04 Unit(s)/Min IV Continuous <Continuous>      Social Hx: no smoking no etoh     FAMILY HISTORY:  No pertinent family history in first degree relatives        ROS  [ x ] UNABLE TO ELICIT as on vent and sedated     General:  [  ] Fever   [  ] Malaise    Skin:    HEENT:  [  ] Sore Throat  [  ] Photophobia	    Chest: [  ] SOB  [  ] Cough     Cardiovascular: [  ] CP	    Gastrointestinal: [  ] Abd pain   [  ] N    [  ] V   [  ] Diarrhea	    Genitourinary: [  ] Polyuria   [  ] Urgency   [  ] Dysuria    [  ]  Hematuria	    Musculoskeletal:  [  ] Back Pain	    Neurological: [  ]Dizziness  [  ]Visual Disturbance  [  ]Headaches      PHYSICAL EXAM:    Vital Signs Last 24 Hrs  T(C): 35.3 (29 Dec 2018 13:00), Max: 40.4 (28 Dec 2018 21:52)  T(F): 95.5 (29 Dec 2018 13:00), Max: 104.8 (28 Dec 2018 21:52)  HR: 84 (29 Dec 2018 13:00) (74 - 123)  BP: 72/34 (29 Dec 2018 12:00) (70/44 - 153/57)  BP(mean): 42 (29 Dec 2018 12:00) (42 - 81)  RR: 303 (29 Dec 2018 13:00) (0 - 303)  SpO2: 100% (29 Dec 2018 13:00) (73% - 100%)    Constitutional: sedated on vent nad   pupils sluggish  SCLERA anicteric neck supple  LUNGS mech br sounds  CVS s1 s2 aud no murmurs   ABDOMEN soft non tender obese   NEUROLOGY  flaccid   SKIN no rash   EXTREMITIES no edema no cyanosis /left hip wound clean   weber with scant marisa urine         LABS/DIAGNOSTIC TESTS                          11.2   26.4  )-----------( 187      ( 29 Dec 2018 04:43 )             39.5     WBC Count: 26.4 K/uL (12-29 @ 04:43)  WBC Count: 18.9 K/uL (12-28 @ 06:22)  WBC Count: 16.5 K/uL (12-27 @ 19:20)  WBC Count: 11.1 K/uL (12-27 @ 06:50)      12-29    144  |  116<H>  |  47<H>  ----------------------------<  237<H>  5.5<H>   |  14<L>  |  2.48<H>    Ca    8.2<L>      29 Dec 2018 04:43  Phos  7.2     12-29  Mg     2.3     12-29    TPro  5.7<L>  /  Alb  2.6<L>  /  TBili  1.3<H>  /  DBili  x   /  AST  5339<H>  /  ALT  2372<H>  /  AlkPhos  81  12-29          LIVER FUNCTIONS - ( 29 Dec 2018 04:43 )  Alb: 2.6 g/dL / Pro: 5.7 g/dL / ALK PHOS: 81 U/L / ALT: 2372 U/L DA / AST: 5339 U/L / GGT: x             PT/INR - ( 29 Dec 2018 09:36 )   PT: 42.1 sec;   INR: 3.64 ratio             LACTATE:Lactate, Blood: 8.9 mmol/L (12-29 @ 09:36)  Lactate, Blood: 5.3 mmol/L (12-29 @ 04:39)    Complete Blood Count + Automated Diff (12.29.18 @ 04:43)    WBC Count: 26.4: Results verified. K/uL    RBC Count: 5.96 M/uL    Hemoglobin: 11.2 g/dL    Hematocrit: 39.5 %    Mean Cell Volume: 66.2 fl    Mean Cell Hemoglobin: 18.7 pg    Mean Cell Hemoglobin Conc: 28.3 gm/dL    Red Cell Distrib Width: 14.8 %    Platelet Count - Automated: 187 K/uL              RADIOLOGY  < from: Xray Chest 1 View- PORTABLE-Urgent (12.29.18 @ 11:50) >    EXAM:  XR CHEST PORTABLE URGENT 1V                            PROCEDURE DATE:  12/29/2018          INTERPRETATION:  Chest portable.    Clinical History: Nasogastric tube placement.    Comparison: 12/29/2018, 9:38 AM.    Single AP view submitted.  Patient is rotated, and overlying external artifact limits evaluation.    Nasogastric tube is present, tip at the level of the distal esophagus.    Endotracheal tube tip is above the level of the mary.  Left internal jugular central venous catheter is unchanged in position.    The evaluation of the cardiomediastinal silhouette is limited on portable   technique.    There is right perihilar and left perihilar and lower lobe airspace   consolidations.    Impression:    Nasogastric tube tip at the level of the distal esophagus.  Other findings as discussed.                      LINNEA GROSS M.D., ATTENDING RADIOLOGIST  This document has been electronically signed. Dec 29 2018 12:58PM    < end of copied text >      < from: Xray Hip 2-3 Views, Left (12.26.18 @ 13:32) >    EXAM:  XR FEMUR 2 VIEWS LT                          EXAM:  XR HIP 2-3V LT                            PROCEDURE DATE:  12/26/2018          INTERPRETATION:  LEFT HIP AND LEFT FEMUR X-RAY:    CLINICAL HISTORY: 78 years year old Female with l hip pain.    COMPARISON: None    AP and frog-leg lateral views of the left hip were obtained with AP and   lateral views of the left femur and demonstrate a nondisplaced   intertrochanteric fracture of the left femoral neck. No other fracture or   lytic or blastic lesion is seen. Acetabular osteophytes are present   secondary to hip osteoarthritis.    Dense vascular calcifications are present throughout the soft tissues of   the leg.    There are degenerative changes about the knee incidentally noted with   narrowing of the medial lateral compartments and lateral osteophytes as   well as chondrocalcinosis of the medial meniscus.    Bones are diffusely osteoporotic    IMPRESSION:    Nondisplaced intertrochanteric fracture left femoral neck. Marked   osteoporosis.    Other nonurgent findings as above.    Findings discussed with Dr. Hoang Tong in the emergency room at   12/26/2018 2:38 PM with readback.                 MELVINA MARTEL M.D., ATTENDING RADIOLOGIST  This document has been electronically signed. Dec 26 2018  2:42PM    < end of copied text >    < from: Transthoracic Echocardiogram (12.27.18 @ 07:25) >    Patient name: MARICEL SLAUGHTER  YOB: 1940   Age: 78 (F)   MR#: [id number]  Study Date: 12/27/2018  [facility]  Study quality: Fair  Referring Physician:  SAUL WOOD MD  Blood Pressure: 119/63 mmHg  Height: 155 cm  Weight: 82 kg  BSA: 1.8 m2  ------------------------------------------------------------------------    PROCEDURE: Transthoracic echocardiogram with 2-D, M-Mode  and complete spectral and color flow Doppler.  INDICATION: Pre-op exam(Z01.818)  HISTORY:  ------------------------------------------------------------------------  DIMENSIONS:  Dimensions:     Normal Values:  LA:     4.1 cm    2.0 - 4.0 cm  Ao:     3.8 cm    2.0 - 3.8 cm  SEPTUM: 1.2 cm    0.6 - 1.2 cm  PWT:    1.2 cm   0.6 - 1.1 cm  LVIDd:  5.5 cm    3.0 - 5.6 cm  LVIDs:  4.5 cm    1.8 - 4.0 cm      Derived Variables:  LVMI: 150 g/m2  RWT: 0.43  Ejection Fraction Visual Estimate: 20-25 %    ------------------------------------------------------------------------  OBSERVATIONS:  Mitral Valve: Mild posterior mitral annular calcification.  Trace mitral regurgitation.  Aortic Root: Normal aortic root.  Aortic Valve: Aortic valve not well visualized. No aortic  stenosis. Mild aortic regurgitation.  Left Atrium: Mildly dilated left atrium.  LA volume index =  39 cc/m2.  Left Ventricle: Severe global left ventricular systolic  dysfunction (EF 20-25% by visual estimation). Small  hyperechoic area near apex probably consistent with  trabeculation, less supicion for thrombus since there is  flow on color Doppler.  Paradoxical septal motion is seen,  consistent with conduction delay. Not all LV wall segments  were seen. Moderate left ventricular enlargement. Grade I  diastolic dysfunction (Impaired relaxation).  Right Heart: Normal right atrium. Normal right ventricular  size and systolic function (RV tissue Doppler 14 cm/s).  Normal tricuspid valve. Trace tricuspid regurgitation.  Pulmonic valve not well seen. Trace pulmonic insufficiency  is noted.  Pericardium/PleuraNo pericardial effusion. A prominent  epicardial fat pad is noted.  Hemodynamic: RA Pressure is 8 mm Hg. RV systolic pressure  is normal at  29 mm Hg.  ------------------------------------------------------------------------  CONCLUSIONS:  1. Mild posterior mitral annular calcification. Trace  mitral regurgitation.  2. Aortic valve not well visualized. No aortic stenosis.  Mild aortic regurgitation.  3. Normal aortic root.  4. Mildly dilated left atrium.  LA volume index = 39 cc/m2.  5. Moderate left ventricular enlargement.  6. Severe global left ventricular systolic dysfunction (EF  20-25% by visual estimation). Small hyperechoic area near  apex probably consistent with trabeculation, less supicion  for thrombus since there is flow on color Doppler.  Paradoxical septal motion is seen, consistent with  conduction delay. Not all LV wall segments were seen.  7. Grade I diastolic dysfunction (Impaired relaxation).  8. Normal right atrium.  9. Normal right ventricular size and systolic function (RV  tissue Doppler 14 cm/s).  10. RV systolic pressure is normal at  29 mm Hg.  11. Normal tricuspid valve. Trace tricuspid regurgitation.  12. Pulmonic valve not well seen. Trace pulmonic  insufficiency is noted.  13. No pericardial effusion.    *** No previous Echo exam.    < end of copied text >

## 2018-12-29 NOTE — DIETITIAN INITIAL EVALUATION ADULT. - PERTINENT LABORATORY DATA
reviewed  12-29 Na144 mmol/L Glu 237 mg/dL<H> K+ 5.5 mmol/L<H> Cr  2.48 mg/dL<H> BUN 47 mg/dL<H>   eGFR=18  Fex=023   IhqX8N=6.1   PO4=7.2  elevated LFT

## 2018-12-29 NOTE — PROGRESS NOTE ADULT - SUBJECTIVE AND OBJECTIVE BOX
CHIEF COMPLAINT:Patient is a 78y old  Female who presents with a chief complaint of fall .Pt remains intubate, s/p fever of 104.    	  REVIEW OF SYSTEMS:  unable to obtain    PHYSICAL EXAM:  T(C): 37.6 (12-29-18 @ 07:00), Max: 40.4 (12-28-18 @ 21:52)  HR: 80 (12-29-18 @ 10:00) (80 - 123)  BP: 86/45 (12-29-18 @ 10:00) (70/44 - 153/57)  RR: 30 (12-29-18 @ 10:00) (0 - 30)  SpO2: 95% (12-29-18 @ 10:00) (85% - 100%)  Wt(kg): --  I&O's Summary    28 Dec 2018 07:01  -  29 Dec 2018 07:00  --------------------------------------------------------  IN: 3789.4 mL / OUT: 405 mL / NET: 3384.4 mL    29 Dec 2018 07:01  -  29 Dec 2018 11:16  --------------------------------------------------------  IN: 2498.3 mL / OUT: 10 mL / NET: 2488.3 mL        Appearance: Normal	  HEENT:   Normal oral mucosa, PERRL, EOMI	  Lymphatic: No lymphadenopathy  Cardiovascular: Normal S1 S2, No JVD, No murmurs, No edema  Respiratory: B/L ronchi  Gastrointestinal:  Soft, Non-tender, + BS	  Skin: No rashes, No ecchymoses, No cyanosis	  Extremities: Normal range of motion, No clubbing, cyanosis or edema  Vascular: Peripheral pulses palpable 2+ bilaterally    MEDICATIONS  (STANDING):  ascorbic acid 500 milliGRAM(s) Oral two times a day  aspirin enteric coated 81 milliGRAM(s) Oral daily  chlorhexidine 4% Liquid 1 Application(s) Topical two times a day  cyanocobalamin Injectable 1000 MICROGram(s) SubCutaneous daily  digoxin  Injectable 0.125 milliGRAM(s) IV Push daily  docusate sodium 100 milliGRAM(s) Oral three times a day  enoxaparin Injectable 30 milliGRAM(s) SubCutaneous every 12 hours  famotidine    Tablet 20 milliGRAM(s) Oral every 12 hours  fentaNYL   Infusion. 0.5 MICROgram(s)/kG/Hr (4.105 mL/Hr) IV Continuous <Continuous>  ferrous    sulfate 325 milliGRAM(s) Oral three times a day with meals  ferrous    sulfate 325 milliGRAM(s) Oral two times a day  folic acid 1 milliGRAM(s) Oral daily  insulin lispro (HumaLOG) corrective regimen sliding scale   SubCutaneous every 6 hours  insulin regular  human recombinant. 10 Unit(s) IV Push once  multivitamin 1 Tablet(s) Oral daily  norepinephrine Infusion 0.05 MICROgram(s)/kG/Min (3.848 mL/Hr) IV Continuous <Continuous>  phytonadione   Solution 10 milliGRAM(s) Oral once  piperacillin/tazobactam IVPB. 3.375 Gram(s) IV Intermittent every 12 hours  sodium bicarbonate  Infusion 0.183 mEq/kG/Hr (100 mL/Hr) IV Continuous <Continuous>  sodium polystyrene sulfonate Suspension 30 Gram(s) Oral once  vancomycin  IVPB 1000 milliGRAM(s) IV Intermittent every 12 hours  vasopressin Infusion 0.04 Unit(s)/Min (2.4 mL/Hr) IV Continuous <Continuous>        	  LABS:	 	    CARDIAC MARKERS:  CARDIAC MARKERS ( 27 Dec 2018 11:50 )  <0.015 ng/mL / x     / 48 U/L / x     / 2.1 ng/mL                         11.2   26.4  )-----------( 187      ( 29 Dec 2018 04:43 )             39.5     12-29    144  |  116<H>  |  47<H>  ----------------------------<  237<H>  5.5<H>   |  14<L>  |  2.48<H>    Ca    8.2<L>      29 Dec 2018 04:43  Phos  7.2     12-29  Mg     2.3     12-29    TPro  5.7<L>  /  Alb  2.6<L>  /  TBili  1.3<H>  /  DBili  x   /  AST  5339<H>  /  ALT  2372<H>  /  AlkPhos  81  12-29      Lipid Profile: Cholesterol 82  LDL 22  HDL 40      HgA1c: Hemoglobin A1C, Whole Blood: 6.1 % (12-27 @ 09:51)  Hemoglobin A1C, Whole Blood: 6.2 % (12-26 @ 23:12)    TSH: Thyroid Stimulating Hormone, Serum: 1.19 uU/mL (12-27 @ 06:50)      EXAM:  XR CHEST PORTABLE URGENT 1V                            PROCEDURE DATE:  12/28/2018          INTERPRETATION:  AP semisupine chest on December 28, 2018 at 3:24 PM.   Patient had central line insertion.    Heart enlargement, dilated tortuous aorta, and endotracheal tube remain.    There is a significant infiltrate at the left base showing increase from   December 28, earlier study.    Slight right base atelectasis is roughly similar.    Present film shows a left jugular line inserted with the tip pointing   laterally in the superior vena caval area.    IMPRESSION: Left jugular line as above. Increasing left lower lung field   infiltrate.    Lactate, Blood (12.29.18 @ 09:36)    Lactate, Blood: 8.9: TYPE:(C=Critical, N=Notification, A=Abnormal) C  TESTS: LA  DATE/TIME CALLED: 12/29/18 10:17  CALLED TO: ZACKARY DEGROOT  READ BACK (2 Patient Identifiers)(Y/N): Y  READ BACK VALUES (Y/N): Y  CALLED BY: [id number]/29/18 10:18 mmol/L    Blood Gas Profile - Arterial (12.29.18 @ 07:47)    pH, Arterial: 7.07: TYPE:(C=Critical, N=Notification, A=Abnormal) C  TESTS: _PH 7.07  DATE/TIME CALLED: _12/29/18 07:49  CALLED TO: ANIYAH CARVAJAL Y  READ BACK (2 Patient Identifiers)(Y/N): Y_  READ BACK VALUES (Y/N):Y _  CALLED BY: _JUAN JARUGHESE RRT    pCO2, Arterial: 35 mmHg    pO2, Arterial: 90 mmHg    HCO3, Arterial: 10 mmol/L    Base Excess, Arterial: -19.4 mmol/L    Oxygen Saturation, Arterial: 93 %    FIO2, Arterial: 50    Blood Gas Comments Arterial: ABG ON AC22/450/50%/P5    INR: 3.64 ratio (12.29.18 @ 09:36)

## 2018-12-29 NOTE — DIETITIAN INITIAL EVALUATION ADULT. - PERTINENT MEDS FT
reviewed   MEDICATIONS  (STANDING):  ascorbic acid 500 milliGRAM(s) Oral two times a day  aspirin enteric coated 81 milliGRAM(s) Oral daily  chlorhexidine 4% Liquid 1 Application(s) Topical two times a day  cyanocobalamin Injectable 1000 MICROGram(s) SubCutaneous daily  dextrose 5% + sodium chloride 0.9%. 1000 milliLiter(s) (75 mL/Hr) IV Continuous <Continuous>  dextrose 50% Injectable 50 milliLiter(s) IV Push once  digoxin  Injectable 0.125 milliGRAM(s) IV Push daily  docusate sodium 100 milliGRAM(s) Oral three times a day  enoxaparin Injectable 30 milliGRAM(s) SubCutaneous every 12 hours  famotidine    Tablet 20 milliGRAM(s) Oral every 12 hours  fentaNYL   Infusion. 0.5 MICROgram(s)/kG/Hr (4.105 mL/Hr) IV Continuous <Continuous>  ferrous    sulfate 325 milliGRAM(s) Oral three times a day with meals  ferrous    sulfate 325 milliGRAM(s) Oral two times a day  folic acid 1 milliGRAM(s) Oral daily  insulin lispro (HumaLOG) corrective regimen sliding scale   SubCutaneous every 6 hours  insulin regular  human recombinant. 10 Unit(s) IV Push once  multivitamin 1 Tablet(s) Oral daily  norepinephrine Infusion 0.05 MICROgram(s)/kG/Min (3.848 mL/Hr) IV Continuous <Continuous>  piperacillin/tazobactam IVPB. 3.375 Gram(s) IV Intermittent every 8 hours  sodium bicarbonate  Infusion 0.183 mEq/kG/Hr (100 mL/Hr) IV Continuous <Continuous>  sodium bicarbonate  Injectable 50 milliEquivalent(s) IV Push once  sodium polystyrene sulfonate Suspension 30 Gram(s) Oral once  vancomycin  IVPB 1000 milliGRAM(s) IV Intermittent every 12 hours  vasopressin Infusion 0.04 Unit(s)/Min (2.4 mL/Hr) IV Continuous <Continuous>    MEDICATIONS  (PRN):  acetaminophen   Tablet .. 650 milliGRAM(s) Oral every 6 hours PRN Mild Pain (1 - 3)  morphine  - Injectable 2 milliGRAM(s) IV Push every 4 hours PRN Severe Pain (7 - 10)  ondansetron Injectable 4 milliGRAM(s) IV Push every 6 hours PRN Nausea and/or Vomiting

## 2018-12-29 NOTE — CONSULT NOTE ADULT - SUBJECTIVE AND OBJECTIVE BOX
Patient was seen and examined at bedside.    HPI:  79 y/o F pt with HTN, DM2, PMHx presents to ED c/o L hip pain s/p trip and fall from a curb. renal consulted for ellen and acidosis. Pt had intertrochanteric fracture left femoral neck which was was repaired and pt was intubated. Pt had scr around 0.96mg/dL and worsened to 2.48mg/dL and anuria. pt also had acidosis with ph around 7.09. pt was given iv hco3 and drip. pt is also on pressors to maintain blood pressure.    PMH:   see as above.    PSH:   No significant past surgical history      FAMILY HISTORY:  No pertinent family history in first degree relatives      Social History:  non-smoker/ non-alcoholic     Home Meds:  MEDICATIONS  (STANDING):  acetylcysteine IVPB 8 Gram(s) IV Intermittent once  ascorbic acid 500 milliGRAM(s) Oral two times a day  chlorhexidine 4% Liquid 1 Application(s) Topical two times a day  cyanocobalamin Injectable 1000 MICROGram(s) SubCutaneous daily  digoxin  Injectable 0.125 milliGRAM(s) IV Push every other day  docusate sodium 100 milliGRAM(s) Oral three times a day  famotidine    Tablet 20 milliGRAM(s) Oral every 12 hours  fentaNYL   Infusion. 0.5 MICROgram(s)/kG/Hr (4.105 mL/Hr) IV Continuous <Continuous>  ferrous    sulfate Liquid 300 milliGRAM(s) Enteral Tube three times a day with meals  folic acid 1 milliGRAM(s) Oral daily  insulin lispro (HumaLOG) corrective regimen sliding scale   SubCutaneous every 6 hours  norepinephrine Infusion 0.05 MICROgram(s)/kG/Min (3.848 mL/Hr) IV Continuous <Continuous>  piperacillin/tazobactam IVPB. 3.375 Gram(s) IV Intermittent every 12 hours  sodium bicarbonate  Infusion 0.183 mEq/kG/Hr (100 mL/Hr) IV Continuous <Continuous>  vasopressin Infusion 0.04 Unit(s)/Min (2.4 mL/Hr) IV Continuous <Continuous>    MEDICATIONS  (PRN):  acetaminophen   Tablet .. 650 milliGRAM(s) Oral every 6 hours PRN Mild Pain (1 - 3)  morphine  - Injectable 2 milliGRAM(s) IV Push every 4 hours PRN Severe Pain (7 - 10)  ondansetron Injectable 4 milliGRAM(s) IV Push every 6 hours PRN Nausea and/or Vomiting      Allergies:  Allergies    No Known Allergies    Intolerances        REVIEW OF SYSTEMS:  unable to obtain.     Vital Signs Last 24 Hrs  T(C): 35.8 (29 Dec 2018 15:00), Max: 40.4 (28 Dec 2018 21:52)  T(F): 96.4 (29 Dec 2018 15:00), Max: 104.8 (28 Dec 2018 21:52)  HR: 96 (29 Dec 2018 16:30) (74 - 123)  BP: 90/63 (29 Dec 2018 14:00) (71/55 - 146/50)  BP(mean): 68 (29 Dec 2018 14:00) (42 - 73)  RR: 26 (29 Dec 2018 16:30) (0 - 303)  SpO2: 98% (29 Dec 2018 16:30) (73% - 100%)     @ 07:  -   @ 07:00  --------------------------------------------------------  IN: 3789.4 mL / OUT: 405 mL / NET: 3384.4 mL     @ : @ 16:58  --------------------------------------------------------  IN: 4861.7 mL / OUT: 15 mL / NET: 4846.7 mL        PHYSICAL EXAM:  GENERAL: No acute respiratory distress.  HEAD:  Atraumatic, Normocephalic  EYES: conjunctiva and sclera clear  ENMT: Moist mucous membranes; ET  NECK: Supple, No JVD  NERVOUS SYSTEM:  spontaneously opens the eyes.   CHEST/LUNG: Clear to percussion bilaterally  HEART: S1S2+  ABDOMEN: Soft, Non-tender, Nondistended; Bowel sounds present  EXTREMITIES: no edema.  SKIN: No rashes      LABS:                        11.2   26.4  )-----------( 187      ( 29 Dec 2018 04:43 )             39.5         144  |  116<H>  |  47<H>  ----------------------------<  237<H>  5.5<H>   |  14<L>  |  2.48<H>    Ca    8.2<L>      29 Dec 2018 04:43  Phos  7.2       Mg     2.3         TPro  5.7<L>  /  Alb  2.6<L>  /  TBili  1.3<H>  /  DBili  x   /  AST  5339<H>  /  ALT  2372<H>  /  AlkPhos  81      PT/INR - ( 29 Dec 2018 09:36 )   PT: 42.1 sec;   INR: 3.64 ratio           Urinalysis Basic - ( 29 Dec 2018 16:20 )    Color: Yellow / Appearance: Slightly Turbid / S.020 / pH: x  Gluc: x / Ketone: Trace  / Bili: Negative / Urobili: Negative   Blood: x / Protein: 500 mg/dL / Nitrite: Negative   Leuk Esterase: Small / RBC: x / WBC x   Sq Epi: x / Non Sq Epi: x / Bacteria: x      Magnesium, Serum: 2.3 mg/dL ( @ 04:43)  Phosphorus Level, Serum: 7.2 mg/dL <H> ( @ 04:43)    Urine studies      Medications:  acetaminophen   Tablet .. 650 milliGRAM(s) Oral every 6 hours PRN  acetylcysteine IVPB 8 Gram(s) IV Intermittent once  ascorbic acid 500 milliGRAM(s) Oral two times a day  chlorhexidine 4% Liquid 1 Application(s) Topical two times a day  cyanocobalamin Injectable 1000 MICROGram(s) SubCutaneous daily  digoxin  Injectable 0.125 milliGRAM(s) IV Push every other day  docusate sodium 100 milliGRAM(s) Oral three times a day  famotidine    Tablet 20 milliGRAM(s) Oral every 12 hours  fentaNYL   Infusion. 0.5 MICROgram(s)/kG/Hr IV Continuous <Continuous>  ferrous    sulfate Liquid 300 milliGRAM(s) Enteral Tube three times a day with meals  folic acid 1 milliGRAM(s) Oral daily  insulin lispro (HumaLOG) corrective regimen sliding scale   SubCutaneous every 6 hours  morphine  - Injectable 2 milliGRAM(s) IV Push every 4 hours PRN  norepinephrine Infusion 0.05 MICROgram(s)/kG/Min IV Continuous <Continuous>  ondansetron Injectable 4 milliGRAM(s) IV Push every 6 hours PRN  piperacillin/tazobactam IVPB. 3.375 Gram(s) IV Intermittent every 12 hours  sodium bicarbonate  Infusion 0.183 mEq/kG/Hr IV Continuous <Continuous>  vasopressin Infusion 0.04 Unit(s)/Min IV Continuous <Continuous>      RADIOLOGY & ADDITIONAL TESTS:

## 2018-12-29 NOTE — DIETITIAN INITIAL EVALUATION ADULT. - NS FNS WEIGHT USED FOR CALC
Ht=5' 1"   Adjusted ZDN=681.5 lb    Admission te=209 lb    BMI=34.2    Current kv=874.3 lb 12/21/18-->199.2 lb 12/28/18, ? wt changes, may due to fluid/scale variance/adjusted

## 2018-12-30 NOTE — PROGRESS NOTE ADULT - PROBLEM SELECTOR PLAN 6
C/w pressor support Echo showed EF of 20% with G1 DD  Keep net negative  Unable to administer diuretics due to hypotension  C/w Digoxin and Aspirin

## 2018-12-30 NOTE — PROGRESS NOTE ADULT - SUBJECTIVE AND OBJECTIVE BOX
MARICEL SLAUGHTER  MR# [id number]  78yFemale        Patient is a 78y old  Female who presents with a chief complaint of fall (30 Dec 2018 15:55)      INTERVAL HPI/OVERNIGHT EVENTS:  Patient seen and examined at bedside.     ALLERGIES  No Known Allergies      MEDICATIONS  ascorbic acid 500 milliGRAM(s) Oral two times a day  chlorhexidine 4% Liquid 1 Application(s) Topical two times a day  cyanocobalamin Injectable 1000 MICROGram(s) SubCutaneous daily  docusate sodium 100 milliGRAM(s) Oral three times a day  fentaNYL   Infusion. 0.5 MICROgram(s)/kG/Hr IV Continuous <Continuous>  ferrous    sulfate Liquid 300 milliGRAM(s) Enteral Tube three times a day with meals  folic acid 1 milliGRAM(s) Oral daily  insulin lispro (HumaLOG) corrective regimen sliding scale   SubCutaneous every 6 hours  morphine  - Injectable 2 milliGRAM(s) IV Push every 4 hours PRN Severe Pain (7 - 10)  norepinephrine Infusion 0.05 MICROgram(s)/kG/Min IV Continuous <Continuous>  ondansetron Injectable 4 milliGRAM(s) IV Push every 6 hours PRN Nausea and/or Vomiting  pantoprazole  Injectable 40 milliGRAM(s) IV Push daily  phytonadione   Solution 10 milliGRAM(s) Oral daily  piperacillin/tazobactam IVPB. 3.375 Gram(s) IV Intermittent every 12 hours  prothrombin complex concentrate IVPB (KCENTRA) 2000 International Unit(s) IV Intermittent once  sodium bicarbonate  Infusion 0.183 mEq/kG/Hr IV Continuous <Continuous>  vasopressin Infusion 0.04 Unit(s)/Min IV Continuous <Continuous>              REVIEW OF SYSTEMS:  CONSTITUTIONAL: No fever, weight loss, or fatigue  EYES: No eye pain, visual disturbances, or discharge  ENT:  No difficulty hearing, tinnitus, vertigo; No sinus or throat pain  NECK: No pain or stiffness  RESPIRATORY: No cough, wheezing, chills or hemoptysis; No Shortness of Breath  CARDIOVASCULAR: No chest pain, palpitations, passing out, dizziness, or leg swelling  GASTROINTESTINAL: No abdominal or epigastric pain. No nausea, vomiting, or hematemesis; No diarrhea or constipation. No melena or hematochezia.  GENITOURINARY: No dysuria, frequency, hematuria, or incontinence  NEUROLOGICAL: No headaches, memory loss, loss of strength, numbness, or tremors  SKIN: No itching, burning, rashes, or lesions   LYMPH Nodes: No enlarged glands  ENDOCRINE: No heat or cold intolerance; No hair loss  MUSCULOSKELETAL: No joint pain or swelling; No muscle, back, or extremity pain  PSYCHIATRIC: No depression, anxiety, mood swings, or difficulty sleeping  HEME/LYMPH: No easy bruising, or bleeding gums  ALLERGY AND IMMUNOLOGIC: No hives or eczema	    [ ] All others negative	  [ ] Unable to obtain      T(C): 37 (18 @ 15:30), Max: 37.9 (18 @ 00:00)  T(F): 98.6 (18 @ 15:30), Max: 100.3 (18 @ 00:00)  HR: 90 (18 @ 16:00) (84 - 107)  BP: 93/45 (18 @ 16:00) (82/43 - 126/61)  RR: 30 (18 @ 16:00) (0 - 30)  SpO2: 100% (18 @ 16:00) (93% - 100%)  Wt(kg): --    I&O's Summary    29 Dec 2018 07:  -  30 Dec 2018 07:00  --------------------------------------------------------  IN: 8237.6 mL / OUT: 60 mL / NET: 8177.6 mL    30 Dec 2018 07:  -  30 Dec 2018 16:28  --------------------------------------------------------  IN: 2401.2 mL / OUT: 15 mL / NET: 2386.2 mL          PHYSICAL EXAM:  A X O x  HEAD:  Atraumatic, Normocephalic  EYES: EOMI, PERRLA, conjunctiva and sclera clear  NECK: Supple, No JVD, Normal thyroid  Resp: CTAB, No crackles, wheezing,   CVS: Regular rate and rhythm; No discernable murmurs, rubs, or gallops  ABD: Soft, Nontender, Nondistended; Bowel sounds present  EXTREMITIES:  2+ Peripheral Pulses, No edema  LYMPH: No dicernable lymphadenopathy noted  GENERAL: NAD, well-groomed, well-developed      LABS:                        6.7    13.6  )-----------( 64       ( 30 Dec 2018 12:40 )             23.4     12-30    140  |  102  |  51<H>  ----------------------------<  329<H>  4.4   |  17<L>  |  3.25<H>    Ca    8.0<L>      30 Dec 2018 04:32  Phos  5.0     12  Mg     1.9     1230    TPro  5.3<L>  /  Alb  2.5<L>  /  TBili  2.0<H>  /  DBili  x   /  AST  6372<H>  /  ALT  2974<H>  /  AlkPhos  90  12-30    PT/INR - ( 30 Dec 2018 12:40 )   PT: 28.5 sec;   INR: 2.50 ratio         PTT - ( 30 Dec 2018 04:32 )  PTT:31.1 sec  Urinalysis Basic - ( 29 Dec 2018 16:20 )    Color: Yellow / Appearance: Slightly Turbid / S.020 / pH: x  Gluc: x / Ketone: Trace  / Bili: Negative / Urobili: Negative   Blood: x / Protein: 500 mg/dL / Nitrite: Negative   Leuk Esterase: Small / RBC: 25-50 /HPF / WBC 6-10 /HPF   Sq Epi: x / Non Sq Epi: Few /HPF / Bacteria: Many /HPF      CAPILLARY BLOOD GLUCOSE      POCT Blood Glucose.: 277 mg/dL (30 Dec 2018 11:40)  POCT Blood Glucose.: 321 mg/dL (30 Dec 2018 05:37)  POCT Blood Glucose.: 325 mg/dL (29 Dec 2018 23:13)  POCT Blood Glucose.: 323 mg/dL (29 Dec 2018 17:43)      Troponins:  ProBNP:  Lipid Profile:   HgA1c:  TSH:     ABG - ( 30 Dec 2018 04:26 )  pH, Arterial: 7.37  pH, Blood: x     /  pCO2: 27    /  pO2: 94    / HCO3: 15    / Base Excess: -8.7  /  SaO2: 97                    RADIOLOGY & ADDITIONAL TESTS:    Imaging Personally Reviewed:  [ ] YES  [ ] NO      Consultant(s) Notes Reviewed:  [x ] YES  [ ] NO    Care Discussed with Consultants/Other Providers [ x] YES  [ ] NO          PAST MEDICAL & SURGICAL HISTORY:  No pertinent past medical history  No significant past surgical history        Closed fracture of neck of left femur  No pertinent family history in first degree relatives  Handoff  MEWS Score  No pertinent past medical history  Closed fracture of left hip, initial encounter  Acute liver failure  Acute renal failure  Septic shock  Acute post-operative pain  Hypotension  Fever  Acute systolic congestive heart failure  Cardiogenic shock  Prophylactic measure  Diabetes mellitus  HTN (hypertension)  Heart failure with reduced ejection fraction  PSVT (paroxysmal supraventricular tachycardia)  Left ventricular dysfunction  Need for prophylactic measure  Closed fracture of left hip, initial encounter  No significant past surgical history  A S/P FALL

## 2018-12-30 NOTE — PROGRESS NOTE ADULT - PROBLEM SELECTOR PLAN 4
CXR: bilateral basal pneumonia  c/w  Vanc and Zosyn, renally dosed with daily Vanco trough due to renal failure

## 2018-12-30 NOTE — PROGRESS NOTE ADULT - SUBJECTIVE AND OBJECTIVE BOX
CHIEF COMPLAINT:Patient is a 78y old  Female who presents with a chief complaint of fall.PT remains intubated.    	  REVIEW OF SYSTEMS:  [x ] Unable to obtain    PHYSICAL EXAM:  T(C): 37.1 (12-30-18 @ 07:30), Max: 37.9 (12-30-18 @ 00:00)  HR: 87 (12-30-18 @ 10:30) (74 - 107)  BP: 100/62 (12-30-18 @ 08:00) (72/34 - 126/61)  RR: 30 (12-30-18 @ 10:30) (0 - 303)  SpO2: 99% (12-30-18 @ 10:30) (93% - 100%)  Wt(kg): --  I&O's Summary    29 Dec 2018 07:01  -  30 Dec 2018 07:00  --------------------------------------------------------  IN: 8237.6 mL / OUT: 60 mL / NET: 8177.6 mL    30 Dec 2018 07:01  -  30 Dec 2018 11:30  --------------------------------------------------------  IN: 1098.2 mL / OUT: 0 mL / NET: 1098.2 mL        Appearance: Normal	  HEENT:   Normal oral mucosa, PERRL, EOMI	  Lymphatic: No lymphadenopathy  Cardiovascular: Normal S1 S2, No JVD, No murmurs, No edema  Respiratory: B/L ronchi  Gastrointestinal:  Soft, Non-tender, + BS	  Skin: No rashes, No ecchymoses, No cyanosis	  Extremities: Normal range of motion, No clubbing, cyanosis or edema  Vascular: Peripheral pulses palpable 2+ bilaterally    MEDICATIONS  (STANDING):  ascorbic acid 500 milliGRAM(s) Oral two times a day  chlorhexidine 4% Liquid 1 Application(s) Topical two times a day  cyanocobalamin Injectable 1000 MICROGram(s) SubCutaneous daily  docusate sodium 100 milliGRAM(s) Oral three times a day  famotidine    Tablet 20 milliGRAM(s) Oral every 12 hours  fentaNYL   Infusion. 0.5 MICROgram(s)/kG/Hr (4.105 mL/Hr) IV Continuous <Continuous>  ferrous    sulfate Liquid 300 milliGRAM(s) Enteral Tube three times a day with meals  folic acid 1 milliGRAM(s) Oral daily  insulin lispro (HumaLOG) corrective regimen sliding scale   SubCutaneous every 6 hours  norepinephrine Infusion 0.05 MICROgram(s)/kG/Min (3.848 mL/Hr) IV Continuous <Continuous>  piperacillin/tazobactam IVPB. 3.375 Gram(s) IV Intermittent every 12 hours  sodium bicarbonate  Infusion 0.183 mEq/kG/Hr (100 mL/Hr) IV Continuous <Continuous>  vasopressin Infusion 0.04 Unit(s)/Min (2.4 mL/Hr) IV Continuous <Continuous>      TELEMETRY: 	  sr with lbbb    LABS:	 	                        7.4    15.8  )-----------( 71       ( 30 Dec 2018 04:32 )             25.2     12-30    140  |  102  |  51<H>  ----------------------------<  329<H>  4.4   |  17<L>  |  3.25<H>    Ca    8.0<L>      30 Dec 2018 04:32  Phos  5.0     12-30  Mg     1.9     12-30    TPro  5.3<L>  /  Alb  2.5<L>  /  TBili  2.0<H>  /  DBili  x   /  AST  6372<H>  /  ALT  2974<H>  /  AlkPhos  90  12-30      Lipid Profile: Cholesterol 82  LDL 22  HDL 40      HgA1c: Hemoglobin A1C, Whole Blood: 6.1 % (12-27 @ 09:51)  Hemoglobin A1C, Whole Blood: 6.2 % (12-26 @ 23:12)    TSH: Thyroid Stimulating Hormone, Serum: 1.19 uU/mL (12-27 @ 06:50)      EXAM:  XR CHEST PORTABLE ROUTINE 1V                            PROCEDURE DATE:  12/30/2018          INTERPRETATION:  Clinical Information: Respiratory distress    Technique: AP chest image.     Comparison: 12/29/2018    Impression: ETT tip above mary. NG tip below diaphragm. Cardiomegaly.   Unchanged large right lung opacity.  	    Digoxin Level, Serum (12.30.18 @ 04:32)    Digoxin Level, Serum: 4.3: TYPE:(C=Critical, N=Notification, A=Abnormal) C  TESTS: DIGOXIN  DATE/TIME CALLED: 12/30/18 09:08  CALLED TO:RENÉE WEAVER RN  READ BACK (2 Patient Identifiers)(Y/N): Y  READ BACK VALUES (Y/N): Y  CALLED BY: RKS ng/mL    PT/INR - ( 30 Dec 2018 04:32 )   PT: 26.0 sec;   INR: 2.28 ratio         PTT - ( 30 Dec 2018 04:32 )  PTT:31.1 sec

## 2018-12-30 NOTE — PROGRESS NOTE ADULT - SUBJECTIVE AND OBJECTIVE BOX
Ortho Note POD# 2  78yFemale    Diagnosis:  S/p IM nailing Left hip fx POD# 3    In ICU, intubated and sedated. Unable to assess motor function.   In acute liver and renal failure.    Vital Signs Last 24 Hrs  T(C): 37 (30 Dec 2018 15:00), Max: 37.9 (30 Dec 2018 00:00)  T(F): 98.6 (30 Dec 2018 15:00), Max: 100.3 (30 Dec 2018 00:00)  HR: 84 (30 Dec 2018 15:00) (84 - 107)  BP: 82/43 (30 Dec 2018 12:00) (82/43 - 126/61)  BP(mean): 51 (30 Dec 2018 12:00) (51 - 77)  RR: 30 (30 Dec 2018 15:00) (0 - 30)  SpO2: 100% (30 Dec 2018 15:00) (93% - 100%)    Physical Exam:    General: AAOx3, in NAD, resting in bed.    Left hip:  In nl. alignment. Wound C/D/I; healing well.  Staples intact. Calves are soft. Unable to assess pulse. B/l feet are cold with cyanotic toes.                          6.7    13.6  )-----------( 64       ( 30 Dec 2018 12:40 )             23.4     12-30    140  |  102  |  51<H>  ----------------------------<  329<H>  4.4   |  17<L>  |  3.25<H>    Ca    8.0<L>      30 Dec 2018 04:32  Phos  5.0     12-30  Mg     1.9     12-30    TPro  5.3<L>  /  Alb  2.5<L>  /  TBili  2.0<H>  /  DBili  x   /  AST  6372<H>  /  ALT  2974<H>  /  AlkPhos  90  12-30      Impression:  78yFemale S/p Im nailing Left hip fx POD# 3  Plan:  -  Receiving multiple transfusions of FFP, to receive prbc today.  -  DVT prophylaxis . INR supratherapeutic  -  Daily Physical Therapy:  WBAT on LLE with walker. ROM exercises when stable  -  Orthopedically stable  -  Dressing changed  -  D/c  staples in 11 days  -  Encouraged use of incentive spirometer  -  Case d/w

## 2018-12-30 NOTE — PROGRESS NOTE ADULT - PROBLEM SELECTOR PLAN 3
Likely due to shock  ALT 5K  AST 2K  Elevated INR    -Nac  -8 FFP transfusion  -vit K 10mg through ng  -Follow coag q 24,  -Follow cmp daily

## 2018-12-30 NOTE — CONSULT NOTE ADULT - ASSESSMENT
Coagulopathy post surgery. The counts are suggestive of DIC with anemia, thrombocytopenia and high coag  Not improved with FFP and Vitamin K  Recommend to treat with K Centra.  now will get 25/kg now. Recommend another 25 units/kg to a full dose of 50 mcg/kg    If the coags do not improve after that, one should check for inhibitors, by checking 50/50 correction of PT and PTT first    Pt has iron def and failure in addition to that. Recommend five days of venofer 200 mg a day and start Procrit 10K units sq three times a week starting tomorrow    Low Hapto and low retic are not supportive of the hemolytic process. will review the peripheral smear    Recommend check for fibrinogen dialy for the next three days and check for thrombin time also    Discussed in detail with the patient's family by the bedside.    Nasir Gondal  [phone]

## 2018-12-30 NOTE — PROGRESS NOTE ADULT - SUBJECTIVE AND OBJECTIVE BOX
Patient was seen and examined at bedside.    CC: pt still anuria and requiring mild pressor support.  s/p 1 unit of prbc and kcentra given.    Vital Signs Last 24 Hrs  T(C): 37 (30 Dec 2018 15:30), Max: 37.9 (30 Dec 2018 00:00)  T(F): 98.6 (30 Dec 2018 15:30), Max: 100.3 (30 Dec 2018 00:00)  HR: 84 (30 Dec 2018 17:00) (84 - 107)  BP: 93/45 (30 Dec 2018 16:00) (82/43 - 126/61)  BP(mean): 57 (30 Dec 2018 16:00) (51 - 77)  RR: 30 (30 Dec 2018 17:00) (0 - 30)  SpO2: 99% (30 Dec 2018 17:00) (93% - 100%)     @ 07:01  -   @ 07:00  --------------------------------------------------------  IN: 8237.6 mL / OUT: 60 mL / NET: 8177.6 mL     @ 07:  -   @ 17:28  --------------------------------------------------------  IN: 3095.8 mL / OUT: 20 mL / NET: 3075.8 mL        PHYSICAL EXAM:  GENERAL: No acute respiratory distress.  HEAD:  Atraumatic, Normocephalic  EYES: conjunctiva and sclera clear  ENMT: Moist mucous membranes; ET  NECK: Supple, No JVD  NERVOUS SYSTEM:  spontaneously opens the eyes.   CHEST/LUNG: decreased bs.  HEART: S1S2+  ABDOMEN: Soft, Non-tender, Nondistended; Bowel sounds present  EXTREMITIES: no edema.  SKIN: No rashes; multiple bruises      MEDICATIONS:  ascorbic acid 500 milliGRAM(s) Oral two times a day  chlorhexidine 4% Liquid 1 Application(s) Topical two times a day  cyanocobalamin Injectable 1000 MICROGram(s) SubCutaneous daily  docusate sodium 100 milliGRAM(s) Oral three times a day  epoetin monique Injectable 33267 Unit(s) IV Push <User Schedule>  fentaNYL   Infusion. 0.5 MICROgram(s)/kG/Hr IV Continuous <Continuous>  folic acid 1 milliGRAM(s) Oral daily  insulin lispro (HumaLOG) corrective regimen sliding scale   SubCutaneous every 6 hours  iron sucrose IVPB 200 milliGRAM(s) IV Intermittent every 24 hours  morphine  - Injectable 2 milliGRAM(s) IV Push every 4 hours PRN  norepinephrine Infusion 0.05 MICROgram(s)/kG/Min IV Continuous <Continuous>  ondansetron Injectable 4 milliGRAM(s) IV Push every 6 hours PRN  pantoprazole  Injectable 40 milliGRAM(s) IV Push daily  phytonadione   Solution 10 milliGRAM(s) Oral daily  piperacillin/tazobactam IVPB. 3.375 Gram(s) IV Intermittent every 12 hours  sodium bicarbonate  Infusion 0.183 mEq/kG/Hr IV Continuous <Continuous>  vasopressin Infusion 0.04 Unit(s)/Min IV Continuous <Continuous>      LABS:                        6.7    13.6  )-----------( 64       ( 30 Dec 2018 12:40 )             23.4     12-30    140  |  102  |  51<H>  ----------------------------<  329<H>  4.4   |  17<L>  |  3.25<H>    Ca    8.0<L>      30 Dec 2018 04:32  Phos  5.0     12  Mg     1.9     1230    TPro  5.3<L>  /  Alb  2.5<L>  /  TBili  2.0<H>  /  DBili  x   /  AST  6372<H>  /  ALT  2974<H>  /  AlkPhos  90  12-30    PT/INR - ( 30 Dec 2018 12:40 )   PT: 28.5 sec;   INR: 2.50 ratio         PTT - ( 30 Dec 2018 04:32 )  PTT:31.1 sec  Urinalysis Basic - ( 29 Dec 2018 16:20 )    Color: Yellow / Appearance: Slightly Turbid / S.020 / pH: x  Gluc: x / Ketone: Trace  / Bili: Negative / Urobili: Negative   Blood: x / Protein: 500 mg/dL / Nitrite: Negative   Leuk Esterase: Small / RBC: 25-50 /HPF / WBC 6-10 /HPF   Sq Epi: x / Non Sq Epi: Few /HPF / Bacteria: Many /HPF      Magnesium, Serum: 1.9 mg/dL ( @ 04:32)  Phosphorus Level, Serum: 5.0 mg/dL ( @ 04:32)    Urine studies    PTH and Vit D:

## 2018-12-30 NOTE — CONSULT NOTE ADULT - SUBJECTIVE AND OBJECTIVE BOX
GI INITIAL CONSULT    HPI: 78F w/stated PMH presented after a fall resulting in a hip fracture that was repaired. Post-op pt developed septic shock due to LLL PNA as well as multiorgan failure including shock liver. GI consult for evaluation of elevated LFTs due to shock liver. All hx obtained from ICU team and chart.    PMH: HTN, DM2, CHF  PSH: hip repair    Meds: MEDICATIONS  (STANDING):  ascorbic acid 500 milliGRAM(s) Oral two times a day  chlorhexidine 4% Liquid 1 Application(s) Topical two times a day  cyanocobalamin Injectable 1000 MICROGram(s) SubCutaneous daily  docusate sodium 100 milliGRAM(s) Oral three times a day  fentaNYL   Infusion. 0.5 MICROgram(s)/kG/Hr (4.105 mL/Hr) IV Continuous <Continuous>  ferrous    sulfate Liquid 300 milliGRAM(s) Enteral Tube three times a day with meals  folic acid 1 milliGRAM(s) Oral daily  insulin lispro (HumaLOG) corrective regimen sliding scale   SubCutaneous every 6 hours  norepinephrine Infusion 0.05 MICROgram(s)/kG/Min (3.848 mL/Hr) IV Continuous <Continuous>  pantoprazole  Injectable 40 milliGRAM(s) IV Push daily  piperacillin/tazobactam IVPB. 3.375 Gram(s) IV Intermittent every 12 hours  sodium bicarbonate  Infusion 0.183 mEq/kG/Hr (100 mL/Hr) IV Continuous <Continuous>  vasopressin Infusion 0.04 Unit(s)/Min (2.4 mL/Hr) IV Continuous <Continuous>    SH: unable to obtain  FH: unable to obtain  ROS: unable to obtain    Vitals: T(C): 37 (12-30-18 @ 15:30)  T(F): 98.6 (12-30-18 @ 15:30), Max: 100.3 (12-30-18 @ 00:00)  HR: 85 (12-30-18 @ 15:30) (84 - 107)  BP: 82/43 (12-30-18 @ 12:00) (82/43 - 126/61)    Gen: intubated, sedated  CVS: S1/S2  Chest: CTABL  Abd: S/NT/ND                        6.7    13.6  )-----------( 64       ( 30 Dec 2018 12:40 )             23.4   12-30    140  |  102  |  51<H>  ----------------------------<  329<H>  4.4   |  17<L>  |  3.25<H>    Ca    8.0<L>      30 Dec 2018 04:32  Phos  5.0     12-30  Mg     1.9     12-30    TPro  5.3<L>  /  Alb  2.5<L>  /  TBili  2.0<H>  /  DBili  x   /  AST  6372<H>  /  ALT  2974<H>  /  AlkPhos  90  12-30

## 2018-12-30 NOTE — PROGRESS NOTE ADULT - PROBLEM SELECTOR PLAN 4
Echo showed EF of 20% with G1 DD  Keep net negative  Unable to administer diuretics due to hypotension  C/w Digoxin and Aspirin CXR: bilateral basal pneumonia  Started Vanc and Zosyn, renally dosed with daily Vanco trough due to renal failure CXR: bilateral basal pneumonia  c/w  Vanc and Zosyn, renally dosed with daily Vanco trough due to renal failure

## 2018-12-30 NOTE — PROGRESS NOTE ADULT - PROBLEM SELECTOR PLAN 5
Digoxin loading Digoxin level follow up S/p L Hip IM Nailing on Dec 27  Pain control with fentanyl drip and Morphine

## 2018-12-30 NOTE — PROGRESS NOTE ADULT - PROBLEM SELECTOR PLAN 2
Acute anuric renal failure, multi organ failure likely due to cardiogenic/ septic shock with severe lactic acidosis   Elevated BUN/Cr  Net positive 3L  Nephro consulted, pt needs urgent diaysis  -Monitoring for hyperkalemia with kayexalate as needed  -Bicarb drip for acidosis  -Plan for shiley once normalization of INR followed by dialysis  s/p 12 U FFP, 2U PRBC, repeat INR for goal 1.5 or lower for Shiley placement by vascular surgery

## 2018-12-30 NOTE — PROGRESS NOTE ADULT - ASSESSMENT
1. MARIA G due to ATN from hypotensive and spetic shock with multiorgan failure  -unable to perform HD yesterday since no dialysis access due to elevated INR. awaiting shiley placement once INR is optimal. Electrolytes and volume status is acceptable so no urgent need of HD today and will plan for HD once access is established, possibly tomorrow.    -daughter gave consent for HD. Patient's daughter was explained in detail the risks (hypotension, angina, MI, and cardiac arrest), benefits and alternative modalities (no dialysis and conservative medical management) for dialysis. Patient' daughter given time to ask questions and all questions were answered. Patient gave consent for renal replacement therapy.  -unable to obtain urine studies because anuric.   Adjust meds to eGFR and avoid IV Gadolinium contrast, NSAIDs, and phosphate enema.  -Monitor I/O's daily.   -Monitor SMA daily.  2. Acidosis due shock  -ph is improving and continue with bicarbonate drip.   3. Hyperkalemia:   improved with conservative management. monitor K daily.   4. Multiorgan failure: plan as per MICU. pt has poor prognosis.  5. Anemia: received blood products.

## 2018-12-30 NOTE — CONSULT NOTE ADULT - SUBJECTIVE AND OBJECTIVE BOX
Patient is a 78y old  Female who presents with a chief complaint of fall (30 Dec 2018 16:28)    Pt had no history of any hematological abnormality except perhaps thal trait.  Now admitted with hip trauma.  Post op got into shock and developed multiorgan failure  Called due to coagulopathy which has not improved with multiple FFP and Vit k injections  Pt is anuric and awaits dialysis catheter placement         ROS:  unable to communicate    PAST MEDICAL & SURGICAL HISTORY:  No pertinent past medical history  No significant past surgical history  except thal trait      SOCIAL HISTORY: Had two live births    FAMILY HISTORY:  No pertinent family history in first degree relatives      MEDICATIONS  (STANDING):  ascorbic acid 500 milliGRAM(s) Oral two times a day  chlorhexidine 4% Liquid 1 Application(s) Topical two times a day  cyanocobalamin Injectable 1000 MICROGram(s) SubCutaneous daily  docusate sodium 100 milliGRAM(s) Oral three times a day  fentaNYL   Infusion. 0.5 MICROgram(s)/kG/Hr (4.105 mL/Hr) IV Continuous <Continuous>  ferrous    sulfate Liquid 300 milliGRAM(s) Enteral Tube three times a day with meals  folic acid 1 milliGRAM(s) Oral daily  insulin lispro (HumaLOG) corrective regimen sliding scale   SubCutaneous every 6 hours  iron sucrose IVPB 200 milliGRAM(s) IV Intermittent every 24 hours  norepinephrine Infusion 0.05 MICROgram(s)/kG/Min (3.848 mL/Hr) IV Continuous <Continuous>  pantoprazole  Injectable 40 milliGRAM(s) IV Push daily  phytonadione   Solution 10 milliGRAM(s) Oral daily  piperacillin/tazobactam IVPB. 3.375 Gram(s) IV Intermittent every 12 hours  prothrombin complex concentrate IVPB (KCENTRA) 2000 International Unit(s) IV Intermittent once  sodium bicarbonate  Infusion 0.183 mEq/kG/Hr (100 mL/Hr) IV Continuous <Continuous>  vasopressin Infusion 0.04 Unit(s)/Min (2.4 mL/Hr) IV Continuous <Continuous>    MEDICATIONS  (PRN):  morphine  - Injectable 2 milliGRAM(s) IV Push every 4 hours PRN Severe Pain (7 - 10)  ondansetron Injectable 4 milliGRAM(s) IV Push every 6 hours PRN Nausea and/or Vomiting      Allergies    No Known Allergies    Intolerances        Vital Signs Last 24 Hrs  T(C): 37 (30 Dec 2018 15:30), Max: 37.9 (30 Dec 2018 00:00)  T(F): 98.6 (30 Dec 2018 15:30), Max: 100.3 (30 Dec 2018 00:00)  HR: 90 (30 Dec 2018 16:00) (84 - 107)  BP: 93/45 (30 Dec 2018 16:00) (82/43 - 126/61)  BP(mean): 57 (30 Dec 2018 16:00) (51 - 77)  RR: 30 (30 Dec 2018 16:00) (0 - 30)  SpO2: 100% (30 Dec 2018 16:00) (93% - 100%)    PHYSICAL EXAM  General: adult intubated  HEENT: clear oropharynx, anicteric sclera, pink conjunctiva  Neck: supple  CV: normal S1/S2 with no murmur rubs or gallops  Lungs: decreased breath sounds  Abdomen: soft non-tender non-distended, no hepatosplenomegaly, obese  Ext: no clubbing cyanosis or edema, Left thigh surgical scar, NO obvious hematoma around it  Skin: no rashes and no petechiae  Neuro: non responsive      LABS:                          6.7    13.6  )-----------( 64       ( 30 Dec 2018 12:40 )             23.4         Mean Cell Volume : 63.9 fl  Mean Cell Hemoglobin : 18.3 pg  Mean Cell Hemoglobin Concentration : 28.6 gm/dL  Auto Neutrophil # : x  Auto Lymphocyte # : x  Auto Monocyte # : x  Auto Eosinophil # : x  Auto Basophil # : x  Auto Neutrophil % : x  Auto Lymphocyte % : x  Auto Monocyte % : x  Auto Eosinophil % : x  Auto Basophil % : x      Serial CBC's  12-30 @ 12:40  Hct-23.4 / Hgb-6.7 / Plat-64 / RBC-3.67 / WBC-13.6  Serial CBC's  12-30 @ 04:32  Hct-25.2 / Hgb-7.4 / Plat-71 / RBC-3.96 / WBC-15.8  Serial CBC's  12-29 @ 04:43  Hct-39.5 / Hgb-11.2 / Plat-187 / RBC-5.96 / WBC-26.4  Serial CBC's  12-28 @ 06:22  Hct-33.7 / Hgb-9.8 / Plat-212 / RBC-5.41 / WBC-18.9  Serial CBC's  12-27 @ 19:20  Hct-35.3 / Hgb-10.1 / Plat-188 / RBC-5.46 / WBC-16.5  Serial CBC's  12-27 @ 09:51  Hct--- / Hgb--- / Plat--- / RBC-5.42 / WBC---  Serial CBC's  12-27 @ 06:50  Hct-35.5 / Hgb-10.3 / Plat-191 / RBC-5.56 / WBC-11.1      12-30    140  |  102  |  51<H>  ----------------------------<  329<H>  4.4   |  17<L>  |  3.25<H>    Ca    8.0<L>      30 Dec 2018 04:32  Phos  5.0     12-30  Mg     1.9     12-30    TPro  5.3<L>  /  Alb  2.5<L>  /  TBili  2.0<H>  /  DBili  x   /  AST  6372<H>  /  ALT  2974<H>  /  AlkPhos  90  12-30      PT/INR - ( 30 Dec 2018 12:40 )   PT: 28.5 sec;   INR: 2.50 ratio         PTT - ( 30 Dec 2018 04:32 )  PTT:31.1 sec    Ferritin, Serum: 19 ng/mL (12-27 @ 09:54)  Folate, Serum: 7.5 ng/mL (12-27 @ 09:54)  Vitamin B12, Serum: 211 pg/mL (12-27 @ 09:54)  Reticulocyte Percent: 1.5 % (12-27 @ 09:51)  Iron - Total Binding Capacity.: 363 ug/dL (12-27 @ 06:50)

## 2018-12-30 NOTE — PROGRESS NOTE ADULT - PROBLEM SELECTOR PLAN 3
S/p L Hip IM Nailing on Dec 27  Pain control with fentanyl drip and Morphine Likely due to shock  ALT 5K  AST 2K  Elevated INR    -Nac  -8 FFP transfusion  -vit K 10mg through ng  -Follow coag q 24,  -Follow cmp daily

## 2018-12-30 NOTE — PROGRESS NOTE ADULT - ASSESSMENT
77 y/o F pt with PMHX of  HTN, DM2, who presents to ED c/o L hip pain s/p trip and fall from a curb, SVT and severe lv dysfunction,post-op septic shock,MARIA G,LLL pneumonia,and shock liver.  1.ICU monitoring.  2.Vent and pressor support as per iCU.  3.PSVT-change dig .125mg qod, (dig level not accurate-s/p loading need 4 half lives)  4.DM-Insulin.  5.Ortho f/u.  6.Replace vit b12.  7.ID eval, s/p pan cx, ABX for Cr .  8.MARIA G with metabolic acidosis-Renal eval noted.  9.Shock liver-f/u lft's,correct coaggulopathy.  10.Consider transfusing PRBC with HD.  11.GI prophylaxis.

## 2018-12-30 NOTE — PROGRESS NOTE ADULT - SUBJECTIVE AND OBJECTIVE BOX
INTERVAL HPI/OVERNIGHT EVENTS:       Antimicrobial:  piperacillin/tazobactam IVPB. 3.375 Gram(s) IV Intermittent every 12 hours    Cardiovascular:  norepinephrine Infusion 0.05 MICROgram(s)/kG/Min IV Continuous <Continuous>    Pulmonary:    Hematalogic:    Other:  ascorbic acid 500 milliGRAM(s) Oral two times a day  chlorhexidine 4% Liquid 1 Application(s) Topical two times a day  cyanocobalamin Injectable 1000 MICROGram(s) SubCutaneous daily  docusate sodium 100 milliGRAM(s) Oral three times a day  famotidine    Tablet 20 milliGRAM(s) Oral every 12 hours  fentaNYL   Infusion. 0.5 MICROgram(s)/kG/Hr IV Continuous <Continuous>  ferrous    sulfate Liquid 300 milliGRAM(s) Enteral Tube three times a day with meals  folic acid 1 milliGRAM(s) Oral daily  insulin lispro (HumaLOG) corrective regimen sliding scale   SubCutaneous every 6 hours  morphine  - Injectable 2 milliGRAM(s) IV Push every 4 hours PRN  ondansetron Injectable 4 milliGRAM(s) IV Push every 6 hours PRN  sodium bicarbonate  Infusion 0.183 mEq/kG/Hr IV Continuous <Continuous>  vasopressin Infusion 0.04 Unit(s)/Min IV Continuous <Continuous>      Drug Dosing Weight  Height (cm): 154.94 (28 Dec 2018 01:00)  Weight (kg): 82.1 (28 Dec 2018 01:00)  BMI (kg/m2): 34.2 (28 Dec 2018 01:00)  BSA (m2): 1.81 (28 Dec 2018 01:00)    CENTRAL LINE: [ ] YES [ ] NO  LOCATION:   DATE INSERTED:    DE SOUZA: [ ] YES [ ] NO    DATE INSERTED:    A-LINE:  [ ] YES [ ] NO  LOCATION:   DATE INSERTED:    [facility]/Social Hx/Fam Hx -reviewed admission note, no change since admission  PAST MEDICAL & SURGICAL HISTORY:  No pertinent past medical history  No significant past surgical history      T(C): 37.1 (18 @ 07:30), Max: 37.9 (18 @ 00:00)  HR: 87 (18 @ 10:30)  BP: 100/62 (18 @ 08:00)  BP(mean): 68 (18 @ 08:00)  ABP: 106/48 (18 @ 10:30)  ABP(mean): 63 (18 @ 10:30)  RR: 30 (18 @ 10:30)  SpO2: 99% (18 @ 10:30)  Wt(kg): --    ABG - ( 30 Dec 2018 04:26 )  pH, Arterial: 7.37  pH, Blood: x     /  pCO2: 27    /  pO2: 94    / HCO3: 15    / Base Excess: -8.7  /  SaO2: 97                     @ 07:01  -   @ 07:00  --------------------------------------------------------  IN: 8237.6 mL / OUT: 60 mL / NET: 8177.6 mL        Mode: AC/ CMV (Assist Control/ Continuous Mandatory Ventilation)  RR (machine): 30  TV (machine): 450  FiO2: 50  PEEP: 6  ITime: 1  MAP: 16  PIP: 26      PHYSICAL EXAM:    GENERAL: No signs of distress, comfortable  HEAD: Atraumatic, Normocephalic  EYES: EOMI, PERRLA  ENMT: No erythema, exudates, or enlargement, Moist mucous membranes +ETT  NECK: Supple, normal appearance, No JVD; [  ] central line (if applicable)  CHEST/LUNG: No chest deformity, fair bilateral air entry; No rales, rhonchi, wheezing; crackles  HEART: Regular rate and rhythm; No murmurs, rubs, or gallops;   ABDOMEN: Soft, Nontender, Nondistended; Bowel sounds present  EXTREMITIES:  + Peripheral Pulses, No clubbing, cyanosis, or edema + hip dressing  NERVOUS SYSTEM: sedated  LYMPH: No lymphadenopathy noted  SKIN: No rashes or lesions; good turgor, warm, dry      LABS:  CBC Full  -  ( 30 Dec 2018 04:32 )  WBC Count : 15.8 K/uL  Hemoglobin : 7.4 g/dL  Hematocrit : 25.2 %  Platelet Count - Automated : 71 K/uL  Mean Cell Volume : 63.7 fl  Mean Cell Hemoglobin : 18.6 pg  Mean Cell Hemoglobin Concentration : 29.2 gm/dL  Auto Neutrophil # : 13.1 K/uL  Auto Lymphocyte # : 2.0 K/uL  Auto Monocyte # : 0.6 K/uL  Auto Eosinophil # : 0.1 K/uL  Auto Basophil # : 0.0 K/uL  Auto Neutrophil % : 83.0 %  Auto Lymphocyte % : 12.6 %  Auto Monocyte % : 3.6 %  Auto Eosinophil % : 0.4 %  Auto Basophil % : 0.3 %        140  |  102  |  51<H>  ----------------------------<  329<H>  4.4   |  17<L>  |  3.25<H>    Ca    8.0<L>      30 Dec 2018 04:32  Phos  5.0       Mg     1.9         TPro  5.3<L>  /  Alb  2.5<L>  /  TBili  2.0<H>  /  DBili  x   /  AST  6372<H>  /  ALT  2974<H>  /  AlkPhos  90      PT/INR - ( 30 Dec 2018 04:32 )   PT: 26.0 sec;   INR: 2.28 ratio         PTT - ( 30 Dec 2018 04:32 )  PTT:31.1 sec  Urinalysis Basic - ( 29 Dec 2018 16:20 )    Color: Yellow / Appearance: Slightly Turbid / S.020 / pH: x  Gluc: x / Ketone: Trace  / Bili: Negative / Urobili: Negative   Blood: x / Protein: 500 mg/dL / Nitrite: Negative   Leuk Esterase: Small / RBC: 25-50 /HPF / WBC 6-10 /HPF   Sq Epi: x / Non Sq Epi: Few /HPF / Bacteria: Many /HPF          RADIOLOGY & ADDITIONAL STUDIES REVIEWED     CXR;< from: Xray Chest 1 View- PORTABLE-Routine (18 @ 09:42) >  EXAM:  XR CHEST PORTABLE ROUTINE 1V                            PROCEDURE DATE:  2018          INTERPRETATION:  Clinical Information: Respiratory distress    Technique: AP chest image.     Comparison: 2018    Impression: ETT tip above mary. NG tip below diaphragm. Cardiomegaly.   Unchanged large right lung opacity.    < end of copied text >      IMPRESSION:  PAST MEDICAL & SURGICAL HISTORY:  No pertinent past medical history  No significant past surgical history   p/w     IMP: This is an 78 yr old woman with CAD, CHF with decrease EF,  and prior mentioned medical condition s/p fall with right hip fx. S/P Right hip IM nailing 12/27. Post op resp failure and cardiogenic shock  vs septic on pressors to maintain BP.  Pat is in MOSF.  Severe life threatening metabolic acidosis due to MARIA G, MOSF. Coagulopathy due to liver failure ( shock liver). Started on iv antibx . Pat is auto coagulopathic s/p ffp but inr is still elevated,hence no dialysis cath placement. acidosis is better with iv bicarb.  Drop in H/h              Plan:      CNS: keep sedated    PULMONARY: continue vent support    CARDIAC: monitor, for dialysis after dialysis cath placement    GI: npo, NAC. monitor LFT    RENAL: monitor out put and bmp    SKIN: skin care    MUSCULOSKELETAL:boots    ID: antibx as per ID    HEME: repeat cbc and if need will transfuse prbc. will rx 3 units FFP to correct coagulopathy    DVT and GI Prophylaxis    GOALS OF CARE DISCUSSION WITH PATIENT/FAMILY/PROXY/ icu team    CRITICAL CARE TIME SPENT: 35 minutes

## 2018-12-30 NOTE — PROGRESS NOTE ADULT - PROBLEM SELECTOR PLAN 2
CXR: bilateral basal pneumonia  Started Vanc and Zosyn Acute anuric renal failure, multi organ failure likely due to cardiogenic/ septic shock with severe lactic acidosis   Elevated BUN/Cr  Net positive 3L  Nephro consulted, pt needs urgent diaysis  -Monitoring for hyperkalemia with kayxealate as needed  -Bicarb drip for acidosis  -Plan for shiley once normalization of inr followed by dialysis Acute anuric renal failure, multi organ failure likely due to cardiogenic/ septic shock with severe lactic acidosis   Elevated BUN/Cr  Net positive 3L  Nephro consulted, pt needs urgent diaysis  -Monitoring for hyperkalemia with kayexalate as needed  -Bicarb drip for acidosis  -Plan for shiley once normalization of INR followed by dialysis  s/p 12 U FFP, 2U PRBC, repeat INR for goal 1.5 or lower for Shiley placement by vascular surgery

## 2018-12-30 NOTE — CONSULT NOTE ADULT - ASSESSMENT
78F w/HTN, DM2 presented to ED c/o L hip pain s/p trip and fall from a curb, was found to have SVT and severe LV dysfunction, post-op septic shock, MARIA G, LLL pneumonia, and shock liver.  -liver dysfunction due to shock liver w/concomitant sepsis  -LFTs are worsening and acute liver failure persists  -agree with empirical NAC  -recommend D/C H2RA as is can cause thrombocytopenia; recommend starting Protonix 40 mg daily  -recommend speaking w/ID re: changing Zosyn to a different abx as Zosyn has been known to cause LFT elevation (which can compound pt's current problem)  -check viral hepatitis panel  -check abd U/S w/dopplers to evaluate the hepatic vessels  -would give Vit K 10 mg PO daily x3 days  -supportive care per ICU team (avoid hypotension)

## 2018-12-31 NOTE — PROGRESS NOTE ADULT - ASSESSMENT
77 y/o F pt with HTN, DM2, PMHx presents to ED c/o L hip pain s/p trip and fall from a curb today. Pt describes pain as sharp, mild and constant, 10/10 in severity. Pain of the left hip increases with ROM. Pt unable to ambulate nor bear weight. Patient denies head strike, LOC, N/V, vision changes, neurological deficits chest pain, palpitations, shortness of breath, or any other complaints. XR hip showed nondisplaced intertrochanteric fracture left femoral neck and marked osteoporosis. XR hip showed nondisplaced intertrochanteric fracture left femoral neck and marked osteoporosis. (26 Dec 2018 19:35)s/p nailing 12/27 with post op hypotension , septic vs cardiogenic shock, also found to have shock liver . on pressors and sedation. on vanco and zosyn. worsening kidney function with worsening ph and metabolic acidosis    # s/p fall with subsequent left hip fx s/p im nailing 12/27 with post op resp failure and MOSF including shock liver     # resp failure sec to asp pna     # septic/cardiogenic shock with MOSF . echo with very low LVEF     plan  blood cx times 2 f/u ( neg )   u/a and urine cx   vanco random levels daily , if less than 20 can be supplemented with 1 gm stat   change ab to merrem as zosyn can elevated lft . add zyvox   for dialysis as per renal     poor prognosis   pt is dnr   thnx will f/u   d/w family and intern

## 2018-12-31 NOTE — PROGRESS NOTE ADULT - PROBLEM SELECTOR PLAN 5
S/p L Hip IM Nailing on Dec 27  Pain control with fentanyl drip and Morphine Blue toes  Unable to anticoagulate due to anemia  F/u Arterial doppler    Consulted Vascular Surgery

## 2018-12-31 NOTE — PROGRESS NOTE ADULT - SUBJECTIVE AND OBJECTIVE BOX
Patient is a 78y old  Female with coagulopathy  Received K centra yesterday  Pt is on iv iron and EPO    Had catheter placed  Non responsive to verbal stimuli    MEDICATIONS  (STANDING):  ascorbic acid 500 milliGRAM(s) Oral two times a day  chlorhexidine 4% Liquid 1 Application(s) Topical two times a day  cyanocobalamin Injectable 1000 MICROGram(s) SubCutaneous daily  digoxin  Injectable 0.125 milliGRAM(s) IV Push every other day  docusate sodium Liquid 100 milliGRAM(s) Oral two times a day  epoetin monique Injectable 95793 Unit(s) IV Push <User Schedule>  fentaNYL   Infusion. 0.5 MICROgram(s)/kG/Hr (4.105 mL/Hr) IV Continuous <Continuous>  folic acid 1 milliGRAM(s) Oral daily  insulin lispro (HumaLOG) corrective regimen sliding scale   SubCutaneous every 6 hours  iron sucrose IVPB 200 milliGRAM(s) IV Intermittent every 24 hours  norepinephrine Infusion 0.05 MICROgram(s)/kG/Min (3.848 mL/Hr) IV Continuous <Continuous>  pantoprazole  Injectable 40 milliGRAM(s) IV Push daily  phytonadione   Solution 10 milliGRAM(s) Oral daily  piperacillin/tazobactam IVPB. 3.375 Gram(s) IV Intermittent every 12 hours  sodium bicarbonate  Infusion 0.183 mEq/kG/Hr (100 mL/Hr) IV Continuous <Continuous>  vasopressin Infusion 0.04 Unit(s)/Min (2.4 mL/Hr) IV Continuous <Continuous>    MEDICATIONS  (PRN):  morphine  - Injectable 2 milliGRAM(s) IV Push every 4 hours PRN Severe Pain (7 - 10)  ondansetron Injectable 4 milliGRAM(s) IV Push every 6 hours PRN Nausea and/or Vomiting      Allergies    No Known Allergies    Intolerances        Vital Signs Last 24 Hrs  T(C): 37.2 (31 Dec 2018 04:00), Max: 37.4 (30 Dec 2018 11:00)  T(F): 99 (31 Dec 2018 04:00), Max: 99.3 (30 Dec 2018 11:00)  HR: 89 (31 Dec 2018 08:44) (81 - 94)  BP: 94/56 (31 Dec 2018 00:00) (82/43 - 94/56)  BP(mean): 64 (31 Dec 2018 00:00) (51 - 64)  RR: 30 (31 Dec 2018 07:00) (0 - 30)  SpO2: 100% (31 Dec 2018 08:44) (93% - 100%)    PHYSICAL EXAM  General: adult , not responding  HEENT: intubated  CV: normal S1/S2 with no murmur rubs or gallops  Lungs: positive air movement b/l ant lungs,clear to auscultation, no wheezes, no rales  rest deferred       LABS:                          7.7    13.8  )-----------( 48       ( 31 Dec 2018 06:31 )             25.1         Mean Cell Volume : 67.3 fl  Mean Cell Hemoglobin : 20.8 pg  Mean Cell Hemoglobin Concentration : 30.8 gm/dL  Auto Neutrophil # : 11.9 K/uL  Auto Lymphocyte # : 1.2 K/uL  Auto Monocyte # : 0.6 K/uL  Auto Eosinophil # : 0.1 K/uL  Auto Basophil # : 0.1 K/uL  Auto Neutrophil % : 86.2 %  Auto Lymphocyte % : 8.5 %  Auto Monocyte % : 4.4 %  Auto Eosinophil % : 0.5 %  Auto Basophil % : 0.5 %      Serial CBC's  12-31 @ 06:31  Hct-25.1 / Hgb-7.7 / Plat-48 / RBC-3.73 / WBC-13.8  Serial CBC's  12-30 @ 18:31  Hct-21.7 / Hgb-6.5 / Plat-55 / RBC-3.43 / WBC-12.1  Serial CBC's  12-30 @ 12:40  Hct-23.4 / Hgb-6.7 / Plat-64 / RBC-3.67 / WBC-13.6  Serial CBC's  12-30 @ 04:32  Hct-25.2 / Hgb-7.4 / Plat-71 / RBC-3.96 / WBC-15.8  Serial CBC's  12-29 @ 04:43  Hct-39.5 / Hgb-11.2 / Plat-187 / RBC-5.96 / WBC-26.4  Serial CBC's  12-28 @ 06:22  Hct-33.7 / Hgb-9.8 / Plat-212 / RBC-5.41 / WBC-18.9  Serial CBC's  12-27 @ 19:20  Hct-35.3 / Hgb-10.1 / Plat-188 / RBC-5.46 / WBC-16.5      12-31    139  |  98  |  57<H>  ----------------------------<  310<H>  3.9   |  19<L>  |  4.15<H>    Ca    8.0<L>      31 Dec 2018 06:31  Phos  4.7     12-31  Mg     1.8     12-31    TPro  5.6<L>  /  Alb  2.8<L>  /  TBili  4.1<H>  /  DBili  x   /  AST  1949<H>  /  ALT  1531<H>  /  AlkPhos  98  12-31      PT/INR - ( 31 Dec 2018 00:46 )   PT: 21.1 sec;   INR: 1.86 ratio         PTT - ( 31 Dec 2018 00:46 )  PTT:29.2 sec    Ferritin, Serum: 19 ng/mL (12-27 @ 09:54)  Folate, Serum: 7.5 ng/mL (12-27 @ 09:54)  Vitamin B12, Serum: 211 pg/mL (12-27 @ 09:54)  Reticulocyte Percent: 1.5 % (12-27 @ 09:51)  Iron - Total Binding Capacity.: 363 ug/dL (12-27 @ 06:50)        Review of the smear.: NO FRAGMENTED RBC, NO SPHEROCYTES. PLATELETS ARE REDUCED, LEFT SHIFT

## 2018-12-31 NOTE — PROGRESS NOTE ADULT - ASSESSMENT
78 F pt with HTN, DM2, PMHx presents to ED c/o L hip pain s/p trip and fall. Patient admitted to medicine floor for left hip fracture. Underwent left IM nailing on 12/27/18. Patient brought to ICU for post op monitoring. Now undergoing multi-organ failure.

## 2018-12-31 NOTE — PROGRESS NOTE ADULT - PROBLEM SELECTOR PLAN 2
Multiorgan failure with Anuria with severe lactic acidosis   Rt Femoral Shiley  Started Bicarb drip for acidosis  Consulted Dr Rao

## 2018-12-31 NOTE — PROGRESS NOTE ADULT - PROBLEM SELECTOR PLAN 1
CXR: Bilateral basal pneumonia  Started Vanc and Zosyn  Monitor Vanc troughs daily and dose after dialysis

## 2018-12-31 NOTE — CONSULT NOTE ADULT - ASSESSMENT
78 year old female with shock s/p left IM hip nailing on 12/27. Found to have cyanosis of bilateral distal lower extremities. ARF, shock liver, coagulopathy with thrombocytopenia    - serial exams of bilateral lower extremity to monitor for progression  - continue to monitor distal pulses  - when stable recommend arterial duplex/ GETACHEW/PVR  - likely not a good candidate for AC due to existing coagulopathy  - continue to monitor renal funciton, may need HD per nephrology  - call if Shiley catheter placement needed  - continue to monitor PLT's, H/H, and coags   - Heme/onc follow up   - continue ICU management  - will discuss with Dr. Andersen 78 year old female with shock s/p left IM hip nailing on 12/27. Found to have cyanosis of bilateral distal lower extremities, likely secondary to pressors. ARF, shock liver, coagulopathy with thrombocytopenia    - serial exams of bilateral lower extremity to monitor for progression  - continue to monitor distal pulses  - when stable recommend arterial duplex/ GETACHEW/PVR  - likely not a good candidate for AC due to existing coagulopathy  - recommend removal of left femoral A-line when coagulopathy and thrombocytopenia reversed  - continue to monitor renal funciton, may need HD per nephrology  - call if Shiley catheter placement needed  - continue to monitor PLT's, H/H, and coags   - Heme/onc follow up   - continue ICU management  - discussed with Dr. Andersen who agrees 78 year old female with shock s/p left IM hip nailing on 12/27. Found to have cyanosis of bilateral distal lower extremities, likely secondary to pressors. ARF, shock liver, coagulopathy with thrombocytopenia    - serial exams of bilateral lower extremity to monitor for progression  - continue to monitor distal pulses  - when stable recommend arterial duplex/ GETACHEW/PVR  - likely not a good candidate for AC due to existing coagulopathy  - recommend removal of left femoral A-line when coagulopathy and thrombocytopenia reversed  - continue to monitor renal funciton, HD per nephrology  - continue to monitor PLT's, H/H, and coags   - Heme/onc follow up   - continue ICU management  - discussed with Dr. Andersen who agrees

## 2018-12-31 NOTE — PROGRESS NOTE ADULT - PROBLEM SELECTOR PLAN 5
Blue toes  Unable to anticoagulate due to anemia  F/u Arterial doppler    Consulted Vascular Surgery

## 2018-12-31 NOTE — PROGRESS NOTE ADULT - PROBLEM SELECTOR PLAN 8
HbA1c of 6  C/w HSS and Lantus 15 C/w pressor support levophed and vasopressin C/w pressor support vasopressin as needed

## 2018-12-31 NOTE — PROGRESS NOTE ADULT - SUBJECTIVE AND OBJECTIVE BOX
78yFemale    Diagnosis:  S/p L Hip IM Nailing POD# 4    Patient was seen and evaluated at bedside. Patient is still intubated and sedated in ICU.     Vital Signs Last 24 Hrs  T(C): 37.3 (31 Dec 2018 13:20), Max: 37.3 (31 Dec 2018 13:20)  T(F): 99.1 (31 Dec 2018 13:20), Max: 99.1 (31 Dec 2018 13:20)  HR: 86 (31 Dec 2018 13:20) (81 - 99)  BP: 94/50 (31 Dec 2018 13:20) (93/45 - 94/56)  BP(mean): 64 (31 Dec 2018 00:00) (57 - 64)  RR: 21 (31 Dec 2018 13:20) (0 - 30)  SpO2: 99% (31 Dec 2018 13:20) (96% - 100%)  I&O's Detail    30 Dec 2018 07:01  -  31 Dec 2018 07:00  --------------------------------------------------------  IN:    Enteral Tube Flush: 300 mL    fentaNYL Infusion.: 393.6 mL    norepinephrine Infusion: 54 mL    Packed Red Blood Cells: 700 mL    Plasma: 2462 mL    sodium bicarbonate  Infusion: 2400 mL    Solution: 150 mL    Solution: 250 mL    Solution: 180 mL    Solution: 250 mL    vasopressin Infusion: 52.8 mL  Total IN: 7192.4 mL    OUT:    Indwelling Catheter - Urethral: 15 mL    Intermittent Catheterization - Urethral: 20 mL  Total OUT: 35 mL    Total NET: 7157.4 mL      31 Dec 2018 07:01  -  31 Dec 2018 14:24  --------------------------------------------------------  IN:    fentaNYL Infusion.: 82 mL    sodium bicarbonate  Infusion: 500 mL  Total IN: 582 mL    OUT:    Indwelling Catheter - Urethral: 45 mL  Total OUT: 45 mL    Total NET: 537 mL    Physical Exam:    General: AAOx3, NAD, resting comfortably in bed.    L Hip:  In normal alignment, Dressing is C/D/I. Skin is pink and warm. Dressing changed today, Staples intact. No erythema. SILT.  Wound with no drainage, healing well.   Lower extremity: Calves are soft. Unable to assess pulse. B/l feet are cold with cyanotic toes. skin intact.                           9.4    26.1  )-----------( 77       ( 31 Dec 2018 12:06 )             30.1     12-31    139  |  98  |  57<H>  ----------------------------<  310<H>  3.9   |  19<L>  |  4.15<H>    Ca    8.0<L>      31 Dec 2018 06:31  Phos  4.7     12-31  Mg     1.8     12-31    TPro  5.6<L>  /  Alb  2.8<L>  /  TBili  4.1<H>  /  DBili  x   /  AST  1949<H>  /  ALT  1531<H>  /  AlkPhos  98  12-31        Impression:  78yFemale S/p L Hip IM Nailing POD# 4  Plan:  -  DVT prophylaxis   -  Daily Physical Therapy:  WBAT on LLE with walker. ROM exercises when stable  -  Dressing changed  -  D/c staples in 10 days  -  D/w ICU team  -  Continue with care as per ICU team  -  Case d/w

## 2018-12-31 NOTE — PROGRESS NOTE ADULT - ASSESSMENT
77 y/o F pt with PMHX of  HTN, DM2, who presents to ED c/o L hip pain s/p trip and fall from a curb, SVT and severe lv dysfunction,post-op septic shock,MARIA G,LLL pneumonia,and shock liver.  1.ICU monitoring.  2.Vent  support as per iCU.  3.PSVT-change dig .125mg qod, add low dose b blocker.  4.DM-Insulin.  5.Ortho f/u.  6.Replace vit b12.  7.ID eval, ABX.  8.MARIA G with metabolic acidosis-Renal f/u.  9.Shock liver-f/u lft's,correct coaggulopathy.  10.Consider transfusing PRBC with HD.  11.GI prophylaxis.  12.Coagulopathy-Heme eval noted.

## 2018-12-31 NOTE — PROGRESS NOTE ADULT - SUBJECTIVE AND OBJECTIVE BOX
CHIEF COMPLAINT:Patient is a 78y old  Female who presents with a chief complaint of fall .Pt remains intubated.    	  REVIEW OF SYSTEMS:  [x ] Unable to obtain    PHYSICAL EXAM:  T(C): 37.2 (12-31-18 @ 04:00), Max: 37.4 (12-30-18 @ 11:00)  HR: 89 (12-31-18 @ 08:44) (81 - 94)  BP: 94/56 (12-31-18 @ 00:00) (82/43 - 94/56)  RR: 30 (12-31-18 @ 07:00) (0 - 30)  SpO2: 100% (12-31-18 @ 08:44) (93% - 100%)  Wt(kg): --  I&O's Summary    30 Dec 2018 07:01  -  31 Dec 2018 07:00  --------------------------------------------------------  IN: 7192.4 mL / OUT: 35 mL / NET: 7157.4 mL        Appearance: Normal	  HEENT:   Normal oral mucosa, PERRL, EOMI	  Lymphatic: No lymphadenopathy  Cardiovascular: Normal S1 S2, No JVD, No murmurs, +1 edema  Respiratory: Lungs clear to auscultation	  Gastrointestinal:  Soft, Non-tender, + BS	  Skin: No rashes, No ecchymoses, No cyanosis	  Extremities: Normal range of motion, No clubbing, cyanosis or edema  Vascular: Peripheral pulses palpable 2+ bilaterally    MEDICATIONS  (STANDING):  ascorbic acid 500 milliGRAM(s) Oral two times a day  chlorhexidine 4% Liquid 1 Application(s) Topical two times a day  cyanocobalamin Injectable 1000 MICROGram(s) SubCutaneous daily  digoxin  Injectable 0.125 milliGRAM(s) IV Push every other day  docusate sodium Liquid 100 milliGRAM(s) Oral two times a day  epoetin monique Injectable 54692 Unit(s) IV Push <User Schedule>  fentaNYL   Infusion. 0.5 MICROgram(s)/kG/Hr (4.105 mL/Hr) IV Continuous <Continuous>  folic acid 1 milliGRAM(s) Oral daily  insulin lispro (HumaLOG) corrective regimen sliding scale   SubCutaneous every 6 hours  iron sucrose IVPB 200 milliGRAM(s) IV Intermittent every 24 hours  norepinephrine Infusion 0.05 MICROgram(s)/kG/Min (3.848 mL/Hr) IV Continuous <Continuous>  pantoprazole  Injectable 40 milliGRAM(s) IV Push daily  phytonadione   Solution 10 milliGRAM(s) Oral daily  piperacillin/tazobactam IVPB. 3.375 Gram(s) IV Intermittent every 12 hours  sodium bicarbonate  Infusion 0.183 mEq/kG/Hr (100 mL/Hr) IV Continuous <Continuous>  vasopressin Infusion 0.04 Unit(s)/Min (2.4 mL/Hr) IV Continuous <Continuous>      TELEMETRY: 	svt with RVR    	  LABS:	 	                       7.7    13.8  )-----------( 48       ( 31 Dec 2018 06:31 )             25.1     12-31    139  |  98  |  57<H>  ----------------------------<  310<H>  3.9   |  19<L>  |  4.15<H>    Ca    8.0<L>      31 Dec 2018 06:31  Phos  4.7     12-31  Mg     1.8     12-31    TPro  5.6<L>  /  Alb  2.8<L>  /  TBili  4.1<H>  /  DBili  x   /  AST  1949<H>  /  ALT  1531<H>  /  AlkPhos  98  12-31      Lipid Profile: Cholesterol 82  LDL 22  HDL 40      HgA1c: Hemoglobin A1C, Whole Blood: 6.1 % (12-27 @ 09:51)  Hemoglobin A1C, Whole Blood: 6.2 % (12-26 @ 23:12)    TSH: Thyroid Stimulating Hormone, Serum: 1.19 uU/mL (12-27 @ 06:50)      EXAM:  XR CHEST PORTABLE ROUTINE 1V                            PROCEDURE DATE:  12/30/2018          INTERPRETATION:  Clinical Information: Respiratory distress    Technique: AP chest image.     Comparison: 12/29/2018    Impression: ETT tip above mary. NG tip below diaphragm. Cardiomegaly.   Unchanged large right lung opacity.    Blood Gas Profile - Arterial in AM (12.31.18 @ 04:51)    pH, Arterial: 7.46    pCO2, Arterial: 26 mmHg    pO2, Arterial: 89 mmHg    HCO3, Arterial: 18 mmol/L    Base Excess, Arterial: -4.7 mmol/L    Oxygen Saturation, Arterial: 97 %    FIO2, Arterial: 50    Blood Gas Comments Arterial: CMV  30   - 450     A. L.    INR: 1.86 ratio (12.31.18 @ 00:46)

## 2018-12-31 NOTE — PROGRESS NOTE ADULT - SUBJECTIVE AND OBJECTIVE BOX
MARICEL SLAUGHTER  MR# [id number]  78yFemale        Patient is a 78y old  Female who presents with a chief complaint of fall (31 Dec 2018 17:21)      INTERVAL HPI/OVERNIGHT EVENTS:  Patient seen and examined at bedside. Trailed HD despite hypotension. Continues on vent, pressor support and Abx.     ALLERGIES  No Known Allergies      MEDICATIONS  ascorbic acid 500 milliGRAM(s) Oral two times a day  chlorhexidine 4% Liquid 1 Application(s) Topical two times a day  cyanocobalamin Injectable 1000 MICROGram(s) SubCutaneous daily  digoxin  Injectable 0.125 milliGRAM(s) IV Push every other day  docusate sodium Liquid 100 milliGRAM(s) Oral two times a day  epoetin monique Injectable 25655 Unit(s) IV Push <User Schedule>  fentaNYL   Infusion. 0.5 MICROgram(s)/kG/Hr IV Continuous <Continuous>  folic acid 1 milliGRAM(s) Oral daily  insulin glargine Injectable (LANTUS) 15 Unit(s) SubCutaneous at bedtime  insulin lispro (HumaLOG) corrective regimen sliding scale   SubCutaneous every 6 hours  iron sucrose IVPB 200 milliGRAM(s) IV Intermittent every 24 hours  linezolid  IVPB 600 milliGRAM(s) IV Intermittent every 12 hours  meropenem  IVPB 500 milliGRAM(s) IV Intermittent every 12 hours  morphine  - Injectable 2 milliGRAM(s) IV Push every 4 hours PRN Severe Pain (7 - 10)  norepinephrine Infusion 0.05 MICROgram(s)/kG/Min IV Continuous <Continuous>  ondansetron Injectable 4 milliGRAM(s) IV Push every 6 hours PRN Nausea and/or Vomiting  pantoprazole  Injectable 40 milliGRAM(s) IV Push daily  phytonadione   Solution 10 milliGRAM(s) Oral daily  sodium bicarbonate  Infusion 0.183 mEq/kG/Hr IV Continuous <Continuous>              REVIEW OF SYSTEMS:  CONSTITUTIONAL: No fever, weight loss, or fatigue  EYES: No eye pain, visual disturbances, or discharge  ENT:  No difficulty hearing, tinnitus, vertigo; No sinus or throat pain  NECK: No pain or stiffness  RESPIRATORY: No cough, wheezing, chills or hemoptysis; No Shortness of Breath  CARDIOVASCULAR: No chest pain, palpitations, passing out, dizziness, or leg swelling  GASTROINTESTINAL: No abdominal or epigastric pain. No nausea, vomiting, or hematemesis; No diarrhea or constipation. No melena or hematochezia.  GENITOURINARY: No dysuria, frequency, hematuria, or incontinence  NEUROLOGICAL: No headaches, memory loss, loss of strength, numbness, or tremors  SKIN: No itching, burning, rashes, or lesions   LYMPH Nodes: No enlarged glands  ENDOCRINE: No heat or cold intolerance; No hair loss  MUSCULOSKELETAL: No joint pain or swelling; No muscle, back, or extremity pain  PSYCHIATRIC: No depression, anxiety, mood swings, or difficulty sleeping  HEME/LYMPH: No easy bruising, or bleeding gums  ALLERGY AND IMMUNOLOGIC: No hives or eczema	    [ ] All others negative	  [ ] Unable to obtain      T(C): 37 (12-31-18 @ 15:15), Max: 37.3 (12-31-18 @ 13:20)  T(F): 98.6 (12-31-18 @ 15:15), Max: 99.1 (12-31-18 @ 13:20)  HR: 82 (12-31-18 @ 18:00) (81 - 102)  BP: 99/67 (12-31-18 @ 15:15) (94/50 - 99/67)  RR: 14 (12-31-18 @ 18:00) (0 - 30)  SpO2: 97% (12-31-18 @ 18:00) (96% - 100%)  Wt(kg): --    I&O's Summary    30 Dec 2018 07:01  -  31 Dec 2018 07:00  --------------------------------------------------------  IN: 7192.4 mL / OUT: 35 mL / NET: 7157.4 mL    31 Dec 2018 07:01  -  31 Dec 2018 19:04  --------------------------------------------------------  IN: 1737.6 mL / OUT: 45 mL / NET: 1692.6 mL          PHYSICAL EXAM:  A X O x  HEAD:  Atraumatic, Normocephalic  EYES: EOMI, PERRLA, conjunctiva and sclera clear  NECK: Supple, No JVD, Normal thyroid  Resp: CTAB, No crackles, wheezing,   CVS: Regular rate and rhythm; No discernable murmurs, rubs, or gallops  ABD: Soft, Nontender, Nondistended; Bowel sounds present  EXTREMITIES:  2+ Peripheral Pulses, No edema  LYMPH: No dicernable lymphadenopathy noted  GENERAL: NAD, well-groomed, well-developed      LABS:                        9.4    26.1  )-----------( 77       ( 31 Dec 2018 12:06 )             30.1     12-31    139  |  98  |  57<H>  ----------------------------<  310<H>  3.9   |  19<L>  |  4.15<H>    Ca    8.0<L>      31 Dec 2018 06:31  Phos  4.7     12-31  Mg     1.8     12-31    TPro  5.6<L>  /  Alb  2.8<L>  /  TBili  4.1<H>  /  DBili  x   /  AST  1949<H>  /  ALT  1531<H>  /  AlkPhos  98  12-31    PT/INR - ( 31 Dec 2018 00:46 )   PT: 21.1 sec;   INR: 1.86 ratio         PTT - ( 31 Dec 2018 00:46 )  PTT:29.2 sec    CAPILLARY BLOOD GLUCOSE      POCT Blood Glucose.: 169 mg/dL (31 Dec 2018 17:01)  POCT Blood Glucose.: 192 mg/dL (31 Dec 2018 12:18)  POCT Blood Glucose.: 327 mg/dL (31 Dec 2018 06:21)  POCT Blood Glucose.: 334 mg/dL (30 Dec 2018 23:20)      Troponins:  ProBNP:  Lipid Profile:   HgA1c:  TSH:     ABG - ( 31 Dec 2018 17:22 )  pH, Arterial: 7.36  pH, Blood: x     /  pCO2: 38    /  pO2: 77    / HCO3: 21    / Base Excess: -3.6  /  SaO2: 94                    RADIOLOGY & ADDITIONAL TESTS:    Imaging Personally Reviewed:  [ ] YES  [ ] NO      Consultant(s) Notes Reviewed:  [x ] YES  [ ] NO    Care Discussed with Consultants/Other Providers [ x] YES  [ ] NO          PAST MEDICAL & SURGICAL HISTORY:  No pertinent past medical history  No significant past surgical history        Closed fracture of neck of left femur  Yes  No pertinent family history in first degree relatives  Handoff  MEWS Score  No pertinent past medical history  Closed fracture of left hip, initial encounter  Peripheral arterial disease  Drop in hemoglobin  Acute liver failure  Acute renal failure  Septic shock  Acute post-operative pain  Hypotension  Fever  Acute systolic congestive heart failure  Cardiogenic shock  Prophylactic measure  Diabetes mellitus  HTN (hypertension)  Heart failure with reduced ejection fraction  PSVT (paroxysmal supraventricular tachycardia)  Left ventricular dysfunction  Need for prophylactic measure  Closed fracture of left hip, initial encounter  No significant past surgical history  A S/P FALL          MARICEL SLAUGHTER  MR# [id number]  78yFemale        Patient is a 78y old  Female who presents with a chief complaint of fall (31 Dec 2018 17:21)      INTERVAL HPI/OVERNIGHT EVENTS:  Patient seen and examined at bedside. No notations of chest pain, palpitation, SOB, orthopnea, nausea, vomiting or abdominal pain.    ALLERGIES  No Known Allergies      MEDICATIONS  ascorbic acid 500 milliGRAM(s) Oral two times a day  chlorhexidine 4% Liquid 1 Application(s) Topical two times a day  cyanocobalamin Injectable 1000 MICROGram(s) SubCutaneous daily  digoxin  Injectable 0.125 milliGRAM(s) IV Push every other day  docusate sodium Liquid 100 milliGRAM(s) Oral two times a day  epoetin monique Injectable 45538 Unit(s) IV Push <User Schedule>  fentaNYL   Infusion. 0.5 MICROgram(s)/kG/Hr IV Continuous <Continuous>  folic acid 1 milliGRAM(s) Oral daily  insulin glargine Injectable (LANTUS) 15 Unit(s) SubCutaneous at bedtime  insulin lispro (HumaLOG) corrective regimen sliding scale   SubCutaneous every 6 hours  iron sucrose IVPB 200 milliGRAM(s) IV Intermittent every 24 hours  linezolid  IVPB 600 milliGRAM(s) IV Intermittent every 12 hours  meropenem  IVPB 500 milliGRAM(s) IV Intermittent every 12 hours  morphine  - Injectable 2 milliGRAM(s) IV Push every 4 hours PRN Severe Pain (7 - 10)  norepinephrine Infusion 0.05 MICROgram(s)/kG/Min IV Continuous <Continuous>  ondansetron Injectable 4 milliGRAM(s) IV Push every 6 hours PRN Nausea and/or Vomiting  pantoprazole  Injectable 40 milliGRAM(s) IV Push daily  phytonadione   Solution 10 milliGRAM(s) Oral daily  sodium bicarbonate  Infusion 0.183 mEq/kG/Hr IV Continuous <Continuous>              REVIEW OF SYSTEMS:  CONSTITUTIONAL: No fever, weight loss, or fatigue  EYES: No eye pain, visual disturbances, or discharge  ENT:  No difficulty hearing, tinnitus, vertigo; No sinus or throat pain  NECK: No pain or stiffness  RESPIRATORY: No cough, wheezing, chills or hemoptysis; No Shortness of Breath  CARDIOVASCULAR: No chest pain, palpitations, passing out, dizziness, or leg swelling  GASTROINTESTINAL: No abdominal or epigastric pain. No nausea, vomiting, or hematemesis; No diarrhea or constipation. No melena or hematochezia.  GENITOURINARY: No dysuria, frequency, hematuria, or incontinence  NEUROLOGICAL: No headaches, memory loss, loss of strength, numbness, or tremors  SKIN: No itching, burning, rashes, or lesions   LYMPH Nodes: No enlarged glands  ENDOCRINE: No heat or cold intolerance; No hair loss  MUSCULOSKELETAL: No joint pain or swelling; No muscle, back, or extremity pain  PSYCHIATRIC: No depression, anxiety, mood swings, or difficulty sleeping  HEME/LYMPH: No easy bruising, or bleeding gums  ALLERGY AND IMMUNOLOGIC: No hives or eczema	    [ ] All others negative	  [ ] Unable to obtain      T(C): 37 (12-31-18 @ 15:15), Max: 37.3 (12-31-18 @ 13:20)  T(F): 98.6 (12-31-18 @ 15:15), Max: 99.1 (12-31-18 @ 13:20)  HR: 78 (12-31-18 @ 19:00) (78 - 102)  BP: 99/67 (12-31-18 @ 15:15) (94/50 - 99/67)  RR: 32 (12-31-18 @ 19:00) (0 - 32)  SpO2: 97% (12-31-18 @ 19:00) (96% - 100%)  Wt(kg): --    I&O's Summary    30 Dec 2018 07:01  -  31 Dec 2018 07:00  --------------------------------------------------------  IN: 7192.4 mL / OUT: 35 mL / NET: 7157.4 mL    31 Dec 2018 07:01  -  31 Dec 2018 19:12  --------------------------------------------------------  IN: 1737.6 mL / OUT: 45 mL / NET: 1692.6 mL          PHYSICAL EXAM:  A X O x  HEAD:  Atraumatic, Normocephalic  EYES: EOMI, PERRLA, conjunctiva and sclera clear  NECK: Supple, No JVD, Normal thyroid  Resp: CTAB, No crackles, wheezing,   CVS: Regular rate and rhythm; No discernable murmurs, rubs, or gallops  ABD: Soft, Nontender, Nondistended; Bowel sounds present  EXTREMITIES:  2+ Peripheral Pulses, No edema  LYMPH: No dicernable lymphadenopathy noted  GENERAL: NAD, well-groomed, well-developed      LABS:                        9.4    26.1  )-----------( 77       ( 31 Dec 2018 12:06 )             30.1     12-31    139  |  98  |  57<H>  ----------------------------<  310<H>  3.9   |  19<L>  |  4.15<H>    Ca    8.0<L>      31 Dec 2018 06:31  Phos  4.7     12-31  Mg     1.8     12-31    TPro  5.6<L>  /  Alb  2.8<L>  /  TBili  4.1<H>  /  DBili  x   /  AST  1949<H>  /  ALT  1531<H>  /  AlkPhos  98  12-31    PT/INR - ( 31 Dec 2018 00:46 )   PT: 21.1 sec;   INR: 1.86 ratio         PTT - ( 31 Dec 2018 00:46 )  PTT:29.2 sec    CAPILLARY BLOOD GLUCOSE      POCT Blood Glucose.: 169 mg/dL (31 Dec 2018 17:01)  POCT Blood Glucose.: 192 mg/dL (31 Dec 2018 12:18)  POCT Blood Glucose.: 327 mg/dL (31 Dec 2018 06:21)  POCT Blood Glucose.: 334 mg/dL (30 Dec 2018 23:20)      Troponins:  ProBNP:  Lipid Profile:   HgA1c:  TSH:     ABG - ( 31 Dec 2018 17:22 )  pH, Arterial: 7.36  pH, Blood: x     /  pCO2: 38    /  pO2: 77    / HCO3: 21    / Base Excess: -3.6  /  SaO2: 94                    RADIOLOGY & ADDITIONAL TESTS:    Imaging Personally Reviewed:  [ ] YES  [ ] NO      Consultant(s) Notes Reviewed:  [x ] YES  [ ] NO    Care Discussed with Consultants/Other Providers [ x] YES  [ ] NO          PAST MEDICAL & SURGICAL HISTORY:  No pertinent past medical history  No significant past surgical history        Closed fracture of neck of left femur  Yes  No pertinent family history in first degree relatives  Handoff  MEWS Score  No pertinent past medical history  Closed fracture of left hip, initial encounter  Peripheral arterial disease  Drop in hemoglobin  Acute liver failure  Acute renal failure  Septic shock  Acute post-operative pain  Hypotension  Fever  Acute systolic congestive heart failure  Cardiogenic shock  Prophylactic measure  Diabetes mellitus  HTN (hypertension)  Heart failure with reduced ejection fraction  PSVT (paroxysmal supraventricular tachycardia)  Left ventricular dysfunction  Need for prophylactic measure  Closed fracture of left hip, initial encounter  No significant past surgical history  A S/P FALL

## 2018-12-31 NOTE — PROGRESS NOTE ADULT - ASSESSMENT
78 F pt with HTN, DM2, PMHx presents to ED c/o L hip pain s/p trip and fall. Patient admitted to medicine floor for left hip fracture. Underwent left IM nailing on 12/27/18. Patient brought to ICU for post op monitoring. Now undergoing multi-organ failure.     Consider d/c of fentanyl as likely culprit in contributing ti pronounced hypotension  ABx changed to Merrem given rising LFTs w/ Zosyn  Added zyvox for coverage for mrsa / stap

## 2018-12-31 NOTE — PROGRESS NOTE ADULT - ASSESSMENT
. MARIA G due to ATN from hypotensive and spetic shock with multiorgan failure  -Scr still worsening and no urine output. HD done today but for only 1hr due to catheter issues. removed 600cc of fluid. will reassess daily for HD.  -vascular notified and will try cathflo.  -unable to obtain urine studies because anuric.   Adjust meds to eGFR and avoid IV Gadolinium contrast, NSAIDs, and phosphate enema.  -Monitor I/O's daily.   -Monitor SMA daily.  2. Acidosis due shock  -stable this morning.   3. Hyperkalemia:   stable and continue conservative management. monitor K daily.   4. Multiorgan failure: plan as per MICU. pt has poor prognosis.  5. Anemia: h/h is stable; plan as per MICU.

## 2018-12-31 NOTE — PROGRESS NOTE ADULT - PROBLEM SELECTOR PLAN 6
Echo showed EF of 20% with G1 DD  Keep net negative  Unable to administer diuretics due to hypotension  C/w Digoxin S/p L Hip IM Nailing on Dec 27  Pain control with fentanyl drip and Morphine

## 2018-12-31 NOTE — PROGRESS NOTE ADULT - SUBJECTIVE AND OBJECTIVE BOX
Subjective: on HD but not tolerating it . remains hypotensive , despite being on pressors. wbc increasing , cxr with worsening bilateral infiltrates       PHYSICAL EXAM:    Vital Signs Last 24 Hrs  T(C): 37.3 (31 Dec 2018 13:20), Max: 37.3 (31 Dec 2018 13:20)  T(F): 99.1 (31 Dec 2018 13:20), Max: 99.1 (31 Dec 2018 13:20)  HR: 98 (31 Dec 2018 14:00) (81 - 99)  BP: 97/49 (31 Dec 2018 14:00) (93/45 - 97/49)  BP(mean): 59 (31 Dec 2018 14:00) (57 - 64)  RR: 19 (31 Dec 2018 14:00) (0 - 30)  SpO2: 99% (31 Dec 2018 14:00) (96% - 100%)    Constitutional: sedated on vent , unresponsive    pupils sluggish  SCLERA anicteric neck supple  LUNGS mech br sounds  CVS s1 s2 aud no murmurs   ABDOMEN soft non tender obese   NEUROLOGY  flaccid   SKIN no rash   EXTREMITIES discoloration of toes /left hip wound clean /right groin with dialysis cath   weber with scant marisa urine         LABS/DIAGNOSTIC TESTS                        9.4    26.1  )-----------( 77       ( 31 Dec 2018 12:06 )             30.1         139  |  98  |  57<H>  ----------------------------<  310<H>  3.9   |  19<L>  |  4.15<H>    Ca    8.0<L>      31 Dec 2018 06:31  Phos  4.7       Mg     1.8         TPro  5.6<L>  /  Alb  2.8<L>  /  TBili  4.1<H>  /  DBili  x   /  AST  1949<H>  /  ALT  1531<H>  /  AlkPhos  98      PT/INR - ( 31 Dec 2018 00:46 )   PT: 21.1 sec;   INR: 1.86 ratio         PTT - ( 31 Dec 2018 00:46 )  PTT:29.2 sec  Urinalysis Basic - ( 29 Dec 2018 16:20 )    Color: Yellow / Appearance: Slightly Turbid / S.020 / pH: x  Gluc: x / Ketone: Trace  / Bili: Negative / Urobili: Negative   Blood: x / Protein: 500 mg/dL / Nitrite: Negative   Leuk Esterase: Small / RBC: 25-50 /HPF / WBC 6-10 /HPF   Sq Epi: x / Non Sq Epi: Few /HPF / Bacteria: Many /HPF        meds   ascorbic acid 500 milliGRAM(s) Oral two times a day  chlorhexidine 4% Liquid 1 Application(s) Topical two times a day  cyanocobalamin Injectable 1000 MICROGram(s) SubCutaneous daily  digoxin  Injectable 0.125 milliGRAM(s) IV Push every other day  docusate sodium Liquid 100 milliGRAM(s) Oral two times a day  epoetin monique Injectable 57783 Unit(s) IV Push <User Schedule>  fentaNYL   Infusion. 0.5 MICROgram(s)/kG/Hr IV Continuous <Continuous>  folic acid 1 milliGRAM(s) Oral daily  insulin glargine Injectable (LANTUS) 15 Unit(s) SubCutaneous at bedtime  insulin lispro (HumaLOG) corrective regimen sliding scale   SubCutaneous every 6 hours  iron sucrose IVPB 200 milliGRAM(s) IV Intermittent every 24 hours  morphine  - Injectable 2 milliGRAM(s) IV Push every 4 hours PRN  norepinephrine Infusion 0.05 MICROgram(s)/kG/Min IV Continuous <Continuous>  ondansetron Injectable 4 milliGRAM(s) IV Push every 6 hours PRN  pantoprazole  Injectable 40 milliGRAM(s) IV Push daily  phytonadione   Solution 10 milliGRAM(s) Oral daily  sodium bicarbonate  Infusion 0.183 mEq/kG/Hr IV Continuous <Continuous>        CULTURES  Culture Results: blood  No growth to date. ( @ 14:42)  Culture Results: blood  No growth to date. ( @ 21:43)        RADIOLOGY  < from: Xray Chest 1 View- PORTABLE-Routine (18 @ 10:15) >    EXAM:  XR CHEST PORTABLE ROUTINE 1V                            PROCEDURE DATE:  2018          INTERPRETATION:  AP supine chest on 2018 at 9:15 AM. Patient   requires intubation.    Heart is enlarged. The aorta is dilated and uncoiled.    Left jugular line and nasogastric tube remain.    Endotracheal tube remains. Endotracheal tube tip is approximately 2 cm   above the mary. This is unchanged.    Heart obscures most of the right lung.    Significant bilateral infiltrates areagain noted.    There is mild basilar fluid particularly on the right.    There is no definite change from .    IMPRESSION: As above.    < end of copied text >

## 2018-12-31 NOTE — PROGRESS NOTE ADULT - PROBLEM SELECTOR PLAN 7
C/w pressor support levophed and vasopressin Echo showed EF of 20% with G1 DD  Keep net negative  Unable to administer diuretics due to hypotension  C/w Digoxin

## 2018-12-31 NOTE — PROGRESS NOTE ADULT - SUBJECTIVE AND OBJECTIVE BOX
Patient was seen and examined at bedside.    CC: HD done today for 1 hr before having catheter issues.   pt is back on pressors. about 600cc of fluid removed.     Vital Signs Last 24 Hrs  T(C): 37 (31 Dec 2018 15:15), Max: 37.3 (31 Dec 2018 13:20)  T(F): 98.6 (31 Dec 2018 15:15), Max: 99.1 (31 Dec 2018 13:20)  HR: 90 (31 Dec 2018 16:40) (81 - 102)  BP: 99/67 (31 Dec 2018 15:15) (94/50 - 99/67)  BP(mean): 59 (31 Dec 2018 14:00) (59 - 64)  RR: 18 (31 Dec 2018 16:00) (0 - 30)  SpO2: 99% (31 Dec 2018 16:40) (96% - 100%)    12-30 @ 07:01  -  12-31 @ 07:00  --------------------------------------------------------  IN: 7192.4 mL / OUT: 35 mL / NET: 7157.4 mL    12-31 @ 07:01  -  12-31 @ 17:22  --------------------------------------------------------  IN: 1169.2 mL / OUT: 45 mL / NET: 1124.2 mL        PHYSICAL EXAM:  GENERAL: No acute respiratory distress.  HEAD:  Atraumatic, Normocephalic  EYES: conjunctiva and sclera clear  ENMT: Moist mucous membranes; ET  NECK: Supple, No JVD  NERVOUS SYSTEM:  spontaneously opens the eyes.   CHEST/LUNG: decreased bs.  HEART: S1S2+  ABDOMEN: Soft, Non-tender, Nondistended; Bowel sounds present  EXTREMITIES: no edema.  SKIN: No rashes; multiple bruises      MEDICATIONS:  ascorbic acid 500 milliGRAM(s) Oral two times a day  chlorhexidine 4% Liquid 1 Application(s) Topical two times a day  cyanocobalamin Injectable 1000 MICROGram(s) SubCutaneous daily  digoxin  Injectable 0.125 milliGRAM(s) IV Push every other day  docusate sodium Liquid 100 milliGRAM(s) Oral two times a day  epoetin monique Injectable 53862 Unit(s) IV Push <User Schedule>  fentaNYL   Infusion. 0.5 MICROgram(s)/kG/Hr IV Continuous <Continuous>  folic acid 1 milliGRAM(s) Oral daily  insulin glargine Injectable (LANTUS) 15 Unit(s) SubCutaneous at bedtime  insulin lispro (HumaLOG) corrective regimen sliding scale   SubCutaneous every 6 hours  iron sucrose IVPB 200 milliGRAM(s) IV Intermittent every 24 hours  linezolid  IVPB 600 milliGRAM(s) IV Intermittent every 12 hours  meropenem  IVPB 500 milliGRAM(s) IV Intermittent every 12 hours  morphine  - Injectable 2 milliGRAM(s) IV Push every 4 hours PRN  norepinephrine Infusion 0.05 MICROgram(s)/kG/Min IV Continuous <Continuous>  ondansetron Injectable 4 milliGRAM(s) IV Push every 6 hours PRN  pantoprazole  Injectable 40 milliGRAM(s) IV Push daily  phytonadione   Solution 10 milliGRAM(s) Oral daily  sodium bicarbonate  Infusion 0.183 mEq/kG/Hr IV Continuous <Continuous>      LABS:                        9.4    26.1  )-----------( 77       ( 31 Dec 2018 12:06 )             30.1     12-31    139  |  98  |  57<H>  ----------------------------<  310<H>  3.9   |  19<L>  |  4.15<H>    Ca    8.0<L>      31 Dec 2018 06:31  Phos  4.7     12-31  Mg     1.8     12-31    TPro  5.6<L>  /  Alb  2.8<L>  /  TBili  4.1<H>  /  DBili  x   /  AST  1949<H>  /  ALT  1531<H>  /  AlkPhos  98  12-31    PT/INR - ( 31 Dec 2018 00:46 )   PT: 21.1 sec;   INR: 1.86 ratio         PTT - ( 31 Dec 2018 00:46 )  PTT:29.2 sec    Magnesium, Serum: 1.8 mg/dL (12-31 @ 06:31)  Phosphorus Level, Serum: 4.7 mg/dL (12-31 @ 06:31)    Urine studies    PTH and Vit D:

## 2018-12-31 NOTE — PROGRESS NOTE ADULT - PROBLEM SELECTOR PLAN 7
Echo showed EF of 20% with G1 DD  Keep net negative  Unable to administer diuretics due to hypotension  C/w Digoxin

## 2018-12-31 NOTE — PROGRESS NOTE ADULT - ASSESSMENT
Coagulopathy:  the workup is consistent with consumption coagulopathy with elevated TT and stable and a bit lower fibrinogen then the before level  Continue present care, and transfuse conservatively  No obvious sign of hematoma at the site of surgery    Continue IV iron and EPO    If bleeds again, may need kcentra again    Will follow    Nasir Gondal  [phone]

## 2018-12-31 NOTE — CONSULT NOTE ADULT - SUBJECTIVE AND OBJECTIVE BOX
HPI:  77 y/o F pt with HTN, DM2, PMHx presents to ED c/o L hip pain s/p trip and fall from a curb today. Pt describes pain as sharp, mild and constant, 10/10 in severity. Pain of the left hip increases with ROM. Pt unable to ambulate nor bear weight. Patient denies head strike, LOC, N/V, vision changes, neurological deficits chest pain, palpitations, shortness of breath, or any other complaints. XR hip showed nondisplaced intertrochanteric fracture left femoral neck and marked osteoporosis. XR hip showed nondisplaced intertrochanteric fracture left femoral neck and marked osteoporosis. (26 Dec 2018 19:35)    Patient was taken to the OR by orthopedics of left hip IM nailing 18, she was admitted to the ICU postoperatively for monitoring and was found to be in shock. Patient had been on levo and vasopressin, but is off all pressors at the time of the consult. Patient is seen and examined at bedside in the ICU for vascular consult secondary to cyanosis of her toes bilaterally. Patient currently intubated and sedated, therefore unable to provide history. Patient also noted to have worsening renal function which may require urgent HD. Patient with coagulopathy and shock liver.     PAST MEDICAL & SURGICAL HISTORY:  HTN, DM2, CHF    Review of Systems:  Unable to obtain    MEDICATIONS  (STANDING):  ascorbic acid 500 milliGRAM(s) Oral two times a day  chlorhexidine 4% Liquid 1 Application(s) Topical two times a day  cyanocobalamin Injectable 1000 MICROGram(s) SubCutaneous daily  digoxin  Injectable 0.125 milliGRAM(s) IV Push every other day  docusate sodium Liquid 100 milliGRAM(s) Oral two times a day  epoetin monique Injectable 67163 Unit(s) IV Push <User Schedule>  fentaNYL   Infusion. 0.5 MICROgram(s)/kG/Hr (4.105 mL/Hr) IV Continuous <Continuous>  folic acid 1 milliGRAM(s) Oral daily  insulin lispro (HumaLOG) corrective regimen sliding scale   SubCutaneous every 6 hours  iron sucrose IVPB 200 milliGRAM(s) IV Intermittent every 24 hours  norepinephrine Infusion 0.05 MICROgram(s)/kG/Min (3.848 mL/Hr) IV Continuous <Continuous>  pantoprazole  Injectable 40 milliGRAM(s) IV Push daily  phytonadione   Solution 10 milliGRAM(s) Oral daily  piperacillin/tazobactam IVPB. 3.375 Gram(s) IV Intermittent every 12 hours  sodium bicarbonate  Infusion 0.183 mEq/kG/Hr (100 mL/Hr) IV Continuous <Continuous>  vasopressin Infusion 0.04 Unit(s)/Min (2.4 mL/Hr) IV Continuous <Continuous>    MEDICATIONS  (PRN):  morphine  - Injectable 2 milliGRAM(s) IV Push every 4 hours PRN Severe Pain (7 - 10)  ondansetron Injectable 4 milliGRAM(s) IV Push every 6 hours PRN Nausea and/or Vomiting    Vital Signs Last 24 Hrs  T(C): 37.2 (31 Dec 2018 04:00), Max: 37.4 (30 Dec 2018 11:00)  T(F): 99 (31 Dec 2018 04:00), Max: 99.3 (30 Dec 2018 11:00)  HR: 89 (31 Dec 2018 08:44) (81 - 94)  BP: 94/56 (31 Dec 2018 00:00) (82/43 - 94/56)  BP(mean): 64 (31 Dec 2018 00:00) (51 - 64)  RR: 30 (31 Dec 2018 07:00) (0 - 30)  SpO2: 100% (31 Dec 2018 08:44) (93% - 100%)    Physical Exam:    General: Sedated  HENT: left IJ central line in place. feeding NGT in place, no feeds running.   Chest: intubated on vent  Abdomen:  Obese, soft, NT  Extremities: left femoral A-line in left groin, bilateral IPCD's in place. Cyanosis of distal toe of bilateral lower extremities. Cyanosis and coolness worst on 1st and 2nd toes of bilateral lower extremities, Left greater then Right. No cyanosis to bilateral upper extremities.   Vascular:  Palpable and doppler-able PT and DP pulses  Skin: cyanosis of distal bilateral lower extremities.    LABS:                        7.7    13.8  )-----------( 48       ( 31 Dec 2018 06:31 )             25.1     12-    139  |  98  |  57<H>  ----------------------------<  310<H>  3.9   |  19<L>  |  4.15<H>    Ca    8.0<L>      31 Dec 2018 06:31  Phos  4.7     12  Mg     1.8         TPro  5.6<L>  /  Alb  2.8<L>  /  TBili  4.1<H>  /  DBili  x   /  AST  1949<H>  /  ALT  1531<H>  /  AlkPhos  98  12-    PT/INR - ( 31 Dec 2018 00:46 )   PT: 21.1 sec;   INR: 1.86 ratio      PTT - ( 31 Dec 2018 00:46 )  PTT:29.2 sec  Urinalysis Basic - ( 29 Dec 2018 16:20 )    Color: Yellow / Appearance: Slightly Turbid / S.020 / pH: x  Gluc: x / Ketone: Trace  / Bili: Negative / Urobili: Negative   Blood: x / Protein: 500 mg/dL / Nitrite: Negative   Leuk Esterase: Small / RBC: 25-50 /HPF / WBC 6-10 /HPF   Sq Epi: x / Non Sq Epi: Few /HPF / Bacteria: Many /HPF    < from: Transthoracic Echocardiogram (18 @ 07:25) >  CONCLUSIONS:  1. Mild posterior mitral annular calcification. Trace  mitral regurgitation.  2. Aortic valve not well visualized. No aortic stenosis.  Mild aortic regurgitation.  3. Normal aortic root.  4. Mildly dilated left atrium.  LA volume index = 39 cc/m2.  5. Moderate left ventricular enlargement.  6. Severe global left ventricular systolic dysfunction (EF  20-25% by visual estimation). Small hyperechoic area near  apex probably consistent with trabeculation, less supicion  for thrombus since there is flow on color Doppler.  Paradoxical septal motion is seen, consistent with  conduction delay. Not all LV wall segments were seen.  7. Grade I diastolic dysfunction (Impaired relaxation).  8. Normal right atrium.  9. Normal right ventricular size and systolic function (RV  tissue Doppler 14 cm/s).  10. RV systolic pressure is normal at  29 mm Hg.  11. Normal tricuspid valve. Trace tricuspid regurgitation.  12. Pulmonic valve not well seen. Trace pulmonic  insufficiency is noted.  13. No pericardial effusion.    *** No previous Echo exam.    < end of copied text > HPI:  79 y/o F pt with HTN, DM2, PMHx presents to ED c/o L hip pain s/p trip and fall from a curb today. Pt describes pain as sharp, mild and constant, 10/10 in severity. Pain of the left hip increases with ROM. Pt unable to ambulate nor bear weight. Patient denies head strike, LOC, N/V, vision changes, neurological deficits chest pain, palpitations, shortness of breath, or any other complaints. XR hip showed nondisplaced intertrochanteric fracture left femoral neck and marked osteoporosis. XR hip showed nondisplaced intertrochanteric fracture left femoral neck and marked osteoporosis. (26 Dec 2018 19:35)    Patient was taken to the OR by orthopedics of left hip IM nailing 18, she was admitted to the ICU postoperatively for monitoring and was found to be in shock. Patient had been on levo and vasopressin, but is off all pressors at the time of the consult. Patient is seen and examined at bedside in the ICU for vascular consult secondary to cyanosis of her toes bilaterally. Patient currently intubated and sedated, therefore unable to provide history. Patient also noted to have worsening renal function which may require urgent HD. Patient with coagulopathy and shock liver.     PAST MEDICAL & SURGICAL HISTORY:  HTN, DM2, CHF    Review of Systems:  Unable to obtain    MEDICATIONS  (STANDING):  ascorbic acid 500 milliGRAM(s) Oral two times a day  chlorhexidine 4% Liquid 1 Application(s) Topical two times a day  cyanocobalamin Injectable 1000 MICROGram(s) SubCutaneous daily  digoxin  Injectable 0.125 milliGRAM(s) IV Push every other day  docusate sodium Liquid 100 milliGRAM(s) Oral two times a day  epoetin monique Injectable 08656 Unit(s) IV Push <User Schedule>  fentaNYL   Infusion. 0.5 MICROgram(s)/kG/Hr (4.105 mL/Hr) IV Continuous <Continuous>  folic acid 1 milliGRAM(s) Oral daily  insulin lispro (HumaLOG) corrective regimen sliding scale   SubCutaneous every 6 hours  iron sucrose IVPB 200 milliGRAM(s) IV Intermittent every 24 hours  norepinephrine Infusion 0.05 MICROgram(s)/kG/Min (3.848 mL/Hr) IV Continuous <Continuous>  pantoprazole  Injectable 40 milliGRAM(s) IV Push daily  phytonadione   Solution 10 milliGRAM(s) Oral daily  piperacillin/tazobactam IVPB. 3.375 Gram(s) IV Intermittent every 12 hours  sodium bicarbonate  Infusion 0.183 mEq/kG/Hr (100 mL/Hr) IV Continuous <Continuous>  vasopressin Infusion 0.04 Unit(s)/Min (2.4 mL/Hr) IV Continuous <Continuous>    MEDICATIONS  (PRN):  morphine  - Injectable 2 milliGRAM(s) IV Push every 4 hours PRN Severe Pain (7 - 10)  ondansetron Injectable 4 milliGRAM(s) IV Push every 6 hours PRN Nausea and/or Vomiting    Vital Signs Last 24 Hrs  T(C): 37.2 (31 Dec 2018 04:00), Max: 37.4 (30 Dec 2018 11:00)  T(F): 99 (31 Dec 2018 04:00), Max: 99.3 (30 Dec 2018 11:00)  HR: 89 (31 Dec 2018 08:44) (81 - 94)  BP: 94/56 (31 Dec 2018 00:00) (82/43 - 94/56)  BP(mean): 64 (31 Dec 2018 00:00) (51 - 64)  RR: 30 (31 Dec 2018 07:00) (0 - 30)  SpO2: 100% (31 Dec 2018 08:44) (93% - 100%)    Physical Exam:    General: Sedated  HENT: left IJ central line in place. feeding NGT in place, no feeds running.   Chest: intubated on vent  Abdomen:  Obese, soft, NT  Extremities: left femoral A-line in left groin, bilateral IPCD's in place. Cyanosis of distal toe of bilateral lower extremities. Cyanosis and coolness worst on 1st and 2nd toes of bilateral lower extremities, Left greater then Right. No cyanosis to bilateral upper extremities. Right groin shiley catheter in place (placed by ICU team)  Vascular:  Palpable and doppler-able PT and DP pulses  Skin: cyanosis of distal bilateral lower extremities.    LABS:                        7.7    13.8  )-----------( 48       ( 31 Dec 2018 06:31 )             25.1     12-    139  |  98  |  57<H>  ----------------------------<  310<H>  3.9   |  19<L>  |  4.15<H>    Ca    8.0<L>      31 Dec 2018 06:31  Phos  4.7     12-  Mg     1.8     12-    TPro  5.6<L>  /  Alb  2.8<L>  /  TBili  4.1<H>  /  DBili  x   /  AST  1949<H>  /  ALT  1531<H>  /  AlkPhos  98  12-31    PT/INR - ( 31 Dec 2018 00:46 )   PT: 21.1 sec;   INR: 1.86 ratio      PTT - ( 31 Dec 2018 00:46 )  PTT:29.2 sec  Urinalysis Basic - ( 29 Dec 2018 16:20 )    Color: Yellow / Appearance: Slightly Turbid / S.020 / pH: x  Gluc: x / Ketone: Trace  / Bili: Negative / Urobili: Negative   Blood: x / Protein: 500 mg/dL / Nitrite: Negative   Leuk Esterase: Small / RBC: 25-50 /HPF / WBC 6-10 /HPF   Sq Epi: x / Non Sq Epi: Few /HPF / Bacteria: Many /HPF    < from: Transthoracic Echocardiogram (18 @ 07:25) >  CONCLUSIONS:  1. Mild posterior mitral annular calcification. Trace  mitral regurgitation.  2. Aortic valve not well visualized. No aortic stenosis.  Mild aortic regurgitation.  3. Normal aortic root.  4. Mildly dilated left atrium.  LA volume index = 39 cc/m2.  5. Moderate left ventricular enlargement.  6. Severe global left ventricular systolic dysfunction (EF  20-25% by visual estimation). Small hyperechoic area near  apex probably consistent with trabeculation, less supicion  for thrombus since there is flow on color Doppler.  Paradoxical septal motion is seen, consistent with  conduction delay. Not all LV wall segments were seen.  7. Grade I diastolic dysfunction (Impaired relaxation).  8. Normal right atrium.  9. Normal right ventricular size and systolic function (RV  tissue Doppler 14 cm/s).  10. RV systolic pressure is normal at  29 mm Hg.  11. Normal tricuspid valve. Trace tricuspid regurgitation.  12. Pulmonic valve not well seen. Trace pulmonic  insufficiency is noted.  13. No pericardial effusion.    *** No previous Echo exam.    < end of copied text >

## 2018-12-31 NOTE — PROGRESS NOTE ADULT - SUBJECTIVE AND OBJECTIVE BOX
INTERVAL HPI/OVERNIGHT EVENTS: ***    PRESSORS: [ ] YES [ ] NO  WHICH:    ANTIBIOTICS:                  DATE STARTED:  ANTIBIOTICS:                  DATE STARTED:  ANTIBIOTICS:                  DATE STARTED:    Antimicrobial:  piperacillin/tazobactam IVPB. 3.375 Gram(s) IV Intermittent every 12 hours    Cardiovascular:  digoxin  Injectable 0.125 milliGRAM(s) IV Push every other day  norepinephrine Infusion 0.05 MICROgram(s)/kG/Min IV Continuous <Continuous>    Pulmonary:    Hematalogic:    Other:  ascorbic acid 500 milliGRAM(s) Oral two times a day  chlorhexidine 4% Liquid 1 Application(s) Topical two times a day  cyanocobalamin Injectable 1000 MICROGram(s) SubCutaneous daily  docusate sodium Liquid 100 milliGRAM(s) Oral two times a day  epoetin monique Injectable 48845 Unit(s) IV Push <User Schedule>  fentaNYL   Infusion. 0.5 MICROgram(s)/kG/Hr IV Continuous <Continuous>  folic acid 1 milliGRAM(s) Oral daily  insulin lispro (HumaLOG) corrective regimen sliding scale   SubCutaneous every 6 hours  iron sucrose IVPB 200 milliGRAM(s) IV Intermittent every 24 hours  morphine  - Injectable 2 milliGRAM(s) IV Push every 4 hours PRN  ondansetron Injectable 4 milliGRAM(s) IV Push every 6 hours PRN  pantoprazole  Injectable 40 milliGRAM(s) IV Push daily  phytonadione   Solution 10 milliGRAM(s) Oral daily  sodium bicarbonate  Infusion 0.183 mEq/kG/Hr IV Continuous <Continuous>  vasopressin Infusion 0.04 Unit(s)/Min IV Continuous <Continuous>    ascorbic acid 500 milliGRAM(s) Oral two times a day  chlorhexidine 4% Liquid 1 Application(s) Topical two times a day  cyanocobalamin Injectable 1000 MICROGram(s) SubCutaneous daily  digoxin  Injectable 0.125 milliGRAM(s) IV Push every other day  docusate sodium Liquid 100 milliGRAM(s) Oral two times a day  epoetin monique Injectable 59912 Unit(s) IV Push <User Schedule>  fentaNYL   Infusion. 0.5 MICROgram(s)/kG/Hr IV Continuous <Continuous>  folic acid 1 milliGRAM(s) Oral daily  insulin lispro (HumaLOG) corrective regimen sliding scale   SubCutaneous every 6 hours  iron sucrose IVPB 200 milliGRAM(s) IV Intermittent every 24 hours  morphine  - Injectable 2 milliGRAM(s) IV Push every 4 hours PRN  norepinephrine Infusion 0.05 MICROgram(s)/kG/Min IV Continuous <Continuous>  ondansetron Injectable 4 milliGRAM(s) IV Push every 6 hours PRN  pantoprazole  Injectable 40 milliGRAM(s) IV Push daily  phytonadione   Solution 10 milliGRAM(s) Oral daily  piperacillin/tazobactam IVPB. 3.375 Gram(s) IV Intermittent every 12 hours  sodium bicarbonate  Infusion 0.183 mEq/kG/Hr IV Continuous <Continuous>  vasopressin Infusion 0.04 Unit(s)/Min IV Continuous <Continuous>    Drug Dosing Weight  Height (cm): 154.94 (28 Dec 2018 01:00)  Weight (kg): 82.1 (28 Dec 2018 01:00)  BMI (kg/m2): 34.2 (28 Dec 2018 01:)  BSA (m2): 1.81 (28 Dec 2018 01:00)    CENTRAL LINE: [ ] YES [ ] NO  LOCATION:   DATE INSERTED:    DE SOUZA: [ ] YES [ ] NO    DATE INSERTED:    A-LINE:  [ ] YES [ ] NO  LOCATION:   DATE INSERTED:    ICU Vital Signs Last 24 Hrs  T(C): 37.2 (31 Dec 2018 04:00), Max: 37.4 (30 Dec 2018 11:00)  T(F): 99 (31 Dec 2018 04:00), Max: 99.3 (30 Dec 2018 11:00)  HR: 81 (31 Dec 2018 07:00) (81 - 107)  BP: 94/56 (31 Dec 2018 00:00) (82/43 - 94/56)  BP(mean): 64 (31 Dec 2018 00:00) (51 - 64)  ABP: 116/52 (31 Dec 2018 07:00) (94/49 - 165/73)  ABP(mean): 71 (31 Dec 2018 07:00) (56 - 99)  RR: 30 (31 Dec 2018 07:00) (0 - 30)  SpO2: 93% (31 Dec 2018 07:00) (93% - 100%)      ABG - ( 31 Dec 2018 04:51 )  pH, Arterial: 7.46  pH, Blood: x     /  pCO2: 26    /  pO2: 89    / HCO3: 18    / Base Excess: -4.7  /  SaO2: 97                    - @ 07:01  -   @ 07:00  --------------------------------------------------------  IN: 7192.4 mL / OUT: 35 mL / NET: 7157.4 mL        Mode: AC/ CMV (Assist Control/ Continuous Mandatory Ventilation)  RR (machine): 30  TV (machine): 450  FiO2: 50  PEEP: 6  ITime: 1  MAP: 12  PIP: 33      PHYSICAL EXAM:    >>> <<<    LABS:  CBC Full  -  ( 31 Dec 2018 06:31 )  WBC Count : 13.8 K/uL  Hemoglobin : 7.7 g/dL  Hematocrit : 25.1 %  Platelet Count - Automated : 48 K/uL  Mean Cell Volume : 67.3 fl  Mean Cell Hemoglobin : 20.8 pg  Mean Cell Hemoglobin Concentration : 30.8 gm/dL  Auto Neutrophil # : 11.9 K/uL  Auto Lymphocyte # : 1.2 K/uL  Auto Monocyte # : 0.6 K/uL  Auto Eosinophil # : 0.1 K/uL  Auto Basophil # : 0.1 K/uL  Auto Neutrophil % : 86.2 %  Auto Lymphocyte % : 8.5 %  Auto Monocyte % : 4.4 %  Auto Eosinophil % : 0.5 %  Auto Basophil % : 0.5 %        139  |  98  |  57<H>  ----------------------------<  310<H>  3.9   |  19<L>  |  4.15<H>    Ca    8.0<L>      31 Dec 2018 06:31  Phos  4.7       Mg     1.8         TPro  5.6<L>  /  Alb  2.8<L>  /  TBili  4.1<H>  /  DBili  x   /  AST  1949<H>  /  ALT  1531<H>  /  AlkPhos  98      PT/INR - ( 31 Dec 2018 00:46 )   PT: 21.1 sec;   INR: 1.86 ratio         PTT - ( 31 Dec 2018 00:46 )  PTT:29.2 sec  Urinalysis Basic - ( 29 Dec 2018 16:20 )    Color: Yellow / Appearance: Slightly Turbid / S.020 / pH: x  Gluc: x / Ketone: Trace  / Bili: Negative / Urobili: Negative   Blood: x / Protein: 500 mg/dL / Nitrite: Negative   Leuk Esterase: Small / RBC: 25-50 /HPF / WBC 6-10 /HPF   Sq Epi: x / Non Sq Epi: Few /HPF / Bacteria: Many /HPF      Culture Results:   No growth to date. ( @ 21:43)      RADIOLOGY & ADDITIONAL STUDIES REVIEWED:  ***    [ ]GOALS OF CARE DISCUSSION WITH PATIENT/FAMILY/PROXY:    CRITICAL CARE TIME SPENT: 35 minutes INTERVAL HPI/OVERNIGHT EVENTS: Pt was transfused with PRBC and FFP over weekend for coagulopathy and anemia. Got [facility] for dialysis.    PRESSORS: [ ] YES [x] NO    ANTIBIOTICS: Yuliana Fermin                 DATE STARTED: Dec 29, 2018    Antimicrobial:  piperacillin/tazobactam IVPB. 3.375 Gram(s) IV Intermittent every 12 hours    Cardiovascular:  digoxin  Injectable 0.125 milliGRAM(s) IV Push every other day  norepinephrine Infusion 0.05 MICROgram(s)/kG/Min IV Continuous <Continuous>    Other:  ascorbic acid 500 milliGRAM(s) Oral two times a day  chlorhexidine 4% Liquid 1 Application(s) Topical two times a day  cyanocobalamin Injectable 1000 MICROGram(s) SubCutaneous daily  docusate sodium Liquid 100 milliGRAM(s) Oral two times a day  epoetin monique Injectable 18121 Unit(s) IV Push <User Schedule>  fentaNYL   Infusion. 0.5 MICROgram(s)/kG/Hr IV Continuous <Continuous>  folic acid 1 milliGRAM(s) Oral daily  insulin lispro (HumaLOG) corrective regimen sliding scale   SubCutaneous every 6 hours  iron sucrose IVPB 200 milliGRAM(s) IV Intermittent every 24 hours  morphine  - Injectable 2 milliGRAM(s) IV Push every 4 hours PRN  ondansetron Injectable 4 milliGRAM(s) IV Push every 6 hours PRN  pantoprazole  Injectable 40 milliGRAM(s) IV Push daily  phytonadione   Solution 10 milliGRAM(s) Oral daily  sodium bicarbonate  Infusion 0.183 mEq/kG/Hr IV Continuous <Continuous>  vasopressin Infusion 0.04 Unit(s)/Min IV Continuous <Continuous>    ascorbic acid 500 milliGRAM(s) Oral two times a day  chlorhexidine 4% Liquid 1 Application(s) Topical two times a day  cyanocobalamin Injectable 1000 MICROGram(s) SubCutaneous daily  digoxin  Injectable 0.125 milliGRAM(s) IV Push every other day  docusate sodium Liquid 100 milliGRAM(s) Oral two times a day  epoetin monique Injectable 71409 Unit(s) IV Push <User Schedule>  fentaNYL   Infusion. 0.5 MICROgram(s)/kG/Hr IV Continuous <Continuous>  folic acid 1 milliGRAM(s) Oral daily  insulin lispro (HumaLOG) corrective regimen sliding scale   SubCutaneous every 6 hours  iron sucrose IVPB 200 milliGRAM(s) IV Intermittent every 24 hours  morphine  - Injectable 2 milliGRAM(s) IV Push every 4 hours PRN  norepinephrine Infusion 0.05 MICROgram(s)/kG/Min IV Continuous <Continuous>  ondansetron Injectable 4 milliGRAM(s) IV Push every 6 hours PRN  pantoprazole  Injectable 40 milliGRAM(s) IV Push daily  phytonadione   Solution 10 milliGRAM(s) Oral daily  piperacillin/tazobactam IVPB. 3.375 Gram(s) IV Intermittent every 12 hours  sodium bicarbonate  Infusion 0.183 mEq/kG/Hr IV Continuous <Continuous>  vasopressin Infusion 0.04 Unit(s)/Min IV Continuous <Continuous>    Drug Dosing Weight  Height (cm): 154.94 (28 Dec 2018 01:00)  Weight (kg): 82.1 (28 Dec 2018 01:00)  BMI (kg/m2): 34.2 (28 Dec 2018 01:00)  BSA (m2): 1.81 (28 Dec 2018 01:00)    CENTRAL LINE: [x] YES [ ] NO  LOCATION:   DATE INSERTED:    DE SOUZA: [x] YES [ ] NO    DATE INSERTED:    A-LINE:  [x] YES [ ] NO  LOCATION:   DATE INSERTED:    ICU Vital Signs Last 24 Hrs  T(C): 37.2 (31 Dec 2018 04:00), Max: 37.4 (30 Dec 2018 11:00)  T(F): 99 (31 Dec 2018 04:00), Max: 99.3 (30 Dec 2018 11:00)  HR: 81 (31 Dec 2018 07:00) (81 - 107)  BP: 94/56 (31 Dec 2018 00:00) (82/43 - 94/56)  BP(mean): 64 (31 Dec 2018 00:00) (51 - 64)  ABP: 116/52 (31 Dec 2018 07:00) (94/49 - 165/73)  ABP(mean): 71 (31 Dec 2018 07:00) (56 - 99)  RR: 30 (31 Dec 2018 07:00) (0 - 30)  SpO2: 93% (31 Dec 2018 07:00) (93% - 100%)      ABG - ( 31 Dec 2018 04:51 )  pH, Arterial: 7.46  pH, Blood: x     /  pCO2: 26    /  pO2: 89    / HCO3: 18    / Base Excess: -4.7  /  SaO2: 97                    - @ 07: @ 07:00  --------------------------------------------------------  IN: 7192.4 mL / OUT: 35 mL / NET: 7157.4 mL        Mode: AC/ CMV (Assist Control/ Continuous Mandatory Ventilation)  RR (machine): 30  TV (machine): 450  FiO2: 50  PEEP: 6  ITime: 1  MAP: 12  PIP: 33      PHYSICAL EXAM:    GENERAL: Well developed, Well nourished white female, sedated  HEENT:  Normocephalic/Atraumatic, reactive light reflex, moist mucous membranes  NECK: Supple, no JVD, LIJ line  RESP: Symmetric movement of the chest, clear to auscultation bilaterally, minimal bilateral basal rales   CVS: Tachycardic, S1 and S2 audible, no murmur, rubs or gallops  GI: Normal active bowel sounds present, abdomen soft, non tender, non distended, NG tube+  EXTREMITIES:  No edema, no clubbing, cyanosis, Lt femoral A-line, Rt Shiley femoral    MSK: 0/5 strength bilateral upper and lower extremities, Left hip scar/band   PSYCH: Sedated  NEURO: Alert and oriented x 0      LABS:  CBC Full  -  ( 31 Dec 2018 06:31 )  WBC Count : 13.8 K/uL  Hemoglobin : 7.7 g/dL  Hematocrit : 25.1 %  Platelet Count - Automated : 48 K/uL  Mean Cell Volume : 67.3 fl  Mean Cell Hemoglobin : 20.8 pg  Mean Cell Hemoglobin Concentration : 30.8 gm/dL  Auto Neutrophil # : 11.9 K/uL  Auto Lymphocyte # : 1.2 K/uL  Auto Monocyte # : 0.6 K/uL  Auto Eosinophil # : 0.1 K/uL  Auto Basophil # : 0.1 K/uL  Auto Neutrophil % : 86.2 %  Auto Lymphocyte % : 8.5 %  Auto Monocyte % : 4.4 %  Auto Eosinophil % : 0.5 %  Auto Basophil % : 0.5 %        139  |  98  |  57<H>  ----------------------------<  310<H>  3.9   |  19<L>  |  4.15<H>    Ca    8.0<L>      31 Dec 2018 06:31  Phos  4.7       Mg     1.8         TPro  5.6<L>  /  Alb  2.8<L>  /  TBili  4.1<H>  /  DBili  x   /  AST  1949<H>  /  ALT  1531<H>  /  AlkPhos  98      PT/INR - ( 31 Dec 2018 00:46 )   PT: 21.1 sec;   INR: 1.86 ratio         PTT - ( 31 Dec 2018 00:46 )  PTT:29.2 sec  Urinalysis Basic - ( 29 Dec 2018 16:20 )    Color: Yellow / Appearance: Slightly Turbid / S.020 / pH: x  Gluc: x / Ketone: Trace  / Bili: Negative / Urobili: Negative   Blood: x / Protein: 500 mg/dL / Nitrite: Negative   Leuk Esterase: Small / RBC: 25-50 /HPF / WBC 6-10 /HPF   Sq Epi: x / Non Sq Epi: Few /HPF / Bacteria: Many /HPF      Culture Results:   No growth to date. ( @ 21:43)        [ ]GOALS OF CARE DISCUSSION WITH PATIENT/FAMILY/PROXY: Full Code    CRITICAL CARE TIME SPENT: 35 minutes INTERVAL HPI/OVERNIGHT EVENTS: Pt was transfused with PRBC and FFP over weekend for coagulopathy and anemia. Got [facility] for dialysis.    PRESSORS: [ ] YES [x] NO    ANTIBIOTICS: Yuliana Fermin                 DATE STARTED: Dec 29, 2018    Antimicrobial:  piperacillin/tazobactam IVPB. 3.375 Gram(s) IV Intermittent every 12 hours    Cardiovascular:  digoxin  Injectable 0.125 milliGRAM(s) IV Push every other day  norepinephrine Infusion 0.05 MICROgram(s)/kG/Min IV Continuous <Continuous>    Other:  ascorbic acid 500 milliGRAM(s) Oral two times a day  chlorhexidine 4% Liquid 1 Application(s) Topical two times a day  cyanocobalamin Injectable 1000 MICROGram(s) SubCutaneous daily  docusate sodium Liquid 100 milliGRAM(s) Oral two times a day  epoetin monique Injectable 19962 Unit(s) IV Push <User Schedule>  fentaNYL   Infusion. 0.5 MICROgram(s)/kG/Hr IV Continuous <Continuous>  folic acid 1 milliGRAM(s) Oral daily  insulin lispro (HumaLOG) corrective regimen sliding scale   SubCutaneous every 6 hours  iron sucrose IVPB 200 milliGRAM(s) IV Intermittent every 24 hours  morphine  - Injectable 2 milliGRAM(s) IV Push every 4 hours PRN  ondansetron Injectable 4 milliGRAM(s) IV Push every 6 hours PRN  pantoprazole  Injectable 40 milliGRAM(s) IV Push daily  phytonadione   Solution 10 milliGRAM(s) Oral daily  sodium bicarbonate  Infusion 0.183 mEq/kG/Hr IV Continuous <Continuous>  vasopressin Infusion 0.04 Unit(s)/Min IV Continuous <Continuous>    ascorbic acid 500 milliGRAM(s) Oral two times a day  chlorhexidine 4% Liquid 1 Application(s) Topical two times a day  cyanocobalamin Injectable 1000 MICROGram(s) SubCutaneous daily  digoxin  Injectable 0.125 milliGRAM(s) IV Push every other day  docusate sodium Liquid 100 milliGRAM(s) Oral two times a day  epoetin monique Injectable 91355 Unit(s) IV Push <User Schedule>  fentaNYL   Infusion. 0.5 MICROgram(s)/kG/Hr IV Continuous <Continuous>  folic acid 1 milliGRAM(s) Oral daily  insulin lispro (HumaLOG) corrective regimen sliding scale   SubCutaneous every 6 hours  iron sucrose IVPB 200 milliGRAM(s) IV Intermittent every 24 hours  morphine  - Injectable 2 milliGRAM(s) IV Push every 4 hours PRN  norepinephrine Infusion 0.05 MICROgram(s)/kG/Min IV Continuous <Continuous>  ondansetron Injectable 4 milliGRAM(s) IV Push every 6 hours PRN  pantoprazole  Injectable 40 milliGRAM(s) IV Push daily  phytonadione   Solution 10 milliGRAM(s) Oral daily  piperacillin/tazobactam IVPB. 3.375 Gram(s) IV Intermittent every 12 hours  sodium bicarbonate  Infusion 0.183 mEq/kG/Hr IV Continuous <Continuous>  vasopressin Infusion 0.04 Unit(s)/Min IV Continuous <Continuous>    Drug Dosing Weight  Height (cm): 154.94 (28 Dec 2018 01:00)  Weight (kg): 82.1 (28 Dec 2018 01:00)  BMI (kg/m2): 34.2 (28 Dec 2018 01:00)  BSA (m2): 1.81 (28 Dec 2018 01:00)    CENTRAL LINE: [x] YES [ ] NO  LOCATION:   DATE INSERTED:    DE SOUZA: [x] YES [ ] NO    DATE INSERTED:    A-LINE:  [x] YES [ ] NO  LOCATION:   DATE INSERTED:    ICU Vital Signs Last 24 Hrs  T(C): 37.2 (31 Dec 2018 04:00), Max: 37.4 (30 Dec 2018 11:00)  T(F): 99 (31 Dec 2018 04:00), Max: 99.3 (30 Dec 2018 11:00)  HR: 81 (31 Dec 2018 07:00) (81 - 107)  BP: 94/56 (31 Dec 2018 00:00) (82/43 - 94/56)  BP(mean): 64 (31 Dec 2018 00:00) (51 - 64)  ABP: 116/52 (31 Dec 2018 07:00) (94/49 - 165/73)  ABP(mean): 71 (31 Dec 2018 07:00) (56 - 99)  RR: 30 (31 Dec 2018 07:00) (0 - 30)  SpO2: 93% (31 Dec 2018 07:00) (93% - 100%)      ABG - ( 31 Dec 2018 04:51 )  pH, Arterial: 7.46  pH, Blood: x     /  pCO2: 26    /  pO2: 89    / HCO3: 18    / Base Excess: -4.7  /  SaO2: 97                    - @ 07: @ 07:00  --------------------------------------------------------  IN: 7192.4 mL / OUT: 35 mL / NET: 7157.4 mL        Mode: AC/ CMV (Assist Control/ Continuous Mandatory Ventilation)  RR (machine): 30  TV (machine): 450  FiO2: 50  PEEP: 6  ITime: 1  MAP: 12  PIP: 33      PHYSICAL EXAM:    GENERAL: Well developed, Well nourished white female, sedated  HEENT:  Normocephalic/Atraumatic, reactive light reflex, moist mucous membranes  NECK: Supple, no JVD, LIJ line  RESP: Symmetric movement of the chest, clear to auscultation bilaterally, minimal bilateral basal rales   CVS: Tachycardic, S1 and S2 audible, no murmur, rubs or gallops  GI: Normal active bowel sounds present, abdomen soft, non tender, non distended, NG tube+  EXTREMITIES:  No edema, no clubbing, cyanosis, Lt femoral A-line, Rt Shiley femoral    MSK: 0/5 strength bilateral upper and lower extremities, Left hip scar/band, Blue toes   PSYCH: Sedated  NEURO: Alert and oriented x 0      LABS:  CBC Full  -  ( 31 Dec 2018 06:31 )  WBC Count : 13.8 K/uL  Hemoglobin : 7.7 g/dL  Hematocrit : 25.1 %  Platelet Count - Automated : 48 K/uL  Mean Cell Volume : 67.3 fl  Mean Cell Hemoglobin : 20.8 pg  Mean Cell Hemoglobin Concentration : 30.8 gm/dL  Auto Neutrophil # : 11.9 K/uL  Auto Lymphocyte # : 1.2 K/uL  Auto Monocyte # : 0.6 K/uL  Auto Eosinophil # : 0.1 K/uL  Auto Basophil # : 0.1 K/uL  Auto Neutrophil % : 86.2 %  Auto Lymphocyte % : 8.5 %  Auto Monocyte % : 4.4 %  Auto Eosinophil % : 0.5 %  Auto Basophil % : 0.5 %        139  |  98  |  57<H>  ----------------------------<  310<H>  3.9   |  19<L>  |  4.15<H>    Ca    8.0<L>      31 Dec 2018 06:31  Phos  4.7       Mg     1.8         TPro  5.6<L>  /  Alb  2.8<L>  /  TBili  4.1<H>  /  DBili  x   /  AST  1949<H>  /  ALT  1531<H>  /  AlkPhos  98      PT/INR - ( 31 Dec 2018 00:46 )   PT: 21.1 sec;   INR: 1.86 ratio         PTT - ( 31 Dec 2018 00:46 )  PTT:29.2 sec  Urinalysis Basic - ( 29 Dec 2018 16:20 )    Color: Yellow / Appearance: Slightly Turbid / S.020 / pH: x  Gluc: x / Ketone: Trace  / Bili: Negative / Urobili: Negative   Blood: x / Protein: 500 mg/dL / Nitrite: Negative   Leuk Esterase: Small / RBC: 25-50 /HPF / WBC 6-10 /HPF   Sq Epi: x / Non Sq Epi: Few /HPF / Bacteria: Many /HPF      Culture Results:   No growth to date. ( @ 21:43)        [ ]GOALS OF CARE DISCUSSION WITH PATIENT/FAMILY/PROXY: Full Code    CRITICAL CARE TIME SPENT: 35 minutes

## 2019-01-01 ENCOUNTER — INPATIENT (INPATIENT)
Facility: HOSPITAL | Age: 79
LOS: 1 days | DRG: 951 | End: 2019-01-13
Attending: FAMILY MEDICINE | Admitting: FAMILY MEDICINE
Payer: OTHER MISCELLANEOUS

## 2019-01-01 VITALS
HEART RATE: 90 BPM | TEMPERATURE: 102 F | RESPIRATION RATE: 22 BRPM | DIASTOLIC BLOOD PRESSURE: 30 MMHG | SYSTOLIC BLOOD PRESSURE: 92 MMHG | OXYGEN SATURATION: 96 %

## 2019-01-01 VITALS
TEMPERATURE: 101 F | DIASTOLIC BLOOD PRESSURE: 30 MMHG | SYSTOLIC BLOOD PRESSURE: 86 MMHG | RESPIRATION RATE: 17 BRPM | HEART RATE: 73 BPM

## 2019-01-01 VITALS — SYSTOLIC BLOOD PRESSURE: 95 MMHG | RESPIRATION RATE: 23 BRPM | HEART RATE: 90 BPM | DIASTOLIC BLOOD PRESSURE: 32 MMHG

## 2019-01-01 DIAGNOSIS — J96.01 ACUTE RESPIRATORY FAILURE WITH HYPOXIA: ICD-10-CM

## 2019-01-01 DIAGNOSIS — Z51.5 ENCOUNTER FOR PALLIATIVE CARE: ICD-10-CM

## 2019-01-01 DIAGNOSIS — A41.9 SEPSIS, UNSPECIFIED ORGANISM: ICD-10-CM

## 2019-01-01 DIAGNOSIS — G93.1 ANOXIC BRAIN DAMAGE, NOT ELSEWHERE CLASSIFIED: ICD-10-CM

## 2019-01-01 DIAGNOSIS — I50.9 HEART FAILURE, UNSPECIFIED: ICD-10-CM

## 2019-01-01 DIAGNOSIS — I50.20 UNSPECIFIED SYSTOLIC (CONGESTIVE) HEART FAILURE: ICD-10-CM

## 2019-01-01 DIAGNOSIS — G93.40 ENCEPHALOPATHY, UNSPECIFIED: ICD-10-CM

## 2019-01-01 DIAGNOSIS — R45.1 RESTLESSNESS AND AGITATION: ICD-10-CM

## 2019-01-01 LAB
ALBUMIN SERPL ELPH-MCNC: 1.6 G/DL — LOW (ref 3.5–5)
ALBUMIN SERPL ELPH-MCNC: 1.7 G/DL — LOW (ref 3.5–5)
ALBUMIN SERPL ELPH-MCNC: 1.8 G/DL — LOW (ref 3.5–5)
ALBUMIN SERPL ELPH-MCNC: 1.8 G/DL — LOW (ref 3.5–5)
ALBUMIN SERPL ELPH-MCNC: 2 G/DL — LOW (ref 3.5–5)
ALBUMIN SERPL ELPH-MCNC: 2.4 G/DL — LOW (ref 3.5–5)
ALBUMIN SERPL ELPH-MCNC: 2.7 G/DL — LOW (ref 3.5–5)
ALP SERPL-CCNC: 116 U/L — SIGNIFICANT CHANGE UP (ref 40–120)
ALP SERPL-CCNC: 122 U/L — HIGH (ref 40–120)
ALP SERPL-CCNC: 128 U/L — HIGH (ref 40–120)
ALP SERPL-CCNC: 133 U/L — HIGH (ref 40–120)
ALP SERPL-CCNC: 134 U/L — HIGH (ref 40–120)
ALP SERPL-CCNC: 136 U/L — HIGH (ref 40–120)
ALP SERPL-CCNC: 146 U/L — HIGH (ref 40–120)
ALP SERPL-CCNC: 147 U/L — HIGH (ref 40–120)
ALP SERPL-CCNC: 166 U/L — HIGH (ref 40–120)
ALT FLD-CCNC: 101 U/L DA — HIGH (ref 10–60)
ALT FLD-CCNC: 1086 U/L DA — HIGH (ref 10–60)
ALT FLD-CCNC: 155 U/L DA — HIGH (ref 10–60)
ALT FLD-CCNC: 226 U/L DA — HIGH (ref 10–60)
ALT FLD-CCNC: 385 U/L DA — HIGH (ref 10–60)
ALT FLD-CCNC: 61 U/L DA — HIGH (ref 10–60)
ALT FLD-CCNC: 64 U/L DA — HIGH (ref 10–60)
ALT FLD-CCNC: 658 U/L DA — HIGH (ref 10–60)
ALT FLD-CCNC: 72 U/L DA — HIGH (ref 10–60)
AMMONIA BLD-MCNC: 23 UMOL/L — SIGNIFICANT CHANGE UP (ref 11–32)
ANION GAP SERPL CALC-SCNC: 16 MMOL/L — SIGNIFICANT CHANGE UP (ref 5–17)
ANION GAP SERPL CALC-SCNC: 16 MMOL/L — SIGNIFICANT CHANGE UP (ref 5–17)
ANION GAP SERPL CALC-SCNC: 17 MMOL/L — SIGNIFICANT CHANGE UP (ref 5–17)
ANION GAP SERPL CALC-SCNC: 17 MMOL/L — SIGNIFICANT CHANGE UP (ref 5–17)
ANION GAP SERPL CALC-SCNC: 18 MMOL/L — HIGH (ref 5–17)
ANION GAP SERPL CALC-SCNC: 19 MMOL/L — HIGH (ref 5–17)
ANION GAP SERPL CALC-SCNC: 21 MMOL/L — HIGH (ref 5–17)
ANION GAP SERPL CALC-SCNC: 21 MMOL/L — HIGH (ref 5–17)
APTT BLD: 30.7 SEC — SIGNIFICANT CHANGE UP (ref 27.5–36.3)
APTT BLD: 34.9 SEC — SIGNIFICANT CHANGE UP (ref 27.5–36.3)
AST SERPL-CCNC: 112 U/L — HIGH (ref 10–40)
AST SERPL-CCNC: 186 U/L — HIGH (ref 10–40)
AST SERPL-CCNC: 336 U/L — HIGH (ref 10–40)
AST SERPL-CCNC: 48 U/L — HIGH (ref 10–40)
AST SERPL-CCNC: 53 U/L — HIGH (ref 10–40)
AST SERPL-CCNC: 57 U/L — HIGH (ref 10–40)
AST SERPL-CCNC: 66 U/L — HIGH (ref 10–40)
AST SERPL-CCNC: 72 U/L — HIGH (ref 10–40)
AST SERPL-CCNC: 860 U/L — HIGH (ref 10–40)
BASE EXCESS BLDA CALC-SCNC: -0.2 MMOL/L — SIGNIFICANT CHANGE UP (ref -2–2)
BASE EXCESS BLDA CALC-SCNC: -2.5 MMOL/L — LOW (ref -2–2)
BASE EXCESS BLDA CALC-SCNC: -2.6 MMOL/L — LOW (ref -2–2)
BASE EXCESS BLDA CALC-SCNC: -4.6 MMOL/L — LOW (ref -2–2)
BASE EXCESS BLDA CALC-SCNC: -5.6 MMOL/L — LOW (ref -2–2)
BASE EXCESS BLDA CALC-SCNC: -7 MMOL/L — LOW (ref -2–2)
BASE EXCESS BLDA CALC-SCNC: 0.2 MMOL/L — SIGNIFICANT CHANGE UP (ref -2–2)
BASE EXCESS BLDA CALC-SCNC: 1 MMOL/L — SIGNIFICANT CHANGE UP (ref -2–2)
BASOPHILS # BLD AUTO: 0.1 K/UL — SIGNIFICANT CHANGE UP (ref 0–0.2)
BASOPHILS # BLD AUTO: 0.2 K/UL — SIGNIFICANT CHANGE UP (ref 0–0.2)
BASOPHILS # BLD AUTO: 0.2 K/UL — SIGNIFICANT CHANGE UP (ref 0–0.2)
BASOPHILS NFR BLD AUTO: 0.4 % — SIGNIFICANT CHANGE UP (ref 0–2)
BASOPHILS NFR BLD AUTO: 0.5 % — SIGNIFICANT CHANGE UP (ref 0–2)
BASOPHILS NFR BLD AUTO: 0.6 % — SIGNIFICANT CHANGE UP (ref 0–2)
BASOPHILS NFR BLD AUTO: 1 % — SIGNIFICANT CHANGE UP (ref 0–2)
BILIRUB DIRECT SERPL-MCNC: 11.1 MG/DL — HIGH (ref 0–0.2)
BILIRUB INDIRECT FLD-MCNC: 3.1 MG/DL — HIGH (ref 0.2–1)
BILIRUB SERPL-MCNC: 10.7 MG/DL — HIGH (ref 0.2–1.2)
BILIRUB SERPL-MCNC: 14.2 MG/DL — HIGH (ref 0.2–1.2)
BILIRUB SERPL-MCNC: 14.2 MG/DL — HIGH (ref 0.2–1.2)
BILIRUB SERPL-MCNC: 16.1 MG/DL — HIGH (ref 0.2–1.2)
BILIRUB SERPL-MCNC: 16.7 MG/DL — HIGH (ref 0.2–1.2)
BILIRUB SERPL-MCNC: 16.8 MG/DL — HIGH (ref 0.2–1.2)
BILIRUB SERPL-MCNC: 20.5 MG/DL — HIGH (ref 0.2–1.2)
BILIRUB SERPL-MCNC: 5.7 MG/DL — HIGH (ref 0.2–1.2)
BILIRUB SERPL-MCNC: 9.1 MG/DL — HIGH (ref 0.2–1.2)
BILIRUB SERPL-MCNC: 9.6 MG/DL — HIGH (ref 0.2–1.2)
BLOOD GAS COMMENTS ARTERIAL: SIGNIFICANT CHANGE UP
BUN SERPL-MCNC: 100 MG/DL — HIGH (ref 7–18)
BUN SERPL-MCNC: 110 MG/DL — HIGH (ref 7–18)
BUN SERPL-MCNC: 127 MG/DL — HIGH (ref 7–18)
BUN SERPL-MCNC: 129 MG/DL — HIGH (ref 7–18)
BUN SERPL-MCNC: 148 MG/DL — HIGH (ref 7–18)
BUN SERPL-MCNC: 59 MG/DL — HIGH (ref 7–18)
BUN SERPL-MCNC: 62 MG/DL — HIGH (ref 7–18)
BUN SERPL-MCNC: 63 MG/DL — HIGH (ref 7–18)
BUN SERPL-MCNC: 81 MG/DL — HIGH (ref 7–18)
BUN SERPL-MCNC: 91 MG/DL — HIGH (ref 7–18)
CALCIUM SERPL-MCNC: 8.1 MG/DL — LOW (ref 8.4–10.5)
CALCIUM SERPL-MCNC: 8.2 MG/DL — LOW (ref 8.4–10.5)
CALCIUM SERPL-MCNC: 8.2 MG/DL — LOW (ref 8.4–10.5)
CALCIUM SERPL-MCNC: 8.3 MG/DL — LOW (ref 8.4–10.5)
CALCIUM SERPL-MCNC: 8.4 MG/DL — SIGNIFICANT CHANGE UP (ref 8.4–10.5)
CALCIUM SERPL-MCNC: 8.5 MG/DL — SIGNIFICANT CHANGE UP (ref 8.4–10.5)
CALCIUM SERPL-MCNC: 8.6 MG/DL — SIGNIFICANT CHANGE UP (ref 8.4–10.5)
CALCIUM SERPL-MCNC: 8.8 MG/DL — SIGNIFICANT CHANGE UP (ref 8.4–10.5)
CHLORIDE SERPL-SCNC: 102 MMOL/L — SIGNIFICANT CHANGE UP (ref 96–108)
CHLORIDE SERPL-SCNC: 94 MMOL/L — LOW (ref 96–108)
CHLORIDE SERPL-SCNC: 97 MMOL/L — SIGNIFICANT CHANGE UP (ref 96–108)
CHLORIDE SERPL-SCNC: 98 MMOL/L — SIGNIFICANT CHANGE UP (ref 96–108)
CHLORIDE SERPL-SCNC: 98 MMOL/L — SIGNIFICANT CHANGE UP (ref 96–108)
CHLORIDE SERPL-SCNC: 99 MMOL/L — SIGNIFICANT CHANGE UP (ref 96–108)
CO2 SERPL-SCNC: 17 MMOL/L — LOW (ref 22–31)
CO2 SERPL-SCNC: 18 MMOL/L — LOW (ref 22–31)
CO2 SERPL-SCNC: 18 MMOL/L — LOW (ref 22–31)
CO2 SERPL-SCNC: 19 MMOL/L — LOW (ref 22–31)
CO2 SERPL-SCNC: 20 MMOL/L — LOW (ref 22–31)
CO2 SERPL-SCNC: 22 MMOL/L — SIGNIFICANT CHANGE UP (ref 22–31)
CO2 SERPL-SCNC: 23 MMOL/L — SIGNIFICANT CHANGE UP (ref 22–31)
CO2 SERPL-SCNC: 25 MMOL/L — SIGNIFICANT CHANGE UP (ref 22–31)
CO2 SERPL-SCNC: 26 MMOL/L — SIGNIFICANT CHANGE UP (ref 22–31)
CO2 SERPL-SCNC: 28 MMOL/L — SIGNIFICANT CHANGE UP (ref 22–31)
CREAT SERPL-MCNC: 4.37 MG/DL — HIGH (ref 0.5–1.3)
CREAT SERPL-MCNC: 4.53 MG/DL — HIGH (ref 0.5–1.3)
CREAT SERPL-MCNC: 4.68 MG/DL — HIGH (ref 0.5–1.3)
CREAT SERPL-MCNC: 5.22 MG/DL — HIGH (ref 0.5–1.3)
CREAT SERPL-MCNC: 5.23 MG/DL — HIGH (ref 0.5–1.3)
CREAT SERPL-MCNC: 5.38 MG/DL — HIGH (ref 0.5–1.3)
CREAT SERPL-MCNC: 5.79 MG/DL — HIGH (ref 0.5–1.3)
CREAT SERPL-MCNC: 5.81 MG/DL — HIGH (ref 0.5–1.3)
CREAT SERPL-MCNC: 6.18 MG/DL — HIGH (ref 0.5–1.3)
CREAT SERPL-MCNC: 6.27 MG/DL — HIGH (ref 0.5–1.3)
CULTURE RESULTS: SIGNIFICANT CHANGE UP
CULTURE RESULTS: SIGNIFICANT CHANGE UP
EOSINOPHIL # BLD AUTO: 0 K/UL — SIGNIFICANT CHANGE UP (ref 0–0.5)
EOSINOPHIL # BLD AUTO: 0.1 K/UL — SIGNIFICANT CHANGE UP (ref 0–0.5)
EOSINOPHIL # BLD AUTO: 0.1 K/UL — SIGNIFICANT CHANGE UP (ref 0–0.5)
EOSINOPHIL # BLD AUTO: 0.2 K/UL — SIGNIFICANT CHANGE UP (ref 0–0.5)
EOSINOPHIL # BLD AUTO: 0.3 K/UL — SIGNIFICANT CHANGE UP (ref 0–0.5)
EOSINOPHIL # BLD AUTO: 0.3 K/UL — SIGNIFICANT CHANGE UP (ref 0–0.5)
EOSINOPHIL NFR BLD AUTO: 0.2 % — SIGNIFICANT CHANGE UP (ref 0–6)
EOSINOPHIL NFR BLD AUTO: 0.2 % — SIGNIFICANT CHANGE UP (ref 0–6)
EOSINOPHIL NFR BLD AUTO: 0.3 % — SIGNIFICANT CHANGE UP (ref 0–6)
EOSINOPHIL NFR BLD AUTO: 0.9 % — SIGNIFICANT CHANGE UP (ref 0–6)
EOSINOPHIL NFR BLD AUTO: 1 % — SIGNIFICANT CHANGE UP (ref 0–6)
EOSINOPHIL NFR BLD AUTO: 1.4 % — SIGNIFICANT CHANGE UP (ref 0–6)
EOSINOPHIL NFR BLD AUTO: 1.8 % — SIGNIFICANT CHANGE UP (ref 0–6)
EOSINOPHIL NFR BLD AUTO: 2 % — SIGNIFICANT CHANGE UP (ref 0–6)
FIBRINOGEN PPP-MCNC: 383 MG/DL — SIGNIFICANT CHANGE UP (ref 350–510)
GLUCOSE SERPL-MCNC: 121 MG/DL — HIGH (ref 70–99)
GLUCOSE SERPL-MCNC: 122 MG/DL — HIGH (ref 70–99)
GLUCOSE SERPL-MCNC: 128 MG/DL — HIGH (ref 70–99)
GLUCOSE SERPL-MCNC: 141 MG/DL — HIGH (ref 70–99)
GLUCOSE SERPL-MCNC: 159 MG/DL — HIGH (ref 70–99)
GLUCOSE SERPL-MCNC: 175 MG/DL — HIGH (ref 70–99)
GLUCOSE SERPL-MCNC: 203 MG/DL — HIGH (ref 70–99)
GLUCOSE SERPL-MCNC: 203 MG/DL — HIGH (ref 70–99)
GLUCOSE SERPL-MCNC: 220 MG/DL — HIGH (ref 70–99)
GLUCOSE SERPL-MCNC: 74 MG/DL — SIGNIFICANT CHANGE UP (ref 70–99)
HCO3 BLDA-SCNC: 17 MMOL/L — LOW (ref 23–27)
HCO3 BLDA-SCNC: 18 MMOL/L — LOW (ref 23–27)
HCO3 BLDA-SCNC: 20 MMOL/L — LOW (ref 23–27)
HCO3 BLDA-SCNC: 21 MMOL/L — LOW (ref 23–27)
HCO3 BLDA-SCNC: 22 MMOL/L — LOW (ref 23–27)
HCO3 BLDA-SCNC: 24 MMOL/L — SIGNIFICANT CHANGE UP (ref 23–27)
HCO3 BLDA-SCNC: 24 MMOL/L — SIGNIFICANT CHANGE UP (ref 23–27)
HCO3 BLDA-SCNC: 26 MMOL/L — SIGNIFICANT CHANGE UP (ref 23–27)
HCT VFR BLD CALC: 26.5 % — LOW (ref 34.5–45)
HCT VFR BLD CALC: 28.6 % — LOW (ref 34.5–45)
HCT VFR BLD CALC: 30.6 % — LOW (ref 34.5–45)
HCT VFR BLD CALC: 31.3 % — LOW (ref 34.5–45)
HCT VFR BLD CALC: 31.9 % — LOW (ref 34.5–45)
HCT VFR BLD CALC: 32.2 % — LOW (ref 34.5–45)
HCT VFR BLD CALC: 32.9 % — LOW (ref 34.5–45)
HCT VFR BLD CALC: 34.2 % — LOW (ref 34.5–45)
HCT VFR BLD CALC: 34.4 % — LOW (ref 34.5–45)
HGB BLD-MCNC: 10.2 G/DL — LOW (ref 11.5–15.5)
HGB BLD-MCNC: 10.7 G/DL — LOW (ref 11.5–15.5)
HGB BLD-MCNC: 10.8 G/DL — LOW (ref 11.5–15.5)
HGB BLD-MCNC: 8.4 G/DL — LOW (ref 11.5–15.5)
HGB BLD-MCNC: 9.1 G/DL — LOW (ref 11.5–15.5)
HGB BLD-MCNC: 9.5 G/DL — LOW (ref 11.5–15.5)
HGB BLD-MCNC: 9.8 G/DL — LOW (ref 11.5–15.5)
HGB BLD-MCNC: 9.9 G/DL — LOW (ref 11.5–15.5)
HGB BLD-MCNC: 9.9 G/DL — LOW (ref 11.5–15.5)
HOROWITZ INDEX BLDA+IHG-RTO: 40 — SIGNIFICANT CHANGE UP
HOROWITZ INDEX BLDA+IHG-RTO: 40 — SIGNIFICANT CHANGE UP
HOROWITZ INDEX BLDA+IHG-RTO: 60 — SIGNIFICANT CHANGE UP
INR BLD: 1.31 RATIO — HIGH (ref 0.88–1.16)
INR BLD: 1.77 RATIO — HIGH (ref 0.88–1.16)
LACTATE SERPL-SCNC: 2.5 MMOL/L — HIGH (ref 0.7–2)
LACTATE SERPL-SCNC: 2.7 MMOL/L — HIGH (ref 0.7–2)
LACTATE SERPL-SCNC: 2.8 MMOL/L — HIGH (ref 0.7–2)
LACTATE SERPL-SCNC: 3.1 MMOL/L — HIGH (ref 0.7–2)
LACTATE SERPL-SCNC: 3.2 MMOL/L — HIGH (ref 0.7–2)
LACTATE SERPL-SCNC: 4.3 MMOL/L — CRITICAL HIGH (ref 0.7–2)
LACTATE SERPL-SCNC: 4.6 MMOL/L — CRITICAL HIGH (ref 0.7–2)
LYMPHOCYTES # BLD AUTO: 1.2 K/UL — SIGNIFICANT CHANGE UP (ref 1–3.3)
LYMPHOCYTES # BLD AUTO: 1.3 K/UL — SIGNIFICANT CHANGE UP (ref 1–3.3)
LYMPHOCYTES # BLD AUTO: 1.6 K/UL — SIGNIFICANT CHANGE UP (ref 1–3.3)
LYMPHOCYTES # BLD AUTO: 1.7 K/UL — SIGNIFICANT CHANGE UP (ref 1–3.3)
LYMPHOCYTES # BLD AUTO: 1.7 K/UL — SIGNIFICANT CHANGE UP (ref 1–3.3)
LYMPHOCYTES # BLD AUTO: 12 % — LOW (ref 13–44)
LYMPHOCYTES # BLD AUTO: 2 K/UL — SIGNIFICANT CHANGE UP (ref 1–3.3)
LYMPHOCYTES # BLD AUTO: 3 % — LOW (ref 13–44)
LYMPHOCYTES # BLD AUTO: 3.9 % — LOW (ref 13–44)
LYMPHOCYTES # BLD AUTO: 5.7 % — LOW (ref 13–44)
LYMPHOCYTES # BLD AUTO: 6.1 % — LOW (ref 13–44)
LYMPHOCYTES # BLD AUTO: 8.4 % — LOW (ref 13–44)
LYMPHOCYTES # BLD AUTO: 9.6 % — LOW (ref 13–44)
LYMPHOCYTES # BLD AUTO: 9.9 % — LOW (ref 13–44)
MAGNESIUM SERPL-MCNC: 1.9 MG/DL — SIGNIFICANT CHANGE UP (ref 1.6–2.6)
MAGNESIUM SERPL-MCNC: 1.9 MG/DL — SIGNIFICANT CHANGE UP (ref 1.6–2.6)
MAGNESIUM SERPL-MCNC: 2.1 MG/DL — SIGNIFICANT CHANGE UP (ref 1.6–2.6)
MAGNESIUM SERPL-MCNC: 2.2 MG/DL — SIGNIFICANT CHANGE UP (ref 1.6–2.6)
MAGNESIUM SERPL-MCNC: 2.2 MG/DL — SIGNIFICANT CHANGE UP (ref 1.6–2.6)
MAGNESIUM SERPL-MCNC: 2.3 MG/DL — SIGNIFICANT CHANGE UP (ref 1.6–2.6)
MAGNESIUM SERPL-MCNC: 2.5 MG/DL — SIGNIFICANT CHANGE UP (ref 1.6–2.6)
MCHC RBC-ENTMCNC: 20.4 PG — LOW (ref 27–34)
MCHC RBC-ENTMCNC: 20.5 PG — LOW (ref 27–34)
MCHC RBC-ENTMCNC: 20.6 PG — LOW (ref 27–34)
MCHC RBC-ENTMCNC: 20.9 PG — LOW (ref 27–34)
MCHC RBC-ENTMCNC: 21 PG — LOW (ref 27–34)
MCHC RBC-ENTMCNC: 30.8 GM/DL — LOW (ref 32–36)
MCHC RBC-ENTMCNC: 30.8 GM/DL — LOW (ref 32–36)
MCHC RBC-ENTMCNC: 31 GM/DL — LOW (ref 32–36)
MCHC RBC-ENTMCNC: 31.1 GM/DL — LOW (ref 32–36)
MCHC RBC-ENTMCNC: 31.2 GM/DL — LOW (ref 32–36)
MCHC RBC-ENTMCNC: 31.7 GM/DL — LOW (ref 32–36)
MCHC RBC-ENTMCNC: 31.9 GM/DL — LOW (ref 32–36)
MCV RBC AUTO: 65.6 FL — LOW (ref 80–100)
MCV RBC AUTO: 65.6 FL — LOW (ref 80–100)
MCV RBC AUTO: 65.8 FL — LOW (ref 80–100)
MCV RBC AUTO: 65.9 FL — LOW (ref 80–100)
MCV RBC AUTO: 66 FL — LOW (ref 80–100)
MCV RBC AUTO: 66.1 FL — LOW (ref 80–100)
MCV RBC AUTO: 66.1 FL — LOW (ref 80–100)
MCV RBC AUTO: 66.5 FL — LOW (ref 80–100)
MCV RBC AUTO: 66.6 FL — LOW (ref 80–100)
MONOCYTES # BLD AUTO: 1.1 K/UL — HIGH (ref 0–0.9)
MONOCYTES # BLD AUTO: 1.3 K/UL — HIGH (ref 0–0.9)
MONOCYTES # BLD AUTO: 1.5 K/UL — HIGH (ref 0–0.9)
MONOCYTES # BLD AUTO: 1.8 K/UL — HIGH (ref 0–0.9)
MONOCYTES # BLD AUTO: 2 K/UL — HIGH (ref 0–0.9)
MONOCYTES # BLD AUTO: 2.3 K/UL — HIGH (ref 0–0.9)
MONOCYTES NFR BLD AUTO: 2 % — SIGNIFICANT CHANGE UP (ref 2–14)
MONOCYTES NFR BLD AUTO: 5.2 % — SIGNIFICANT CHANGE UP (ref 2–14)
MONOCYTES NFR BLD AUTO: 6 % — SIGNIFICANT CHANGE UP (ref 2–14)
MONOCYTES NFR BLD AUTO: 6 % — SIGNIFICANT CHANGE UP (ref 2–14)
MONOCYTES NFR BLD AUTO: 6.4 % — SIGNIFICANT CHANGE UP (ref 2–14)
MONOCYTES NFR BLD AUTO: 8.5 % — SIGNIFICANT CHANGE UP (ref 2–14)
MONOCYTES NFR BLD AUTO: 8.5 % — SIGNIFICANT CHANGE UP (ref 2–14)
MONOCYTES NFR BLD AUTO: 9.3 % — SIGNIFICANT CHANGE UP (ref 2–14)
NEUTROPHILS # BLD AUTO: 14.4 K/UL — HIGH (ref 1.8–7.4)
NEUTROPHILS # BLD AUTO: 15.9 K/UL — HIGH (ref 1.8–7.4)
NEUTROPHILS # BLD AUTO: 16.6 K/UL — HIGH (ref 1.8–7.4)
NEUTROPHILS # BLD AUTO: 17.9 K/UL — HIGH (ref 1.8–7.4)
NEUTROPHILS # BLD AUTO: 22.7 K/UL — HIGH (ref 1.8–7.4)
NEUTROPHILS # BLD AUTO: 30.2 K/UL — HIGH (ref 1.8–7.4)
NEUTROPHILS NFR BLD AUTO: 79.6 % — HIGH (ref 43–77)
NEUTROPHILS NFR BLD AUTO: 80 % — HIGH (ref 43–77)
NEUTROPHILS NFR BLD AUTO: 83.2 % — HIGH (ref 43–77)
NEUTROPHILS NFR BLD AUTO: 84.6 % — HIGH (ref 43–77)
NEUTROPHILS NFR BLD AUTO: 85 % — HIGH (ref 43–77)
NEUTROPHILS NFR BLD AUTO: 87 % — HIGH (ref 43–77)
NEUTROPHILS NFR BLD AUTO: 87.7 % — HIGH (ref 43–77)
NEUTROPHILS NFR BLD AUTO: 89.5 % — HIGH (ref 43–77)
OB PNL STL: NEGATIVE — SIGNIFICANT CHANGE UP
PCO2 BLDA: 28 MMHG — LOW (ref 32–46)
PCO2 BLDA: 28 MMHG — LOW (ref 32–46)
PCO2 BLDA: 34 MMHG — SIGNIFICANT CHANGE UP (ref 32–46)
PCO2 BLDA: 35 MMHG — SIGNIFICANT CHANGE UP (ref 32–46)
PCO2 BLDA: 39 MMHG — SIGNIFICANT CHANGE UP (ref 32–46)
PCO2 BLDA: 39 MMHG — SIGNIFICANT CHANGE UP (ref 32–46)
PCO2 BLDA: 43 MMHG — SIGNIFICANT CHANGE UP (ref 32–46)
PCO2 BLDA: 46 MMHG — SIGNIFICANT CHANGE UP (ref 32–46)
PH BLDA: 7.37 — SIGNIFICANT CHANGE UP (ref 7.35–7.45)
PH BLDA: 7.38 — SIGNIFICANT CHANGE UP (ref 7.35–7.45)
PH BLDA: 7.4 — SIGNIFICANT CHANGE UP (ref 7.35–7.45)
PH BLDA: 7.41 — SIGNIFICANT CHANGE UP (ref 7.35–7.45)
PH BLDA: 7.41 — SIGNIFICANT CHANGE UP (ref 7.35–7.45)
PH BLDA: 7.42 — SIGNIFICANT CHANGE UP (ref 7.35–7.45)
PHOSPHATE SERPL-MCNC: 5.2 MG/DL — HIGH (ref 2.5–4.5)
PHOSPHATE SERPL-MCNC: 5.6 MG/DL — HIGH (ref 2.5–4.5)
PHOSPHATE SERPL-MCNC: 5.6 MG/DL — HIGH (ref 2.5–4.5)
PHOSPHATE SERPL-MCNC: 6.9 MG/DL — HIGH (ref 2.5–4.5)
PHOSPHATE SERPL-MCNC: 7.8 MG/DL — HIGH (ref 2.5–4.5)
PHOSPHATE SERPL-MCNC: 8.1 MG/DL — HIGH (ref 2.5–4.5)
PHOSPHATE SERPL-MCNC: 8.2 MG/DL — HIGH (ref 2.5–4.5)
PHOSPHATE SERPL-MCNC: 8.3 MG/DL — SIGNIFICANT CHANGE UP (ref 2.5–4.5)
PHOSPHATE SERPL-MCNC: 8.5 MG/DL — HIGH (ref 2.5–4.5)
PLATELET # BLD AUTO: 100 K/UL — LOW (ref 150–400)
PLATELET # BLD AUTO: 56 K/UL — LOW (ref 150–400)
PLATELET # BLD AUTO: 58 K/UL — LOW (ref 150–400)
PLATELET # BLD AUTO: 61 K/UL — LOW (ref 150–400)
PLATELET # BLD AUTO: 74 K/UL — LOW (ref 150–400)
PLATELET # BLD AUTO: 75 K/UL — LOW (ref 150–400)
PLATELET # BLD AUTO: 84 K/UL — LOW (ref 150–400)
PLATELET # BLD AUTO: 92 K/UL — LOW (ref 150–400)
PLATELET # BLD AUTO: 98 K/UL — LOW (ref 150–400)
PO2 BLDA: 61 MMHG — LOW (ref 74–108)
PO2 BLDA: 78 MMHG — SIGNIFICANT CHANGE UP (ref 74–108)
PO2 BLDA: 78 MMHG — SIGNIFICANT CHANGE UP (ref 74–108)
PO2 BLDA: 88 MMHG — SIGNIFICANT CHANGE UP (ref 74–108)
PO2 BLDA: 91 MMHG — SIGNIFICANT CHANGE UP (ref 74–108)
PO2 BLDA: 92 MMHG — SIGNIFICANT CHANGE UP (ref 74–108)
PO2 BLDA: 92 MMHG — SIGNIFICANT CHANGE UP (ref 74–108)
PO2 BLDA: 94 MMHG — SIGNIFICANT CHANGE UP (ref 74–108)
POTASSIUM SERPL-MCNC: 4 MMOL/L — SIGNIFICANT CHANGE UP (ref 3.5–5.3)
POTASSIUM SERPL-MCNC: 4.1 MMOL/L — SIGNIFICANT CHANGE UP (ref 3.5–5.3)
POTASSIUM SERPL-MCNC: 4.3 MMOL/L — SIGNIFICANT CHANGE UP (ref 3.5–5.3)
POTASSIUM SERPL-MCNC: 4.5 MMOL/L — SIGNIFICANT CHANGE UP (ref 3.5–5.3)
POTASSIUM SERPL-MCNC: 4.6 MMOL/L — SIGNIFICANT CHANGE UP (ref 3.5–5.3)
POTASSIUM SERPL-MCNC: 4.8 MMOL/L — SIGNIFICANT CHANGE UP (ref 3.5–5.3)
POTASSIUM SERPL-MCNC: 4.8 MMOL/L — SIGNIFICANT CHANGE UP (ref 3.5–5.3)
POTASSIUM SERPL-MCNC: 5.1 MMOL/L — SIGNIFICANT CHANGE UP (ref 3.5–5.3)
POTASSIUM SERPL-SCNC: 4 MMOL/L — SIGNIFICANT CHANGE UP (ref 3.5–5.3)
POTASSIUM SERPL-SCNC: 4.1 MMOL/L — SIGNIFICANT CHANGE UP (ref 3.5–5.3)
POTASSIUM SERPL-SCNC: 4.3 MMOL/L — SIGNIFICANT CHANGE UP (ref 3.5–5.3)
POTASSIUM SERPL-SCNC: 4.5 MMOL/L — SIGNIFICANT CHANGE UP (ref 3.5–5.3)
POTASSIUM SERPL-SCNC: 4.6 MMOL/L — SIGNIFICANT CHANGE UP (ref 3.5–5.3)
POTASSIUM SERPL-SCNC: 4.8 MMOL/L — SIGNIFICANT CHANGE UP (ref 3.5–5.3)
POTASSIUM SERPL-SCNC: 4.8 MMOL/L — SIGNIFICANT CHANGE UP (ref 3.5–5.3)
POTASSIUM SERPL-SCNC: 5.1 MMOL/L — SIGNIFICANT CHANGE UP (ref 3.5–5.3)
PROT SERPL-MCNC: 4.4 G/DL — LOW (ref 6–8.3)
PROT SERPL-MCNC: 4.6 G/DL — LOW (ref 6–8.3)
PROT SERPL-MCNC: 4.7 G/DL — LOW (ref 6–8.3)
PROT SERPL-MCNC: 4.8 G/DL — LOW (ref 6–8.3)
PROT SERPL-MCNC: 4.8 G/DL — LOW (ref 6–8.3)
PROT SERPL-MCNC: 4.9 G/DL — LOW (ref 6–8.3)
PROT SERPL-MCNC: 4.9 G/DL — LOW (ref 6–8.3)
PROT SERPL-MCNC: 5.4 G/DL — LOW (ref 6–8.3)
PROT SERPL-MCNC: 5.6 G/DL — LOW (ref 6–8.3)
PROTHROM AB SERPL-ACNC: 14.7 SEC — HIGH (ref 10–12.9)
PROTHROM AB SERPL-ACNC: 20 SEC — HIGH (ref 10–12.9)
RBC # BLD: 4.02 M/UL — SIGNIFICANT CHANGE UP (ref 3.8–5.2)
RBC # BLD: 4.36 M/UL — SIGNIFICANT CHANGE UP (ref 3.8–5.2)
RBC # BLD: 4.67 M/UL — SIGNIFICANT CHANGE UP (ref 3.8–5.2)
RBC # BLD: 4.74 M/UL — SIGNIFICANT CHANGE UP (ref 3.8–5.2)
RBC # BLD: 4.8 M/UL — SIGNIFICANT CHANGE UP (ref 3.8–5.2)
RBC # BLD: 4.83 M/UL — SIGNIFICANT CHANGE UP (ref 3.8–5.2)
RBC # BLD: 4.99 M/UL — SIGNIFICANT CHANGE UP (ref 3.8–5.2)
RBC # BLD: 5.18 M/UL — SIGNIFICANT CHANGE UP (ref 3.8–5.2)
RBC # BLD: 5.22 M/UL — HIGH (ref 3.8–5.2)
RBC # FLD: 18 % — HIGH (ref 10.3–14.5)
RBC # FLD: 18.4 % — HIGH (ref 10.3–14.5)
RBC # FLD: 19.1 % — HIGH (ref 10.3–14.5)
RBC # FLD: 19.3 % — HIGH (ref 10.3–14.5)
RBC # FLD: 19.4 % — HIGH (ref 10.3–14.5)
RBC # FLD: 20.4 % — HIGH (ref 10.3–14.5)
RBC # FLD: 21.8 % — HIGH (ref 10.3–14.5)
RBC # FLD: 21.9 % — HIGH (ref 10.3–14.5)
RBC # FLD: 22.6 % — HIGH (ref 10.3–14.5)
SAO2 % BLDA: 88 % — LOW (ref 92–96)
SAO2 % BLDA: 94 % — SIGNIFICANT CHANGE UP (ref 92–96)
SAO2 % BLDA: 95 % — SIGNIFICANT CHANGE UP (ref 92–96)
SAO2 % BLDA: 96 % — SIGNIFICANT CHANGE UP (ref 92–96)
SAO2 % BLDA: 97 % — HIGH (ref 92–96)
SAO2 % BLDA: 97 % — HIGH (ref 92–96)
SODIUM SERPL-SCNC: 136 MMOL/L — SIGNIFICANT CHANGE UP (ref 135–145)
SODIUM SERPL-SCNC: 138 MMOL/L — SIGNIFICANT CHANGE UP (ref 135–145)
SODIUM SERPL-SCNC: 138 MMOL/L — SIGNIFICANT CHANGE UP (ref 135–145)
SODIUM SERPL-SCNC: 139 MMOL/L — SIGNIFICANT CHANGE UP (ref 135–145)
SODIUM SERPL-SCNC: 139 MMOL/L — SIGNIFICANT CHANGE UP (ref 135–145)
SODIUM SERPL-SCNC: 140 MMOL/L — SIGNIFICANT CHANGE UP (ref 135–145)
SODIUM SERPL-SCNC: 140 MMOL/L — SIGNIFICANT CHANGE UP (ref 135–145)
SODIUM SERPL-SCNC: 141 MMOL/L — SIGNIFICANT CHANGE UP (ref 135–145)
SPECIMEN SOURCE: SIGNIFICANT CHANGE UP
SPECIMEN SOURCE: SIGNIFICANT CHANGE UP
WBC # BLD: 18.1 K/UL — HIGH (ref 3.8–10.5)
WBC # BLD: 19.9 K/UL — HIGH (ref 3.8–10.5)
WBC # BLD: 19.9 K/UL — HIGH (ref 3.8–10.5)
WBC # BLD: 20.1 K/UL — HIGH (ref 3.8–10.5)
WBC # BLD: 20.4 K/UL — HIGH (ref 3.8–10.5)
WBC # BLD: 21.6 K/UL — HIGH (ref 3.8–10.5)
WBC # BLD: 26.6 K/UL — HIGH (ref 3.8–10.5)
WBC # BLD: 26.8 K/UL — HIGH (ref 3.8–10.5)
WBC # BLD: 33.8 K/UL — HIGH (ref 3.8–10.5)
WBC # FLD AUTO: 18.1 K/UL — HIGH (ref 3.8–10.5)
WBC # FLD AUTO: 19.9 K/UL — HIGH (ref 3.8–10.5)
WBC # FLD AUTO: 19.9 K/UL — HIGH (ref 3.8–10.5)
WBC # FLD AUTO: 20.1 K/UL — HIGH (ref 3.8–10.5)
WBC # FLD AUTO: 20.4 K/UL — HIGH (ref 3.8–10.5)
WBC # FLD AUTO: 21.6 K/UL — HIGH (ref 3.8–10.5)
WBC # FLD AUTO: 26.6 K/UL — HIGH (ref 3.8–10.5)
WBC # FLD AUTO: 26.8 K/UL — HIGH (ref 3.8–10.5)
WBC # FLD AUTO: 33.8 K/UL — HIGH (ref 3.8–10.5)

## 2019-01-01 PROCEDURE — P9059: CPT

## 2019-01-01 PROCEDURE — 71045 X-RAY EXAM CHEST 1 VIEW: CPT | Mod: 26

## 2019-01-01 PROCEDURE — 84478 ASSAY OF TRIGLYCERIDES: CPT

## 2019-01-01 PROCEDURE — 73552 X-RAY EXAM OF FEMUR 2/>: CPT

## 2019-01-01 PROCEDURE — 84484 ASSAY OF TROPONIN QUANT: CPT

## 2019-01-01 PROCEDURE — P9040: CPT

## 2019-01-01 PROCEDURE — 86900 BLOOD TYPING SEROLOGIC ABO: CPT

## 2019-01-01 PROCEDURE — 87040 BLOOD CULTURE FOR BACTERIA: CPT

## 2019-01-01 PROCEDURE — 36430 TRANSFUSION BLD/BLD COMPNT: CPT

## 2019-01-01 PROCEDURE — 94003 VENT MGMT INPAT SUBQ DAY: CPT

## 2019-01-01 PROCEDURE — 82803 BLOOD GASES ANY COMBINATION: CPT

## 2019-01-01 PROCEDURE — 86923 COMPATIBILITY TEST ELECTRIC: CPT

## 2019-01-01 PROCEDURE — 73502 X-RAY EXAM HIP UNI 2-3 VIEWS: CPT

## 2019-01-01 PROCEDURE — 85045 AUTOMATED RETICULOCYTE COUNT: CPT

## 2019-01-01 PROCEDURE — 85384 FIBRINOGEN ACTIVITY: CPT

## 2019-01-01 PROCEDURE — 85610 PROTHROMBIN TIME: CPT

## 2019-01-01 PROCEDURE — 82248 BILIRUBIN DIRECT: CPT

## 2019-01-01 PROCEDURE — 83615 LACTATE (LD) (LDH) ENZYME: CPT

## 2019-01-01 PROCEDURE — 83550 IRON BINDING TEST: CPT

## 2019-01-01 PROCEDURE — 82728 ASSAY OF FERRITIN: CPT

## 2019-01-01 PROCEDURE — 71045 X-RAY EXAM CHEST 1 VIEW: CPT | Mod: 26,77

## 2019-01-01 PROCEDURE — 99233 SBSQ HOSP IP/OBS HIGH 50: CPT

## 2019-01-01 PROCEDURE — 83735 ASSAY OF MAGNESIUM: CPT

## 2019-01-01 PROCEDURE — 99223 1ST HOSP IP/OBS HIGH 75: CPT

## 2019-01-01 PROCEDURE — 85027 COMPLETE CBC AUTOMATED: CPT

## 2019-01-01 PROCEDURE — 84466 ASSAY OF TRANSFERRIN: CPT

## 2019-01-01 PROCEDURE — 82553 CREATINE MB FRACTION: CPT

## 2019-01-01 PROCEDURE — 76705 ECHO EXAM OF ABDOMEN: CPT

## 2019-01-01 PROCEDURE — 82550 ASSAY OF CK (CPK): CPT

## 2019-01-01 PROCEDURE — 83036 HEMOGLOBIN GLYCOSYLATED A1C: CPT

## 2019-01-01 PROCEDURE — 82746 ASSAY OF FOLIC ACID SERUM: CPT

## 2019-01-01 PROCEDURE — 84100 ASSAY OF PHOSPHORUS: CPT

## 2019-01-01 PROCEDURE — 99497 ADVNCD CARE PLAN 30 MIN: CPT | Mod: 25

## 2019-01-01 PROCEDURE — 93306 TTE W/DOPPLER COMPLETE: CPT

## 2019-01-01 PROCEDURE — 85670 THROMBIN TIME PLASMA: CPT

## 2019-01-01 PROCEDURE — 80053 COMPREHEN METABOLIC PANEL: CPT

## 2019-01-01 PROCEDURE — 85730 THROMBOPLASTIN TIME PARTIAL: CPT

## 2019-01-01 PROCEDURE — 99232 SBSQ HOSP IP/OBS MODERATE 35: CPT

## 2019-01-01 PROCEDURE — C1713: CPT

## 2019-01-01 PROCEDURE — 84443 ASSAY THYROID STIM HORMONE: CPT

## 2019-01-01 PROCEDURE — 82607 VITAMIN B-12: CPT

## 2019-01-01 PROCEDURE — 86901 BLOOD TYPING SEROLOGIC RH(D): CPT

## 2019-01-01 PROCEDURE — 82962 GLUCOSE BLOOD TEST: CPT

## 2019-01-01 PROCEDURE — 70450 CT HEAD/BRAIN W/O DYE: CPT

## 2019-01-01 PROCEDURE — 99285 EMERGENCY DEPT VISIT HI MDM: CPT | Mod: 25

## 2019-01-01 PROCEDURE — C1889: CPT

## 2019-01-01 PROCEDURE — 97163 PT EVAL HIGH COMPLEX 45 MIN: CPT

## 2019-01-01 PROCEDURE — C1769: CPT

## 2019-01-01 PROCEDURE — 82306 VITAMIN D 25 HYDROXY: CPT

## 2019-01-01 PROCEDURE — 83010 ASSAY OF HAPTOGLOBIN QUANT: CPT

## 2019-01-01 PROCEDURE — 76001: CPT

## 2019-01-01 PROCEDURE — 72170 X-RAY EXAM OF PELVIS: CPT

## 2019-01-01 PROCEDURE — 80048 BASIC METABOLIC PNL TOTAL CA: CPT

## 2019-01-01 PROCEDURE — 83605 ASSAY OF LACTIC ACID: CPT

## 2019-01-01 PROCEDURE — 80202 ASSAY OF VANCOMYCIN: CPT

## 2019-01-01 PROCEDURE — 82272 OCCULT BLD FECES 1-3 TESTS: CPT

## 2019-01-01 PROCEDURE — 93005 ELECTROCARDIOGRAM TRACING: CPT

## 2019-01-01 PROCEDURE — 86850 RBC ANTIBODY SCREEN: CPT

## 2019-01-01 PROCEDURE — 85635 REPTILASE TEST: CPT

## 2019-01-01 PROCEDURE — 80074 ACUTE HEPATITIS PANEL: CPT

## 2019-01-01 PROCEDURE — 71045 X-RAY EXAM CHEST 1 VIEW: CPT

## 2019-01-01 PROCEDURE — 82140 ASSAY OF AMMONIA: CPT

## 2019-01-01 PROCEDURE — 83540 ASSAY OF IRON: CPT

## 2019-01-01 PROCEDURE — 80162 ASSAY OF DIGOXIN TOTAL: CPT

## 2019-01-01 PROCEDURE — 70450 CT HEAD/BRAIN W/O DYE: CPT | Mod: 26

## 2019-01-01 PROCEDURE — 81001 URINALYSIS AUTO W/SCOPE: CPT

## 2019-01-01 PROCEDURE — 76705 ECHO EXAM OF ABDOMEN: CPT | Mod: 26

## 2019-01-01 PROCEDURE — 97110 THERAPEUTIC EXERCISES: CPT

## 2019-01-01 PROCEDURE — 80061 LIPID PANEL: CPT

## 2019-01-01 PROCEDURE — 99261: CPT

## 2019-01-01 PROCEDURE — 36415 COLL VENOUS BLD VENIPUNCTURE: CPT

## 2019-01-01 RX ORDER — FENTANYL CITRATE 50 UG/ML
1 INJECTION INTRAVENOUS
Qty: 2500 | Refills: 0 | Status: DISCONTINUED | OUTPATIENT
Start: 2019-01-01 | End: 2019-01-01

## 2019-01-01 RX ORDER — HYDROMORPHONE HYDROCHLORIDE 2 MG/ML
0 INJECTION INTRAMUSCULAR; INTRAVENOUS; SUBCUTANEOUS
Qty: 0 | Refills: 0 | COMMUNITY
Start: 2019-01-01

## 2019-01-01 RX ORDER — ALTEPLASE 100 MG
2 KIT INTRAVENOUS ONCE
Qty: 0 | Refills: 0 | Status: COMPLETED | OUTPATIENT
Start: 2019-01-01 | End: 2019-01-01

## 2019-01-01 RX ORDER — ERYTHROPOIETIN 10000 [IU]/ML
6000 INJECTION, SOLUTION INTRAVENOUS; SUBCUTANEOUS
Qty: 0 | Refills: 0 | Status: DISCONTINUED | OUTPATIENT
Start: 2019-01-01 | End: 2019-01-01

## 2019-01-01 RX ORDER — CALCIUM ACETATE 667 MG
667 TABLET ORAL
Qty: 0 | Refills: 0 | Status: DISCONTINUED | OUTPATIENT
Start: 2019-01-01 | End: 2019-01-01

## 2019-01-01 RX ORDER — PROPOFOL 10 MG/ML
5 INJECTION, EMULSION INTRAVENOUS
Qty: 1000 | Refills: 0 | Status: DISCONTINUED | OUTPATIENT
Start: 2019-01-01 | End: 2019-01-01

## 2019-01-01 RX ORDER — HYDROMORPHONE HYDROCHLORIDE 2 MG/ML
2 INJECTION INTRAMUSCULAR; INTRAVENOUS; SUBCUTANEOUS ONCE
Qty: 0 | Refills: 0 | Status: DISCONTINUED | OUTPATIENT
Start: 2019-01-01 | End: 2019-01-01

## 2019-01-01 RX ORDER — ACETAMINOPHEN 500 MG
1 TABLET ORAL
Qty: 0 | Refills: 0 | COMMUNITY
Start: 2019-01-01

## 2019-01-01 RX ORDER — ACETAMINOPHEN 500 MG
650 TABLET ORAL EVERY 6 HOURS
Qty: 0 | Refills: 0 | Status: DISCONTINUED | OUTPATIENT
Start: 2019-01-01 | End: 2019-01-01

## 2019-01-01 RX ORDER — DEXMEDETOMIDINE HYDROCHLORIDE IN 0.9% SODIUM CHLORIDE 4 UG/ML
0.5 INJECTION INTRAVENOUS
Qty: 200 | Refills: 0 | Status: DISCONTINUED | OUTPATIENT
Start: 2019-01-01 | End: 2019-01-01

## 2019-01-01 RX ORDER — FUROSEMIDE 40 MG
120 TABLET ORAL ONCE
Qty: 0 | Refills: 0 | Status: COMPLETED | OUTPATIENT
Start: 2019-01-01 | End: 2019-01-01

## 2019-01-01 RX ORDER — HEPARIN SODIUM 5000 [USP'U]/ML
5000 INJECTION INTRAVENOUS; SUBCUTANEOUS EVERY 12 HOURS
Qty: 0 | Refills: 0 | Status: DISCONTINUED | OUTPATIENT
Start: 2019-01-01 | End: 2019-01-01

## 2019-01-01 RX ORDER — HYDROMORPHONE HYDROCHLORIDE 2 MG/ML
1 INJECTION INTRAMUSCULAR; INTRAVENOUS; SUBCUTANEOUS
Qty: 0 | Refills: 0 | Status: DISCONTINUED | OUTPATIENT
Start: 2019-01-01 | End: 2019-01-01

## 2019-01-01 RX ORDER — PREGABALIN 225 MG/1
1000 CAPSULE ORAL DAILY
Qty: 0 | Refills: 0 | Status: DISCONTINUED | OUTPATIENT
Start: 2019-01-01 | End: 2019-01-01

## 2019-01-01 RX ORDER — ROBINUL 0.2 MG/ML
0.4 INJECTION INTRAMUSCULAR; INTRAVENOUS EVERY 4 HOURS
Qty: 0 | Refills: 0 | Status: DISCONTINUED | OUTPATIENT
Start: 2019-01-01 | End: 2019-01-01

## 2019-01-01 RX ORDER — ALUMINUM HYDROXIDE
30 POWDER (GRAM) MISCELLANEOUS THREE TIMES A DAY
Qty: 0 | Refills: 0 | Status: DISCONTINUED | OUTPATIENT
Start: 2019-01-01 | End: 2019-01-01

## 2019-01-01 RX ORDER — INSULIN GLARGINE 100 [IU]/ML
10 INJECTION, SOLUTION SUBCUTANEOUS AT BEDTIME
Qty: 0 | Refills: 0 | Status: DISCONTINUED | OUTPATIENT
Start: 2019-01-01 | End: 2019-01-01

## 2019-01-01 RX ORDER — HYDROMORPHONE HYDROCHLORIDE 2 MG/ML
0.5 INJECTION INTRAMUSCULAR; INTRAVENOUS; SUBCUTANEOUS EVERY 4 HOURS
Qty: 0 | Refills: 0 | Status: DISCONTINUED | OUTPATIENT
Start: 2019-01-01 | End: 2019-01-01

## 2019-01-01 RX ORDER — ROBINUL 0.2 MG/ML
0 INJECTION INTRAMUSCULAR; INTRAVENOUS
Qty: 0 | Refills: 0 | COMMUNITY
Start: 2019-01-01

## 2019-01-01 RX ORDER — HYDROMORPHONE HYDROCHLORIDE 2 MG/ML
2 INJECTION INTRAMUSCULAR; INTRAVENOUS; SUBCUTANEOUS EVERY 4 HOURS
Qty: 0 | Refills: 0 | Status: DISCONTINUED | OUTPATIENT
Start: 2019-01-01 | End: 2019-01-01

## 2019-01-01 RX ADMIN — INSULIN GLARGINE 10 UNIT(S): 100 INJECTION, SOLUTION SUBCUTANEOUS at 21:20

## 2019-01-01 RX ADMIN — LINEZOLID 300 MILLIGRAM(S): 600 INJECTION, SOLUTION INTRAVENOUS at 05:04

## 2019-01-01 RX ADMIN — Medication 1: at 23:09

## 2019-01-01 RX ADMIN — FENTANYL CITRATE 8.21 MICROGRAM(S)/KG/HR: 50 INJECTION INTRAVENOUS at 13:59

## 2019-01-01 RX ADMIN — CHLORHEXIDINE GLUCONATE 1 APPLICATION(S): 213 SOLUTION TOPICAL at 17:37

## 2019-01-01 RX ADMIN — PROPOFOL 2.46 MICROGRAM(S)/KG/MIN: 10 INJECTION, EMULSION INTRAVENOUS at 07:57

## 2019-01-01 RX ADMIN — ROBINUL 0.4 MILLIGRAM(S): 0.2 INJECTION INTRAMUSCULAR; INTRAVENOUS at 02:54

## 2019-01-01 RX ADMIN — LINEZOLID 300 MILLIGRAM(S): 600 INJECTION, SOLUTION INTRAVENOUS at 06:05

## 2019-01-01 RX ADMIN — Medication 1: at 17:38

## 2019-01-01 RX ADMIN — CHLORHEXIDINE GLUCONATE 1 APPLICATION(S): 213 SOLUTION TOPICAL at 18:17

## 2019-01-01 RX ADMIN — Medication 650 MILLIGRAM(S): at 02:00

## 2019-01-01 RX ADMIN — LINEZOLID 300 MILLIGRAM(S): 600 INJECTION, SOLUTION INTRAVENOUS at 18:17

## 2019-01-01 RX ADMIN — MORPHINE SULFATE 2 MILLIGRAM(S): 50 CAPSULE, EXTENDED RELEASE ORAL at 06:00

## 2019-01-01 RX ADMIN — PANTOPRAZOLE SODIUM 40 MILLIGRAM(S): 20 TABLET, DELAYED RELEASE ORAL at 13:34

## 2019-01-01 RX ADMIN — LINEZOLID 300 MILLIGRAM(S): 600 INJECTION, SOLUTION INTRAVENOUS at 17:33

## 2019-01-01 RX ADMIN — LINEZOLID 300 MILLIGRAM(S): 600 INJECTION, SOLUTION INTRAVENOUS at 05:26

## 2019-01-01 RX ADMIN — DEXMEDETOMIDINE HYDROCHLORIDE IN 0.9% SODIUM CHLORIDE 10.26 MICROGRAM(S)/KG/HR: 4 INJECTION INTRAVENOUS at 18:33

## 2019-01-01 RX ADMIN — MEROPENEM 100 MILLIGRAM(S): 1 INJECTION INTRAVENOUS at 17:57

## 2019-01-01 RX ADMIN — Medication 500 MILLIGRAM(S): at 05:26

## 2019-01-01 RX ADMIN — Medication 667 MILLIGRAM(S): at 13:35

## 2019-01-01 RX ADMIN — INSULIN GLARGINE 15 UNIT(S): 100 INJECTION, SOLUTION SUBCUTANEOUS at 21:31

## 2019-01-01 RX ADMIN — Medication 500 MILLIGRAM(S): at 05:05

## 2019-01-01 RX ADMIN — LINEZOLID 300 MILLIGRAM(S): 600 INJECTION, SOLUTION INTRAVENOUS at 17:31

## 2019-01-01 RX ADMIN — FENTANYL CITRATE 8.21 MICROGRAM(S)/KG/HR: 50 INJECTION INTRAVENOUS at 17:23

## 2019-01-01 RX ADMIN — Medication 500 MILLIGRAM(S): at 23:42

## 2019-01-01 RX ADMIN — Medication 3: at 00:33

## 2019-01-01 RX ADMIN — Medication 500 MILLIGRAM(S): at 17:54

## 2019-01-01 RX ADMIN — PANTOPRAZOLE SODIUM 40 MILLIGRAM(S): 20 TABLET, DELAYED RELEASE ORAL at 11:47

## 2019-01-01 RX ADMIN — FENTANYL CITRATE 4.11 MICROGRAM(S)/KG/HR: 50 INJECTION INTRAVENOUS at 15:09

## 2019-01-01 RX ADMIN — Medication 650 MILLIGRAM(S): at 00:48

## 2019-01-01 RX ADMIN — Medication 1 MILLIGRAM(S): at 13:35

## 2019-01-01 RX ADMIN — Medication 667 MILLIGRAM(S): at 17:57

## 2019-01-01 RX ADMIN — Medication 667 MILLIGRAM(S): at 10:04

## 2019-01-01 RX ADMIN — PREGABALIN 1000 MICROGRAM(S): 225 CAPSULE ORAL at 11:31

## 2019-01-01 RX ADMIN — ERYTHROPOIETIN 6000 UNIT(S): 10000 INJECTION, SOLUTION INTRAVENOUS; SUBCUTANEOUS at 15:14

## 2019-01-01 RX ADMIN — CHLORHEXIDINE GLUCONATE 1 APPLICATION(S): 213 SOLUTION TOPICAL at 05:49

## 2019-01-01 RX ADMIN — Medication 1: at 12:08

## 2019-01-01 RX ADMIN — MEROPENEM 100 MILLIGRAM(S): 1 INJECTION INTRAVENOUS at 17:35

## 2019-01-01 RX ADMIN — Medication 500 MILLIGRAM(S): at 06:14

## 2019-01-01 RX ADMIN — HYDROMORPHONE HYDROCHLORIDE 1 MILLIGRAM(S): 2 INJECTION INTRAMUSCULAR; INTRAVENOUS; SUBCUTANEOUS at 19:12

## 2019-01-01 RX ADMIN — Medication 667 MILLIGRAM(S): at 23:42

## 2019-01-01 RX ADMIN — Medication 2: at 06:27

## 2019-01-01 RX ADMIN — Medication 500 MILLIGRAM(S): at 06:18

## 2019-01-01 RX ADMIN — MEROPENEM 100 MILLIGRAM(S): 1 INJECTION INTRAVENOUS at 18:07

## 2019-01-01 RX ADMIN — Medication 2: at 00:25

## 2019-01-01 RX ADMIN — Medication 667 MILLIGRAM(S): at 14:08

## 2019-01-01 RX ADMIN — Medication 1: at 23:00

## 2019-01-01 RX ADMIN — HYDROMORPHONE HYDROCHLORIDE 0.5 MILLIGRAM(S): 2 INJECTION INTRAMUSCULAR; INTRAVENOUS; SUBCUTANEOUS at 22:42

## 2019-01-01 RX ADMIN — PREGABALIN 1000 MICROGRAM(S): 225 CAPSULE ORAL at 11:58

## 2019-01-01 RX ADMIN — FENTANYL CITRATE 8.21 MICROGRAM(S)/KG/HR: 50 INJECTION INTRAVENOUS at 21:48

## 2019-01-01 RX ADMIN — FENTANYL CITRATE 8.21 MICROGRAM(S)/KG/HR: 50 INJECTION INTRAVENOUS at 05:27

## 2019-01-01 RX ADMIN — MEROPENEM 100 MILLIGRAM(S): 1 INJECTION INTRAVENOUS at 05:27

## 2019-01-01 RX ADMIN — PANTOPRAZOLE SODIUM 40 MILLIGRAM(S): 20 TABLET, DELAYED RELEASE ORAL at 12:50

## 2019-01-01 RX ADMIN — HEPARIN SODIUM 5000 UNIT(S): 5000 INJECTION INTRAVENOUS; SUBCUTANEOUS at 05:04

## 2019-01-01 RX ADMIN — Medication 500 MILLIGRAM(S): at 18:16

## 2019-01-01 RX ADMIN — Medication 100 MILLIGRAM(S): at 05:54

## 2019-01-01 RX ADMIN — Medication 1 MILLIGRAM(S): at 13:37

## 2019-01-01 RX ADMIN — HYDROMORPHONE HYDROCHLORIDE 2 MILLIGRAM(S): 2 INJECTION INTRAMUSCULAR; INTRAVENOUS; SUBCUTANEOUS at 12:57

## 2019-01-01 RX ADMIN — Medication 1: at 06:23

## 2019-01-01 RX ADMIN — PROPOFOL 2.46 MICROGRAM(S)/KG/MIN: 10 INJECTION, EMULSION INTRAVENOUS at 22:31

## 2019-01-01 RX ADMIN — Medication 30 MILLILITER(S): at 21:21

## 2019-01-01 RX ADMIN — Medication 1: at 17:44

## 2019-01-01 RX ADMIN — Medication 500 MILLIGRAM(S): at 17:45

## 2019-01-01 RX ADMIN — PROPOFOL 2.46 MICROGRAM(S)/KG/MIN: 10 INJECTION, EMULSION INTRAVENOUS at 07:59

## 2019-01-01 RX ADMIN — CHLORHEXIDINE GLUCONATE 1 APPLICATION(S): 213 SOLUTION TOPICAL at 06:19

## 2019-01-01 RX ADMIN — MEROPENEM 100 MILLIGRAM(S): 1 INJECTION INTRAVENOUS at 05:04

## 2019-01-01 RX ADMIN — ROBINUL 0.4 MILLIGRAM(S): 0.2 INJECTION INTRAMUSCULAR; INTRAVENOUS at 18:15

## 2019-01-01 RX ADMIN — Medication 1 MILLIGRAM(S): at 11:47

## 2019-01-01 RX ADMIN — HYDROMORPHONE HYDROCHLORIDE 0.5 MILLIGRAM(S): 2 INJECTION INTRAMUSCULAR; INTRAVENOUS; SUBCUTANEOUS at 22:43

## 2019-01-01 RX ADMIN — HEPARIN SODIUM 5000 UNIT(S): 5000 INJECTION INTRAVENOUS; SUBCUTANEOUS at 05:27

## 2019-01-01 RX ADMIN — CHLORHEXIDINE GLUCONATE 1 APPLICATION(S): 213 SOLUTION TOPICAL at 17:54

## 2019-01-01 RX ADMIN — HYDROMORPHONE HYDROCHLORIDE 1 MILLIGRAM(S): 2 INJECTION INTRAMUSCULAR; INTRAVENOUS; SUBCUTANEOUS at 04:35

## 2019-01-01 RX ADMIN — Medication 667 MILLIGRAM(S): at 18:16

## 2019-01-01 RX ADMIN — Medication 0.12 MILLIGRAM(S): at 12:50

## 2019-01-01 RX ADMIN — PREGABALIN 1000 MICROGRAM(S): 225 CAPSULE ORAL at 13:34

## 2019-01-01 RX ADMIN — HYDROMORPHONE HYDROCHLORIDE 1 MILLIGRAM(S): 2 INJECTION INTRAMUSCULAR; INTRAVENOUS; SUBCUTANEOUS at 20:36

## 2019-01-01 RX ADMIN — HYDROMORPHONE HYDROCHLORIDE 1 MILLIGRAM(S): 2 INJECTION INTRAMUSCULAR; INTRAVENOUS; SUBCUTANEOUS at 09:09

## 2019-01-01 RX ADMIN — LINEZOLID 300 MILLIGRAM(S): 600 INJECTION, SOLUTION INTRAVENOUS at 18:07

## 2019-01-01 RX ADMIN — LINEZOLID 300 MILLIGRAM(S): 600 INJECTION, SOLUTION INTRAVENOUS at 17:59

## 2019-01-01 RX ADMIN — LINEZOLID 300 MILLIGRAM(S): 600 INJECTION, SOLUTION INTRAVENOUS at 21:19

## 2019-01-01 RX ADMIN — PANTOPRAZOLE SODIUM 40 MILLIGRAM(S): 20 TABLET, DELAYED RELEASE ORAL at 11:29

## 2019-01-01 RX ADMIN — CHLORHEXIDINE GLUCONATE 1 APPLICATION(S): 213 SOLUTION TOPICAL at 05:01

## 2019-01-01 RX ADMIN — MEROPENEM 100 MILLIGRAM(S): 1 INJECTION INTRAVENOUS at 17:29

## 2019-01-01 RX ADMIN — Medication 667 MILLIGRAM(S): at 18:33

## 2019-01-01 RX ADMIN — Medication 30 MILLILITER(S): at 06:20

## 2019-01-01 RX ADMIN — Medication 1 MILLIGRAM(S): at 11:30

## 2019-01-01 RX ADMIN — Medication 0.12 MILLIGRAM(S): at 14:07

## 2019-01-01 RX ADMIN — HYDROMORPHONE HYDROCHLORIDE 1 MILLIGRAM(S): 2 INJECTION INTRAMUSCULAR; INTRAVENOUS; SUBCUTANEOUS at 20:09

## 2019-01-01 RX ADMIN — LINEZOLID 300 MILLIGRAM(S): 600 INJECTION, SOLUTION INTRAVENOUS at 17:39

## 2019-01-01 RX ADMIN — Medication 1 MILLIGRAM(S): at 14:08

## 2019-01-01 RX ADMIN — CHLORHEXIDINE GLUCONATE 1 APPLICATION(S): 213 SOLUTION TOPICAL at 18:34

## 2019-01-01 RX ADMIN — Medication 667 MILLIGRAM(S): at 10:47

## 2019-01-01 RX ADMIN — MEROPENEM 100 MILLIGRAM(S): 1 INJECTION INTRAVENOUS at 18:34

## 2019-01-01 RX ADMIN — MEROPENEM 100 MILLIGRAM(S): 1 INJECTION INTRAVENOUS at 05:26

## 2019-01-01 RX ADMIN — Medication 500 MILLIGRAM(S): at 05:27

## 2019-01-01 RX ADMIN — MEROPENEM 100 MILLIGRAM(S): 1 INJECTION INTRAVENOUS at 18:17

## 2019-01-01 RX ADMIN — HYDROMORPHONE HYDROCHLORIDE 1 MILLIGRAM(S): 2 INJECTION INTRAMUSCULAR; INTRAVENOUS; SUBCUTANEOUS at 04:14

## 2019-01-01 RX ADMIN — Medication 100 MILLIGRAM(S): at 05:27

## 2019-01-01 RX ADMIN — Medication 120 MILLIGRAM(S): at 10:22

## 2019-01-01 RX ADMIN — Medication 3: at 06:14

## 2019-01-01 RX ADMIN — HEPARIN SODIUM 5000 UNIT(S): 5000 INJECTION INTRAVENOUS; SUBCUTANEOUS at 05:23

## 2019-01-01 RX ADMIN — Medication 0.12 MILLIGRAM(S): at 13:38

## 2019-01-01 RX ADMIN — INSULIN GLARGINE 10 UNIT(S): 100 INJECTION, SOLUTION SUBCUTANEOUS at 22:43

## 2019-01-01 RX ADMIN — PROPOFOL 2.46 MICROGRAM(S)/KG/MIN: 10 INJECTION, EMULSION INTRAVENOUS at 10:21

## 2019-01-01 RX ADMIN — CHLORHEXIDINE GLUCONATE 1 APPLICATION(S): 213 SOLUTION TOPICAL at 05:28

## 2019-01-01 RX ADMIN — HYDROMORPHONE HYDROCHLORIDE 1 MILLIGRAM(S): 2 INJECTION INTRAMUSCULAR; INTRAVENOUS; SUBCUTANEOUS at 18:29

## 2019-01-01 RX ADMIN — HYDROMORPHONE HYDROCHLORIDE 1 MILLIGRAM(S): 2 INJECTION INTRAMUSCULAR; INTRAVENOUS; SUBCUTANEOUS at 10:23

## 2019-01-01 RX ADMIN — FENTANYL CITRATE 8.21 MICROGRAM(S)/KG/HR: 50 INJECTION INTRAVENOUS at 05:54

## 2019-01-01 RX ADMIN — Medication 1: at 13:33

## 2019-01-01 RX ADMIN — FENTANYL CITRATE 4.11 MICROGRAM(S)/KG/HR: 50 INJECTION INTRAVENOUS at 02:29

## 2019-01-01 RX ADMIN — HYDROMORPHONE HYDROCHLORIDE 1 MILLIGRAM(S): 2 INJECTION INTRAMUSCULAR; INTRAVENOUS; SUBCUTANEOUS at 05:02

## 2019-01-01 RX ADMIN — CHLORHEXIDINE GLUCONATE 1 APPLICATION(S): 213 SOLUTION TOPICAL at 05:22

## 2019-01-01 RX ADMIN — CHLORHEXIDINE GLUCONATE 1 APPLICATION(S): 213 SOLUTION TOPICAL at 05:18

## 2019-01-01 RX ADMIN — HYDROMORPHONE HYDROCHLORIDE 1 MILLIGRAM(S): 2 INJECTION INTRAMUSCULAR; INTRAVENOUS; SUBCUTANEOUS at 13:19

## 2019-01-01 RX ADMIN — PANTOPRAZOLE SODIUM 40 MILLIGRAM(S): 20 TABLET, DELAYED RELEASE ORAL at 11:59

## 2019-01-01 RX ADMIN — MEROPENEM 100 MILLIGRAM(S): 1 INJECTION INTRAVENOUS at 17:59

## 2019-01-01 RX ADMIN — ALTEPLASE 2 MILLIGRAM(S): KIT at 11:49

## 2019-01-01 RX ADMIN — CHLORHEXIDINE GLUCONATE 1 APPLICATION(S): 213 SOLUTION TOPICAL at 05:30

## 2019-01-01 RX ADMIN — HYDROMORPHONE HYDROCHLORIDE 1 MILLIGRAM(S): 2 INJECTION INTRAMUSCULAR; INTRAVENOUS; SUBCUTANEOUS at 04:15

## 2019-01-01 RX ADMIN — Medication 500 MILLIGRAM(S): at 18:33

## 2019-01-01 RX ADMIN — Medication 30 MILLILITER(S): at 23:41

## 2019-01-01 RX ADMIN — LINEZOLID 300 MILLIGRAM(S): 600 INJECTION, SOLUTION INTRAVENOUS at 05:29

## 2019-01-01 RX ADMIN — ERYTHROPOIETIN 6000 UNIT(S): 10000 INJECTION, SOLUTION INTRAVENOUS; SUBCUTANEOUS at 15:16

## 2019-01-01 RX ADMIN — PROPOFOL 2.46 MICROGRAM(S)/KG/MIN: 10 INJECTION, EMULSION INTRAVENOUS at 11:47

## 2019-01-01 RX ADMIN — Medication 500 MILLIGRAM(S): at 06:05

## 2019-01-01 RX ADMIN — Medication 1 MILLIGRAM(S): at 12:50

## 2019-01-01 RX ADMIN — HYDROMORPHONE HYDROCHLORIDE 1 MILLIGRAM(S): 2 INJECTION INTRAMUSCULAR; INTRAVENOUS; SUBCUTANEOUS at 13:54

## 2019-01-01 RX ADMIN — CHLORHEXIDINE GLUCONATE 1 APPLICATION(S): 213 SOLUTION TOPICAL at 17:29

## 2019-01-01 RX ADMIN — Medication 2: at 05:58

## 2019-01-01 RX ADMIN — MEROPENEM 100 MILLIGRAM(S): 1 INJECTION INTRAVENOUS at 05:54

## 2019-01-01 RX ADMIN — LINEZOLID 300 MILLIGRAM(S): 600 INJECTION, SOLUTION INTRAVENOUS at 18:34

## 2019-01-01 RX ADMIN — MEROPENEM 100 MILLIGRAM(S): 1 INJECTION INTRAVENOUS at 06:05

## 2019-01-01 RX ADMIN — HEPARIN SODIUM 5000 UNIT(S): 5000 INJECTION INTRAVENOUS; SUBCUTANEOUS at 06:18

## 2019-01-01 RX ADMIN — Medication 1: at 12:00

## 2019-01-01 RX ADMIN — IRON SUCROSE 110 MILLIGRAM(S): 20 INJECTION, SOLUTION INTRAVENOUS at 17:27

## 2019-01-01 RX ADMIN — DEXMEDETOMIDINE HYDROCHLORIDE IN 0.9% SODIUM CHLORIDE 10.26 MICROGRAM(S)/KG/HR: 4 INJECTION INTRAVENOUS at 05:06

## 2019-01-01 RX ADMIN — HEPARIN SODIUM 5000 UNIT(S): 5000 INJECTION INTRAVENOUS; SUBCUTANEOUS at 18:16

## 2019-01-01 RX ADMIN — INSULIN GLARGINE 10 UNIT(S): 100 INJECTION, SOLUTION SUBCUTANEOUS at 22:58

## 2019-01-01 RX ADMIN — HYDROMORPHONE HYDROCHLORIDE 2 MILLIGRAM(S): 2 INJECTION INTRAMUSCULAR; INTRAVENOUS; SUBCUTANEOUS at 11:42

## 2019-01-01 RX ADMIN — Medication 667 MILLIGRAM(S): at 11:35

## 2019-01-01 RX ADMIN — Medication 0.12 MILLIGRAM(S): at 11:59

## 2019-01-01 RX ADMIN — CHLORHEXIDINE GLUCONATE 1 APPLICATION(S): 213 SOLUTION TOPICAL at 17:57

## 2019-01-01 RX ADMIN — HYDROMORPHONE HYDROCHLORIDE 1 MILLIGRAM(S): 2 INJECTION INTRAMUSCULAR; INTRAVENOUS; SUBCUTANEOUS at 05:32

## 2019-01-01 RX ADMIN — CHLORHEXIDINE GLUCONATE 1 APPLICATION(S): 213 SOLUTION TOPICAL at 18:12

## 2019-01-01 RX ADMIN — HEPARIN SODIUM 5000 UNIT(S): 5000 INJECTION INTRAVENOUS; SUBCUTANEOUS at 05:33

## 2019-01-01 RX ADMIN — LINEZOLID 300 MILLIGRAM(S): 600 INJECTION, SOLUTION INTRAVENOUS at 17:30

## 2019-01-01 RX ADMIN — HYDROMORPHONE HYDROCHLORIDE 1 MILLIGRAM(S): 2 INJECTION INTRAMUSCULAR; INTRAVENOUS; SUBCUTANEOUS at 03:29

## 2019-01-01 RX ADMIN — Medication 100 MILLIGRAM(S): at 17:57

## 2019-01-01 RX ADMIN — ROBINUL 0.4 MILLIGRAM(S): 0.2 INJECTION INTRAMUSCULAR; INTRAVENOUS at 10:18

## 2019-01-01 RX ADMIN — CHLORHEXIDINE GLUCONATE 1 APPLICATION(S): 213 SOLUTION TOPICAL at 05:58

## 2019-01-01 RX ADMIN — MEROPENEM 100 MILLIGRAM(S): 1 INJECTION INTRAVENOUS at 05:30

## 2019-01-01 RX ADMIN — PREGABALIN 1000 MICROGRAM(S): 225 CAPSULE ORAL at 17:33

## 2019-01-01 RX ADMIN — HEPARIN SODIUM 5000 UNIT(S): 5000 INJECTION INTRAVENOUS; SUBCUTANEOUS at 17:33

## 2019-01-01 RX ADMIN — Medication 667 MILLIGRAM(S): at 07:56

## 2019-01-01 RX ADMIN — IRON SUCROSE 110 MILLIGRAM(S): 20 INJECTION, SOLUTION INTRAVENOUS at 17:32

## 2019-01-01 RX ADMIN — HYDROMORPHONE HYDROCHLORIDE 1 MILLIGRAM(S): 2 INJECTION INTRAMUSCULAR; INTRAVENOUS; SUBCUTANEOUS at 18:28

## 2019-01-01 RX ADMIN — DEXMEDETOMIDINE HYDROCHLORIDE IN 0.9% SODIUM CHLORIDE 10.26 MICROGRAM(S)/KG/HR: 4 INJECTION INTRAVENOUS at 12:51

## 2019-01-01 RX ADMIN — MEROPENEM 100 MILLIGRAM(S): 1 INJECTION INTRAVENOUS at 17:48

## 2019-01-01 RX ADMIN — Medication 2 MILLIGRAM(S): at 10:52

## 2019-01-01 RX ADMIN — PREGABALIN 1000 MICROGRAM(S): 225 CAPSULE ORAL at 12:51

## 2019-01-01 RX ADMIN — Medication 667 MILLIGRAM(S): at 17:53

## 2019-01-01 RX ADMIN — HEPARIN SODIUM 5000 UNIT(S): 5000 INJECTION INTRAVENOUS; SUBCUTANEOUS at 18:34

## 2019-01-01 RX ADMIN — MEROPENEM 100 MILLIGRAM(S): 1 INJECTION INTRAVENOUS at 05:01

## 2019-01-01 RX ADMIN — FENTANYL CITRATE 8.21 MICROGRAM(S)/KG/HR: 50 INJECTION INTRAVENOUS at 04:06

## 2019-01-01 RX ADMIN — HYDROMORPHONE HYDROCHLORIDE 1 MILLIGRAM(S): 2 INJECTION INTRAMUSCULAR; INTRAVENOUS; SUBCUTANEOUS at 18:56

## 2019-01-01 RX ADMIN — HYDROMORPHONE HYDROCHLORIDE 1 MILLIGRAM(S): 2 INJECTION INTRAMUSCULAR; INTRAVENOUS; SUBCUTANEOUS at 08:45

## 2019-01-01 RX ADMIN — HYDROMORPHONE HYDROCHLORIDE 1 MILLIGRAM(S): 2 INJECTION INTRAMUSCULAR; INTRAVENOUS; SUBCUTANEOUS at 10:18

## 2019-01-01 RX ADMIN — Medication 500 MILLIGRAM(S): at 05:54

## 2019-01-01 RX ADMIN — IRON SUCROSE 110 MILLIGRAM(S): 20 INJECTION, SOLUTION INTRAVENOUS at 17:29

## 2019-01-01 RX ADMIN — Medication 100 MILLIGRAM(S): at 17:45

## 2019-01-01 RX ADMIN — Medication 667 MILLIGRAM(S): at 13:43

## 2019-01-01 RX ADMIN — Medication 667 MILLIGRAM(S): at 09:19

## 2019-01-01 RX ADMIN — HYDROMORPHONE HYDROCHLORIDE 1 MILLIGRAM(S): 2 INJECTION INTRAMUSCULAR; INTRAVENOUS; SUBCUTANEOUS at 13:20

## 2019-01-01 RX ADMIN — MEROPENEM 100 MILLIGRAM(S): 1 INJECTION INTRAVENOUS at 06:19

## 2019-01-01 RX ADMIN — HEPARIN SODIUM 5000 UNIT(S): 5000 INJECTION INTRAVENOUS; SUBCUTANEOUS at 18:08

## 2019-01-01 RX ADMIN — LINEZOLID 300 MILLIGRAM(S): 600 INJECTION, SOLUTION INTRAVENOUS at 06:19

## 2019-01-01 RX ADMIN — PANTOPRAZOLE SODIUM 40 MILLIGRAM(S): 20 TABLET, DELAYED RELEASE ORAL at 13:26

## 2019-01-01 RX ADMIN — CHLORHEXIDINE GLUCONATE 1 APPLICATION(S): 213 SOLUTION TOPICAL at 05:05

## 2019-01-01 RX ADMIN — CHLORHEXIDINE GLUCONATE 1 APPLICATION(S): 213 SOLUTION TOPICAL at 19:00

## 2019-01-01 RX ADMIN — CHLORHEXIDINE GLUCONATE 1 APPLICATION(S): 213 SOLUTION TOPICAL at 18:00

## 2019-01-01 RX ADMIN — FENTANYL CITRATE 4.11 MICROGRAM(S)/KG/HR: 50 INJECTION INTRAVENOUS at 05:00

## 2019-01-01 RX ADMIN — Medication 1: at 05:27

## 2019-01-01 RX ADMIN — INSULIN GLARGINE 10 UNIT(S): 100 INJECTION, SOLUTION SUBCUTANEOUS at 21:47

## 2019-01-01 RX ADMIN — MEROPENEM 100 MILLIGRAM(S): 1 INJECTION INTRAVENOUS at 21:19

## 2019-01-01 RX ADMIN — Medication 1: at 18:08

## 2019-01-01 RX ADMIN — LINEZOLID 300 MILLIGRAM(S): 600 INJECTION, SOLUTION INTRAVENOUS at 05:21

## 2019-01-01 RX ADMIN — Medication 667 MILLIGRAM(S): at 08:43

## 2019-01-01 RX ADMIN — Medication 1 MILLIGRAM(S): at 11:58

## 2019-01-01 RX ADMIN — MEROPENEM 100 MILLIGRAM(S): 1 INJECTION INTRAVENOUS at 05:58

## 2019-01-01 RX ADMIN — ERYTHROPOIETIN 6000 UNIT(S): 10000 INJECTION, SOLUTION INTRAVENOUS; SUBCUTANEOUS at 13:40

## 2019-01-01 RX ADMIN — CHLORHEXIDINE GLUCONATE 1 APPLICATION(S): 213 SOLUTION TOPICAL at 17:45

## 2019-01-01 RX ADMIN — Medication 1: at 12:51

## 2019-01-01 RX ADMIN — Medication 100 MILLIGRAM(S): at 06:05

## 2019-01-01 RX ADMIN — PREGABALIN 1000 MICROGRAM(S): 225 CAPSULE ORAL at 11:29

## 2019-01-01 RX ADMIN — HEPARIN SODIUM 5000 UNIT(S): 5000 INJECTION INTRAVENOUS; SUBCUTANEOUS at 21:20

## 2019-01-01 RX ADMIN — FENTANYL CITRATE 8.21 MICROGRAM(S)/KG/HR: 50 INJECTION INTRAVENOUS at 18:03

## 2019-01-01 RX ADMIN — LINEZOLID 300 MILLIGRAM(S): 600 INJECTION, SOLUTION INTRAVENOUS at 05:58

## 2019-01-01 RX ADMIN — CHLORHEXIDINE GLUCONATE 1 APPLICATION(S): 213 SOLUTION TOPICAL at 05:06

## 2019-01-01 RX ADMIN — Medication 667 MILLIGRAM(S): at 12:50

## 2019-01-01 RX ADMIN — INSULIN GLARGINE 10 UNIT(S): 100 INJECTION, SOLUTION SUBCUTANEOUS at 22:37

## 2019-01-01 RX ADMIN — FENTANYL CITRATE 8.21 MICROGRAM(S)/KG/HR: 50 INJECTION INTRAVENOUS at 14:31

## 2019-01-01 RX ADMIN — Medication 500 MILLIGRAM(S): at 17:57

## 2019-01-01 RX ADMIN — PANTOPRAZOLE SODIUM 40 MILLIGRAM(S): 20 TABLET, DELAYED RELEASE ORAL at 13:51

## 2019-01-01 RX ADMIN — LINEZOLID 300 MILLIGRAM(S): 600 INJECTION, SOLUTION INTRAVENOUS at 05:54

## 2019-01-01 RX ADMIN — INSULIN GLARGINE 10 UNIT(S): 100 INJECTION, SOLUTION SUBCUTANEOUS at 22:14

## 2019-01-01 RX ADMIN — CHLORHEXIDINE GLUCONATE 1 APPLICATION(S): 213 SOLUTION TOPICAL at 06:04

## 2019-01-01 RX ADMIN — Medication 1: at 05:28

## 2019-01-01 RX ADMIN — Medication 100 MILLIGRAM(S): at 18:33

## 2019-01-01 RX ADMIN — MORPHINE SULFATE 2 MILLIGRAM(S): 50 CAPSULE, EXTENDED RELEASE ORAL at 06:13

## 2019-01-01 RX ADMIN — FENTANYL CITRATE 4.11 MICROGRAM(S)/KG/HR: 50 INJECTION INTRAVENOUS at 05:58

## 2019-01-01 RX ADMIN — PANTOPRAZOLE SODIUM 40 MILLIGRAM(S): 20 TABLET, DELAYED RELEASE ORAL at 14:07

## 2019-01-01 RX ADMIN — HYDROMORPHONE HYDROCHLORIDE 1 MILLIGRAM(S): 2 INJECTION INTRAMUSCULAR; INTRAVENOUS; SUBCUTANEOUS at 13:30

## 2019-01-01 RX ADMIN — PROPOFOL 2.46 MICROGRAM(S)/KG/MIN: 10 INJECTION, EMULSION INTRAVENOUS at 18:00

## 2019-01-01 RX ADMIN — Medication 1: at 14:18

## 2019-01-01 RX ADMIN — LINEZOLID 300 MILLIGRAM(S): 600 INJECTION, SOLUTION INTRAVENOUS at 05:01

## 2019-01-01 RX ADMIN — PANTOPRAZOLE SODIUM 40 MILLIGRAM(S): 20 TABLET, DELAYED RELEASE ORAL at 11:31

## 2019-01-01 NOTE — PROGRESS NOTE ADULT - PROBLEM SELECTOR PLAN 5
Blue toes  Unable to anticoagulate due to anemia  F/u Arterial doppler    Consulted Vascular Surgery Blue toes  Unable to anticoagulate due to anemia  F/u Arterial doppler when stable  Consulted Vascular Surgery

## 2019-01-01 NOTE — PROGRESS NOTE ADULT - SUBJECTIVE AND OBJECTIVE BOX
CHIEF COMPLAINT:Patient is a 78y old  Female who presents with a chief complaint of fall .Pt remains intubated.    	  REVIEW OF SYSTEMS:    x[ ] Unable to obtain    PHYSICAL EXAM:  T(C): 36.6 (01-01-19 @ 06:00), Max: 37.3 (12-31-18 @ 13:20)  HR: 98 (01-01-19 @ 09:45) (78 - 105)  BP: 133/62 (01-01-19 @ 09:00) (94/50 - 133/62)  RR: 19 (01-01-19 @ 09:45) (14 - 32)  SpO2: 92% (01-01-19 @ 09:45) (90% - 100%)  Wt(kg): --  I&O's Summary    31 Dec 2018 07:01  -  01 Jan 2019 07:00  --------------------------------------------------------  IN: 3183.4 mL / OUT: 47 mL / NET: 3136.4 mL    01 Jan 2019 07:01  -  01 Jan 2019 10:26  --------------------------------------------------------  IN: 32.8 mL / OUT: 5 mL / NET: 27.8 mL        Appearance: Normal	  HEENT:   Normal oral mucosa, PERRL, EOMI	  Lymphatic: No lymphadenopathy  Cardiovascular: Normal S1 S2, No JVD, No murmurs, +2 edema  Respiratory: B/L ronchi  Gastrointestinal:  Soft, Non-tender, + BS	  Skin: No rashes, No ecchymoses, No cyanosis	  Extremities: Normal range of motion, No clubbing, cyanosis +2 edema  Vascular: Peripheral pulses palpable 2+ bilaterally    MEDICATIONS  (STANDING):  alteplase for catheter clearance 2 milliGRAM(s) Catheter once  alteplase for catheter clearance 2 milliGRAM(s) Catheter once  ascorbic acid 500 milliGRAM(s) Oral two times a day  chlorhexidine 4% Liquid 1 Application(s) Topical two times a day  cyanocobalamin Injectable 1000 MICROGram(s) SubCutaneous daily  digoxin  Injectable 0.125 milliGRAM(s) IV Push every other day  docusate sodium Liquid 100 milliGRAM(s) Oral two times a day  epoetin monique Injectable 98396 Unit(s) IV Push <User Schedule>  fentaNYL   Infusion. 0.5 MICROgram(s)/kG/Hr (4.105 mL/Hr) IV Continuous <Continuous>  folic acid 1 milliGRAM(s) Oral daily  insulin glargine Injectable (LANTUS) 15 Unit(s) SubCutaneous at bedtime  insulin lispro (HumaLOG) corrective regimen sliding scale   SubCutaneous every 6 hours  iron sucrose IVPB 200 milliGRAM(s) IV Intermittent every 24 hours  linezolid  IVPB 600 milliGRAM(s) IV Intermittent every 12 hours  meropenem  IVPB 500 milliGRAM(s) IV Intermittent every 12 hours  pantoprazole  Injectable 40 milliGRAM(s) IV Push daily  phytonadione   Solution 10 milliGRAM(s) Oral daily      TELEMETRY: 	nsr,sinus tach      	  LABS:	 	                                    9.8    19.9  )-----------( 61       ( 01 Jan 2019 04:51 )             31.9     01-01    140  |  98  |  62<H>  ----------------------------<  203<H>  4.1   |  26  |  4.37<H>    Ca    8.2<L>      01 Jan 2019 04:51  Phos  5.2     01-01  Mg     1.9     01-01    TPro  5.6<L>  /  Alb  2.7<L>  /  TBili  5.7<H>  /  DBili  x   /  AST  860<H>  /  ALT  1086<H>  /  AlkPhos  116  01-01    proBNP:   Lipid Profile: Cholesterol --  LDL --  HDL --    Cholesterol 82  LDL 22  HDL 40      HgA1c: Hemoglobin A1C, Whole Blood: 6.1 % (12-27 @ 09:51)  Hemoglobin A1C, Whole Blood: 6.2 % (12-26 @ 23:12)    TSH: Thyroid Stimulating Hormone, Serum: 1.19 uU/mL (12-27 @ 06:50)      EXAM:  XR CHEST PORTABLE ROUTINE 1V                            PROCEDURE DATE:  01/01/2019          INTERPRETATION:  AP chest on January 1, 2019 at 8:18 AM. Patient requires   intubation.    Heart enlargement, endotracheal tube, and left jugular line remain.    There is a mild to moderate right effusion showing increase from December 31. Central infiltrates again noted particularly on the left relatively   stable.    NG tube present on December 31 has been removed.    IMPRESSION: NG tube removed. Increasing right effusion. Grossly stable   central infiltrates.    Lactate, Blood (01.01.19 @ 04:51)    Lactate, Blood: 4.3: TYPE:(C=Critical, N=Notification, A=Abnormal) c  TESTS: _lactate  DATE/TIME CALLED: _01/01/19 05:36  CALLED TO: _patti miranda  READ BACK (2 Patient Identifiers)(Y/N): _y  READ BACK VALUES (Y/N): _y  CALLED BY: terrance alexandre01/01/19 05:37 mmol/L    Blood Gas Profile - Arterial in AM (01.01.19 @ 04:38)    pH, Arterial: 7.37    pCO2, Arterial: 46 mmHg    pO2, Arterial: 61 mmHg    HCO3, Arterial: 26 mmol/L    Base Excess, Arterial: 1.0 mmol/L    Oxygen Saturation, Arterial: 88 %    FIO2, Arterial: 60    Blood Gas Comments Arterial: CMV   16  - 450    AL

## 2019-01-01 NOTE — PROGRESS NOTE ADULT - SUBJECTIVE AND OBJECTIVE BOX
Ortho Note POD# 2  78yFemale    Diagnosis:  S/p IM nailing Left hip fx POD# 5    Patient in ICU. Intubated and sedated. On ABx for b/l basal pneumonia.   Lactate trending downward. INR improving. For dialysis possibly today. Off vasopressors.     Vital Signs Last 24 Hrs  T(C): 36.6 (01 Jan 2019 06:00), Max: 37.3 (31 Dec 2018 13:20)  T(F): 97.8 (01 Jan 2019 06:00), Max: 99.1 (31 Dec 2018 13:20)  HR: 98 (01 Jan 2019 09:45) (78 - 105)  BP: 133/62 (01 Jan 2019 09:00) (94/50 - 133/62)  BP(mean): 77 (01 Jan 2019 09:00) (59 - 77)  RR: 19 (01 Jan 2019 09:45) (14 - 32)  SpO2: 92% (01 Jan 2019 09:45) (90% - 100%)    Physical Exam:    General: AAOx3, in NAD, resting in bed.    Left hip:  In nl. alignment. Wound C/D/I; healing well.  No drainage. Staples intact. Calves are soft, +pulses on arterial doppler. NVI.                          9.8    19.9  )-----------( 61       ( 01 Jan 2019 04:51 )             31.9     01-01    140  |  98  |  62<H>  ----------------------------<  203<H>  4.1   |  26  |  4.37<H>    Ca    8.2<L>      01 Jan 2019 04:51  Phos  5.2     01-01  Mg     1.9     01-01    TPro  5.6<L>  /  Alb  2.7<L>  /  TBili  5.7<H>  /  DBili  x   /  AST  860<H>  /  ALT  1086<H>  /  AlkPhos  116  01-01      Impression:  78yFemale S/p Im nailing Left hip fx POD# 5  Plan:  -  Continue pain management and home meds  -  DVT prophylaxis with venodynes  -  Daily Physical Therapy:  WBAT on LLE with walker  -  Orthopedically stable  -  Dressing changed  -  Case d/w   - Plan as per ICU team

## 2019-01-01 NOTE — PROGRESS NOTE ADULT - SUBJECTIVE AND OBJECTIVE BOX
MARICEL SLAUGHTER  MR# [id number]  78yFemale        Patient is a 78y old  Female who presents with a chief complaint of fall (01 Jan 2019 16:55)      INTERVAL HPI/OVERNIGHT EVENTS:  Patient seen and examined at bedside.     ALLERGIES  No Known Allergies      MEDICATIONS  ascorbic acid 500 milliGRAM(s) Oral two times a day  chlorhexidine 4% Liquid 1 Application(s) Topical two times a day  digoxin  Injectable 0.125 milliGRAM(s) IV Push every other day  docusate sodium Liquid 100 milliGRAM(s) Oral two times a day  epoetin monique Injectable 28738 Unit(s) IV Push <User Schedule>  fentaNYL   Infusion. 0.5 MICROgram(s)/kG/Hr IV Continuous <Continuous>  folic acid 1 milliGRAM(s) Oral daily  insulin glargine Injectable (LANTUS) 15 Unit(s) SubCutaneous at bedtime  insulin lispro (HumaLOG) corrective regimen sliding scale   SubCutaneous every 6 hours  iron sucrose IVPB 200 milliGRAM(s) IV Intermittent every 24 hours  linezolid  IVPB 600 milliGRAM(s) IV Intermittent every 12 hours  meropenem  IVPB 500 milliGRAM(s) IV Intermittent every 12 hours  morphine  - Injectable 2 milliGRAM(s) IV Push every 4 hours PRN Severe Pain (7 - 10)  ondansetron Injectable 4 milliGRAM(s) IV Push every 6 hours PRN Nausea and/or Vomiting  pantoprazole  Injectable 40 milliGRAM(s) IV Push daily              REVIEW OF SYSTEMS:  CONSTITUTIONAL: No fever, weight loss, or fatigue  EYES: No eye pain, visual disturbances, or discharge  ENT:  No difficulty hearing, tinnitus, vertigo; No sinus or throat pain  NECK: No pain or stiffness  RESPIRATORY: No cough, wheezing, chills or hemoptysis; No Shortness of Breath  CARDIOVASCULAR: No chest pain, palpitations, passing out, dizziness, or leg swelling  GASTROINTESTINAL: No abdominal or epigastric pain. No nausea, vomiting, or hematemesis; No diarrhea or constipation. No melena or hematochezia.  GENITOURINARY: No dysuria, frequency, hematuria, or incontinence  NEUROLOGICAL: No headaches, memory loss, loss of strength, numbness, or tremors  SKIN: No itching, burning, rashes, or lesions   LYMPH Nodes: No enlarged glands  ENDOCRINE: No heat or cold intolerance; No hair loss  MUSCULOSKELETAL: No joint pain or swelling; No muscle, back, or extremity pain  PSYCHIATRIC: No depression, anxiety, mood swings, or difficulty sleeping  HEME/LYMPH: No easy bruising, or bleeding gums  ALLERGY AND IMMUNOLOGIC: No hives or eczema	    [ ] All others negative	  [ ] Unable to obtain      T(C): 36.4 (01-01-19 @ 16:15), Max: 36.7 (12-31-18 @ 21:00)  T(F): 97.5 (01-01-19 @ 16:15), Max: 98 (12-31-18 @ 21:00)  HR: 101 (01-01-19 @ 17:15) (78 - 108)  BP: 100/44 (01-01-19 @ 16:00) (96/45 - 133/62)  RR: 19 (01-01-19 @ 17:15) (14 - 32)  SpO2: 95% (01-01-19 @ 17:15) (90% - 100%)  Wt(kg): --    I&O's Summary    31 Dec 2018 07:01  -  01 Jan 2019 07:00  --------------------------------------------------------  IN: 3183.4 mL / OUT: 47 mL / NET: 3136.4 mL    01 Jan 2019 07:01  -  01 Jan 2019 17:50  --------------------------------------------------------  IN: 135.3 mL / OUT: 10 mL / NET: 125.3 mL          PHYSICAL EXAM:  A X O x  HEAD:  Atraumatic, Normocephalic  EYES: EOMI, PERRLA, conjunctiva and sclera clear  NECK: Supple, No JVD, Normal thyroid  Resp: CTAB, No crackles, wheezing,   CVS: Regular rate and rhythm; No discernable murmurs, rubs, or gallops  ABD: Soft, Nontender, Nondistended; Bowel sounds present  EXTREMITIES:  2+ Peripheral Pulses, No edema  LYMPH: No dicernable lymphadenopathy noted  GENERAL: NAD, well-groomed, well-developed      LABS:                        9.8    19.9  )-----------( 61       ( 01 Jan 2019 04:51 )             31.9     01-01    139  |  94<L>  |  59<H>  ----------------------------<  141<H>  4.0   |  28  |  4.68<H>    Ca    8.1<L>      01 Jan 2019 13:56  Phos  5.2     01-01  Mg     1.9     01-01    TPro  5.6<L>  /  Alb  2.7<L>  /  TBili  5.7<H>  /  DBili  x   /  AST  860<H>  /  ALT  1086<H>  /  AlkPhos  116  01-01    PT/INR - ( 31 Dec 2018 00:46 )   PT: 21.1 sec;   INR: 1.86 ratio         PTT - ( 31 Dec 2018 00:46 )  PTT:29.2 sec    CAPILLARY BLOOD GLUCOSE      POCT Blood Glucose.: 115 mg/dL (01 Jan 2019 17:07)  POCT Blood Glucose.: 153 mg/dL (01 Jan 2019 13:30)  POCT Blood Glucose.: 157 mg/dL (01 Jan 2019 11:43)  POCT Blood Glucose.: 262 mg/dL (01 Jan 2019 06:06)  POCT Blood Glucose.: 254 mg/dL (01 Jan 2019 00:23)      Troponins:  ProBNP:  Lipid Profile:   HgA1c:  TSH:     ABG - ( 01 Jan 2019 04:38 )  pH, Arterial: 7.37  pH, Blood: x     /  pCO2: 46    /  pO2: 61    / HCO3: 26    / Base Excess: 1.0   /  SaO2: 88                    RADIOLOGY & ADDITIONAL TESTS:    Imaging Personally Reviewed:  [ ] YES  [ ] NO      Consultant(s) Notes Reviewed:  [x ] YES  [ ] NO    Care Discussed with Consultants/Other Providers [ x] YES  [ ] NO          PAST MEDICAL & SURGICAL HISTORY:  No pertinent past medical history  No significant past surgical history        Closed fracture of neck of left femur  Yes  No pertinent family history in first degree relatives  Handoff  MEWS Score  No pertinent past medical history  Closed fracture of left hip, initial encounter  Peripheral arterial disease  Drop in hemoglobin  Acute liver failure  Acute renal failure  Septic shock  Acute post-operative pain  Hypotension  Fever  Acute systolic congestive heart failure  Cardiogenic shock  Prophylactic measure  Diabetes mellitus  HTN (hypertension)  Heart failure with reduced ejection fraction  PSVT (paroxysmal supraventricular tachycardia)  Left ventricular dysfunction  Need for prophylactic measure  Closed fracture of left hip, initial encounter  No significant past surgical history  A S/P FALL

## 2019-01-01 NOTE — PROGRESS NOTE ADULT - SUBJECTIVE AND OBJECTIVE BOX
INTERVAL HPI/OVERNIGHT EVENTS: anuric       Antimicrobial:  linezolid  IVPB 600 milliGRAM(s) IV Intermittent every 12 hours  meropenem  IVPB 500 milliGRAM(s) IV Intermittent every 12 hours    Cardiovascular:  digoxin  Injectable 0.125 milliGRAM(s) IV Push every other day    Pulmonary:    Hematalogic:    Other:  ascorbic acid 500 milliGRAM(s) Oral two times a day  chlorhexidine 4% Liquid 1 Application(s) Topical two times a day  docusate sodium Liquid 100 milliGRAM(s) Oral two times a day  epoetin monique Injectable 00800 Unit(s) IV Push <User Schedule>  fentaNYL   Infusion. 0.5 MICROgram(s)/kG/Hr IV Continuous <Continuous>  folic acid 1 milliGRAM(s) Oral daily  insulin glargine Injectable (LANTUS) 15 Unit(s) SubCutaneous at bedtime  insulin lispro (HumaLOG) corrective regimen sliding scale   SubCutaneous every 6 hours  iron sucrose IVPB 200 milliGRAM(s) IV Intermittent every 24 hours  morphine  - Injectable 2 milliGRAM(s) IV Push every 4 hours PRN  ondansetron Injectable 4 milliGRAM(s) IV Push every 6 hours PRN  pantoprazole  Injectable 40 milliGRAM(s) IV Push daily  phytonadione   Solution 10 milliGRAM(s) Oral daily      Drug Dosing Weight  Height (cm): 154.94 (28 Dec 2018 01:00)  Weight (kg): 82.1 (28 Dec 2018 01:00)  BMI (kg/m2): 34.2 (28 Dec 2018 01:00)  BSA (m2): 1.81 (28 Dec 2018 01:00)    CENTRAL LINE: [s ] YES [ ] NO  LOCATION:   DATE INSERTED:    DE SOUZA: [ s] YES [ ] NO    DATE INSERTED:    A-LINE:  [ ] YES [ ] NO  LOCATION:   DATE INSERTED:    PMH/Social Hx/Fam Hx -reviewed admission note, no change since admission  PAST MEDICAL & SURGICAL HISTORY:  No pertinent past medical history  No significant past surgical history      T(C): 36.2 (01-01-19 @ 11:00), Max: 37.3 (12-31-18 @ 13:20)  HR: 95 (01-01-19 @ 12:29)  BP: 133/62 (01-01-19 @ 09:00)  BP(mean): 77 (01-01-19 @ 09:00)  ABP: 145/65 (01-01-19 @ 11:00)  ABP(mean): 90 (01-01-19 @ 11:00)  RR: 17 (01-01-19 @ 11:00)  SpO2: 94% (01-01-19 @ 12:29)  Wt(kg): --    ABG - ( 01 Jan 2019 04:38 )  pH, Arterial: 7.37  pH, Blood: x     /  pCO2: 46    /  pO2: 61    / HCO3: 26    / Base Excess: 1.0   /  SaO2: 88                    12-31 @ 07:01  -  01-01 @ 07:00  --------------------------------------------------------  IN: 3183.4 mL / OUT: 47 mL / NET: 3136.4 mL        Mode: AC/ CMV (Assist Control/ Continuous Mandatory Ventilation)  RR (machine): 16  TV (machine): 450  FiO2: 60  PEEP: 5  ITime: 1  MAP: 7.8  PIP: 23      PHYSICAL EXAM:    GENERAL: No signs of distress, comfortable  HEAD: Atraumatic, Normocephalic  EYES: EOMI, PERRLA  ENMT: No erythema, exudates, or enlargement, Moist mucous membranes +ETT  NECK: Supple, normal appearance, No JVD; [  ] central line (if applicable)  CHEST/LUNG: No chest deformity, fair bilateral air entry; No rales, rhonchi, wheezing; crackles  HEART: Regular rate and rhythm; No murmurs, rubs, or gallops;   ABDOMEN: Soft, Nontender, Nondistended; Bowel sounds present  EXTREMITIES:  + Peripheral Pulses, No clubbing, cyanosis, or edema  NERVOUS SYSTEM: comatose  LYMPH: No lymphadenopathy noted  SKIN: No rashes or lesions; good turgor, warm, dry      LABS:  CBC Full  -  ( 01 Jan 2019 04:51 )  WBC Count : 19.9 K/uL  Hemoglobin : 9.8 g/dL  Hematocrit : 31.9 %  Platelet Count - Automated : 61 K/uL  Mean Cell Volume : 66.5 fl  Mean Cell Hemoglobin : 20.5 pg  Mean Cell Hemoglobin Concentration : 30.8 gm/dL  Auto Neutrophil # : 16.6 K/uL  Auto Lymphocyte # : 2.0 K/uL  Auto Monocyte # : 1.3 K/uL  Auto Eosinophil # : 0.0 K/uL  Auto Basophil # : 0.1 K/uL  Auto Neutrophil % : 83.2 %  Auto Lymphocyte % : 9.9 %  Auto Monocyte % : 6.4 %  Auto Eosinophil % : 0.2 %  Auto Basophil % : 0.4 %    01-01    140  |  98  |  62<H>  ----------------------------<  203<H>  4.1   |  26  |  4.37<H>    Ca    8.2<L>      01 Jan 2019 04:51  Phos  5.2     01-01  Mg     1.9     01-01    TPro  5.6<L>  /  Alb  2.7<L>  /  TBili  5.7<H>  /  DBili  x   /  AST  860<H>  /  ALT  1086<H>  /  AlkPhos  116  01-01    PT/INR - ( 31 Dec 2018 00:46 )   PT: 21.1 sec;   INR: 1.86 ratio         PTT - ( 31 Dec 2018 00:46 )  PTT:29.2 sec    Culture Results:   No growth to date. (12-30 @ 14:42)  Culture Results:   No growth to date. (12-29 @ 21:43)      RADIOLOGY & ADDITIONAL STUDIES REVIEWED     CXR:< from: Xray Chest 1 View- PORTABLE-Routine (01.01.19 @ 08:50) >  EXAM:  XR CHEST PORTABLE ROUTINE 1V                            PROCEDURE DATE:  01/01/2019          INTERPRETATION:  AP chest on January 1, 2019 at 8:18 AM. Patient requires   intubation.    Heart enlargement, endotracheal tube, and left jugular line remain.    There is a mild to moderate right effusion showing increase from December 31. Central infiltrates again noted particularly on the left relatively   stable.    NG tube present on December 31 has been removed.    IMPRESSION: NG tube removed. Increasing right effusion. Grossly stable   central infiltrates.    < end of copied text >      IMPRESSION:  PAST MEDICAL & SURGICAL HISTORY:  No pertinent past medical history  No significant past surgical history   p/w     IMP: This is an 78 yr old woman with CAD, CHF with decrease EF,  and prior mentioned medical condition s/p fall with right hip fx. S/P Right hip IM nailing 12/27. Post op resp failure and cardiogenic shock  vs septic on pressors to maintain BP.  Pat is in MOSF.  Severe life threatening metabolic acidosis due to MARIA G, MOSF. Coagulopathy due to liver failure ( shock liver). Started on iv antibx . Pat is auto coagulopathic s/p ffp but inr is still elevated,hence no dialysis cath placement. acidosis is better with iv bicarb. Antibx changed by ID. Had fist session od dialysis.. incompete. LFT improving                Plan:      CNS: sedated    PULMONARY: continue vent support    CARDIAC: monitor    GI: NPO. monitor LFt    RENAL: bladder scan for urine volume since pat is anuric,, dialysis as per renal    SKIN: wound care    MUSCULOSKELETAL: monitor    ID: continue antibx as per id    HEME: monitor    DVT and GI Prophylaxis    GOALS OF CARE DISCUSSION WITH PATIENT/FAMILY/PROXY/ icu team    CRITICAL CARE TIME SPENT: 35 minutes

## 2019-01-01 NOTE — PROGRESS NOTE ADULT - PROBLEM SELECTOR PLAN 3
Elevated LFTs and INR--> gradually improving  Trend lactate  S/p NAC  S/p K-centra, oral Vit. K and 14 FFP

## 2019-01-01 NOTE — PROGRESS NOTE ADULT - PROBLEM SELECTOR PLAN 1
CXR: Bilateral basal pneumonia  Started Zyvox and meropenem  Blood Cx: NTD CXR: Bilateral basal pneumonia  Intubated and sedated  Started Zyvox and meropenem  Blood Cx: NTD  SBT today CXR: Bilateral basal pneumonia  Intubated and sedated  Started Zyvox and meropenem  Blood Cx: NTD  SBT today failed due to HTN and abdominal breathing

## 2019-01-01 NOTE — PROGRESS NOTE ADULT - ASSESSMENT
1.MARIA G due to ATN from hypotensive and spetic shock with multiorgan failure  -Scr still worsening and no urine output. HD done today  for about 2.5hrs with 1.0kg remove with acceptable bp. s/p cathflo, port working without issues and sufficient for dialysis. pt having clots during HD and discussed with ICU attending to use heparin during HD. Plan for HD tomorrow for fluid removal as requested by ICU.  -unable to obtain urine studies because anuric.   Adjust meds to eGFR and avoid IV Gadolinium contrast, NSAIDs, and phosphate enema.  -Monitor I/O's daily.   -Monitor SMA daily.  2. Acidosis due shock  -stable.  3. Hyperkalemia:   stable and continue conservative management. monitor K daily.   4. Multiorgan failure: plan as per MICU. pt has poor prognosis.  5. Anemia: h/h is stable; plan as per MICU.

## 2019-01-01 NOTE — PROGRESS NOTE ADULT - PROBLEM SELECTOR PLAN 2
Multiorgan failure with Anuria with severe lactic acidosis   Rt Femoral Ilia  D/c Bicarb drip  Will try Dialysis today after cath flow  Consulted Dr Rao

## 2019-01-01 NOTE — PROGRESS NOTE ADULT - ASSESSMENT
Coagulopathy  Seems to be slowly improving/stable  Recommend repeat Fibrinogen in AM    Anemia, multifactorial  Now on IV iron and EPO and seems to respond  May improve with dialysis    Will follow as needed    Nasir Gondal  [phone]

## 2019-01-01 NOTE — PROGRESS NOTE ADULT - SUBJECTIVE AND OBJECTIVE BOX
INTERVAL HPI/OVERNIGHT EVENTS: Lactate has been trending down without intervention. NG tube stopped working overnight. Bicarb drip has been stopped.     PRESSORS: [ ] YES [x] NO    ANTIBIOTICS: Zyvox                           DATE STARTED: 12/31/18  ANTIBIOTICS: Meropenem                 DATE STARTED: 12/31/18    Antimicrobial:  linezolid  IVPB 600 milliGRAM(s) IV Intermittent every 12 hours  meropenem  IVPB 500 milliGRAM(s) IV Intermittent every 12 hours    Cardiovascular:  digoxin  Injectable 0.125 milliGRAM(s) IV Push every other day    Other:  ascorbic acid 500 milliGRAM(s) Oral two times a day  chlorhexidine 4% Liquid 1 Application(s) Topical two times a day  cyanocobalamin Injectable 1000 MICROGram(s) SubCutaneous daily  docusate sodium Liquid 100 milliGRAM(s) Oral two times a day  epoetin monique Injectable 20685 Unit(s) IV Push <User Schedule>  fentaNYL   Infusion. 0.5 MICROgram(s)/kG/Hr IV Continuous <Continuous>  folic acid 1 milliGRAM(s) Oral daily  insulin glargine Injectable (LANTUS) 15 Unit(s) SubCutaneous at bedtime  insulin lispro (HumaLOG) corrective regimen sliding scale   SubCutaneous every 6 hours  iron sucrose IVPB 200 milliGRAM(s) IV Intermittent every 24 hours  morphine  - Injectable 2 milliGRAM(s) IV Push every 4 hours PRN  ondansetron Injectable 4 milliGRAM(s) IV Push every 6 hours PRN  pantoprazole  Injectable 40 milliGRAM(s) IV Push daily  phytonadione   Solution 10 milliGRAM(s) Oral daily    ascorbic acid 500 milliGRAM(s) Oral two times a day  chlorhexidine 4% Liquid 1 Application(s) Topical two times a day  cyanocobalamin Injectable 1000 MICROGram(s) SubCutaneous daily  digoxin  Injectable 0.125 milliGRAM(s) IV Push every other day  docusate sodium Liquid 100 milliGRAM(s) Oral two times a day  epoetin monique Injectable 23182 Unit(s) IV Push <User Schedule>  fentaNYL   Infusion. 0.5 MICROgram(s)/kG/Hr IV Continuous <Continuous>  folic acid 1 milliGRAM(s) Oral daily  insulin glargine Injectable (LANTUS) 15 Unit(s) SubCutaneous at bedtime  insulin lispro (HumaLOG) corrective regimen sliding scale   SubCutaneous every 6 hours  iron sucrose IVPB 200 milliGRAM(s) IV Intermittent every 24 hours  linezolid  IVPB 600 milliGRAM(s) IV Intermittent every 12 hours  meropenem  IVPB 500 milliGRAM(s) IV Intermittent every 12 hours  morphine  - Injectable 2 milliGRAM(s) IV Push every 4 hours PRN  ondansetron Injectable 4 milliGRAM(s) IV Push every 6 hours PRN  pantoprazole  Injectable 40 milliGRAM(s) IV Push daily  phytonadione   Solution 10 milliGRAM(s) Oral daily    Drug Dosing Weight  Height (cm): 154.94 (28 Dec 2018 01:00)  Weight (kg): 82.1 (28 Dec 2018 01:00)  BMI (kg/m2): 34.2 (28 Dec 2018 01:00)  BSA (m2): 1.81 (28 Dec 2018 01:00)    CENTRAL LINE: [x] YES [ ] NO  LOCATION:   DATE INSERTED:    DE SOUZA: [x] YES [ ] NO    DATE INSERTED:    A-LINE:  [x] YES [ ] NO  LOCATION:   DATE INSERTED:    ICU Vital Signs Last 24 Hrs  T(C): 36.6 (01 Jan 2019 06:00), Max: 37.3 (31 Dec 2018 13:20)  T(F): 97.8 (01 Jan 2019 06:00), Max: 99.1 (31 Dec 2018 13:20)  HR: 90 (01 Jan 2019 07:00) (78 - 102)  BP: 126/51 (01 Jan 2019 07:00) (94/50 - 126/51)  BP(mean): 65 (01 Jan 2019 07:00) (59 - 65)  ABP: 166/75 (01 Jan 2019 07:00) (99/51 - 187/84)  ABP(mean): 102 (01 Jan 2019 07:00) (65 - 118)  RR: 18 (01 Jan 2019 07:00) (14 - 32)  SpO2: 95% (01 Jan 2019 07:00) (90% - 100%)      ABG - ( 01 Jan 2019 04:38 )  pH, Arterial: 7.37  pH, Blood: x     /  pCO2: 46    /  pO2: 61    / HCO3: 26    / Base Excess: 1.0   /  SaO2: 88                    12-31 @ 07:01  -  01-01 @ 07:00  --------------------------------------------------------  IN: 3183.4 mL / OUT: 47 mL / NET: 3136.4 mL        Mode: AC/ CMV (Assist Control/ Continuous Mandatory Ventilation)  RR (machine): 16  TV (machine): 450  FiO2: 60  PEEP: 5  ITime: 1  MAP: 10  PIP: 29      PHYSICAL EXAM:    GENERAL: Well developed, Well nourished white female, sedated  HEENT:  Normocephalic/Atraumatic, reactive light reflex, moist mucous membranes  NECK: Supple, no JVD, LIJ line  RESP: Symmetric movement of the chest, clear to auscultation bilaterally, diffuse bilateral basal rales   CVS: Tachycardic, S1 and S2 audible, no murmur, rubs or gallops  GI: Normal active bowel sounds present, abdomen soft, non tender, non distended  EXTREMITIES:  No edema, no clubbing, cyanosis, Lt femoral A-line, Rt Shiley femoral    MSK: 0/5 strength bilateral upper and lower extremities, Left hip scar/band, Blue toes   PSYCH: Sedated  NEURO: Alert and oriented x 0      LABS:  CBC Full  -  ( 01 Jan 2019 04:51 )  WBC Count : 19.9 K/uL  Hemoglobin : 9.8 g/dL  Hematocrit : 31.9 %  Platelet Count - Automated : 61 K/uL  Mean Cell Volume : 66.5 fl  Mean Cell Hemoglobin : 20.5 pg  Mean Cell Hemoglobin Concentration : 30.8 gm/dL  Auto Neutrophil # : 16.6 K/uL  Auto Lymphocyte # : 2.0 K/uL  Auto Monocyte # : 1.3 K/uL  Auto Eosinophil # : 0.0 K/uL  Auto Basophil # : 0.1 K/uL  Auto Neutrophil % : 83.2 %  Auto Lymphocyte % : 9.9 %  Auto Monocyte % : 6.4 %  Auto Eosinophil % : 0.2 %  Auto Basophil % : 0.4 %    01-01    140  |  98  |  62<H>  ----------------------------<  203<H>  4.1   |  26  |  4.37<H>    Ca    8.2<L>      01 Jan 2019 04:51  Phos  5.2     01-01  Mg     1.9     01-01    TPro  5.6<L>  /  Alb  2.7<L>  /  TBili  5.7<H>  /  DBili  x   /  AST  860<H>  /  ALT  1086<H>  /  AlkPhos  116  01-01    PT/INR - ( 31 Dec 2018 00:46 )   PT: 21.1 sec;   INR: 1.86 ratio         PTT - ( 31 Dec 2018 00:46 )  PTT:29.2 sec    Culture Results:   No growth to date. (12-30 @ 14:42)  Culture Results:   No growth to date. (12-29 @ 21:43)        [ ]GOALS OF CARE DISCUSSION WITH PATIENT/FAMILY/PROXY: DNR only    CRITICAL CARE TIME SPENT: 35 minutes

## 2019-01-01 NOTE — PROGRESS NOTE ADULT - PROBLEM SELECTOR PLAN 3
Elevated LFTs and INR  S/p NAC  S/p K-centra, oral Vit. K and 14 FFP Elevated LFTs and INR--> gradually improving  S/p NAC  S/p K-centra, oral Vit. K and 14 FFP Elevated LFTs and INR--> gradually improving  Trend lactate  S/p NAC  S/p K-centra, oral Vit. K and 14 FFP

## 2019-01-01 NOTE — PROGRESS NOTE ADULT - SUBJECTIVE AND OBJECTIVE BOX
Patient was seen and examined at bedside.    CC: pt still non-verbal and has abd breathing present  still anuria.    Vital Signs Last 24 Hrs  T(C): 36.4 (01 Jan 2019 16:15), Max: 36.7 (31 Dec 2018 21:00)  T(F): 97.5 (01 Jan 2019 16:15), Max: 98 (31 Dec 2018 21:00)  HR: 97 (01 Jan 2019 16:15) (78 - 105)  BP: 100/44 (01 Jan 2019 16:00) (96/45 - 133/62)  BP(mean): 55 (01 Jan 2019 16:00) (55 - 77)  RR: 21 (01 Jan 2019 16:15) (14 - 32)  SpO2: 96% (01 Jan 2019 16:15) (90% - 100%)    12-31 @ 07:01 - 01-01 @ 07:00  --------------------------------------------------------  IN: 3183.4 mL / OUT: 47 mL / NET: 3136.4 mL    01-01 @ 07:01 - 01-01 @ 16:56  --------------------------------------------------------  IN: 135.3 mL / OUT: 10 mL / NET: 125.3 mL        PHYSICAL EXAM:  GENERAL: No acute respiratory distress.  HEAD:  Atraumatic, Normocephalic  EYES: conjunctiva and sclera clear  ENMT: Moist mucous membranes; ET  NECK: Supple, No JVD  NERVOUS SYSTEM:  spontaneously opens the eyes.   CHEST/LUNG: decreased bs.  HEART: S1S2+  ABDOMEN: Soft, Non-tender, Nondistended; Bowel sounds present  EXTREMITIES: no edema; cyanotic toes; rt femoral shiley  SKIN: No rashes; multiple bruises        MEDICATIONS:  ascorbic acid 500 milliGRAM(s) Oral two times a day  chlorhexidine 4% Liquid 1 Application(s) Topical two times a day  digoxin  Injectable 0.125 milliGRAM(s) IV Push every other day  docusate sodium Liquid 100 milliGRAM(s) Oral two times a day  epoetin monique Injectable 84842 Unit(s) IV Push <User Schedule>  fentaNYL   Infusion. 0.5 MICROgram(s)/kG/Hr IV Continuous <Continuous>  folic acid 1 milliGRAM(s) Oral daily  insulin glargine Injectable (LANTUS) 15 Unit(s) SubCutaneous at bedtime  insulin lispro (HumaLOG) corrective regimen sliding scale   SubCutaneous every 6 hours  iron sucrose IVPB 200 milliGRAM(s) IV Intermittent every 24 hours  linezolid  IVPB 600 milliGRAM(s) IV Intermittent every 12 hours  meropenem  IVPB 500 milliGRAM(s) IV Intermittent every 12 hours  morphine  - Injectable 2 milliGRAM(s) IV Push every 4 hours PRN  ondansetron Injectable 4 milliGRAM(s) IV Push every 6 hours PRN  pantoprazole  Injectable 40 milliGRAM(s) IV Push daily      LABS:                        9.8    19.9  )-----------( 61       ( 01 Jan 2019 04:51 )             31.9     01-01    139  |  94<L>  |  59<H>  ----------------------------<  141<H>  4.0   |  28  |  4.68<H>    Ca    8.1<L>      01 Jan 2019 13:56  Phos  5.2     01-01  Mg     1.9     01-01    TPro  5.6<L>  /  Alb  2.7<L>  /  TBili  5.7<H>  /  DBili  x   /  AST  860<H>  /  ALT  1086<H>  /  AlkPhos  116  01-01    PT/INR - ( 31 Dec 2018 00:46 )   PT: 21.1 sec;   INR: 1.86 ratio         PTT - ( 31 Dec 2018 00:46 )  PTT:29.2 sec    Magnesium, Serum: 1.9 mg/dL (01-01 @ 04:51)  Phosphorus Level, Serum: 5.2 mg/dL (01-01 @ 04:51)    Urine studies    PTH and Vit D:

## 2019-01-01 NOTE — PROGRESS NOTE ADULT - ASSESSMENT
77 y/o F pt with PMHX of  HTN, DM2, who presents to ED c/o L hip pain s/p trip and fall from a curb, SVT and severe lv dysfunction,post-op septic shock,MARIA G,LLL pneumonia,and shock liver.  1.ICU monitoring.  2.Vent  support as per iCU.  3.PSVT-dig .125mg qod, add low dose b blocker.  4.DM-Insulin.  5.Ortho f/u.  6.Replace vit b12.  7.ID eval, ABX.  8.MARIA G with metabolic acidosis-Renal f/u.  9.Shock liver-f/u lft's.  10.Consider transfusing PRBC with HD.  11.GI prophylaxis.  12.Coagulopathy-Heme f/u.

## 2019-01-01 NOTE — PROGRESS NOTE ADULT - PROBLEM SELECTOR PLAN 2
Multiorgan failure with Anuria with severe lactic acidosis   Rt Femoral Paulaley  D/c Bicarb drip  Will try Dialysis  Consulted Dr Rao Multiorgan failure with Anuria with severe lactic acidosis   Rt Femoral Ilia  D/c Bicarb drip  Will try Dialysis today after cath flow  Consulted Dr Rao

## 2019-01-01 NOTE — PROGRESS NOTE ADULT - PROBLEM SELECTOR PLAN 1
CXR: Bilateral basal pneumonia  Intubated and sedated  Started Zyvox and meropenem  Blood Cx: NTD  SBT today failed due to HTN and abdominal breathing

## 2019-01-01 NOTE — PROGRESS NOTE ADULT - SUBJECTIVE AND OBJECTIVE BOX
Patient is a 78y old  Female with coagulopathy  Now undergoing dialysis    No obvious bleeding  Remains intubated    MEDICATIONS  (STANDING):  ascorbic acid 500 milliGRAM(s) Oral two times a day  chlorhexidine 4% Liquid 1 Application(s) Topical two times a day  digoxin  Injectable 0.125 milliGRAM(s) IV Push every other day  docusate sodium Liquid 100 milliGRAM(s) Oral two times a day  epoetin monique Injectable 67646 Unit(s) IV Push <User Schedule>  fentaNYL   Infusion. 0.5 MICROgram(s)/kG/Hr (4.105 mL/Hr) IV Continuous <Continuous>  folic acid 1 milliGRAM(s) Oral daily  insulin glargine Injectable (LANTUS) 15 Unit(s) SubCutaneous at bedtime  insulin lispro (HumaLOG) corrective regimen sliding scale   SubCutaneous every 6 hours  iron sucrose IVPB 200 milliGRAM(s) IV Intermittent every 24 hours  linezolid  IVPB 600 milliGRAM(s) IV Intermittent every 12 hours  meropenem  IVPB 500 milliGRAM(s) IV Intermittent every 12 hours  pantoprazole  Injectable 40 milliGRAM(s) IV Push daily    MEDICATIONS  (PRN):  morphine  - Injectable 2 milliGRAM(s) IV Push every 4 hours PRN Severe Pain (7 - 10)  ondansetron Injectable 4 milliGRAM(s) IV Push every 6 hours PRN Nausea and/or Vomiting            Vital Signs Last 24 Hrs  T(C): 36.3 (01 Jan 2019 13:30), Max: 37 (31 Dec 2018 15:15)  T(F): 97.4 (01 Jan 2019 13:30), Max: 98.6 (31 Dec 2018 15:15)  HR: 89 (01 Jan 2019 13:30) (78 - 105)  BP: 96/45 (01 Jan 2019 13:30) (96/45 - 133/62)  BP(mean): 65 (01 Jan 2019 12:00) (65 - 77)  RR: 17 (01 Jan 2019 13:30) (14 - 32)  SpO2: 100% (01 Jan 2019 13:30) (90% - 100%)    PHYSICAL EXAM  General: adult intubated  rest deferred    LABS:                          9.8    19.9  )-----------( 61       ( 01 Jan 2019 04:51 )             31.9         Mean Cell Volume : 66.5 fl  Mean Cell Hemoglobin : 20.5 pg  Mean Cell Hemoglobin Concentration : 30.8 gm/dL  Auto Neutrophil # : 16.6 K/uL  Auto Lymphocyte # : 2.0 K/uL  Auto Monocyte # : 1.3 K/uL  Auto Eosinophil # : 0.0 K/uL  Auto Basophil # : 0.1 K/uL  Auto Neutrophil % : 83.2 %  Auto Lymphocyte % : 9.9 %  Auto Monocyte % : 6.4 %  Auto Eosinophil % : 0.2 %  Auto Basophil % : 0.4 %      Serial CBC's  01-01 @ 04:51  Hct-31.9 / Hgb-9.8 / Plat-61 / RBC-4.80 / WBC-19.9  Serial CBC's  12-31 @ 12:06  Hct-30.1 / Hgb-9.4 / Plat-77 / RBC-4.58 / WBC-26.1  Serial CBC's  12-31 @ 06:31  Hct-25.1 / Hgb-7.7 / Plat-48 / RBC-3.73 / WBC-13.8  Serial CBC's  12-30 @ 18:31  Hct-21.7 / Hgb-6.5 / Plat-55 / RBC-3.43 / WBC-12.1  Serial CBC's  12-30 @ 12:40  Hct-23.4 / Hgb-6.7 / Plat-64 / RBC-3.67 / WBC-13.6  Serial CBC's  12-30 @ 04:32  Hct-25.2 / Hgb-7.4 / Plat-71 / RBC-3.96 / WBC-15.8  Serial CBC's  12-29 @ 04:43  Hct-39.5 / Hgb-11.2 / Plat-187 / RBC-5.96 / WBC-26.4      01-01    139  |  94<L>  |  59<H>  ----------------------------<  141<H>  4.0   |  28  |  4.68<H>    Ca    8.1<L>      01 Jan 2019 13:56  Phos  5.2     01-01  Mg     1.9     01-01    TPro  5.6<L>  /  Alb  2.7<L>  /  TBili  5.7<H>  /  DBili  x   /  AST  860<H>  /  ALT  1086<H>  /  AlkPhos  116  01-01      PT/INR - ( 31 Dec 2018 00:46 )   PT: 21.1 sec;   INR: 1.86 ratio         PTT - ( 31 Dec 2018 00:46 )  PTT:29.2 sec    Ferritin, Serum: 19 ng/mL (12-27 @ 09:54)  Folate, Serum: 7.5 ng/mL (12-27 @ 09:54)  Vitamin B12, Serum: 211 pg/mL (12-27 @ 09:54)  Reticulocyte Percent: 1.5 % (12-27 @ 09:51)  Iron - Total Binding Capacity.: 363 ug/dL (12-27 @ 06:50)

## 2019-01-02 NOTE — PROGRESS NOTE ADULT - SUBJECTIVE AND OBJECTIVE BOX
Subjective: remains on the vent , sedated with multiple sedatives. off pressors. on HD . no fevers       PHYSICAL EXAM:    Vital Signs Last 24 Hrs  T(C): 35.8 (02 Jan 2019 15:57), Max: 37 (01 Jan 2019 20:17)  T(F): 96.5 (02 Jan 2019 15:57), Max: 98.6 (01 Jan 2019 20:17)  HR: 100 (02 Jan 2019 16:00) (76 - 114)  BP: 116/56 (02 Jan 2019 16:00) (92/43 - 155/58)  BP(mean): 70 (02 Jan 2019 16:00) (54 - 86)  RR: 20 (02 Jan 2019 16:00) (6 - 32)  SpO2: 92% (02 Jan 2019 16:00) (92% - 100%)    Constitutional: sedated on vent nad   pupils sluggish  SCLERA anicteric neck supple  LUNGS mech br sounds  CVS s1 s2 aud no murmurs   ABDOMEN soft non tender obese   NEUROLOGY  flaccid   SKIN no rash   EXTREMITIES no edema no cyanosis /left hip wound clean /right groin dialysis cath   weber with scant marisa urine         LABS/DIAGNOSTIC TESTS                        10.7   21.6  )-----------( 58       ( 02 Jan 2019 04:17 )             34.4     01-02    141  |  99  |  63<H>  ----------------------------<  121<H>  4.0   |  25  |  4.53<H>    Ca    8.4      02 Jan 2019 04:23  Phos  5.6     01-02  Mg     1.9     01-02    TPro  5.4<L>  /  Alb  2.4<L>  /  TBili  9.1<H>  /  DBili  x   /  AST  336<H>  /  ALT  658<H>  /  AlkPhos  128<H>  01-02    PT/INR - ( 02 Jan 2019 14:20 )   PT: 20.0 sec;   INR: 1.77 ratio         PTT - ( 02 Jan 2019 14:20 )  PTT:34.9 sec      meds   ascorbic acid 500 milliGRAM(s) Oral two times a day  chlorhexidine 4% Liquid 1 Application(s) Topical two times a day  digoxin  Injectable 0.125 milliGRAM(s) IV Push every other day  docusate sodium Liquid 100 milliGRAM(s) Oral two times a day  epoetin monique Injectable 21434 Unit(s) IV Push <User Schedule>  fentaNYL   Infusion. 1 MICROgram(s)/kG/Hr IV Continuous <Continuous>  folic acid 1 milliGRAM(s) Oral daily  HYDROmorphone  Injectable 2 milliGRAM(s) IV Push every 4 hours PRN  insulin glargine Injectable (LANTUS) 15 Unit(s) SubCutaneous at bedtime  insulin lispro (HumaLOG) corrective regimen sliding scale   SubCutaneous every 6 hours  iron sucrose IVPB 200 milliGRAM(s) IV Intermittent every 24 hours  linezolid  IVPB 600 milliGRAM(s) IV Intermittent every 12 hours  meropenem  IVPB 500 milliGRAM(s) IV Intermittent every 12 hours  ondansetron Injectable 4 milliGRAM(s) IV Push every 6 hours PRN  pantoprazole  Injectable 40 milliGRAM(s) IV Push daily  propofol Infusion 5 MICROgram(s)/kG/Min IV Continuous <Continuous>        CULTURES  Culture Results: blood  No growth to date. (12-30 @ 14:42)  Culture Results: blood  No growth to date. (12-29 @ 21:43)        RADIOLOGY  < from: US Hepatic & Pancreatic (01.02.19 @ 11:15) >  EXAM:  US LIVER AND PANCREAS                            PROCEDURE DATE:  01/02/2019          INTERPRETATION:  CLINICAL STATEMENT: Rule out gallstone..  Abdominal pain.    TECHNIQUE: Ultrasound of the right upper quadrant.    COMPARISON: None.    FINDINGS:  Liver measures 16.9 cm with normal echogenicity.    There is no gallstone or gallbladder wall thickening. 5 mm gallbladder   polyp noted    The common bile duct is not dilated measuring 4 mm.    The pancreas is not well seen.    The right kidney measures 10.8 cm in length.  There is no hydronephrosis.    IMPRESSION:  5 mm gallbladder polyp. Follow-up recommended.    < end of copied text >          < from: Xray Chest 1 View- PORTABLE-Routine (01.02.19 @ 07:50) >    EXAM:  XR CHEST PORTABLE ROUTINE 1V                            PROCEDURE DATE:  01/02/2019          INTERPRETATION:  CLINICAL STATEMENT: Follow-up chest pain.    TECHNIQUE: AP view of the chest.    COMPARISON: 1/1/2019    FINDINGS/  IMPRESSION:  ETtube again noted. Left central line again noted. No pneumothorax. New   feeding tube noted tip under the diaphragm overlying expected region of   stomach.    Increased interstitial lung markings without significant change. Moderate   size right pleural effusion with adjacent opacity without significant   change    Heart size cannot be accurately assessed in this projection.                  DEO CASTREJON M.D., ATTENDING RADIOLOGIST  This document has been electronically signed. Jan 2 2019  8:35AM       < end of copied text >

## 2019-01-02 NOTE — CHART NOTE - NSCHARTNOTEFT_GEN_A_CORE
Assessment:   Patient is a 79y old  Female who presents with a chief complaint of fall (2019 16:15) Pt remains NPO, intubated. Multiorgan failure/ poor prognosis.   Discussed with PGY 2 and RN.    Factors impacting intake: [ ] none [ ] nausea  [ ] vomiting [ ] diarrhea [ ] constipation  [ ]chewing problems [ ] swallowing issues  [ ] other:     Diet Presciption: Diet, NPO (18 @ 18:46)    Intake:     Daily Height in cm: 154.94 (28 Dec 2018 01:00)  Height in cm: 154.94 (27 Dec 2018 19:00)  Height in cm: 154.94 (26 Dec 2018 12:22)      Daily Weight in k.6 (2019 08:00)  Weight in k.5 (31 Dec 2018 15:15)  Weight in k.6 (31 Dec 2018 13:20)  Weight in k.2 (30 Dec 2018 07:00)  Weight in k.3 (29 Dec 2018 09:33)  Weight in k.3 (29 Dec 2018 09:33)  Weight in k.5 (29 Dec 2018 08:00)  Weight in k.4 (28 Dec 2018 07:00)  Weight in k.9 (27 Dec 2018 05:29)    % Weight Change    Pertinent Medications: MEDICATIONS  (STANDING):  ascorbic acid 500 milliGRAM(s) Oral two times a day  chlorhexidine 4% Liquid 1 Application(s) Topical two times a day  digoxin  Injectable 0.125 milliGRAM(s) IV Push every other day  docusate sodium Liquid 100 milliGRAM(s) Oral two times a day  epoetin monique Injectable 21050 Unit(s) IV Push <User Schedule>  fentaNYL   Infusion. 1 MICROgram(s)/kG/Hr (8.21 mL/Hr) IV Continuous <Continuous>  folic acid 1 milliGRAM(s) Oral daily  insulin glargine Injectable (LANTUS) 15 Unit(s) SubCutaneous at bedtime  insulin lispro (HumaLOG) corrective regimen sliding scale   SubCutaneous every 6 hours  iron sucrose IVPB 200 milliGRAM(s) IV Intermittent every 24 hours  linezolid  IVPB 600 milliGRAM(s) IV Intermittent every 12 hours  meropenem  IVPB 500 milliGRAM(s) IV Intermittent every 12 hours  pantoprazole  Injectable 40 milliGRAM(s) IV Push daily  propofol Infusion 5 MICROgram(s)/kG/Min (2.463 mL/Hr) IV Continuous <Continuous>    MEDICATIONS  (PRN):  HYDROmorphone  Injectable 2 milliGRAM(s) IV Push every 4 hours PRN Severe Pain (7 - 10)  ondansetron Injectable 4 milliGRAM(s) IV Push every 6 hours PRN Nausea and/or Vomiting    Pertinent Labs:  Na141 mmol/L Glu 121 mg/dL<H> K+ 4.0 mmol/L Cr  4.53 mg/dL<H> BUN 63 mg/dL<H>  Phos 5.6 mg/dL<H>  Alb 2.4 g/dL<L>  [id number] 6.1 %<H>  Chol --    LDL --    HDL --    Trig 165 mg/dL<H>     CAPILLARY BLOOD GLUCOSE      POCT Blood Glucose.: 78 mg/dL (2019 12:15)  POCT Blood Glucose.: 124 mg/dL (2019 04:42)  POCT Blood Glucose.: 119 mg/dL (2019 22:35)  POCT Blood Glucose.: 77 mg/dL (2019 22:34)    Skin:     Estimated Needs:   [ ] no change since previous assessment  [ ] recalculated:       Previous Nutrition Diagnosis:   [ ] Altered GI function  [x ]Inadequate Oral Intake [ ] Swallowing Difficulty   [ ] Altered nutrition related labs [ ] Increased Nutrient Needs [ ] Overweight/Obesity   [ ] Unintended Weight Loss [ ] Food & Nutrition Related Knowledge Deficit [ ] Malnutrition   [ ] Other:     Nutrition Diagnosis is [x ] ongoing  [ ] resolved [ ] not applicable     New Nutrition Diagnosis: [ ] not applicable       Interventions:   Recommend  [ ] Change Diet To:  [ ] Nutrition Supplement  [x ] Nutrition Support; Consider alternate nutrition if medically feasible and if consistent with goals of care   [x ] Other: MD to monitor. RD available.     Monitoring and Evaluation:   [ ] PO intake [ x ] Tolerance to diet prescription [ x ] weights [ x ] labs[ x ] follow up per protocol  [ ] other:

## 2019-01-02 NOTE — PROGRESS NOTE ADULT - SUBJECTIVE AND OBJECTIVE BOX
79yFemale    Diagnosis:  S/p Left Hip IM Nailing POD#6    Patient was seen and evaluated at bedside in ICU.    Patient is still intubated and sedated.    Vital Signs Last 24 Hrs  T(C): 36.2 (02 Jan 2019 05:00), Max: 37 (01 Jan 2019 20:17)  T(F): 97.2 (02 Jan 2019 05:00), Max: 98.6 (01 Jan 2019 20:17)  HR: 109 (02 Jan 2019 07:21) (82 - 114)  BP: 136/55 (02 Jan 2019 06:00) (96/45 - 144/70)  BP(mean): 73 (02 Jan 2019 06:00) (55 - 86)  RR: 18 (02 Jan 2019 06:00) (6 - 32)  SpO2: 100% (02 Jan 2019 07:21) (91% - 100%)  I&O's Detail    01 Jan 2019 07:01  -  02 Jan 2019 07:00  --------------------------------------------------------  IN:    fentaNYL Infusion.: 479.7 mL    Solution: 350 mL  Total IN: 829.7 mL    OUT:    Indwelling Catheter - Urethral: 15 mL    Stool: 1 mL  Total OUT: 16 mL    Total NET: 813.7 mL    Physical Exam:  General: NAD, resting in bed  Left Hip:  In Nl alignment. Dressing is C/D/I. Skin is pink and warm. Staples intact. No erythema.                          10.7   21.6  )-----------( 58       ( 02 Jan 2019 04:17 )             34.4     01-02    141  |  99  |  63<H>  ----------------------------<  121<H>  4.0   |  25  |  4.53<H>    Ca    8.4      02 Jan 2019 04:23  Phos  5.6     01-02  Mg     1.9     01-02    TPro  5.4<L>  /  Alb  2.4<L>  /  TBili  9.1<H>  /  DBili  x   /  AST  336<H>  /  ALT  658<H>  /  AlkPhos  128<H>  01-02      Impression:  79yFemale S/p Left Hip IM Nailing POD#6  Plan:  -  Pain management  -  DVT prophylaxis with venodynes  -  Daily Physical Therapy  WBAT of the left lower extremity  -  Plan as per ICU team  -  Case d/w DR. Bruce

## 2019-01-02 NOTE — PROGRESS NOTE ADULT - ASSESSMENT
77 y/o F pt with HTN, DM2, PMHx presents to ED c/o L hip pain s/p trip and fall from a curb today. Pt describes pain as sharp, mild and constant, 10/10 in severity. Pain of the left hip increases with ROM. Pt unable to ambulate nor bear weight. Patient denies head strike, LOC, N/V, vision changes, neurological deficits chest pain, palpitations, shortness of breath, or any other complaints. XR hip showed nondisplaced intertrochanteric fracture left femoral neck and marked osteoporosis. XR hip showed nondisplaced intertrochanteric fracture left femoral neck and marked osteoporosis. (26 Dec 2018 19:35)s/p nailing 12/27 with post op hypotension , septic vs cardiogenic shock, also found to have shock liver . on pressors and sedation. on vanco and zosyn. worsening kidney function with worsening ph and metabolic acidosis    # s/p fall with subsequent left hip fx s/p im nailing 12/27 with post op resp failure and MOSF including shock liver /lft decreasing     # resp failure sec to asp pna     # S/P septic/cardiogenic shock with MOSF . echo with very low LVEF     plan  blood cx times 2 f/u ( neg )   no need for vanco cont zyvox and merrem   dialysis as per renal     poor prognosis   pt is dnr   thnx will f/u   d/w family and intern

## 2019-01-02 NOTE — CONSULT NOTE ADULT - ATTENDING COMMENTS
patient examined, imaging reviewed.  agree with PA A/P of left IT fx.  long discussion with paitent and family regarding risks and benefits of surgery espeically in the setting of reduced EF.  They agree that the risks of prolonged bedrest outweigh the benefit of non-op management.  we willl proceed with ORIF L hip
pt was seen and evaluated by myself
79 y/o F pt with HTN, DM2, PMHx presents to ED c/o L hip pain s/p trip and fall. Patient admitted to medicine floor for left hip fracture.    Now s/p OR today for left IM nailing. Patient brought to ICU for post op monitoring and respiratory insufficiency remaining intubated post op for high fio2 requirements   Will obtain CXR and post op labs   Supportive transfusions as needed   NPO   Gentle IVF hydration given cardiac history   Pain control   Gentle sedation keep RASS 0 to -1   DVT and stress ulcer prophylaxis   HoB at 30, Fall and aspiration precautions   Glycemic control keep FSBS 140 -180   Keep normothermic

## 2019-01-02 NOTE — PROGRESS NOTE ADULT - SUBJECTIVE AND OBJECTIVE BOX
INTERVAL HPI/OVERNIGHT EVENTS:       Antimicrobial:  linezolid  IVPB 600 milliGRAM(s) IV Intermittent every 12 hours  meropenem  IVPB 500 milliGRAM(s) IV Intermittent every 12 hours    Cardiovascular:  digoxin  Injectable 0.125 milliGRAM(s) IV Push every other day    Pulmonary:    Hematalogic:    Other:  ascorbic acid 500 milliGRAM(s) Oral two times a day  chlorhexidine 4% Liquid 1 Application(s) Topical two times a day  docusate sodium Liquid 100 milliGRAM(s) Oral two times a day  epoetin monique Injectable 13266 Unit(s) IV Push <User Schedule>  fentaNYL   Infusion. 1 MICROgram(s)/kG/Hr IV Continuous <Continuous>  folic acid 1 milliGRAM(s) Oral daily  HYDROmorphone  Injectable 2 milliGRAM(s) IV Push every 4 hours PRN  insulin glargine Injectable (LANTUS) 15 Unit(s) SubCutaneous at bedtime  insulin lispro (HumaLOG) corrective regimen sliding scale   SubCutaneous every 6 hours  iron sucrose IVPB 200 milliGRAM(s) IV Intermittent every 24 hours  ondansetron Injectable 4 milliGRAM(s) IV Push every 6 hours PRN  pantoprazole  Injectable 40 milliGRAM(s) IV Push daily  propofol Infusion 5 MICROgram(s)/kG/Min IV Continuous <Continuous>      Drug Dosing Weight  Height (cm): 154.94 (28 Dec 2018 01:00)  Weight (kg): 82.1 (28 Dec 2018 01:00)  BMI (kg/m2): 34.2 (28 Dec 2018 01:00)  BSA (m2): 1.81 (28 Dec 2018 01:00)    CENTRAL LINE: [x ] YES [ ] NO  LOCATION:   DATE INSERTED:    DE SOUZA: [x ] YES [ ] NO    DATE INSERTED:    A-LINE:  [ ] YES [x ] NO  LOCATION:   DATE INSERTED:    PMH/Social Hx/Fam Hx -reviewed admission note, no change since admission  PAST MEDICAL & SURGICAL HISTORY:  No pertinent past medical history  No significant past surgical history      T(C): 36.2 (01-02-19 @ 12:00), Max: 37 (01-01-19 @ 20:17)  HR: 86 (01-02-19 @ 14:00)  BP: 104/45 (01-02-19 @ 13:23)  BP(mean): 57 (01-02-19 @ 13:23)  ABP: 97/43 (01-02-19 @ 14:00)  ABP(mean): 59 (01-02-19 @ 14:00)  RR: 16 (01-02-19 @ 14:00)  SpO2: 100% (01-02-19 @ 14:00)  Wt(kg): --    ABG - ( 02 Jan 2019 04:59 )  pH, Arterial: 7.38  pH, Blood: x     /  pCO2: 43    /  pO2: 91    / HCO3: 24    / Base Excess: -0.2  /  SaO2: 96                    01-01 @ 07:01  -  01-02 @ 07:00  --------------------------------------------------------  IN: 829.7 mL / OUT: 16 mL / NET: 813.7 mL        Mode: AC/ CMV (Assist Control/ Continuous Mandatory Ventilation)  RR (machine): 16  TV (machine): 450  FiO2: 60  PEEP: 5  ITime: 1  MAP: 12  PIP: 24      PHYSICAL EXAM:    GENERAL: No signs of distress, comfortable  HEAD: Atraumatic, Normocephalic  EYES: EOMI, PERRLA  ENMT: No erythema, exudates, or enlargement, Moist mucous membranes + ETT  NECK: Supple, normal appearance, No JVD; [  ] central line (if applicable)  CHEST/LUNG: No chest deformity, fair bilateral air entry; No rales, rhonchi, wheezing; crackles  HEART: Regular rate and rhythm; No murmurs, rubs, or gallops;   ABDOMEN: Soft, Nontender, Nondistended; Bowel sounds present  EXTREMITIES:  + Peripheral Pulses, cyanotic toes and fingers  NERVOUS SYSTEM: sedated  LYMPH: No lymphadenopathy noted  SKIN: No rashes or lesions; good turgor, warm, dry      LABS:  CBC Full  -  ( 02 Jan 2019 04:17 )  WBC Count : 21.6 K/uL  Hemoglobin : 10.7 g/dL  Hematocrit : 34.4 %  Platelet Count - Automated : 58 K/uL  Mean Cell Volume : 66.0 fl  Mean Cell Hemoglobin : 20.5 pg  Mean Cell Hemoglobin Concentration : 31.0 gm/dL  Auto Neutrophil # : x  Auto Lymphocyte # : x  Auto Monocyte # : x  Auto Eosinophil # : x  Auto Basophil # : x  Auto Neutrophil % : 85.0 %  Auto Lymphocyte % : 12.0 %  Auto Monocyte % : 2.0 %  Auto Eosinophil % : 1.0 %  Auto Basophil % : x    01-02    141  |  99  |  63<H>  ----------------------------<  121<H>  4.0   |  25  |  4.53<H>    Ca    8.4      02 Jan 2019 04:23  Phos  5.6     01-02  Mg     1.9     01-02    TPro  5.4<L>  /  Alb  2.4<L>  /  TBili  9.1<H>  /  DBili  x   /  AST  336<H>  /  ALT  658<H>  /  AlkPhos  128<H>  01-02        Culture Results:   No growth to date. (12-30 @ 14:42)      RADIOLOGY & ADDITIONAL STUDIES REVIEWED     CXR:< from: Xray Chest 1 View- PORTABLE-Routine (01.02.19 @ 07:50) >    EXAM:  XR CHEST PORTABLE ROUTINE 1V                            PROCEDURE DATE:  01/02/2019          INTERPRETATION:  CLINICAL STATEMENT: Follow-up chest pain.    TECHNIQUE: AP view of the chest.    COMPARISON: 1/1/2019    FINDINGS/  IMPRESSION:  ETtube again noted. Left central line again noted. No pneumothorax. New   feeding tube noted tip under the diaphragm overlying expected region of   stomach.    Increased interstitial lung markings without significant change. Moderate   size right pleural effusion with adjacent opacity without significant   change    Heart size cannot be accurately assessed in this projection.                  DEO CASTREJON M.D., ATTENDING RADIOLOGIST  This document has been electronically signed. Jan 2 2019  8:35AM               < end of copied text >      HIDA SCAN:< from: US Hepatic & Pancreatic (01.02.19 @ 11:15) >    EXAM:  US LIVER AND PANCREAS                            PROCEDURE DATE:  01/02/2019          INTERPRETATION:  CLINICAL STATEMENT: Rule out gallstone..  Abdominal pain.    TECHNIQUE: Ultrasound of the right upper quadrant.    COMPARISON: None.    FINDINGS:  Liver measures 16.9 cm with normal echogenicity.    There is no gallstone or gallbladder wall thickening. 5 mm gallbladder   polyp noted    The common bile duct is not dilated measuring 4 mm.    The pancreas is not well seen.    The right kidney measures 10.8 cm in length.  There is no hydronephrosis.    IMPRESSION:  5 mm gallbladder polyp. Follow-up recommended.                  DEO CASTREJON M.D., ATTENDING RADIOLOGIST  This document has been electronically signed. Jan 2 2019 11:52AM                < end of copied text >              IMPRESSION:  PAST MEDICAL & SURGICAL HISTORY:  No pertinent past medical history  No significant past surgical history   p/w       IMP: This is an 78 yr old woman with CAD, CHF with decrease EF,  and prior mentioned medical condition s/p fall with right hip fx. S/P Right hip IM nailing 12/27. Post op resp failure and cardiogenic shock  vs septic on pressors to maintain BP.  Pat is in MOSF.  Severe life threatening metabolic acidosis due to MARIA G, MOSF. Coagulopathy due to liver failure ( shock liver). Started on iv antibx . Pat is auto coagulopathic s/p ffp but inr is still elevated,hence no dialysis cath placement. acidosis is better with iv bicarb. Antibx changed by ID. Had fist session od dialysis.. incompete. LFT improving. Cyanotic toes and fingers due to low flow state and pressors ( now off presssors).  Started on HD , had first complete session 1/1. Will repeat dialysis as per renal 1/3. Anuric now              Plan:      CNS: sedate    PULMONARY: continue vent support. will need for dialysis before SBT    CARDIAC: monitor    GI: NPO    RENAL: trial of lasix 120 mg ivp x 1, dialysis as per renal    SKIN: cound care    MUSCULOSKELETAL:    ID: antibx    HEME: monitor    DVT and GI Prophylaxis    GOALS OF CARE DISCUSSION WITH PATIENT/FAMILY/PROXY/ icu team on rounds    CRITICAL CARE TIME SPENT: 35 minutes

## 2019-01-02 NOTE — PROGRESS NOTE ADULT - SUBJECTIVE AND OBJECTIVE BOX
INTERVAL HPI/OVERNIGHT EVENTS:    Pt seen and examined at bedside. No acute events overnight.     Vital Signs Last 24 Hrs  T(C): 36.2 (02 Jan 2019 05:00), Max: 37 (01 Jan 2019 20:17)  T(F): 97.2 (02 Jan 2019 05:00), Max: 98.6 (01 Jan 2019 20:17)  HR: 109 (02 Jan 2019 07:21) (83 - 114)  BP: 136/55 (02 Jan 2019 06:00) (96/45 - 144/70)  BP(mean): 73 (02 Jan 2019 06:00) (55 - 86)  RR: 18 (02 Jan 2019 06:00) (6 - 32)  SpO2: 100% (02 Jan 2019 07:21) (92% - 100%)  I&O's Detail    01 Jan 2019 07:01  -  02 Jan 2019 07:00  --------------------------------------------------------  IN:    fentaNYL Infusion.: 479.7 mL    Solution: 350 mL  Total IN: 829.7 mL    OUT:    Indwelling Catheter - Urethral: 15 mL    Stool: 1 mL  Total OUT: 16 mL    Total NET: 813.7 mL        docusate sodium Liquid 100 milliGRAM(s) Oral two times a day  linezolid  IVPB 600 milliGRAM(s) IV Intermittent every 12 hours  meropenem  IVPB 500 milliGRAM(s) IV Intermittent every 12 hours  pantoprazole  Injectable 40 milliGRAM(s) IV Push daily      Physical Exam  General: Intubated, Sedated  Abdomen: Obese, soft, NT  Extremities: left femoral A-line in left groin, Cyanosis and coolness of distal toes b/l LE, worst on 1st and 2nd toes b/l LE, Left greater then Right. Right groin HD catheter in place (placed by ICU team)  Vascular:  Palpable DP pulse    Labs:                        10.7   21.6  )-----------( 58       ( 02 Jan 2019 04:17 )             34.4     01-02    141  |  99  |  63<H>  ----------------------------<  121<H>  4.0   |  25  |  4.53<H>    Ca    8.4      02 Jan 2019 04:23  Phos  5.6     01-02  Mg     1.9     01-02    TPro  5.4<L>  /  Alb  2.4<L>  /  TBili  9.1<H>  /  DBili  x   /  AST  336<H>  /  ALT  658<H>  /  AlkPhos  128<H>  01-02

## 2019-01-02 NOTE — PROGRESS NOTE ADULT - ASSESSMENT
1.MARIA G due to ATN from hypotensive and spetic shock with multiorgan failure  -Scr still worsening and no urine output. Last HD completed yesterday for about 2.5hrs with 1.0kg remove with acceptable bp. s/p cathflo, port working without issues and sufficient for dialysis. pt having clots during HD and discussed with ICU attending to use heparin during HD. Plan for HD tomorrow for fluid removal as requested by ICU.  -unable to obtain urine studies because anuric.   Adjust meds to eGFR and avoid IV Gadolinium contrast, NSAIDs, and phosphate enema.  -Monitor I/O's daily.   -Monitor SMA daily.  2. Acidosis due shock  -stable.  3. Hyperkalemia:   stable and continue conservative management. monitor K daily.   4. Multiorgan failure: plan as per MICU. pt has poor prognosis.  5. Anemia: h/h is stable; plan as per MICU. 1.MARIA G due to ATN from hypotensive and spetic shock with multiorgan failure  -Scr slight improved and no urine output.   -Last HD completed yesterday for about 2.5hrs with 1.0kg remove with acceptable bp. s/p cathflo, port working without issues and sufficient for dialysis.   -unable to obtain urine studies because anuric.   -Adjust meds to eGFR and avoid IV Gadolinium contrast, NSAIDs, and phosphate enema.  -Monitor I/O's daily.   -Monitor SMA daily.  2. Acidosis due shock  -stable.  3. Hyperkalemia:   stable and continue conservative management. monitor K daily.   4. Multiorgan failure: plan as per MICU. pt has poor prognosis.  5. Anemia: h/h is stable; plan as per MICU. 1.MARIA G due to ATN from hypotensive and septic  shock with multiorgan failure  -pts renal status is poor with minimal u/o  -Last HD completed yesterday for about 2.5hrs with 1.0kg remove with acceptable bp. s/p cathflo, port working without issues and sufficient for dialysis.   -unable to obtain urine studies because anuric.   -Adjust meds to eGFR and avoid IV Gadolinium contrast, NSAIDs, and phosphate enema.  -Monitor I/O's daily.   -Monitor SMA daily.  -we will plan for hd tomorrow  2. Acidosis ;secondary to shcok/renal failure, now improved  -f/u co2 daily  -keep PH >7.3  3. Hyperkalemia: secondary to maria g, now improved   -keep k >4 and <5  -f/u k level daily  -add low k diet  4. Multiorgan failure: plan as per MICU. pt has poor prognosis.  5. Anemia:multifactorial  -add epogen in hd tiw  -f/u Hb daily  6.Hyperphosphatemia:mild, secondary to maria g  -f/u phos level daily  -continue hd

## 2019-01-02 NOTE — PROGRESS NOTE ADULT - PROBLEM SELECTOR PLAN 5
Blue toes and hands  Anticoagulation as per Vascular   F/u Arterial doppler when stable  Consulted Vascular Surgery

## 2019-01-02 NOTE — PROGRESS NOTE ADULT - PROBLEM SELECTOR PLAN 5
Blue toes and hands  Unable to anticoagulate due to anemia  F/u Arterial doppler when stable  Consulted Vascular Surgery Blue toes and hands  Anticoagulation as per Vascular   F/u Arterial doppler when stable  Consulted Vascular Surgery

## 2019-01-02 NOTE — PROGRESS NOTE ADULT - SUBJECTIVE AND OBJECTIVE BOX
CHIEF COMPLAINT:Patient is a 79y old  Female who presents with a chief complaint of fall. Pt remains intubated.    	  REVIEW OF SYSTEMS:  [x ] Unable to obtain    PHYSICAL EXAM:  T(C): 36.4 (01-02-19 @ 08:00), Max: 37 (01-01-19 @ 20:17)  HR: 105 (01-02-19 @ 10:00) (83 - 114)  BP: 155/58 (01-02-19 @ 08:00) (96/45 - 155/58)  RR: 17 (01-02-19 @ 10:00) (6 - 32)  SpO2: 94% (01-02-19 @ 10:00) (92% - 100%)  Wt(kg): --  I&O's Summary    01 Jan 2019 07:01  -  02 Jan 2019 07:00  --------------------------------------------------------  IN: 829.7 mL / OUT: 16 mL / NET: 813.7 mL    02 Jan 2019 07:01  -  02 Jan 2019 11:20  --------------------------------------------------------  IN: 118.2 mL / OUT: 5 mL / NET: 113.2 mL        Appearance: Normal	  HEENT:   Normal oral mucosa, PERRL, EOMI	  Lymphatic: No lymphadenopathy  Cardiovascular: Normal S1 S2, No JVD, No murmurs, No edema  Respiratory:B/L ronxhi  Psychiatry: A & O x 3, Mood & affect appropriate  Gastrointestinal:  Soft, Non-tender  Skin: No rashes, No ecchymoses, No cyanosis	  Neurologic: Non-focal  Extremities: Normal range of motion, No clubbing, cyanosis or edema      MEDICATIONS  (STANDING):  ascorbic acid 500 milliGRAM(s) Oral two times a day  chlorhexidine 4% Liquid 1 Application(s) Topical two times a day  digoxin  Injectable 0.125 milliGRAM(s) IV Push every other day  docusate sodium Liquid 100 milliGRAM(s) Oral two times a day  epoetin monique Injectable 61062 Unit(s) IV Push <User Schedule>  fentaNYL   Infusion. 0.5 MICROgram(s)/kG/Hr (4.105 mL/Hr) IV Continuous <Continuous>  folic acid 1 milliGRAM(s) Oral daily  insulin glargine Injectable (LANTUS) 15 Unit(s) SubCutaneous at bedtime  insulin lispro (HumaLOG) corrective regimen sliding scale   SubCutaneous every 6 hours  iron sucrose IVPB 200 milliGRAM(s) IV Intermittent every 24 hours  linezolid  IVPB 600 milliGRAM(s) IV Intermittent every 12 hours  meropenem  IVPB 500 milliGRAM(s) IV Intermittent every 12 hours  pantoprazole  Injectable 40 milliGRAM(s) IV Push daily  propofol Infusion 5 MICROgram(s)/kG/Min (2.463 mL/Hr) IV Continuous <Continuous>      TELEMETRY: 	  sr,sinus tach,lbbb    	  LABS:	 	                        10.7   21.6  )-----------( 58       ( 02 Jan 2019 04:17 )             34.4     01-02    141  |  99  |  63<H>  ----------------------------<  121<H>  4.0   |  25  |  4.53<H>    Ca    8.4      02 Jan 2019 04:23  Phos  5.6     01-02  Mg     1.9     01-02    TPro  5.4<L>  /  Alb  2.4<L>  /  TBili  9.1<H>  /  DBili  x   /  AST  336<H>  /  ALT  658<H>  /  AlkPhos  128<H>  01-02        HgA1c: Hemoglobin A1C, Whole Blood: 6.1 % (12-27 @ 09:51)  Hemoglobin A1C, Whole Blood: 6.2 % (12-26 @ 23:12)    TSH: Thyroid Stimulating Hormone, Serum: 1.19 uU/mL (12-27 @ 06:50)      	  Blood Gas Profile - Arterial (01.02.19 @ 04:59)    pH, Arterial: 7.38    pCO2, Arterial: 43 mmHg    pO2, Arterial: 91 mmHg    HCO3, Arterial: 24 mmol/L    Base Excess, Arterial: -0.2 mmol/L    Oxygen Saturation, Arterial: 96 %    FIO2, Arterial: 60    Blood Gas Comments Arterial: CMV   16  - 450    A.L.

## 2019-01-02 NOTE — PROGRESS NOTE ADULT - ASSESSMENT
80 y/o Female w/ shock s/p left IM hip nailing on 12/27; Now w/ cyanosis of b/l distal LE, likely secondary to pressors. ARF, shock liver, coagulopathy with thrombocytopenia    - 80 y/o Female w/ shock s/p left IM hip nailing on 12/27; Now w/ cyanosis of b/l distal LE, likely secondary to pressors. ARF, shock liver, coagulopathy with thrombocytopenia    - Serial b/l LE exams  - When stable rec arterial duplex/ GETACHEW/PVR  - Likely not a good candidate for AC due to existing coagulopathy  - Rec removal of left femoral A-line when coagulopathy and thrombocytopenia reversed  - HD as per renal   - Care as per ICU   - Will follow

## 2019-01-02 NOTE — PROGRESS NOTE ADULT - PROBLEM SELECTOR PLAN 1
CXR: Bilateral basal pneumonia and diffuse congestion  Intubated and sedated  Started Zyvox and meropenem  Blood Cx: NTD

## 2019-01-02 NOTE — PROGRESS NOTE ADULT - SUBJECTIVE AND OBJECTIVE BOX
MARICLE SLAUGHTER  MR# [id number]  79yFemale        Patient is a 79y old  Female who presents with a chief complaint of fall (02 Jan 2019 20:05)      INTERVAL HPI/OVERNIGHT EVENTS:  Patient seen and examined at bedside.     ALLERGIES  No Known Allergies      MEDICATIONS  ascorbic acid 500 milliGRAM(s) Oral two times a day  chlorhexidine 4% Liquid 1 Application(s) Topical two times a day  digoxin  Injectable 0.125 milliGRAM(s) IV Push every other day  docusate sodium Liquid 100 milliGRAM(s) Oral two times a day  epoetin monique Injectable 75040 Unit(s) IV Push <User Schedule>  fentaNYL   Infusion. 1 MICROgram(s)/kG/Hr IV Continuous <Continuous>  folic acid 1 milliGRAM(s) Oral daily  HYDROmorphone  Injectable 2 milliGRAM(s) IV Push every 4 hours PRN Severe Pain (7 - 10)  insulin glargine Injectable (LANTUS) 15 Unit(s) SubCutaneous at bedtime  insulin lispro (HumaLOG) corrective regimen sliding scale   SubCutaneous every 6 hours  iron sucrose IVPB 200 milliGRAM(s) IV Intermittent every 24 hours  linezolid  IVPB 600 milliGRAM(s) IV Intermittent every 12 hours  meropenem  IVPB 500 milliGRAM(s) IV Intermittent every 12 hours  ondansetron Injectable 4 milliGRAM(s) IV Push every 6 hours PRN Nausea and/or Vomiting  pantoprazole  Injectable 40 milliGRAM(s) IV Push daily  propofol Infusion 5 MICROgram(s)/kG/Min IV Continuous <Continuous>              REVIEW OF SYSTEMS:  CONSTITUTIONAL: No fever, weight loss, or fatigue  EYES: No eye pain, visual disturbances, or discharge  ENT:  No difficulty hearing, tinnitus, vertigo; No sinus or throat pain  NECK: No pain or stiffness  RESPIRATORY: No cough, wheezing, chills or hemoptysis; No Shortness of Breath  CARDIOVASCULAR: No chest pain, palpitations, passing out, dizziness, or leg swelling  GASTROINTESTINAL: No abdominal or epigastric pain. No nausea, vomiting, or hematemesis; No diarrhea or constipation. No melena or hematochezia.  GENITOURINARY: No dysuria, frequency, hematuria, or incontinence  NEUROLOGICAL: No headaches, memory loss, loss of strength, numbness, or tremors  SKIN: No itching, burning, rashes, or lesions   LYMPH Nodes: No enlarged glands  ENDOCRINE: No heat or cold intolerance; No hair loss  MUSCULOSKELETAL: No joint pain or swelling; No muscle, back, or extremity pain  PSYCHIATRIC: No depression, anxiety, mood swings, or difficulty sleeping  HEME/LYMPH: No easy bruising, or bleeding gums  ALLERGY AND IMMUNOLOGIC: No hives or eczema	    [ ] All others negative	  [ ] Unable to obtain      T(C): 35.8 (01-02-19 @ 15:57), Max: 36.6 (01-02-19 @ 00:07)  T(F): 96.5 (01-02-19 @ 15:57), Max: 97.9 (01-02-19 @ 00:07)  HR: 88 (01-02-19 @ 20:16) (76 - 109)  BP: 116/56 (01-02-19 @ 16:00) (92/43 - 155/58)  RR: 17 (01-02-19 @ 19:00) (6 - 21)  SpO2: 98% (01-02-19 @ 20:16) (92% - 100%)  Wt(kg): --    I&O's Summary    01 Jan 2019 07:01  -  02 Jan 2019 07:00  --------------------------------------------------------  IN: 829.7 mL / OUT: 16 mL / NET: 813.7 mL    02 Jan 2019 07:01  -  02 Jan 2019 21:29  --------------------------------------------------------  IN: 815.8 mL / OUT: 15 mL / NET: 800.8 mL          PHYSICAL EXAM:  A X O x  HEAD:  Atraumatic, Normocephalic  EYES: EOMI, PERRLA, conjunctiva and sclera clear  NECK: Supple, No JVD, Normal thyroid  Resp: CTAB, No crackles, wheezing,   CVS: Regular rate and rhythm; No discernable murmurs, rubs, or gallops  ABD: Soft, Nontender, Nondistended; Bowel sounds present  EXTREMITIES:  2+ Peripheral Pulses, No edema  LYMPH: No dicernable lymphadenopathy noted  GENERAL: NAD, well-groomed, well-developed      LABS:                        10.7   21.6  )-----------( 58       ( 02 Jan 2019 04:17 )             34.4     01-02    141  |  99  |  63<H>  ----------------------------<  121<H>  4.0   |  25  |  4.53<H>    Ca    8.4      02 Jan 2019 04:23  Phos  5.6     01-02  Mg     1.9     01-02    TPro  5.4<L>  /  Alb  2.4<L>  /  TBili  9.1<H>  /  DBili  x   /  AST  336<H>  /  ALT  658<H>  /  AlkPhos  128<H>  01-02    PT/INR - ( 02 Jan 2019 14:20 )   PT: 20.0 sec;   INR: 1.77 ratio         PTT - ( 02 Jan 2019 14:20 )  PTT:34.9 sec    CAPILLARY BLOOD GLUCOSE      POCT Blood Glucose.: 86 mg/dL (02 Jan 2019 18:12)  POCT Blood Glucose.: 54 mg/dL (02 Jan 2019 18:08)  POCT Blood Glucose.: 78 mg/dL (02 Jan 2019 12:15)  POCT Blood Glucose.: 124 mg/dL (02 Jan 2019 04:42)  POCT Blood Glucose.: 119 mg/dL (01 Jan 2019 22:35)  POCT Blood Glucose.: 77 mg/dL (01 Jan 2019 22:34)      Troponins:  ProBNP:  Lipid Profile:   HgA1c:  TSH:     ABG - ( 02 Jan 2019 04:59 )  pH, Arterial: 7.38  pH, Blood: x     /  pCO2: 43    /  pO2: 91    / HCO3: 24    / Base Excess: -0.2  /  SaO2: 96                    RADIOLOGY & ADDITIONAL TESTS:    Imaging Personally Reviewed:  [ ] YES  [ ] NO      Consultant(s) Notes Reviewed:  [x ] YES  [ ] NO    Care Discussed with Consultants/Other Providers [ x] YES  [ ] NO          PAST MEDICAL & SURGICAL HISTORY:  No pertinent past medical history  No significant past surgical history        Closed fracture of neck of left femur  Yes  No pertinent family history in first degree relatives  Handoff  MEWS Score  No pertinent past medical history  Closed fracture of left hip, initial encounter  Peripheral arterial disease  Drop in hemoglobin  Acute liver failure  Acute renal failure  Septic shock  Acute post-operative pain  Hypotension  Fever  Acute systolic congestive heart failure  Cardiogenic shock  Prophylactic measure  Diabetes mellitus  HTN (hypertension)  Heart failure with reduced ejection fraction  PSVT (paroxysmal supraventricular tachycardia)  Left ventricular dysfunction  Need for prophylactic measure  Closed fracture of left hip, initial encounter  No significant past surgical history  A S/P FALL

## 2019-01-02 NOTE — PROGRESS NOTE ADULT - SUBJECTIVE AND OBJECTIVE BOX
pt seen and examined.pts current chart reviewed and case discussed with resident covering.    SUBJECTIVE: Pt is nonverbal/sedated and still anuria, urine study unable to obtain due to anuria. Pt had complete dialysis yesterday. Lactate is trending down without fluids. Pt had urine output of 0 in 24 hours with net Positive 800.       REVIEW OF SYSTEMS:  unable to obtain    Current meds:    ascorbic acid 500 milliGRAM(s) Oral two times a day  chlorhexidine 4% Liquid 1 Application(s) Topical two times a day  digoxin  Injectable 0.125 milliGRAM(s) IV Push every other day  docusate sodium Liquid 100 milliGRAM(s) Oral two times a day  epoetin monique Injectable 95041 Unit(s) IV Push <User Schedule>  fentaNYL   Infusion. 0.5 MICROgram(s)/kG/Hr IV Continuous <Continuous>  folic acid 1 milliGRAM(s) Oral daily  HYDROmorphone  Injectable 2 milliGRAM(s) IV Push every 4 hours PRN  insulin glargine Injectable (LANTUS) 15 Unit(s) SubCutaneous at bedtime  insulin lispro (HumaLOG) corrective regimen sliding scale   SubCutaneous every 6 hours  iron sucrose IVPB 200 milliGRAM(s) IV Intermittent every 24 hours  linezolid  IVPB 600 milliGRAM(s) IV Intermittent every 12 hours  meropenem  IVPB 500 milliGRAM(s) IV Intermittent every 12 hours  ondansetron Injectable 4 milliGRAM(s) IV Push every 6 hours PRN  pantoprazole  Injectable 40 milliGRAM(s) IV Push daily  propofol Infusion 5 MICROgram(s)/kG/Min IV Continuous <Continuous>      Vital Signs    T(F): 97.5 (19 @ 08:00), Max: 98.6 (19 @ 20:17)  HR: 105 (19 @ 10:00) (83 - 114)  BP: 155/58 (19 @ 08:00) (96/45 - 155/58)  ABP: 164/73 (19 @ 10:00) (98/55 - 197/83)  RR: 17 (19 @ 10:00) (6 - 32)  SpO2: 94% (19 @ 10:00) (92% - 100%)  Wt(kg): --  CVP(cm H2O): --  CO: --  PCWP: --    I and O's:    12- @ 07:01   @ 07:00  --------------------------------------------------------  IN:    fentaNYL Infusion.: 418.2 mL    norepinephrine Infusion: 15.2 mL    sodium bicarbonate  Infusion: 2300 mL    Solution: 450 mL  Total IN: 3183.4 mL    OUT:    Indwelling Catheter - Urethral: 45 mL    Stool: 2 mL  Total OUT: 47 mL    Total NET: 3136.4 mL       @ 07: @ 07:00  --------------------------------------------------------  IN:    fentaNYL Infusion.: 479.7 mL    Solution: 350 mL  Total IN: 829.7 mL    OUT:    Indwelling Catheter - Urethral: 15 mL    Stool: 1 mL  Total OUT: 16 mL    Total NET: 813.7 mL       @ 07:02 @ 11:52  --------------------------------------------------------  IN:    fentaNYL Infusion.: 98.4 mL    propofol Infusion: 19.8 mL  Total IN: 118.2 mL    OUT:    Indwelling Catheter - Urethral: 5 mL  Total OUT: 5 mL    Total NET: 113.2 mL        Daily     Daily Weight in k.6 (2019 08:00)    PHYSICAL EXAM:  GENERAL: Well developed, Well nourished white female, sedated  HEENT:  Normocephalic/Atraumatic, reactive light reflex, moist mucous membranes  NECK: Supple, no JVD, LIJ line  RESP: Symmetric movement of the chest, clear to auscultation bilaterally, diffuse bilateral basal rales   CVS: Tachycardic, S1 and S2 audible, no murmur, rubs or gallops  GI: Normal active bowel sounds present, abdomen soft, non tender, non distended  EXTREMITIES:  No edema, no clubbing, cyanosis, Lt femoral A-line, Rt Shiley femoral    MSK: 0/5 strength bilateral upper and lower extremities, Left hip scar/band, Blue hands/toes   PSYCH: Sedated  NEURO: Alert and oriented x 0    LABS:    CBC:                          10.7   21.6  )-----------( 58       ( 2019 04:17 )             34.4           BMP:        141  |  99  |  63<H>  ----------------------------<  121<H>  4.0   |  25  |  4.53<H>      139  |  94<L>  |  59<H>  ----------------------------<  141<H>  4.0   |  28  |  4.68<H>      140  |  98  |  62<H>  ----------------------------<  203<H>  4.1   |  26  |  4.37<H>      139  |  98  |  57<H>  ----------------------------<  310<H>  3.9   |  19<L>  |  4.15<H>    Ca    8.4      2019 04:23  Ca    8.1<L>      2019 13:56  Ca    8.2<L>      2019 04:51  Ca    8.0<L>      31 Dec 2018 06:31  Phos  5.6       Phos  5.2       Phos  4.7       Mg     1.9       Mg     1.9       Mg     1.8         TPro  5.4<L>  /  Alb  2.4<L>  /  TBili  9.1<H>  /  DBili  x   /  AST  336<H>  /  ALT  658<H>  /  AlkPhos  128<H>    TPro  5.6<L>  /  Alb  2.7<L>  /  TBili  5.7<H>  /  DBili  x   /  AST  860<H>  /  ALT  1086<H>  /  AlkPhos  116    TPro  5.6<L>  /  Alb  2.8<L>  /  TBili  4.1<H>  /  DBili  x   /  AST  1949<H>  /  ALT  1531<H>  /  AlkPhos  98  12-31          URINE STUDIES:    RADIOLOGY & ADDITIONAL STUDIES: pt seen and examined.pts current chart reviewed and case discussed with resident covering.    SUBJECTIVE: Pt is sedated and mechanical vent, still anuria, urine study unable to obtain due to anuria. Pt had complete dialysis yesterday. Lactate is trending down without fluids. Pt had urine output of 0 in 24 hours with net Positive 800.       REVIEW OF SYSTEMS:  unable to obtain    Current meds:    ascorbic acid 500 milliGRAM(s) Oral two times a day  chlorhexidine 4% Liquid 1 Application(s) Topical two times a day  digoxin  Injectable 0.125 milliGRAM(s) IV Push every other day  docusate sodium Liquid 100 milliGRAM(s) Oral two times a day  epoetin monique Injectable 00816 Unit(s) IV Push <User Schedule>  fentaNYL   Infusion. 0.5 MICROgram(s)/kG/Hr IV Continuous <Continuous>  folic acid 1 milliGRAM(s) Oral daily  HYDROmorphone  Injectable 2 milliGRAM(s) IV Push every 4 hours PRN  insulin glargine Injectable (LANTUS) 15 Unit(s) SubCutaneous at bedtime  insulin lispro (HumaLOG) corrective regimen sliding scale   SubCutaneous every 6 hours  iron sucrose IVPB 200 milliGRAM(s) IV Intermittent every 24 hours  linezolid  IVPB 600 milliGRAM(s) IV Intermittent every 12 hours  meropenem  IVPB 500 milliGRAM(s) IV Intermittent every 12 hours  ondansetron Injectable 4 milliGRAM(s) IV Push every 6 hours PRN  pantoprazole  Injectable 40 milliGRAM(s) IV Push daily  propofol Infusion 5 MICROgram(s)/kG/Min IV Continuous <Continuous>      Vital Signs    T(F): 97.5 (19 @ 08:00), Max: 98.6 (19 @ 20:17)  HR: 105 (19 @ 10:00) (83 - 114)  BP: 155/58 (19 @ 08:00) (96/45 - 155/58)  ABP: 164/73 (19 @ 10:00) (98/55 - 197/83)  RR: 17 (19 @ 10:00) (6 - 32)  SpO2: 94% (19 @ 10:00) (92% - 100%)  Wt(kg): --  CVP(cm H2O): --  CO: --  PCWP: --    I and O's:    12- @ 07:01   @ 07:00  --------------------------------------------------------  IN:    fentaNYL Infusion.: 418.2 mL    norepinephrine Infusion: 15.2 mL    sodium bicarbonate  Infusion: 2300 mL    Solution: 450 mL  Total IN: 3183.4 mL    OUT:    Indwelling Catheter - Urethral: 45 mL    Stool: 2 mL  Total OUT: 47 mL    Total NET: 3136.4 mL       @ 07: @ 07:00  --------------------------------------------------------  IN:    fentaNYL Infusion.: 479.7 mL    Solution: 350 mL  Total IN: 829.7 mL    OUT:    Indwelling Catheter - Urethral: 15 mL    Stool: 1 mL  Total OUT: 16 mL    Total NET: 813.7 mL       @ 07:02 @ 11:52  --------------------------------------------------------  IN:    fentaNYL Infusion.: 98.4 mL    propofol Infusion: 19.8 mL  Total IN: 118.2 mL    OUT:    Indwelling Catheter - Urethral: 5 mL  Total OUT: 5 mL    Total NET: 113.2 mL        Daily     Daily Weight in k.6 (2019 08:00)    PHYSICAL EXAM:  GENERAL: Well developed, Well nourished white female, sedated  HEENT:  Normocephalic/Atraumatic, reactive light reflex, moist mucous membranes  NECK: Supple, no JVD, LIJ line  RESP: Symmetric movement of the chest, diffuse bilateral basal rales, on Vent support   CVS: Tachycardic, S1 and S2 audible, no murmur, rubs or gallops  GI: Normal active bowel sounds present, abdomen soft, non tender, non distended  EXTREMITIES:  No edema, no clubbing, cyanosis, Lt femoral A-line, Rt Shiley femoral    MSK: 0/5 strength bilateral upper and lower extremities, Left hip scar/band, Blue hands/toes   PSYCH: Sedated  NEURO: sedated     LABS:    CBC:                          10.7   21.6  )-----------( 58       ( 2019 04:17 )             34.4           BMP:        141  |  99  |  63<H>  ----------------------------<  121<H>  4.0   |  25  |  4.53<H>      139  |  94<L>  |  59<H>  ----------------------------<  141<H>  4.0   |  28  |  4.68<H>      140  |  98  |  62<H>  ----------------------------<  203<H>  4.1   |  26  |  4.37<H>      139  |  98  |  57<H>  ----------------------------<  310<H>  3.9   |  19<L>  |  4.15<H>    Ca    8.4      2019 04:23  Ca    8.1<L>      2019 13:56  Ca    8.2<L>      2019 04:51  Ca    8.0<L>      31 Dec 2018 06:31  Phos  5.6     -  Phos  5.2       Phos  4.7       Mg     1.9       Mg     1.9       Mg     1.8         TPro  5.4<L>  /  Alb  2.4<L>  /  TBili  9.1<H>  /  DBili  x   /  AST  336<H>  /  ALT  658<H>  /  AlkPhos  128<H>    TPro  5.6<L>  /  Alb  2.7<L>  /  TBili  5.7<H>  /  DBili  x   /  AST  860<H>  /  ALT  1086<H>  /  AlkPhos  116    TPro  5.6<L>  /  Alb  2.8<L>  /  TBili  4.1<H>  /  DBili  x   /  AST  1949<H>  /  ALT  1531<H>  /  AlkPhos  98  12-31          URINE STUDIES:    RADIOLOGY & ADDITIONAL STUDIES: pt seen and examined.pts current chart reviewed and case discussed with resident covering.    SUBJECTIVE: Pt is sedated and mechanical vent, still anuria, urine study unable to obtain due to anuria. Pt had complete dialysis yesterday. Lactate is trending down without fluids. Pt had urine output of 0 in 24 hours with net Positive 800.       REVIEW OF SYSTEMS:  unable to obtain    Current meds:    ascorbic acid 500 milliGRAM(s) Oral two times a day  chlorhexidine 4% Liquid 1 Application(s) Topical two times a day  digoxin  Injectable 0.125 milliGRAM(s) IV Push every other day  docusate sodium Liquid 100 milliGRAM(s) Oral two times a day  epoetin monique Injectable 34098 Unit(s) IV Push <User Schedule>  fentaNYL   Infusion. 0.5 MICROgram(s)/kG/Hr IV Continuous <Continuous>  folic acid 1 milliGRAM(s) Oral daily  HYDROmorphone  Injectable 2 milliGRAM(s) IV Push every 4 hours PRN  insulin glargine Injectable (LANTUS) 15 Unit(s) SubCutaneous at bedtime  insulin lispro (HumaLOG) corrective regimen sliding scale   SubCutaneous every 6 hours  iron sucrose IVPB 200 milliGRAM(s) IV Intermittent every 24 hours  linezolid  IVPB 600 milliGRAM(s) IV Intermittent every 12 hours  meropenem  IVPB 500 milliGRAM(s) IV Intermittent every 12 hours  ondansetron Injectable 4 milliGRAM(s) IV Push every 6 hours PRN  pantoprazole  Injectable 40 milliGRAM(s) IV Push daily  propofol Infusion 5 MICROgram(s)/kG/Min IV Continuous <Continuous>      Vital Signs    T(F): 97.5 (19 @ 08:00), Max: 98.6 (19 @ 20:17)  HR: 105 (19 @ 10:00) (83 - 114)  BP: 155/58 (19 @ 08:00) (96/45 - 155/58)  ABP: 164/73 (19 @ 10:00) (98/55 - 197/83)  RR: 17 (19 @ 10:00) (6 - 32)  SpO2: 94% (19 @ 10:00) (92% - 100%)  Wt(kg): --  CVP(cm H2O): --  CO: --  PCWP: --    I and O's:    12- @ 07:01   @ 07:00  --------------------------------------------------------  IN:    fentaNYL Infusion.: 418.2 mL    norepinephrine Infusion: 15.2 mL    sodium bicarbonate  Infusion: 2300 mL    Solution: 450 mL  Total IN: 3183.4 mL    OUT:    Indwelling Catheter - Urethral: 45 mL    Stool: 2 mL  Total OUT: 47 mL    Total NET: 3136.4 mL       @ 07: @ 07:00  --------------------------------------------------------  IN:    fentaNYL Infusion.: 479.7 mL    Solution: 350 mL  Total IN: 829.7 mL    OUT:    Indwelling Catheter - Urethral: 15 mL    Stool: 1 mL  Total OUT: 16 mL    Total NET: 813.7 mL       @ 07:02 @ 11:52  --------------------------------------------------------  IN:    fentaNYL Infusion.: 98.4 mL    propofol Infusion: 19.8 mL  Total IN: 118.2 mL    OUT:    Indwelling Catheter - Urethral: 5 mL  Total OUT: 5 mL    Total NET: 113.2 mL        Daily     Daily Weight in k.6 (2019 08:00)    PHYSICAL EXAM:  GENERAL: Well developed, Well nourished white female, sedated  HEENT:  Normocephalic/Atraumatic, reactive light reflex, moist mucous membranes  NECK: Supple, no JVD, LIJ line  RESP: Symmetric movement of the chest, diffuse bilateral basal rales, on Vent support   CVS: Tachycardic, S1 and S2 audible, no murmur, rubs or gallops  GI: Normal active bowel sounds present, abdomen soft, non tender, non distended  EXTREMITIES:  No edema, no clubbing, cyanosis, Lt femoral A-line, Rt Shiley femoral    MSK: 0/5 strength bilateral upper and lower extremities, Left hip scar/band, Blue hands/toes   PSYCH: Sedated  NEURO: sedated     LABS:    CBC:                          10.7   21.6  )-----------( 58       ( 2019 04:17 )             34.4           BMP:        141  |  99  |  63<H>  ----------------------------<  121<H>  4.0   |  25  |  4.53<H>      139  |  94<L>  |  59<H>  ----------------------------<  141<H>  4.0   |  28  |  4.68<H>      140  |  98  |  62<H>  ----------------------------<  203<H>  4.1   |  26  |  4.37<H>      139  |  98  |  57<H>  ----------------------------<  310<H>  3.9   |  19<L>  |  4.15<H>    Ca    8.4      2019 04:23  Ca    8.1<L>      2019 13:56  Ca    8.2<L>      2019 04:51  Ca    8.0<L>      31 Dec 2018 06:31  Phos  5.6     -  Phos  5.2       Phos  4.7       Mg     1.9       Mg     1.9       Mg     1.8         TPro  5.4<L>  /  Alb  2.4<L>  /  TBili  9.1<H>  /  DBili  x   /  AST  336<H>  /  ALT  658<H>  /  AlkPhos  128<H>    TPro  5.6<L>  /  Alb  2.7<L>  /  TBili  5.7<H>  /  DBili  x   /  AST  860<H>  /  ALT  1086<H>  /  AlkPhos  116  01-  TPro  5.6<L>  /  Alb  2.8<L>  /  TBili  4.1<H>  /  DBili  x   /  AST  1949<H>  /  ALT  1531<H>  /  AlkPhos  98  12-31          URINE STUDIES:    RADIOLOGY & ADDITIONAL STUDIES:    < from: Xray Chest 1 View- PORTABLE-Routine (19 @ 07:50) >  EXAM:  XR CHEST PORTABLE ROUTINE 1V                            PROCEDURE DATE:  2019          INTERPRETATION:  CLINICAL STATEMENT: Follow-up chest pain.    TECHNIQUE: AP view of the chest.    COMPARISON: 2019    FINDINGS/  IMPRESSION:  ETtube again noted. Left central line again noted. No pneumothorax. New   feeding tube noted tip under the diaphragm overlying expected region of   stomach.    Increased interstitial lung markings without significant change. Moderate   size right pleural effusion with adjacent opacity without significant   change    Heart size cannot be accurately assessed in this projection.      < end of copied text >  < from: US Hepatic & Pancreatic (19 @ 11:15) >  EXAM:  US LIVER AND PANCREAS                            PROCEDURE DATE:  2019          INTERPRETATION:  CLINICAL STATEMENT: Rule out gallstone..  Abdominal pain.    TECHNIQUE: Ultrasound of the right upper quadrant.    COMPARISON: None.    FINDINGS:  Liver measures 16.9 cm with normal echogenicity.    There is no gallstone or gallbladder wall thickening. 5 mm gallbladder   polyp noted    The common bile duct is not dilated measuring 4 mm.    The pancreas is not well seen.    The right kidney measures 10.8 cm in length.  There is no hydronephrosis.    IMPRESSION:  5 mm gallbladder polyp. Follow-up recommended.    < end of copied text >

## 2019-01-02 NOTE — CONSULT NOTE ADULT - ASSESSMENT
Assessment and Plan:   · Assessment		  77 y/o F pt with HTN, DM2, PMHx presents to ED c/o L hip pain s/p trip and fall from a curb. XR hip showed nondisplaced intertrochanteric fracture left femoral neck and marked osteoporosis. (26 Dec 2018 19:35)s/p nailing 12/27 with post op hypotension , septic vs cardiogenic shock, also found to have shock liver . pt is sedated and resting comfortably in bed   s/p fall with subsequent left hip fx s/p im nailing 12/27 with post op resp failure and MOSF including shock liver /lft decreasing     resp failure sec to asp pna    worsening kidney function with worsening ph and metabolic acidosis    S/P septic/cardiogenic shock with MOSF . echo with very low LVEF     poor prognosis   consider arterial doppler , re -eval distal ischemia when pt is stable enough   discussed with family members Assessment and Plan:   · Assessment		  79 y/o F pt with HTN, DM2, PMHx presented to ED c/o L hip pain s/p trip and fall from a curb. XR hip showed nondisplaced intertrochanteric fracture left femoral neck and marked osteoporosis. (26 Dec 2018 19:35)s/p nailing 12/27 with post op hypotension , septic vs cardiogenic shock, also found to have shock liver . pt is sedated and resting comfortably in bed  resp failure sec to asp pna  worsening kidney function with worsening ph and metabolic acidosis  S/P septic/cardiogenic shock with . echo with very low LVEF   distal left and right foot ischemia   consider arterial doppler when stable  , vascular consult noticed  continue antibiotics as per Dr. Ramsey   continue all other meds   continue offloading b/l foot   re -eval the plan for  distal ischemia  when pt is stable enough -poor prognosis   discussed with family members

## 2019-01-02 NOTE — PROGRESS NOTE ADULT - PROBLEM SELECTOR PLAN 3
Elevated LFTs and INR--> gradually improving  Trend lactate  S/p NAC, K-centra, oral Vit. K and 14 FFP

## 2019-01-02 NOTE — PROGRESS NOTE ADULT - PROBLEM SELECTOR PLAN 1
CXR: Bilateral basal pneumonia  Intubated and sedated  Started Zyvox and meropenem  Blood Cx: NTD CXR: Bilateral basal pneumonia and diffuse congestion  Intubated and sedated  Started Zyvox and meropenem  Blood Cx: NTD

## 2019-01-02 NOTE — PROGRESS NOTE ADULT - SUBJECTIVE AND OBJECTIVE BOX
INTERVAL HPI/OVERNIGHT EVENTS: Pt had complete dialysis yesterday. Lactate is gradually trending down without fluids. NG tube was placed overnight. Pt had 6 beats of Vtach overnight. Pt had urine output of 0 in 24 hours with net Positive 800.     PRESSORS: [ ] YES [x] NO    ANTIBIOTICS: Zyvox                           DATE STARTED: 12/31/18  ANTIBIOTICS: Meropenem                 DATE STARTED: 12/31/18    Antimicrobial:  linezolid  IVPB 600 milliGRAM(s) IV Intermittent every 12 hours  meropenem  IVPB 500 milliGRAM(s) IV Intermittent every 12 hours    Cardiovascular:  digoxin  Injectable 0.125 milliGRAM(s) IV Push every other day    Other:  ascorbic acid 500 milliGRAM(s) Oral two times a day  chlorhexidine 4% Liquid 1 Application(s) Topical two times a day  docusate sodium Liquid 100 milliGRAM(s) Oral two times a day  epoetin monique Injectable 74676 Unit(s) IV Push <User Schedule>  fentaNYL   Infusion. 0.5 MICROgram(s)/kG/Hr IV Continuous <Continuous>  folic acid 1 milliGRAM(s) Oral daily  insulin glargine Injectable (LANTUS) 15 Unit(s) SubCutaneous at bedtime  insulin lispro (HumaLOG) corrective regimen sliding scale   SubCutaneous every 6 hours  iron sucrose IVPB 200 milliGRAM(s) IV Intermittent every 24 hours  morphine  - Injectable 2 milliGRAM(s) IV Push every 4 hours PRN  ondansetron Injectable 4 milliGRAM(s) IV Push every 6 hours PRN  pantoprazole  Injectable 40 milliGRAM(s) IV Push daily    ascorbic acid 500 milliGRAM(s) Oral two times a day  chlorhexidine 4% Liquid 1 Application(s) Topical two times a day  digoxin  Injectable 0.125 milliGRAM(s) IV Push every other day  docusate sodium Liquid 100 milliGRAM(s) Oral two times a day  epoetin monique Injectable 07226 Unit(s) IV Push <User Schedule>  fentaNYL   Infusion. 0.5 MICROgram(s)/kG/Hr IV Continuous <Continuous>  folic acid 1 milliGRAM(s) Oral daily  insulin glargine Injectable (LANTUS) 15 Unit(s) SubCutaneous at bedtime  insulin lispro (HumaLOG) corrective regimen sliding scale   SubCutaneous every 6 hours  iron sucrose IVPB 200 milliGRAM(s) IV Intermittent every 24 hours  linezolid  IVPB 600 milliGRAM(s) IV Intermittent every 12 hours  meropenem  IVPB 500 milliGRAM(s) IV Intermittent every 12 hours  morphine  - Injectable 2 milliGRAM(s) IV Push every 4 hours PRN  ondansetron Injectable 4 milliGRAM(s) IV Push every 6 hours PRN  pantoprazole  Injectable 40 milliGRAM(s) IV Push daily    Drug Dosing Weight  Height (cm): 154.94 (28 Dec 2018 01:00)  Weight (kg): 82.1 (28 Dec 2018 01:00)  BMI (kg/m2): 34.2 (28 Dec 2018 01:00)  BSA (m2): 1.81 (28 Dec 2018 01:00)    CENTRAL LINE: [x] YES [ ] NO  LOCATION:   DATE INSERTED:    DE SOUZA: [x] YES [ ] NO    DATE INSERTED:    A-LINE:  [x] YES [ ] NO  LOCATION:   DATE INSERTED:    ICU Vital Signs Last 24 Hrs  T(C): 36.2 (02 Jan 2019 05:00), Max: 37 (01 Jan 2019 20:17)  T(F): 97.2 (02 Jan 2019 05:00), Max: 98.6 (01 Jan 2019 20:17)  HR: 109 (02 Jan 2019 07:21) (83 - 114)  BP: 136/55 (02 Jan 2019 06:00) (96/45 - 144/70)  BP(mean): 73 (02 Jan 2019 06:00) (55 - 86)  ABP: 184/75 (02 Jan 2019 06:00) (98/55 - 216/88)  ABP(mean): 109 (02 Jan 2019 06:00) (68 - 129)  RR: 18 (02 Jan 2019 06:00) (6 - 32)  SpO2: 100% (02 Jan 2019 07:21) (91% - 100%)      ABG - ( 02 Jan 2019 04:59 )  pH, Arterial: 7.38  pH, Blood: x     /  pCO2: 43    /  pO2: 91    / HCO3: 24    / Base Excess: -0.2  /  SaO2: 96                    01-01 @ 07:01  -  01-02 @ 07:00  --------------------------------------------------------  IN: 829.7 mL / OUT: 16 mL / NET: 813.7 mL        Mode: AC/ CMV (Assist Control/ Continuous Mandatory Ventilation)  RR (machine): 16  TV (machine): 450  FiO2: 60  PEEP: 5  ITime: 1  MAP: 6  PIP: 17      PHYSICAL EXAM:    GENERAL: Well developed, Well nourished white female, sedated  HEENT:  Normocephalic/Atraumatic, reactive light reflex, moist mucous membranes  NECK: Supple, no JVD, LIJ line  RESP: Symmetric movement of the chest, clear to auscultation bilaterally, diffuse bilateral basal rales   CVS: Tachycardic, S1 and S2 audible, no murmur, rubs or gallops  GI: Normal active bowel sounds present, abdomen soft, non tender, non distended  EXTREMITIES:  No edema, no clubbing, cyanosis, Lt femoral A-line, Rt Shiley femoral    MSK: 0/5 strength bilateral upper and lower extremities, Left hip scar/band, Blue hands/toes   PSYCH: Sedated  NEURO: Alert and oriented x 0      LABS:  CBC Full  -  ( 02 Jan 2019 04:17 )  WBC Count : 21.6 K/uL  Hemoglobin : 10.7 g/dL  Hematocrit : 34.4 %  Platelet Count - Automated : 58 K/uL  Mean Cell Volume : 66.0 fl  Mean Cell Hemoglobin : 20.5 pg  Mean Cell Hemoglobin Concentration : 31.0 gm/dL  Auto Neutrophil # : x  Auto Lymphocyte # : x  Auto Monocyte # : x  Auto Eosinophil # : x  Auto Basophil # : x  Auto Neutrophil % : 85.0 %  Auto Lymphocyte % : 12.0 %  Auto Monocyte % : 2.0 %  Auto Eosinophil % : 1.0 %  Auto Basophil % : x    01-02    141  |  99  |  63<H>  ----------------------------<  121<H>  4.0   |  25  |  4.53<H>    Ca    8.4      02 Jan 2019 04:23  Phos  5.6     01-02  Mg     1.9     01-02    TPro  5.4<L>  /  Alb  2.4<L>  /  TBili  9.1<H>  /  DBili  x   /  AST  336<H>  /  ALT  658<H>  /  AlkPhos  128<H>  01-02        Culture Results:   No growth to date. (12-30 @ 14:42)        [ ]GOALS OF CARE DISCUSSION WITH PATIENT/FAMILY/PROXY: DNR only    CRITICAL CARE TIME SPENT: 35 minutes

## 2019-01-02 NOTE — PROGRESS NOTE ADULT - PROBLEM SELECTOR PLAN 2
Multiorgan failure with Anuria with severe lactic acidosis   Rt Femoral Shiley  had complete dialysis yesterday  Dr Rao on board Multiorgan failure with Anuria with severe lactic acidosis   Rt Femoral Paulaley  had complete dialysis yesterday  120 IV lasix today and monitor Urine output  Dr Rao on board

## 2019-01-02 NOTE — PROGRESS NOTE ADULT - PROBLEM SELECTOR PLAN 2
Multiorgan failure with Anuria with severe lactic acidosis   Rt Femoral Paulaley  had complete dialysis yesterday  120 IV lasix today and monitor Urine output  Dr Rao on board

## 2019-01-02 NOTE — CONSULT NOTE ADULT - SUBJECTIVE AND OBJECTIVE BOX
01-02    141  |  99  |  63<H>  ----------------------------<  121<H>  4.0   |  25  |  4.53<H>    Ca    8.4      02 Jan 2019 04:23  Phos  5.6     01-02  Mg     1.9     01-02    TPro  5.4<L>  /  Alb  2.4<L>  /  TBili  9.1<H>  /  DBili  x   /  AST  336<H>  /  ALT  658<H>  /  AlkPhos  128<H>  01-02  ANTIBIOTICS: Zyvox                           DATE STARTED: 12/31/18  ANTIBIOTICS: Meropenem                 DATE STARTED: 12/31/18    Antimicrobial:  linezolid  IVPB 600 milliGRAM(s) IV Intermittent every 12 hours  meropenem  IVPB 500 milliGRAM(s) IV Intermittent every 12 hours    Cardiovascular:  digoxin  Injectable 0.125 milliGRAM(s) IV Push every other day    Other:  ascorbic acid 500 milliGRAM(s) Oral two times a day  chlorhexidine 4% Liquid 1 Application(s) Topical two times a day  docusate sodium Liquid 100 milliGRAM(s) Oral two times a day  epoetin monique Injectable 58941 Unit(s) IV Push <User Schedule>  fentaNYL   Infusion. 0.5 MICROgram(s)/kG/Hr IV Continuous <Continuous>  folic acid 1 milliGRAM(s) Oral daily  insulin glargine Injectable (LANTUS) 15 Unit(s) SubCutaneous at bedtime  insulin lispro (HumaLOG) corrective regimen sliding scale   SubCutaneous every 6 hours  iron sucrose IVPB 200 milliGRAM(s) IV Intermittent every 24 hours  morphine  - Injectable 2 milliGRAM(s) IV Push every 4 hours PRN  ondansetron Injectable 4 milliGRAM(s) IV Push every 6 hours PRN  pantoprazole  Injectable 40 milliGRAM(s) IV Push daily    ascorbic acid 500 milliGRAM(s) Oral two times a day  chlorhexidine 4% Liquid 1 Application(s) Topical two times a day  digoxin  Injectable 0.125 milliGRAM(s) IV Push every other day  docusate sodium Liquid 100 milliGRAM(s) Oral two times a day  epoetin monique Injectable 55696 Unit(s) IV Push     fentaNYL   Infusion. 0.5 MICROgram(s)/kG/Hr IV Continuous <Continuous>  folic acid 1 milliGRAM(s) Oral daily  insulin glargine Injectable (LANTUS) 15 Unit(s) SubCutaneous at bedtime  insulin lispro (HumaLOG) corrective regimen sliding scale   SubCutaneous every 6 hours  iron sucrose IVPB 200 milliGRAM(s) IV Intermittent every 24 hours  linezolid  IVPB 600 milliGRAM(s) IV Intermittent every 12 hours  meropenem  IVPB 500 milliGRAM(s) IV Intermittent every 12 hours  morphine  - Injectable 2 milliGRAM(s) IV Push every 4 hours PRN  ondansetron Injectable 4 milliGRAM(s) IV Push every 6 hours PRN  pantoprazole  Injectable 40 milliGRAM(s) IV Push daily    Drug Dosing Weight  Height (cm): 154.94 (28 Dec 2018 01:00)  Weight (kg): 82.1 (28 Dec 2018 01:00)  BMI (kg/m2): 34.2 (28 Dec 2018 01:00)  BSA (m2): 1.81 (28 Dec 2018 01:00)    CENTRAL LINE: [x] YES [ ] NO  LOCATION:   DATE INSERTED:    DE SOUZA: [x] YES [ ] NO    DATE INSERTED:    A-LINE:  [x] YES [ ] NO  LOCATION:   DATE INSERTED:    ICU Vital Signs Last 24 Hrs  T(C): 36.2 (02 Jan 2019 05:00), Max: 37 (01 Jan 2019 20:17)  T(F): 97.2 (02 Jan 2019 05:00), Max: 98.6 (01 Jan 2019 20:17)  HR: 109 (02 Jan 2019 07:21) (83 - 114)  BP: 136/55 (02 Jan 2019 06:00) (96/45 - 144/70)  BP(mean): 73 (02 Jan 2019 06:00) (55 - 86)  ABP: 184/75 (02 Jan 2019 06:00) (98/55 - 216/88)  ABP(mean): 109 (02 Jan 2019 06:00) (68 - 129)  RR: 18 (02 Jan 2019 06:00) (6 - 32)  SpO2: 100% (02 Jan 2019 07:21) (91% - 100%)      ABG - ( 02 Jan 2019 04:59 )  pH, Arterial: 7.38  pH, Blood: x     /  pCO2: 43    /  pO2: 91    / HCO3: 24    / Base Excess: -0.2  /  SaO2: 96        PHYSICAL EXAM:    GENERAL: Well developed, Well nourished white female, sedated  HEENT:  Normocephalic/Atraumatic, reactive light reflex, moist mucous membranes  NECK: Supple, no JVD, LIJ line  RESP: Symmetric movement of the chest, clear to auscultation bilaterally, diffuse bilateral basal rales   CVS: Tachycardic, S1 and S2 audible, no murmur, rubs or gallops  GI: Normal active bowel sounds present, abdomen soft, non tender, non distended  EXTREMITIES:  No edema, no clubbing, cyanosis, Lt femoral A-line, Rt Shiley femoral    MSK: 0/5 strength bilateral upper and lower extremities, Left hip scar/band, Blue hands/toes   PSYCH: Sedated  NEURO: Alert and oriented x 0      LABS:  CBC Full  -  ( 02 Jan 2019 04:17 )  WBC Count : 21.6 K/uL  Hemoglobin : 10.7 g/dL  Hematocrit : 34.4 %  Platelet Count - Automated : 58 K/uL  Mean Cell Volume : 66.0 fl  Mean Cell Hemoglobin : 20.5 pg  Mean Cell Hemoglobin Concentration : 31.0 gm/dL  Auto Neutrophil # : x  Auto Lymphocyte # : x  Auto Monocyte # : x  Auto Eosinophil # : x  Auto Basophil # : x  Auto Neutrophil % : 85.0 %  Auto Lymphocyte % : 12.0 %  Auto Monocyte % : 2.0 %  Auto Eosinophil % : 1.0 %  Auto Basophil % : x    01-02    141  |  99  |  63<H>  ----------------------------<  121<H>  4.0   |  25  |  4.53<H>    Ca    8.4      02 Jan 2019 04:23  Phos  5.6     01-02  Mg     1.9     01-02    TPro  5.4<L>  /  Alb  2.4<L>  /  TBili  9.1<H>  /  DBili  x   /  AST  336<H>  /  ALT  658<H>  /  AlkPhos  128<H>  01-02        Culture Results:   No growth to date. (12-30 @ 14:42) 01-02    141  |  99  |  63<H>  ----------------------------<  121<H>  4.0   |  25  |  4.53<H>    Ca    8.4      02 Jan 2019 04:23  Phos  5.6     01-02  Mg     1.9     01-02    TPro  5.4<L>  /  Alb  2.4<L>  /  TBili  9.1<H>  /  DBili  x   /  AST  336<H>  /  ALT  658<H>  /  AlkPhos  128<H>  01-02  ANTIBIOTICS: Zyvox                           DATE STARTED: 12/31/18  ANTIBIOTICS: Meropenem                 DATE STARTED: 12/31/18    Antimicrobial:  linezolid  IVPB 600 milliGRAM(s) IV Intermittent every 12 hours  meropenem  IVPB 500 milliGRAM(s) IV Intermittent every 12 hours    Cardiovascular:  digoxin  Injectable 0.125 milliGRAM(s) IV Push every other day    Other:  ascorbic acid 500 milliGRAM(s) Oral two times a day  chlorhexidine 4% Liquid 1 Application(s) Topical two times a day  docusate sodium Liquid 100 milliGRAM(s) Oral two times a day  epoetin monique Injectable 87274 Unit(s) IV Push <User Schedule>  fentaNYL   Infusion. 0.5 MICROgram(s)/kG/Hr IV Continuous <Continuous>  folic acid 1 milliGRAM(s) Oral daily  insulin glargine Injectable (LANTUS) 15 Unit(s) SubCutaneous at bedtime  insulin lispro (HumaLOG) corrective regimen sliding scale   SubCutaneous every 6 hours  iron sucrose IVPB 200 milliGRAM(s) IV Intermittent every 24 hours  morphine  - Injectable 2 milliGRAM(s) IV Push every 4 hours PRN  ondansetron Injectable 4 milliGRAM(s) IV Push every 6 hours PRN  pantoprazole  Injectable 40 milliGRAM(s) IV Push daily    Drug Dosing Weight  Height (cm): 154.94 (28 Dec 2018 01:00)  Weight (kg): 82.1 (28 Dec 2018 01:00)  BMI (kg/m2): 34.2 (28 Dec 2018 01:00)  BSA (m2): 1.81 (28 Dec 2018 01:00)    CENTRAL LINE: [x] YES [ ] NO  LOCATION:   DATE INSERTED:    DE SOUZA: [x] YES [ ] NO    DATE INSERTED:    A-LINE:  [x] YES [ ] NO  LOCATION:   DATE INSERTED:    ICU Vital Signs Last 24 Hrs  T(C): 36.2 (02 Jan 2019 05:00), Max: 37 (01 Jan 2019 20:17)  T(F): 97.2 (02 Jan 2019 05:00), Max: 98.6 (01 Jan 2019 20:17)  HR: 109 (02 Jan 2019 07:21) (83 - 114)  BP: 136/55 (02 Jan 2019 06:00) (96/45 - 144/70)  BP(mean): 73 (02 Jan 2019 06:00) (55 - 86)  ABP: 184/75 (02 Jan 2019 06:00) (98/55 - 216/88)  ABP(mean): 109 (02 Jan 2019 06:00) (68 - 129)  RR: 18 (02 Jan 2019 06:00) (6 - 32)  SpO2: 100% (02 Jan 2019 07:21) (91% - 100%)      ABG - ( 02 Jan 2019 04:59 )  pH, Arterial: 7.38  pH, Blood: x     /  pCO2: 43    /  pO2: 91    / HCO3: 24    / Base Excess: -0.2  /  SaO2: 96        PHYSICAL EXAM:    GENERAL: obese, white female, sedated  NEURO: Alert and oriented x 0      LABS:  CBC Full  -  ( 02 Jan 2019 04:17 )  WBC Count : 21.6 K/uL  Hemoglobin : 10.7 g/dL  Hematocrit : 34.4 %  Platelet Count - Automated : 58 K/uL  Mean Cell Volume : 66.0 fl  Mean Cell Hemoglobin : 20.5 pg  Mean Cell Hemoglobin Concentration : 31.0 gm/dL  Auto Neutrophil # : x  Auto Lymphocyte # : x  Auto Monocyte # : x  Auto Eosinophil # : x  Auto Basophil # : x  Auto Neutrophil % : 85.0 %  Auto Lymphocyte % : 12.0 %  Auto Monocyte % : 2.0 %  Auto Eosinophil % : 1.0 %  Auto Basophil % : x      Culture Results:   No growth to date. (12-30 @ 14:42)      Exam:  vasc: DP is palpable left and right foot   PT is not palpable left and right foot   there is purple/bluish discolorations distal foot   more pronounced on b/l hallux , second and third digit and b/l , the area of discolorations is cold to touch , and colder than skin around .   mild edema   neuro: N/a   MS: no movement        derm:  no open skin lesions, no odor , no active drainage

## 2019-01-03 NOTE — PROGRESS NOTE ADULT - SUBJECTIVE AND OBJECTIVE BOX
pt seen and examined.pts current chart reviewed and case discussed with resident covering.    SUBJECTIVE: Pt still on sedation and mechanical vent, still anuria, urine study unable to obtain due to anuria. Pt had complete dialysis on 19, will get HD today.     REVIEW OF SYSTEMS:  unable to obtain    Current meds:    ascorbic acid 500 milliGRAM(s) Oral two times a day  chlorhexidine 4% Liquid 1 Application(s) Topical two times a day  digoxin  Injectable 0.125 milliGRAM(s) IV Push every other day  docusate sodium Liquid 100 milliGRAM(s) Oral two times a day  epoetin monique Injectable 33860 Unit(s) IV Push <User Schedule>  fentaNYL   Infusion. 1 MICROgram(s)/kG/Hr IV Continuous <Continuous>  folic acid 1 milliGRAM(s) Oral daily  HYDROmorphone  Injectable 2 milliGRAM(s) IV Push every 4 hours PRN  insulin glargine Injectable (LANTUS) 10 Unit(s) SubCutaneous at bedtime  insulin lispro (HumaLOG) corrective regimen sliding scale   SubCutaneous every 6 hours  iron sucrose IVPB 200 milliGRAM(s) IV Intermittent every 24 hours  linezolid  IVPB 600 milliGRAM(s) IV Intermittent every 12 hours  meropenem  IVPB 500 milliGRAM(s) IV Intermittent every 12 hours  ondansetron Injectable 4 milliGRAM(s) IV Push every 6 hours PRN  pantoprazole  Injectable 40 milliGRAM(s) IV Push daily  propofol Infusion 5 MICROgram(s)/kG/Min IV Continuous <Continuous>      Vital Signs    T(F): 99.6 (19 @ 06:00), Max: 99.6 (19 @ 06:00)  HR: 88 (19 @ 09:00) (76 - 105)  BP: 106/49 (19 @ 08:00) (92/43 - 116/56)  ABP: 127/56 (19 @ 09:00) (72/68 - 150/70)  RR: 17 (19 @ 09:00) (9 - 20)  SpO2: 99% (19 @ 09:00) (92% - 100%)  Wt(kg): --  CVP(cm H2O): --  CO: --  PCWP: --    I and O's:     @ 07:  -   @ 07:00  --------------------------------------------------------  IN:    fentaNYL Infusion.: 479.7 mL    Solution: 350 mL  Total IN: 829.7 mL    OUT:    Indwelling Catheter - Urethral: 15 mL    Stool: 1 mL  Total OUT: 16 mL    Total NET: 813.7 mL       @ 07: @ 07:00  --------------------------------------------------------  IN:    fentaNYL Infusion.: 344.4 mL    fentaNYL Infusion.: 147.6 mL    Nepro with Carb Steady: 160 mL    propofol Infusion: 217.8 mL    Solution: 100 mL    Solution: 300 mL    Solution: 100 mL  Total IN: 1369.8 mL    OUT:    Indwelling Catheter - Urethral: 65 mL  Total OUT: 65 mL    Total NET: 1304.8 mL       @ 07: @ 11:06  --------------------------------------------------------  IN:    fentaNYL Infusion.: 24.6 mL    Nepro with Carb Steady: 40 mL    propofol Infusion: 19.8 mL  Total IN: 84.4 mL    OUT:  Total OUT: 0 mL    Total NET: 84.4 mL        Daily     Daily Weight in k.5 (2019 08:00)    PHYSICAL EXAM:  GENERAL: Well developed, Well nourished white female, sedated  HEENT:  Normocephalic/Atraumatic, reactive light reflex, moist mucous membranes  NECK: Supple, no JVD, LIJ line  RESP: Symmetric movement of the chest, diffuse bilateral basal rales, on Vent support   CVS: Tachycardic, S1 and S2 audible, no murmur, rubs or gallops  GI: Normal active bowel sounds present, abdomen soft, non tender, non distended  EXTREMITIES:  No edema, no clubbing, cyanosis, Lt femoral A-line, Rt Shiley femoral    MSK: 0/5 strength bilateral upper and lower extremities, Left hip scar/band, Blue hands/toes   PSYCH: Sedated  NEURO: sedated       LABS:    CBC:                          9.9    18.1  )-----------( 56       ( 2019 06:06 )             32.2           BMP:        138  |  99  |  81<H>  ----------------------------<  74  4.3   |  23  |  5.23<H>      141  |  99  |  63<H>  ----------------------------<  121<H>  4.0   |  25  |  4.53<H>      139  |  94<L>  |  59<H>  ----------------------------<  141<H>  4.0   |  28  |  4.68<H>      140  |  98  |  62<H>  ----------------------------<  203<H>  4.1   |  26  |  4.37<H>    Ca    8.3<L>      2019 06:06  Ca    8.4      2019 04:23  Ca    8.1<L>      2019 13:56  Ca    8.2<L>      2019 04:51  Phos  5.6     -  Phos  5.6     -  Phos  5.2     -  Mg     2.1     -  Mg     1.9     -  Mg     1.9     -    TPro  4.8<L>  /  Alb  2.0<L>  /  TBili  9.6<H>  /  DBili  x   /  AST  186<H>  /  ALT  385<H>  /  AlkPhos  122<H>    TPro  5.4<L>  /  Alb  2.4<L>  /  TBili  9.1<H>  /  DBili  x   /  AST  336<H>  /  ALT  658<H>  /  AlkPhos  128<H>    TPro  5.6<L>  /  Alb  2.7<L>  /  TBili  5.7<H>  /  DBili  x   /  AST  860<H>  /  ALT  1086<H>  /  AlkPhos  116            URINE STUDIES:  none  RADIOLOGY & ADDITIONAL STUDIES:    < from: Xray Chest 1 View- PORTABLE-Routine (19 @ 07:50) >  EXAM:  XR CHEST PORTABLE ROUTINE 1V                            PROCEDURE DATE:  2019          INTERPRETATION:  CLINICAL STATEMENT: Follow-up chest pain.    TECHNIQUE: AP view of the chest.    COMPARISON: 2019    FINDINGS/  IMPRESSION:  ETtube again noted. Left central line again noted. No pneumothorax. New   feeding tube noted tip under the diaphragm overlying expected region of   stomach.    Increased interstitial lung markings without significant change. Moderate   size right pleural effusion with adjacent opacity without significant   change    Heart size cannot be accurately assessed in this projection.      < end of copied text >  < from: US Hepatic & Pancreatic (19 @ 11:15) >  EXAM:  US LIVER AND PANCREAS                            PROCEDURE DATE:  2019          INTERPRETATION:  CLINICAL STATEMENT: Rule out gallstone..  Abdominal pain.    TECHNIQUE: Ultrasound of the right upper quadrant.    COMPARISON: None.    FINDINGS:  Liver measures 16.9 cm with normal echogenicity.    There is no gallstone or gallbladder wall thickening. 5 mm gallbladder   polyp noted    The common bile duct is not dilated measuring 4 mm.    The pancreas is not well seen.    The right kidney measures 10.8 cm in length.  There is no hydronephrosis.    IMPRESSION:  5 mm gallbladder polyp. Follow-up recommended.    < end of copied text >

## 2019-01-03 NOTE — PROGRESS NOTE ADULT - ASSESSMENT
1.MARIA G due to ATN from hypotensive and septic  shock with multiorgan failure  -pts renal status is poor with minimal u/o  -Last HD completed on 01/01/19 for about 2.5hrs with 1.0kg remove with acceptable bp. s/p cathflo, port working without issues and sufficient for dialysis.   -unable to obtain urine studies because anuric.   -Adjust meds to eGFR and avoid IV Gadolinium contrast, NSAIDs, and phosphate enema.  -Monitor I/O's daily.   -Monitor SMA daily.  -plan for HD 01/03/2019  2. Acidosis ;secondary to shcok/renal failure, now improved  -f/u co2 daily  -keep PH >7.3  3. Hyperkalemia: secondary to maria g, now improved   -keep k >4 and <5  -f/u k level daily  -add low k diet  4. Multiorgan failure: plan as per MICU. pt has poor prognosis.  5. Anemia:multifactorial  -add epogen in hd tiw  -f/u Hb daily  6.Hyperphosphatemia:mild, secondary to maria g  -f/u phos level daily  -continue hd 1.MARIA G due to ATN from hypotensive and septic  shock with multiorgan failure  -pts renal status is poor with minimal u/o  -Last HD completed on 01/01/19 for about 2.5hrs with 1.0kg remove with acceptable bp. s/p cathflo, port working without issues and sufficient for dialysis.   -unable to obtain urine studies because anuric.   -Adjust meds to eGFR and avoid IV Gadolinium contrast, NSAIDs, and phosphate enema.  -Monitor I/O's daily.   -Monitor SMA daily.  -plan for HD 01/03/2019  2. Acidosis ;secondary to shcok/renal failure, now improved  -f/u co2 daily  -keep PH >7.3  3. Hyperkalemia: secondary to maria g, now improved   -keep k >4 and <5  -f/u k level daily  -add low k diet  4. Multiorgan failure: plan as per MICU. pt has poor prognosis.  5. Anemia:multifactorial  -c/w epogen 6000units in hd tiw  -f/u Hb daily  6.Hyperphosphatemia:mild, secondary to maria g  -f/u phos level daily  -started phoslo 667mg tid   -continue hd 1.MARIA G due to ATN from hypotensive and septic  shock with multiorgan failure  -pts renal status is poor with minimal u/o  -Last HD completed on 01/01/19 for about 2.5hrs with 1.0kg remove with acceptable bp. s/p cathflo, port working without issues and sufficient for dialysis.   -unable to obtain urine studies because anuric.   -Adjust meds to eGFR and avoid IV Gadolinium contrast, NSAIDs, and phosphate enema.  -Monitor I/O's daily.   -Monitor SMA daily.  -plan for HD 01/03/2019  2. Acidosis ;secondary to shcok/renal failure, now improved  -f/u co2 daily  -keep PH >7.3  3. Hyperkalemia: secondary to maria g, now improved   -keep k >4 and <5  -f/u k level daily  - low k diet  4. Multiorgan failure: plan as per MICU. pt has poor prognosis.  5. Anemia:multifactorial  -c/w epogen 6000units in hd tiw  -f/u Hb daily  6.Hyperphosphatemia:mild, secondary to maria g  -f/u phos level daily  -started phoslo 667mg tid   -continue hd

## 2019-01-03 NOTE — PROGRESS NOTE ADULT - SUBJECTIVE AND OBJECTIVE BOX
INTERVAL HPI/OVERNIGHT EVENTS: No acute overnight events. Sedated with Propofol and Fentanyl.     PRESSORS: [ ] YES [x] NO    ANTIBIOTICS: Zyvox                           DATE STARTED: 12/31/18  ANTIBIOTICS: Meropenem                 DATE STARTED: 12/31/18    Antimicrobial:  linezolid  IVPB 600 milliGRAM(s) IV Intermittent every 12 hours  meropenem  IVPB 500 milliGRAM(s) IV Intermittent every 12 hours    Cardiovascular:  digoxin  Injectable 0.125 milliGRAM(s) IV Push every other day      Other:  ascorbic acid 500 milliGRAM(s) Oral two times a day  chlorhexidine 4% Liquid 1 Application(s) Topical two times a day  docusate sodium Liquid 100 milliGRAM(s) Oral two times a day  epoetin monique Injectable 22163 Unit(s) IV Push <User Schedule>  fentaNYL   Infusion. 1 MICROgram(s)/kG/Hr IV Continuous <Continuous>  folic acid 1 milliGRAM(s) Oral daily  HYDROmorphone  Injectable 2 milliGRAM(s) IV Push every 4 hours PRN  insulin glargine Injectable (LANTUS) 10 Unit(s) SubCutaneous at bedtime  insulin lispro (HumaLOG) corrective regimen sliding scale   SubCutaneous every 6 hours  iron sucrose IVPB 200 milliGRAM(s) IV Intermittent every 24 hours  ondansetron Injectable 4 milliGRAM(s) IV Push every 6 hours PRN  pantoprazole  Injectable 40 milliGRAM(s) IV Push daily  propofol Infusion 5 MICROgram(s)/kG/Min IV Continuous <Continuous>    ascorbic acid 500 milliGRAM(s) Oral two times a day  chlorhexidine 4% Liquid 1 Application(s) Topical two times a day  digoxin  Injectable 0.125 milliGRAM(s) IV Push every other day  docusate sodium Liquid 100 milliGRAM(s) Oral two times a day  epoetin monique Injectable 41632 Unit(s) IV Push <User Schedule>  fentaNYL   Infusion. 1 MICROgram(s)/kG/Hr IV Continuous <Continuous>  folic acid 1 milliGRAM(s) Oral daily  HYDROmorphone  Injectable 2 milliGRAM(s) IV Push every 4 hours PRN  insulin glargine Injectable (LANTUS) 10 Unit(s) SubCutaneous at bedtime  insulin lispro (HumaLOG) corrective regimen sliding scale   SubCutaneous every 6 hours  iron sucrose IVPB 200 milliGRAM(s) IV Intermittent every 24 hours  linezolid  IVPB 600 milliGRAM(s) IV Intermittent every 12 hours  meropenem  IVPB 500 milliGRAM(s) IV Intermittent every 12 hours  ondansetron Injectable 4 milliGRAM(s) IV Push every 6 hours PRN  pantoprazole  Injectable 40 milliGRAM(s) IV Push daily  propofol Infusion 5 MICROgram(s)/kG/Min IV Continuous <Continuous>    Drug Dosing Weight  Height (cm): 154.94 (28 Dec 2018 01:00)  Weight (kg): 82.1 (28 Dec 2018 01:00)  BMI (kg/m2): 34.2 (28 Dec 2018 01:00)  BSA (m2): 1.81 (28 Dec 2018 01:00)    CENTRAL LINE: [x] YES [ ] NO  LOCATION:   DATE INSERTED:    DE SOUZA: [x] YES [ ] NO    DATE INSERTED:    A-LINE:  [x] YES [ ] NO  LOCATION:   DATE INSERTED:    ICU Vital Signs Last 24 Hrs  T(C): 37.6 (03 Jan 2019 06:00), Max: 37.6 (03 Jan 2019 06:00)  T(F): 99.6 (03 Jan 2019 06:00), Max: 99.6 (03 Jan 2019 06:00)  HR: 86 (03 Jan 2019 08:00) (76 - 105)  BP: 106/49 (03 Jan 2019 08:00) (92/43 - 116/56)  BP(mean): 61 (03 Jan 2019 08:00) (54 - 70)  ABP: 122/51 (03 Jan 2019 08:00) (72/68 - 187/78)  ABP(mean): 71 (03 Jan 2019 08:00) (54 - 110)  RR: 16 (03 Jan 2019 08:00) (9 - 20)  SpO2: 100% (03 Jan 2019 08:00) (92% - 100%)      ABG - ( 03 Jan 2019 05:09 )  pH, Arterial: 7.41  pH, Blood: x     /  pCO2: 39    /  pO2: 94    / HCO3: 24    / Base Excess: 0.2   /  SaO2: 97                    01-02 @ 07:01  -  01-03 @ 07:00  --------------------------------------------------------  IN: 1369.8 mL / OUT: 65 mL / NET: 1304.8 mL        Mode: AC/ CMV (Assist Control/ Continuous Mandatory Ventilation)  RR (machine): 16  TV (machine): 450  FiO2: 60  PEEP: 5  ITime: 1  MAP: 10  PIP: 29      PHYSICAL EXAM:    GENERAL: Well developed, Well nourished white female, sedated  HEENT:  Normocephalic/Atraumatic, reactive light reflex, moist mucous membranes, yellow eyes  NECK: Supple, no JVD, LIJ line  RESP: Symmetric movement of the chest, clear to auscultation bilaterally, diffuse bilateral basal rales   CVS: Tachycardic, S1 and S2 audible, no murmur, rubs or gallops  GI: Normal active bowel sounds present, abdomen soft, non tender, non distended  EXTREMITIES:  No edema, no clubbing, cyanosis, Lt femoral A-line, Rt Shiley femoral    MSK: 0/5 strength bilateral upper and lower extremities, Left hip scar/band, Blue hands/toes   PSYCH: Sedated  NEURO: Alert and oriented x 0      LABS:  CBC Full  -  ( 03 Jan 2019 06:06 )  WBC Count : 18.1 K/uL  Hemoglobin : 9.9 g/dL  Hematocrit : 32.2 %  Platelet Count - Automated : 56 K/uL  Mean Cell Volume : 66.6 fl  Mean Cell Hemoglobin : 20.6 pg  Mean Cell Hemoglobin Concentration : 30.8 gm/dL  Auto Neutrophil # : 14.4 K/uL  Auto Lymphocyte # : 1.7 K/uL  Auto Monocyte # : 1.5 K/uL  Auto Eosinophil # : 0.3 K/uL  Auto Basophil # : 0.1 K/uL  Auto Neutrophil % : 79.6 %  Auto Lymphocyte % : 9.6 %  Auto Monocyte % : 8.5 %  Auto Eosinophil % : 1.8 %  Auto Basophil % : 0.6 %    01-03    138  |  99  |  81<H>  ----------------------------<  74  4.3   |  23  |  5.23<H>    Ca    8.3<L>      03 Jan 2019 06:06  Phos  5.6     01-03  Mg     2.1     01-03    TPro  4.8<L>  /  Alb  2.0<L>  /  TBili  9.6<H>  /  DBili  x   /  AST  186<H>  /  ALT  385<H>  /  AlkPhos  122<H>  01-03    PT/INR - ( 02 Jan 2019 14:20 )   PT: 20.0 sec;   INR: 1.77 ratio         PTT - ( 02 Jan 2019 14:20 )  PTT:34.9 sec        [ ]GOALS OF CARE DISCUSSION WITH PATIENT/FAMILY/PROXY: DNR only     CRITICAL CARE TIME SPENT: 35 minutes

## 2019-01-03 NOTE — PROGRESS NOTE ADULT - PROBLEM SELECTOR PLAN 2
Multiorgan failure with Anuria with severe lactic acidosis   Rt Femoral Shiley  Dialysis today  Poor urine output   Dr Lay on board

## 2019-01-03 NOTE — PROGRESS NOTE ADULT - SUBJECTIVE AND OBJECTIVE BOX
INTERVAL HPI/OVERNIGHT EVENTS:    Pt seen and examined at bedside. No acute events overnight, remains intubated and sedated.     Vital Signs Last 24 Hrs  T(C): 37.2 (03 Jan 2019 11:00), Max: 37.6 (03 Jan 2019 06:00)  T(F): 99 (03 Jan 2019 11:00), Max: 99.6 (03 Jan 2019 06:00)  HR: 86 (03 Jan 2019 12:17) (77 - 105)  BP: 106/49 (03 Jan 2019 08:00) (99/47 - 116/56)  BP(mean): 61 (03 Jan 2019 08:00) (55 - 70)  RR: 19 (03 Jan 2019 11:00) (9 - 20)  SpO2: 100% (03 Jan 2019 12:17) (92% - 100%)  I&O's Detail    02 Jan 2019 07:01  -  03 Jan 2019 07:00  --------------------------------------------------------  IN:    fentaNYL Infusion.: 147.6 mL    fentaNYL Infusion.: 344.4 mL    Nepro with Carb Steady: 160 mL    propofol Infusion: 217.8 mL    Solution: 100 mL    Solution: 300 mL    Solution: 100 mL  Total IN: 1369.8 mL    OUT:    Indwelling Catheter - Urethral: 65 mL  Total OUT: 65 mL    Total NET: 1304.8 mL      03 Jan 2019 07:01  -  03 Jan 2019 12:52  --------------------------------------------------------  IN:    fentaNYL Infusion.: 41 mL    Nepro with Carb Steady: 80 mL    propofol Infusion: 44.4 mL  Total IN: 165.4 mL    OUT:    Indwelling Catheter - Urethral: 10 mL  Total OUT: 10 mL    Total NET: 155.4 mL        docusate sodium Liquid 100 milliGRAM(s) Oral two times a day  linezolid  IVPB 600 milliGRAM(s) IV Intermittent every 12 hours  meropenem  IVPB 500 milliGRAM(s) IV Intermittent every 12 hours  pantoprazole  Injectable 40 milliGRAM(s) IV Push daily      Physical Exam  General: Intubated, Sedated  Abdomen: Obese, soft, NT  Extremities: left femoral A-line in left groin, Cyanosis and coolness of distal toes b/l LE, worst on 1st and 2nd toes b/l LE, Left greater then Right. Right groin HD catheter in place (placed by ICU team)  Vascular:  Palpable DP pulse    Labs:                        9.9    18.1  )-----------( 56       ( 03 Jan 2019 06:06 )             32.2     01-03    138  |  99  |  81<H>  ----------------------------<  74  4.3   |  23  |  5.23<H>    Ca    8.3<L>      03 Jan 2019 06:06  Phos  5.6     01-03  Mg     2.1     01-03    TPro  4.8<L>  /  Alb  2.0<L>  /  TBili  9.6<H>  /  DBili  x   /  AST  186<H>  /  ALT  385<H>  /  AlkPhos  122<H>  01-03    PT/INR - ( 02 Jan 2019 14:20 )   PT: 20.0 sec;   INR: 1.77 ratio         PTT - ( 02 Jan 2019 14:20 )  PTT:34.9 sec

## 2019-01-03 NOTE — PROGRESS NOTE ADULT - SUBJECTIVE AND OBJECTIVE BOX
INTERVAL HPI/OVERNIGHT EVENTS:  intubated on vent sedated. anuric       Antimicrobial:  linezolid  IVPB 600 milliGRAM(s) IV Intermittent every 12 hours  meropenem  IVPB 500 milliGRAM(s) IV Intermittent every 12 hours    Cardiovascular:  digoxin  Injectable 0.125 milliGRAM(s) IV Push every other day    Pulmonary:    Hematalogic:    Other:  ascorbic acid 500 milliGRAM(s) Oral two times a day  calcium acetate 667 milliGRAM(s) Oral three times a day with meals  chlorhexidine 4% Liquid 1 Application(s) Topical two times a day  docusate sodium Liquid 100 milliGRAM(s) Oral two times a day  epoetin monique Injectable 6000 Unit(s) IV Push <User Schedule>  fentaNYL   Infusion. 1 MICROgram(s)/kG/Hr IV Continuous <Continuous>  folic acid 1 milliGRAM(s) Oral daily  HYDROmorphone  Injectable 2 milliGRAM(s) IV Push every 4 hours PRN  insulin glargine Injectable (LANTUS) 10 Unit(s) SubCutaneous at bedtime  insulin lispro (HumaLOG) corrective regimen sliding scale   SubCutaneous every 6 hours  iron sucrose IVPB 200 milliGRAM(s) IV Intermittent every 24 hours  ondansetron Injectable 4 milliGRAM(s) IV Push every 6 hours PRN  pantoprazole  Injectable 40 milliGRAM(s) IV Push daily  propofol Infusion 5 MICROgram(s)/kG/Min IV Continuous <Continuous>      Drug Dosing Weight  Height (cm): 154.94 (28 Dec 2018 01:00)  Weight (kg): 82.1 (28 Dec 2018 01:00)  BMI (kg/m2): 34.2 (28 Dec 2018 01:00)  BSA (m2): 1.81 (28 Dec 2018 01:00)    CENTRAL LINE: [ x] YES [ ] NO  LOCATION:   DATE INSERTED:    DE SOUZA: [ x] YES [ ] NO    DATE INSERTED:    A-LINE:  [x ] YES [ ] NO  LOCATION:   DATE INSERTED:    PMH/Social Hx/Fam Hx -reviewed admission note, no change since admission  PAST MEDICAL & SURGICAL HISTORY:  No pertinent past medical history  No significant past surgical history      T(C): 37.2 (01-03-19 @ 11:00), Max: 37.6 (01-03-19 @ 06:00)  HR: 89 (01-03-19 @ 13:00)  BP: 111/44 (01-03-19 @ 12:00)  BP(mean): 58 (01-03-19 @ 12:00)  ABP: 141/58 (01-03-19 @ 13:00)  ABP(mean): 82 (01-03-19 @ 13:00)  RR: 17 (01-03-19 @ 13:00)  SpO2: 100% (01-03-19 @ 13:00)  Wt(kg): --    ABG - ( 03 Jan 2019 05:09 )  pH, Arterial: 7.41  pH, Blood: x     /  pCO2: 39    /  pO2: 94    / HCO3: 24    / Base Excess: 0.2   /  SaO2: 97                    01-02 @ 07:01  -  01-03 @ 07:00  --------------------------------------------------------  IN: 1369.8 mL / OUT: 65 mL / NET: 1304.8 mL        Mode: AC/ CMV (Assist Control/ Continuous Mandatory Ventilation)  RR (machine): 16  TV (machine): 450  FiO2: 60  PEEP: 5  ITime: 1  MAP: 7  PIP: 17      PHYSICAL EXAM:    GENERAL: No signs of distress, comfortable  HEAD: Atraumatic, Normocephalic  EYES: EOMI, PERRLA  ENMT: No erythema, exudates, or enlargement, Moist mucous membranes +ETT  NECK: Supple, normal appearance, No JVD; [  ] central line (if applicable)  CHEST/LUNG: No chest deformity, fair bilateral air entry; No rales, rhonchi, wheezing; crackles  HEART: Regular rate and rhythm; No murmurs, rubs, or gallops;   ABDOMEN: Soft, Nontender, Nondistended; Bowel sounds present  EXTREMITIES:  + Peripheral Pulses,purple toes and fingers  NERVOUS SYSTEM: sedated  LYMPH: No lymphadenopathy noted  SKIN: No rashes or lesions; good turgor, warm, dry      LABS:  CBC Full  -  ( 03 Jan 2019 06:06 )  WBC Count : 18.1 K/uL  Hemoglobin : 9.9 g/dL  Hematocrit : 32.2 %  Platelet Count - Automated : 56 K/uL  Mean Cell Volume : 66.6 fl  Mean Cell Hemoglobin : 20.6 pg  Mean Cell Hemoglobin Concentration : 30.8 gm/dL  Auto Neutrophil # : 14.4 K/uL  Auto Lymphocyte # : 1.7 K/uL  Auto Monocyte # : 1.5 K/uL  Auto Eosinophil # : 0.3 K/uL  Auto Basophil # : 0.1 K/uL  Auto Neutrophil % : 79.6 %  Auto Lymphocyte % : 9.6 %  Auto Monocyte % : 8.5 %  Auto Eosinophil % : 1.8 %  Auto Basophil % : 0.6 %    01-03    138  |  99  |  81<H>  ----------------------------<  74  4.3   |  23  |  5.23<H>    Ca    8.3<L>      03 Jan 2019 06:06  Phos  5.6     01-03  Mg     2.1     01-03    TPro  4.8<L>  /  Alb  2.0<L>  /  TBili  9.6<H>  /  DBili  x   /  AST  186<H>  /  ALT  385<H>  /  AlkPhos  122<H>  01-03    PT/INR - ( 02 Jan 2019 14:20 )   PT: 20.0 sec;   INR: 1.77 ratio         PTT - ( 02 Jan 2019 14:20 )  PTT:34.9 sec        RADIOLOGY & ADDITIONAL STUDIES REVIEWED     CXR:< from: Xray Chest 1 View- PORTABLE-Routine (01.03.19 @ 07:42) >  EXAM:  XR CHEST PORTABLE ROUTINE 1V                            PROCEDURE DATE:  01/03/2019          INTERPRETATION:  AP chest on January 3, 2019 at 7:08 AM. Patient requires   intubation and has positive lung findings.    Heart is enlarged.    Endotracheal tube, nasogastric tube, left jugular line remain.    On present film heart and rotation obscure right lower lung field.    There are significant congestive like changes with some mild right base   fluid.    There may be some improvement in the right chest findings compared to   January 2.    IMPRESSION: As above.      < end of copied text >      IMPRESSION:  PAST MEDICAL & SURGICAL HISTORY:  No pertinent past medical history  No significant past surgical history   p/w       IMP: This is an 78 yr old woman with CAD, CHF with decrease EF,  and prior mentioned medical condition s/p fall with right hip fx. S/P Right hip IM nailing 12/27. Post op resp failure and cardiogenic shock  vs septic on pressors to maintain BP.  Pat is in MOSF.  Severe life threatening metabolic acidosis due to MARIA G, MOSF. Coagulopathy due to liver failure ( shock liver). Started on iv antibx . Pat is auto coagulopathic s/p ffp but inr is still elevated,hence no dialysis cath placement. acidosis is better with iv bicarb. Antibx changed by ID. Had fist session od dialysis.. incompete. LFT improving. Cyanotic toes and fingers due to low flow state and pressors ( now off presssors).  Started on HD , had first complete session 1/1. Will repeat dialysis as per renal 1/3. Anuric now VAscular f/u noted.. will remove A-line .  Podiatry eval noted            Plan:      CNS: sedate as needed    PULMONARY: continue vent support    CARDIAC: monitor    GI: npo    RENAL: dialysis as per renal    SKIN: monitor    MUSCULOSKELETAL: boots    ID: continue antibx    HEME: monitor    DVT and GI Prophylaxis    GOALS OF CARE DISCUSSION WITH PATIENT/FAMILY/PROXY/ icu team on rounds    CRITICAL CARE TIME SPENT: 35 minutes

## 2019-01-03 NOTE — PROGRESS NOTE ADULT - SUBJECTIVE AND OBJECTIVE BOX
MARICEL SLAUGHTER  MR# [id number]  79yFemale        Patient is a 79y old  Female who presents with a chief complaint of fall (03 Jan 2019 15:39)      INTERVAL HPI/OVERNIGHT EVENTS:  Patient seen and examined at bedside.     ALLERGIES  No Known Allergies      MEDICATIONS  ascorbic acid 500 milliGRAM(s) Oral two times a day  calcium acetate 667 milliGRAM(s) Oral three times a day with meals  chlorhexidine 4% Liquid 1 Application(s) Topical two times a day  digoxin  Injectable 0.125 milliGRAM(s) IV Push every other day  docusate sodium Liquid 100 milliGRAM(s) Oral two times a day  epoetin monique Injectable 6000 Unit(s) IV Push <User Schedule>  fentaNYL   Infusion. 1 MICROgram(s)/kG/Hr IV Continuous <Continuous>  folic acid 1 milliGRAM(s) Oral daily  HYDROmorphone  Injectable 2 milliGRAM(s) IV Push every 4 hours PRN Severe Pain (7 - 10)  insulin glargine Injectable (LANTUS) 10 Unit(s) SubCutaneous at bedtime  insulin lispro (HumaLOG) corrective regimen sliding scale   SubCutaneous every 6 hours  linezolid  IVPB 600 milliGRAM(s) IV Intermittent every 12 hours  meropenem  IVPB 500 milliGRAM(s) IV Intermittent every 12 hours  ondansetron Injectable 4 milliGRAM(s) IV Push every 6 hours PRN Nausea and/or Vomiting  pantoprazole  Injectable 40 milliGRAM(s) IV Push daily  propofol Infusion 5 MICROgram(s)/kG/Min IV Continuous <Continuous>              REVIEW OF SYSTEMS:  CONSTITUTIONAL: No fever, weight loss, or fatigue  EYES: No eye pain, visual disturbances, or discharge  ENT:  No difficulty hearing, tinnitus, vertigo; No sinus or throat pain  NECK: No pain or stiffness  RESPIRATORY: No cough, wheezing, chills or hemoptysis; No Shortness of Breath  CARDIOVASCULAR: No chest pain, palpitations, passing out, dizziness, or leg swelling  GASTROINTESTINAL: No abdominal or epigastric pain. No nausea, vomiting, or hematemesis; No diarrhea or constipation. No melena or hematochezia.  GENITOURINARY: No dysuria, frequency, hematuria, or incontinence  NEUROLOGICAL: No headaches, memory loss, loss of strength, numbness, or tremors  SKIN: No itching, burning, rashes, or lesions   LYMPH Nodes: No enlarged glands  ENDOCRINE: No heat or cold intolerance; No hair loss  MUSCULOSKELETAL: No joint pain or swelling; No muscle, back, or extremity pain  PSYCHIATRIC: No depression, anxiety, mood swings, or difficulty sleeping  HEME/LYMPH: No easy bruising, or bleeding gums  ALLERGY AND IMMUNOLOGIC: No hives or eczema	    [ ] All others negative	  [ ] Unable to obtain      T(C): 36.6 (01-03-19 @ 17:55), Max: 37.6 (01-03-19 @ 06:00)  T(F): 97.9 (01-03-19 @ 17:55), Max: 99.6 (01-03-19 @ 06:00)  HR: 94 (01-03-19 @ 18:00) (80 - 101)  BP: 128/45 (01-03-19 @ 16:00) (99/47 - 128/45)  RR: 22 (01-03-19 @ 18:00) (9 - 25)  SpO2: 100% (01-03-19 @ 18:00) (92% - 100%)  Wt(kg): --    I&O's Summary    02 Jan 2019 07:01  -  03 Jan 2019 07:00  --------------------------------------------------------  IN: 1369.8 mL / OUT: 65 mL / NET: 1304.8 mL    03 Jan 2019 07:01  -  03 Jan 2019 20:29  --------------------------------------------------------  IN: 973.7 mL / OUT: 35 mL / NET: 938.7 mL          PHYSICAL EXAM:  A X O x  HEAD:  Atraumatic, Normocephalic  EYES: EOMI, PERRLA, conjunctiva and sclera clear  NECK: Supple, No JVD, Normal thyroid  Resp: CTAB, No crackles, wheezing,   CVS: Regular rate and rhythm; No discernable murmurs, rubs, or gallops  ABD: Soft, Nontender, Nondistended; Bowel sounds present  EXTREMITIES:  2+ Peripheral Pulses, No edema  LYMPH: No dicernable lymphadenopathy noted  GENERAL: NAD, well-groomed, well-developed      LABS:                        9.9    18.1  )-----------( 56       ( 03 Jan 2019 06:06 )             32.2     01-03    138  |  99  |  81<H>  ----------------------------<  74  4.3   |  23  |  5.23<H>    Ca    8.3<L>      03 Jan 2019 06:06  Phos  5.6     01-03  Mg     2.1     01-03    TPro  4.8<L>  /  Alb  2.0<L>  /  TBili  9.6<H>  /  DBili  x   /  AST  186<H>  /  ALT  385<H>  /  AlkPhos  122<H>  01-03    PT/INR - ( 02 Jan 2019 14:20 )   PT: 20.0 sec;   INR: 1.77 ratio         PTT - ( 02 Jan 2019 14:20 )  PTT:34.9 sec    CAPILLARY BLOOD GLUCOSE      POCT Blood Glucose.: 162 mg/dL (03 Jan 2019 17:20)  POCT Blood Glucose.: 150 mg/dL (03 Jan 2019 11:05)  POCT Blood Glucose.: 139 mg/dL (03 Jan 2019 06:57)  POCT Blood Glucose.: 97 mg/dL (02 Jan 2019 22:17)      Troponins:  ProBNP:  Lipid Profile:   HgA1c:  TSH:     ABG - ( 03 Jan 2019 05:09 )  pH, Arterial: 7.41  pH, Blood: x     /  pCO2: 39    /  pO2: 94    / HCO3: 24    / Base Excess: 0.2   /  SaO2: 97                    RADIOLOGY & ADDITIONAL TESTS:    Imaging Personally Reviewed:  [ ] YES  [ ] NO      Consultant(s) Notes Reviewed:  [x ] YES  [ ] NO    Care Discussed with Consultants/Other Providers [ x] YES  [ ] NO          PAST MEDICAL & SURGICAL HISTORY:  No pertinent past medical history  No significant past surgical history        Closed fracture of neck of left femur  Yes  No pertinent family history in first degree relatives  Handoff  MEWS Score  No pertinent past medical history  Closed fracture of left hip, initial encounter  Peripheral arterial disease  Drop in hemoglobin  Acute liver failure  Acute renal failure  Septic shock  Acute post-operative pain  Hypotension  Fever  Acute systolic congestive heart failure  Cardiogenic shock  Prophylactic measure  Diabetes mellitus  HTN (hypertension)  Heart failure with reduced ejection fraction  PSVT (paroxysmal supraventricular tachycardia)  Left ventricular dysfunction  Need for prophylactic measure  Closed fracture of left hip, initial encounter  No significant past surgical history  A S/P FALL

## 2019-01-03 NOTE — PROGRESS NOTE ADULT - PROBLEM SELECTOR PLAN 1
CXR: Bilateral basal pneumonia and diffuse congestion  Intubated and sedated  C/w Zyvox and meropenem  Blood Cx: NTD

## 2019-01-03 NOTE — PROGRESS NOTE ADULT - ASSESSMENT
77 y/o F pt with PMHX of  HTN, DM2, who presents to ED c/o L hip pain s/p trip and fall from a curb, SVT and severe lv dysfunction,post-op septic shock,MARIA G,LLL pneumonia,and shock liver.  1.ICU monitoring.  2.Vent  support as per iCU.  3.PSVT-dig .125mg qod, add low dose b blocker.  4.DM-Insulin.  5.Ortho f/u.  6.Replace vit b12.  7.ID eval, ABX.  8.MARIA G with metabolic acidosis-Renal f/u.  9.Shock liver-f/u lft's.  10. HD as per renal.  11.GI prophylaxis.  12.Coagulopathy-Heme f/u.

## 2019-01-03 NOTE — PROGRESS NOTE ADULT - SUBJECTIVE AND OBJECTIVE BOX
Pt had multiorgan failure including coagulopathy after hip surgery    Now coags are better, shakeel edmondson    Will follow as needed    Dr Gao to follow from tomorrow  Nasir Gondal  [phone]

## 2019-01-03 NOTE — CHART NOTE - NSCHARTNOTEFT_GEN_A_CORE
Assessment:   Patient is a 79y old  Female who presents with a chief complaint of fall (2019 15:39)Pt started on TF. Pt seen this PM, TF infusing @ 20 ml/hr (Nepro), Propofol @ 14.8 ml/hr. Pt remains intubated and sedated.  MOSF noted. Anuria, first complete HD  noted.      Factors impacting intake: [ ] none [ ] nausea  [ ] vomiting [ ] diarrhea [ ] constipation  [ ]chewing problems [ ] swallowing issues  [x ] other: intubation, MOSF.    Diet Presciption: Diet, NPO with Tube Feed:   Tube Feeding Modality: Nasogastric  Nepro with Carb Steady  Continuous  Starting Tube Feed Rate {mL per Hour}: 10  Increase Tube Feed Rate by (mL): 10     Every 6 hours  Until Goal Tube Feed Rate (mL per Hour): 20  Tube Feed Duration (in Hours): 24  Tube Feed Start Time: 18:00 (19 @ 17:15)    Intake:     Daily Height in cm: 154.94 (28 Dec 2018 01:00)  Height in cm: 154.94 (27 Dec 2018 19:00)  Height in cm: 154.94 (26 Dec 2018 12:22)      Daily Weight in k.5 (2019 08:00)  Weight in k.6 (2019 08:00)  Weight in k.5 (31 Dec 2018 15:15)  Weight in k.6 (31 Dec 2018 13:20)  Weight in k.2 (30 Dec 2018 07:00)  Weight in k.3 (29 Dec 2018 09:33)  Weight in k.3 (29 Dec 2018 09:33)  Weight in k.5 (29 Dec 2018 08:00)  Weight in k.4 (28 Dec 2018 07:00)  Weight in k.9 (27 Dec 2018 05:29)    % Weight Change: 3+ generalized edema noted    Pertinent Medications: MEDICATIONS  (STANDING):  ascorbic acid 500 milliGRAM(s) Oral two times a day  calcium acetate 667 milliGRAM(s) Oral three times a day with meals  chlorhexidine 4% Liquid 1 Application(s) Topical two times a day  digoxin  Injectable 0.125 milliGRAM(s) IV Push every other day  docusate sodium Liquid 100 milliGRAM(s) Oral two times a day  epoetin monique Injectable 6000 Unit(s) IV Push <User Schedule>  fentaNYL   Infusion. 1 MICROgram(s)/kG/Hr (8.21 mL/Hr) IV Continuous <Continuous>  folic acid 1 milliGRAM(s) Oral daily  insulin glargine Injectable (LANTUS) 10 Unit(s) SubCutaneous at bedtime  insulin lispro (HumaLOG) corrective regimen sliding scale   SubCutaneous every 6 hours  iron sucrose IVPB 200 milliGRAM(s) IV Intermittent every 24 hours  linezolid  IVPB 600 milliGRAM(s) IV Intermittent every 12 hours  meropenem  IVPB 500 milliGRAM(s) IV Intermittent every 12 hours  pantoprazole  Injectable 40 milliGRAM(s) IV Push daily  propofol Infusion 5 MICROgram(s)/kG/Min (2.463 mL/Hr) IV Continuous <Continuous>    MEDICATIONS  (PRN):  HYDROmorphone  Injectable 2 milliGRAM(s) IV Push every 4 hours PRN Severe Pain (7 - 10)  ondansetron Injectable 4 milliGRAM(s) IV Push every 6 hours PRN Nausea and/or Vomiting    Pertinent Labs:  Na138 mmol/L Glu 74 mg/dL K+ 4.3 mmol/L Cr  5.23 mg/dL<H> BUN 81 mg/dL<H>  Phos 5.6 mg/dL<H>  Alb 2.0 g/dL<L>  [id number] 6.1 %<H>  Chol --    LDL --    HDL --    Trig 165 mg/dL<H>     CAPILLARY BLOOD GLUCOSE      POCT Blood Glucose.: 150 mg/dL (2019 11:05)  POCT Blood Glucose.: 139 mg/dL (2019 06:57)  POCT Blood Glucose.: 97 mg/dL (2019 22:17)  POCT Blood Glucose.: 86 mg/dL (2019 18:12)  POCT Blood Glucose.: 54 mg/dL (2019 18:08)    Skin:     Estimated Needs:   [ ] no change since previous assessment  [ ] recalculated:       Previous Nutrition Diagnosis:   [ ] Altered GI function  [x ]Inadequate Oral Intake [ ] Swallowing Difficulty   [ ] Altered nutrition related labs [ ] Increased Nutrient Needs [ ] Overweight/Obesity   [ ] Unintended Weight Loss [ ] Food & Nutrition Related Knowledge Deficit [ ] Malnutrition   [ ] Other:     Nutrition Diagnosis is [x ] ongoing  [ ] resolved [ ] not applicable     New Nutrition Diagnosis: [ ] not applicable       Interventions:   Recommend  [ ] Change Diet To:  [ ] Nutrition Supplement  [x ] Nutrition Support: Nepro 20x24 hrs provides 480 ml 864 kcals, 39 gm protein, 348 ml free water. (Propofol @14.8 ml/hr (X24 hrs) provides ~390 kcals. (Total kcals ~1254/ day). May maintain current TF level @ this time (with ~Propofol at this rate), for now. May want to add Prosource (each pkt would add 15 gm protein, 60 kcals) and would require additional water. MD to monitor. RD available.   [ ] Other:     Monitoring and Evaluation:   [ ] PO intake [ x ] Tolerance to diet prescription [ x ] weights [ x ] labs[ x ] follow up per protocol  [ ] other:

## 2019-01-03 NOTE — PROGRESS NOTE ADULT - SUBJECTIVE AND OBJECTIVE BOX
CHIEF COMPLAINT:Patient is a 79y old  Female who presents with a chief complaint of fall.Pt remains intubated.    	  REVIEW OF SYSTEMS:  [x ] Unable to obtain    PHYSICAL EXAM:  T(C): 37.2 (01-03-19 @ 11:00), Max: 37.6 (01-03-19 @ 06:00)  HR: 84 (01-03-19 @ 11:00) (76 - 105)  BP: 106/49 (01-03-19 @ 08:00) (92/43 - 116/56)  RR: 19 (01-03-19 @ 11:00) (9 - 20)  SpO2: 92% (01-03-19 @ 11:00) (92% - 100%)  Wt(kg): --  I&O's Summary    02 Jan 2019 07:01  -  03 Jan 2019 07:00  --------------------------------------------------------  IN: 1369.8 mL / OUT: 65 mL / NET: 1304.8 mL    03 Jan 2019 07:01  -  03 Jan 2019 11:41  --------------------------------------------------------  IN: 165.4 mL / OUT: 10 mL / NET: 155.4 mL        Appearance: Normal	  HEENT:   Normal oral mucosa, PERRL, EOMI	  Lymphatic: No lymphadenopathy  Cardiovascular: Normal S1 S2, No JVD, No murmurs, +1 edema  Respiratory: Lungs clear to auscultation	  Gastrointestinal:  Soft, Non-tender, + BS	  Skin: No rashes, No ecchymoses, No cyanosis	  Extremities: Normal range of motion, No clubbing, cyanosis +1 edema  Vascular: Peripheral pulses palpable 2+ bilaterally    MEDICATIONS  (STANDING):  ascorbic acid 500 milliGRAM(s) Oral two times a day  chlorhexidine 4% Liquid 1 Application(s) Topical two times a day  digoxin  Injectable 0.125 milliGRAM(s) IV Push every other day  docusate sodium Liquid 100 milliGRAM(s) Oral two times a day  epoetin monique Injectable 49989 Unit(s) IV Push <User Schedule>  fentaNYL   Infusion. 1 MICROgram(s)/kG/Hr (8.21 mL/Hr) IV Continuous <Continuous>  folic acid 1 milliGRAM(s) Oral daily  insulin glargine Injectable (LANTUS) 10 Unit(s) SubCutaneous at bedtime  insulin lispro (HumaLOG) corrective regimen sliding scale   SubCutaneous every 6 hours  iron sucrose IVPB 200 milliGRAM(s) IV Intermittent every 24 hours  linezolid  IVPB 600 milliGRAM(s) IV Intermittent every 12 hours  meropenem  IVPB 500 milliGRAM(s) IV Intermittent every 12 hours  pantoprazole  Injectable 40 milliGRAM(s) IV Push daily  propofol Infusion 5 MICROgram(s)/kG/Min (2.463 mL/Hr) IV Continuous <Continuous>      LABS:	 	                       9.9    18.1  )-----------( 56       ( 03 Jan 2019 06:06 )             32.2     01-03    138  |  99  |  81<H>  ----------------------------<  74  4.3   |  23  |  5.23<H>    Ca    8.3<L>      03 Jan 2019 06:06  Phos  5.6     01-03  Mg     2.1     01-03    TPro  4.8<L>  /  Alb  2.0<L>  /  TBili  9.6<H>  /  DBili  x   /  AST  186<H>  /  ALT  385<H>  /  AlkPhos  122<H>  01-03    proBNP:   Lipid Profile: Cholesterol --  LDL --  HDL --    Cholesterol 82  LDL 22  HDL 40      HgA1c: Hemoglobin A1C, Whole Blood: 6.1 % (12-27 @ 09:51)  Hemoglobin A1C, Whole Blood: 6.2 % (12-26 @ 23:12)    TSH: Thyroid Stimulating Hormone, Serum: 1.19 uU/mL (12-27 @ 06:50)      Culture - Blood (12.30.18 @ 14:42)    Specimen Source: .Blood Blood-Peripheral    Culture Results:   No growth to date.    Blood Gas Profile - Arterial in AM (01.03.19 @ 05:09)    pH, Arterial: 7.41    pCO2, Arterial: 39 mmHg    pO2, Arterial: 94 mmHg    HCO3, Arterial: 24 mmol/L    Base Excess, Arterial: 0.2 mmol/L    Oxygen Saturation, Arterial: 97 %    FIO2, Arterial: 60    Blood Gas Comments Arterial: CMV  16  - 450    A.L.

## 2019-01-03 NOTE — PROGRESS NOTE ADULT - ASSESSMENT
78 y/o Female w/ shock s/p left IM hip nailing on 12/27; Now w/ cyanosis of b/l distal LE, likely secondary to pressors. ARF, shock liver, coagulopathy with thrombocytopenia    - Serial b/l LE exams  - Arterial duplex/ GETACHEW/PVR when stable   - Rec removal of lt fem A-line when coagulopathy and thrombocytopenia reversed  - HD as per renal   - Care as per ICU   - Will follow

## 2019-01-03 NOTE — PROGRESS NOTE ADULT - SUBJECTIVE AND OBJECTIVE BOX
79yFemale    Diagnosis:  S/p Left Hip IM Nailing POD#7    Patient was seen and evaluated at bedside. Patient is currently intubated and sedated.  Patient is off vasopressors.      Vital Signs Last 24 Hrs  T(C): 37.6 (03 Jan 2019 06:00), Max: 37.6 (03 Jan 2019 06:00)  T(F): 99.6 (03 Jan 2019 06:00), Max: 99.6 (03 Jan 2019 06:00)  HR: 86 (03 Jan 2019 08:00) (76 - 105)  BP: 106/49 (03 Jan 2019 08:00) (92/43 - 116/56)  BP(mean): 61 (03 Jan 2019 08:00) (54 - 70)  RR: 16 (03 Jan 2019 08:00) (9 - 20)  SpO2: 100% (03 Jan 2019 08:00) (92% - 100%)  I&O's Detail    02 Jan 2019 07:01  -  03 Jan 2019 07:00  --------------------------------------------------------  IN:    fentaNYL Infusion.: 147.6 mL    fentaNYL Infusion.: 344.4 mL    Nepro with Carb Steady: 160 mL    propofol Infusion: 217.8 mL    Solution: 100 mL    Solution: 300 mL    Solution: 100 mL  Total IN: 1369.8 mL    OUT:    Indwelling Catheter - Urethral: 65 mL  Total OUT: 65 mL    Total NET: 1304.8 mL      03 Jan 2019 07:01  -  03 Jan 2019 08:56  --------------------------------------------------------  IN:    fentaNYL Infusion.: 16.4 mL    Nepro with Carb Steady: 20 mL    propofol Infusion: 9.9 mL  Total IN: 46.3 mL    OUT:  Total OUT: 0 mL    Total NET: 46.3 mL    Physical Exam:  General: Intubated and sedated, NAD  Left Hip:  Dressing is C/D/I. Skin is pink and warm. Staples intact. No erythema. Wound with no drainage, healing well.   Lower extremities: Tips of all toes are cyanotic b/l. + pulses b/l with arterial doppler.                          9.9    18.1  )-----------( 56       ( 03 Jan 2019 06:06 )             32.2     01-03    138  |  99  |  81<H>  ----------------------------<  74  4.3   |  23  |  5.23<H>    Ca    8.3<L>      03 Jan 2019 06:06  Phos  5.6     01-03  Mg     2.1     01-03    TPro  4.8<L>  /  Alb  2.0<L>  /  TBili  9.6<H>  /  DBili  x   /  AST  186<H>  /  ALT  385<H>  /  AlkPhos  122<H>  01-03    Impression:  79yFemale S/p Left Hip IM Nailing POD#7    Plan:  -  Pain management  -  DVT prophylaxis with Venodynes  -  Daily Physical Therapy:  WBAT of the Left lower extremity  -  Dressing changed today  -  Orthopedically stable  -  Plan as per ICU team  -  Case d/w DR. rBuce

## 2019-01-04 NOTE — PROGRESS NOTE ADULT - SUBJECTIVE AND OBJECTIVE BOX
Subjective: no overnight events . remains intubated , off pressors on HD . no fevers       PHYSICAL EXAM:    Vital Signs Last 24 Hrs  T(C): 36.7 (04 Jan 2019 15:23), Max: 37.2 (04 Jan 2019 13:00)  T(F): 98 (04 Jan 2019 15:23), Max: 99 (04 Jan 2019 13:00)  HR: 100 (04 Jan 2019 19:47) (81 - 103)  BP: 132/52 (04 Jan 2019 19:00) (98/43 - 135/53)  BP(mean): 71 (04 Jan 2019 19:00) (56 - 71)  RR: 22 (04 Jan 2019 19:47) (8 - 26)  SpO2: 99% (04 Jan 2019 19:47) (97% - 100%)    Constitutional: sedated on vent nad   LUNGS mech br sounds  CVS s1 s2 aud no murmurs   ABDOMEN soft non tender obese   NEUROLOGY  flaccid   SKIN no rash   EXTREMITIES no edema no cyanosis /left hip wound clean /right groin dialysis cath /left neck with central line   weber with scant marisa urine         LABS/DIAGNOSTIC TESTS                        10.2   19.9  )-----------( 74       ( 04 Jan 2019 06:33 )             32.9     01-04    139  |  99  |  91<H>  ----------------------------<  203<H>  4.0   |  22  |  5.22<H>    Ca    8.4      04 Jan 2019 06:33  Phos  6.9     01-04  Mg     2.2     01-04    TPro  4.7<L>  /  Alb  2.0<L>  /  TBili  10.7<H>  /  DBili  x   /  AST  112<H>  /  ALT  226<H>  /  AlkPhos  134<H>  01-04          meds   ascorbic acid 500 milliGRAM(s) Oral two times a day  calcium acetate 667 milliGRAM(s) Oral three times a day with meals  chlorhexidine 4% Liquid 1 Application(s) Topical two times a day  digoxin  Injectable 0.125 milliGRAM(s) IV Push every other day  docusate sodium Liquid 100 milliGRAM(s) Oral two times a day  epoetin monique Injectable 6000 Unit(s) IV Push <User Schedule>  fentaNYL   Infusion. 1 MICROgram(s)/kG/Hr IV Continuous <Continuous>  folic acid 1 milliGRAM(s) Oral daily  heparin  Injectable 5000 Unit(s) SubCutaneous every 12 hours  HYDROmorphone  Injectable 2 milliGRAM(s) IV Push every 4 hours PRN  insulin glargine Injectable (LANTUS) 10 Unit(s) SubCutaneous at bedtime  insulin lispro (HumaLOG) corrective regimen sliding scale   SubCutaneous every 6 hours  linezolid  IVPB 600 milliGRAM(s) IV Intermittent every 12 hours  meropenem  IVPB 500 milliGRAM(s) IV Intermittent every 12 hours  ondansetron Injectable 4 milliGRAM(s) IV Push every 6 hours PRN  pantoprazole  Injectable 40 milliGRAM(s) IV Push daily        CULTURES  Culture Results: blood  No growth at 5 days. (12-30 @ 14:42)  Culture Results: blood  No growth at 5 days. (12-29 @ 21:43)        RADIOLOGY  < from: Xray Chest 1 View- PORTABLE-Routine (01.04.19 @ 07:11) >  EXAM:  XR CHEST PORTABLE ROUTINE 1V                            PROCEDURE DATE:  01/04/2019          INTERPRETATION:  CLINICAL STATEMENT: Follow-up chest pain.    TECHNIQUE: AP view of the chest.    COMPARISON: 1/3/2019    FINDINGS/  IMPRESSION:  Left central line, ET tube, feeding tube again noted. No pneumothorax.    Increased interstitial lung markings without significant change. Small   right pleural effusion increased in size with adjacent opacity.    Heart size cannot be accurately assessed in this projection.                  DEO CASTREJON M.D., ATTENDING RADIOLOGIST  This document has been electronically signed. Jan 4 2019 10:11AM    < end of copied text >      < from: US Hepatic & Pancreatic (01.02.19 @ 11:15) >  EXAM:  US LIVER AND PANCREAS                            PROCEDURE DATE:  01/02/2019          INTERPRETATION:  CLINICAL STATEMENT: Rule out gallstone..  Abdominal pain.    TECHNIQUE: Ultrasound of the right upper quadrant.    COMPARISON: None.    FINDINGS:  Liver measures 16.9 cm with normal echogenicity.    There is no gallstone or gallbladder wall thickening. 5 mm gallbladder   polyp noted    The common bile duct is not dilated measuring 4 mm.    The pancreas is not well seen.    The right kidney measures 10.8 cm in length.  There is no hydronephrosis.    IMPRESSION:  5 mm gallbladder polyp. Follow-up recommended.                  DEO CASTREJON M.D., ATTENDING RADIOLOGIST  This document has been electronically signed. Jan 2 2019 11:52AM    < end of copied text >

## 2019-01-04 NOTE — PROGRESS NOTE ADULT - PROBLEM SELECTOR PLAN 10
Holding Lovenox 30 bid due to anemia   Protonix for GI ppx Heparin sq for VTE ppx  Protonix for GI ppx

## 2019-01-04 NOTE — PROGRESS NOTE ADULT - SUBJECTIVE AND OBJECTIVE BOX
INTERVAL HPI/OVERNIGHT EVENTS: No acute events. Pt had dialysis yesterday full session. CXR from this morning looks better.     PRESSORS: [ ] YES [x] NO    ANTIBIOTICS: Zyvox                           DATE STARTED: 12/31/18  ANTIBIOTICS: Meropenem                 DATE STARTED: 12/31/18    Antimicrobial:  linezolid  IVPB 600 milliGRAM(s) IV Intermittent every 12 hours  meropenem  IVPB 500 milliGRAM(s) IV Intermittent every 12 hours    Cardiovascular:  digoxin  Injectable 0.125 milliGRAM(s) IV Push every other day    Other:  ascorbic acid 500 milliGRAM(s) Oral two times a day  calcium acetate 667 milliGRAM(s) Oral three times a day with meals  chlorhexidine 4% Liquid 1 Application(s) Topical two times a day  docusate sodium Liquid 100 milliGRAM(s) Oral two times a day  epoetin monique Injectable 6000 Unit(s) IV Push <User Schedule>  fentaNYL   Infusion. 1 MICROgram(s)/kG/Hr IV Continuous <Continuous>  folic acid 1 milliGRAM(s) Oral daily  HYDROmorphone  Injectable 2 milliGRAM(s) IV Push every 4 hours PRN  insulin glargine Injectable (LANTUS) 10 Unit(s) SubCutaneous at bedtime  insulin lispro (HumaLOG) corrective regimen sliding scale   SubCutaneous every 6 hours  ondansetron Injectable 4 milliGRAM(s) IV Push every 6 hours PRN  pantoprazole  Injectable 40 milliGRAM(s) IV Push daily  propofol Infusion 5 MICROgram(s)/kG/Min IV Continuous <Continuous>    ascorbic acid 500 milliGRAM(s) Oral two times a day  calcium acetate 667 milliGRAM(s) Oral three times a day with meals  chlorhexidine 4% Liquid 1 Application(s) Topical two times a day  digoxin  Injectable 0.125 milliGRAM(s) IV Push every other day  docusate sodium Liquid 100 milliGRAM(s) Oral two times a day  epoetin monique Injectable 6000 Unit(s) IV Push <User Schedule>  fentaNYL   Infusion. 1 MICROgram(s)/kG/Hr IV Continuous <Continuous>  folic acid 1 milliGRAM(s) Oral daily  HYDROmorphone  Injectable 2 milliGRAM(s) IV Push every 4 hours PRN  insulin glargine Injectable (LANTUS) 10 Unit(s) SubCutaneous at bedtime  insulin lispro (HumaLOG) corrective regimen sliding scale   SubCutaneous every 6 hours  linezolid  IVPB 600 milliGRAM(s) IV Intermittent every 12 hours  meropenem  IVPB 500 milliGRAM(s) IV Intermittent every 12 hours  ondansetron Injectable 4 milliGRAM(s) IV Push every 6 hours PRN  pantoprazole  Injectable 40 milliGRAM(s) IV Push daily  propofol Infusion 5 MICROgram(s)/kG/Min IV Continuous <Continuous>    Drug Dosing Weight  Height (cm): 154.94 (28 Dec 2018 01:00)  Weight (kg): 82.1 (28 Dec 2018 01:00)  BMI (kg/m2): 34.2 (28 Dec 2018 01:00)  BSA (m2): 1.81 (28 Dec 2018 01:00)    CENTRAL LINE: [x] YES [ ] NO  LOCATION:   DATE INSERTED:    DE SOUZA: [x] YES [ ] NO    DATE INSERTED:    A-LINE:  [x] YES [ ] NO  LOCATION:   DATE INSERTED:    ICU Vital Signs Last 24 Hrs  T(C): 36.8 (04 Jan 2019 04:00), Max: 37.2 (03 Jan 2019 11:00)  T(F): 98.2 (04 Jan 2019 04:00), Max: 99 (03 Jan 2019 11:00)  HR: 90 (04 Jan 2019 07:47) (81 - 101)  BP: 98/43 (04 Jan 2019 04:00) (98/43 - 128/45)  BP(mean): 56 (04 Jan 2019 04:00) (56 - 62)  ABP: 142/53 (04 Jan 2019 07:47) (23/20 - 142/53)  ABP(mean): 79 (04 Jan 2019 07:47) (21 - 82)  RR: 18 (04 Jan 2019 07:47) (8 - 25)  SpO2: 100% (04 Jan 2019 07:47) (92% - 100%)      ABG - ( 04 Jan 2019 05:15 )  pH, Arterial: 7.37  pH, Blood: x     /  pCO2: 39    /  pO2: 92    / HCO3: 22    / Base Excess: -2.5  /  SaO2: 96                    01-03 @ 07:01  -  01-04 @ 07:00  --------------------------------------------------------  IN: 1430.9 mL / OUT: 95 mL / NET: 1335.9 mL        Mode: AC/ CMV (Assist Control/ Continuous Mandatory Ventilation)  RR (machine): 16  TV (machine): 450  FiO2: 60  PEEP: 5  ITime: 1  MAP: 10  PIP: 21      PHYSICAL EXAM:    GENERAL: Well developed, Well nourished white female, sedated  HEENT:  Normocephalic/Atraumatic, reactive light reflex, moist mucous membranes, yellow eyes  NECK: Supple, no JVD, LIJ line  RESP: Symmetric movement of the chest, clear to auscultation bilaterally, diffuse bilateral basal rales   CVS: Tachycardic, S1 and S2 audible, no murmur, rubs or gallops  GI: Normal active bowel sounds present, abdomen soft, non tender, non distended  EXTREMITIES:  No edema, no clubbing, cyanosis, Lt femoral A-line, Rt Shiley femoral    MSK: 0/5 strength bilateral upper and lower extremities, Left hip scar/band, Blue hands/toes   PSYCH: Sedated  NEURO: Alert and oriented x 0    LABS:  CBC Full  -  ( 03 Jan 2019 06:06 )  WBC Count : 18.1 K/uL  Hemoglobin : 9.9 g/dL  Hematocrit : 32.2 %  Platelet Count - Automated : 56 K/uL  Mean Cell Volume : 66.6 fl  Mean Cell Hemoglobin : 20.6 pg  Mean Cell Hemoglobin Concentration : 30.8 gm/dL  Auto Neutrophil # : 14.4 K/uL  Auto Lymphocyte # : 1.7 K/uL  Auto Monocyte # : 1.5 K/uL  Auto Eosinophil # : 0.3 K/uL  Auto Basophil # : 0.1 K/uL  Auto Neutrophil % : 79.6 %  Auto Lymphocyte % : 9.6 %  Auto Monocyte % : 8.5 %  Auto Eosinophil % : 1.8 %  Auto Basophil % : 0.6 %    01-04    139  |  99  |  91<H>  ----------------------------<  203<H>  4.0   |  22  |  5.22<H>    Ca    8.4      04 Jan 2019 06:33  Phos  6.9     01-04  Mg     2.2     01-04    TPro  4.7<L>  /  Alb  2.0<L>  /  TBili  10.7<H>  /  DBili  x   /  AST  112<H>  /  ALT  226<H>  /  AlkPhos  134<H>  01-04    PT/INR - ( 02 Jan 2019 14:20 )   PT: 20.0 sec;   INR: 1.77 ratio         PTT - ( 02 Jan 2019 14:20 )  PTT:34.9 sec      [ ]GOALS OF CARE DISCUSSION WITH PATIENT/FAMILY/PROXY: Full Code    CRITICAL CARE TIME SPENT: 35 minutes

## 2019-01-04 NOTE — PROGRESS NOTE ADULT - SUBJECTIVE AND OBJECTIVE BOX
pt seen and examined.pts current chart reviewed and case discussed with resident covering.    SUBJECTIVE: pt was seen and examined at bedside. Pt still on sedation and ventilation, got dialysis yesterday and 1775 ml fluid removed. CXR much improved. Net overnight positive 1300ml. Plan for dialysis today.    REVIEW OF SYSTEMS:  unable to obtain    Current meds:    ascorbic acid 500 milliGRAM(s) Oral two times a day  calcium acetate 667 milliGRAM(s) Oral three times a day with meals  chlorhexidine 4% Liquid 1 Application(s) Topical two times a day  digoxin  Injectable 0.125 milliGRAM(s) IV Push every other day  docusate sodium Liquid 100 milliGRAM(s) Oral two times a day  epoetin monique Injectable 6000 Unit(s) IV Push <User Schedule>  fentaNYL   Infusion. 1 MICROgram(s)/kG/Hr IV Continuous <Continuous>  folic acid 1 milliGRAM(s) Oral daily  HYDROmorphone  Injectable 2 milliGRAM(s) IV Push every 4 hours PRN  insulin glargine Injectable (LANTUS) 10 Unit(s) SubCutaneous at bedtime  insulin lispro (HumaLOG) corrective regimen sliding scale   SubCutaneous every 6 hours  linezolid  IVPB 600 milliGRAM(s) IV Intermittent every 12 hours  meropenem  IVPB 500 milliGRAM(s) IV Intermittent every 12 hours  ondansetron Injectable 4 milliGRAM(s) IV Push every 6 hours PRN  pantoprazole  Injectable 40 milliGRAM(s) IV Push daily  propofol Infusion 5 MICROgram(s)/kG/Min IV Continuous <Continuous>      Vital Signs    T(F): 98.2 (19 @ 04:00), Max: 99 (19 @ 11:00)  HR: 93 (19 @ 08:32) (81 - 101)  BP: 119/55 (19 @ 08:23) (98/43 - 128/45)  ABP: 148/54 (19 @ 08:32) (23/ - 148/54)  RR: 21 (19 @ 08:32) (8 - 25)  SpO2: 100% (19 @ 08:32) (92% - 100%)  Wt(kg): --  CVP(cm H2O): --  CO: --  PCWP: --    I and O's:     @ 07:01  -   @ 07:00  --------------------------------------------------------  IN:    fentaNYL Infusion.: 147.6 mL    fentaNYL Infusion.: 344.4 mL    Nepro with Carb Steady: 160 mL    propofol Infusion: 217.8 mL    Solution: 100 mL    Solution: 300 mL    Solution: 100 mL  Total IN: 1369.8 mL    OUT:    Indwelling Catheter - Urethral: 65 mL  Total OUT: 65 mL    Total NET: 1304.8 mL       @ 07: @ 07:00  --------------------------------------------------------  IN:    Enteral Tube Flush: 80 mL    fentaNYL Infusion.: 196.8 mL    Nepro with Carb Steady: 460 mL    propofol Infusion: 44.4 mL    propofol Infusion: 199.7 mL    Solution: 50 mL    Solution: 300 mL    Solution: 100 mL  Total IN: 1430.9 mL    OUT:    Indwelling Catheter - Urethral: 95 mL  Total OUT: 95 mL    Total NET: 1335.9 mL       @ 07:04 @ 09:20  --------------------------------------------------------  IN:    Nepro with Carb Steady: 10 mL  Total IN: 10 mL    OUT:  Total OUT: 0 mL    Total NET: 10 mL        Daily     Daily Weight in k.6 (2019 17:55)    PHYSICAL EXAM:  GENERAL: Well developed, Well nourished white female, sedated  HEENT:  Normocephalic/Atraumatic, reactive light reflex, moist mucous membranes  NECK: Supple, no JVD, LIJ line  RESP: Symmetric movement of the chest, diffuse bilateral basal rales, on Vent support   CVS: Tachycardic, S1 and S2 audible, no murmur, rubs or gallops  GI: Normal active bowel sounds present, abdomen soft, non tender, non distended  EXTREMITIES:  No edema, no clubbing, cyanosis, Lt femoral A-line, Rt Shiley femoral    MSK: 0/5 strength bilateral upper and lower extremities, Left hip scar/band, Blue hands/toes   PSYCH: Sedated  NEURO: sedated       LABS:    CBC:                          10.2   19.9  )-----------( x        ( 2019 06:33 )             32.9           BMP:        139  |  99  |  91<H>  ----------------------------<  203<H>  4.0   |  22  |  5.22<H>      138  |  99  |  81<H>  ----------------------------<  74  4.3   |  23  |  5.23<H>      141  |  99  |  63<H>  ----------------------------<  121<H>  4.0   |  25  |  4.53<H>      139  |  94<L>  |  59<H>  ----------------------------<  141<H>  4.0   |  28  |  4.68<H>    Ca    8.4      2019 06:33  Ca    8.3<L>      2019 06:06  Ca    8.4      2019 04:23  Ca    8.1<L>      2019 13:56  Phos  6.9     -  Phos  5.6     -  Phos  5.6     -  Mg     2.2     -  Mg     2.1     -  Mg     1.9     -    TPro  4.7<L>  /  Alb  2.0<L>  /  TBili  10.7<H>  /  DBili  x   /  AST  112<H>  /  ALT  226<H>  /  AlkPhos  134<H>    TPro  4.8<L>  /  Alb  2.0<L>  /  TBili  9.6<H>  /  DBili  x   /  AST  186<H>  /  ALT  385<H>  /  AlkPhos  122<H>    TPro  5.4<L>  /  Alb  2.4<L>  /  TBili  9.1<H>  /  DBili  x   /  AST  336<H>  /  ALT  658<H>  /  AlkPhos  128<H>            URINE STUDIES:            RADIOLOGY & ADDITIONAL STUDIES: pt seen and examined.pts current chart reviewed and case discussed with resident covering.    SUBJECTIVE: pt was seen and examined at bedside. Pt still on sedation and ventilation, got dialysis yesterday and 1775 ml fluid removed. CXR much improved. Net overnight positive 1300ml. Plan for dialysis today.    REVIEW OF SYSTEMS:  unable to obtain    Current meds:    ascorbic acid 500 milliGRAM(s) Oral two times a day  calcium acetate 667 milliGRAM(s) Oral three times a day with meals  chlorhexidine 4% Liquid 1 Application(s) Topical two times a day  digoxin  Injectable 0.125 milliGRAM(s) IV Push every other day  docusate sodium Liquid 100 milliGRAM(s) Oral two times a day  epoetin monique Injectable 6000 Unit(s) IV Push <User Schedule>  fentaNYL   Infusion. 1 MICROgram(s)/kG/Hr IV Continuous <Continuous>  folic acid 1 milliGRAM(s) Oral daily  HYDROmorphone  Injectable 2 milliGRAM(s) IV Push every 4 hours PRN  insulin glargine Injectable (LANTUS) 10 Unit(s) SubCutaneous at bedtime  insulin lispro (HumaLOG) corrective regimen sliding scale   SubCutaneous every 6 hours  linezolid  IVPB 600 milliGRAM(s) IV Intermittent every 12 hours  meropenem  IVPB 500 milliGRAM(s) IV Intermittent every 12 hours  ondansetron Injectable 4 milliGRAM(s) IV Push every 6 hours PRN  pantoprazole  Injectable 40 milliGRAM(s) IV Push daily  propofol Infusion 5 MICROgram(s)/kG/Min IV Continuous <Continuous>      Vital Signs    T(F): 98.2 (19 @ 04:00), Max: 99 (19 @ 11:00)  HR: 93 (19 @ 08:32) (81 - 101)  BP: 119/55 (19 @ 08:23) (98/43 - 128/45)  ABP: 148/54 (19 @ 08:32) (23/ - 148/54)  RR: 21 (19 @ 08:32) (8 - 25)  SpO2: 100% (19 @ 08:32) (92% - 100%)  Wt(kg): --  CVP(cm H2O): --  CO: --  PCWP: --    I and O's:     @ 07:01  -   @ 07:00  --------------------------------------------------------  IN:    fentaNYL Infusion.: 147.6 mL    fentaNYL Infusion.: 344.4 mL    Nepro with Carb Steady: 160 mL    propofol Infusion: 217.8 mL    Solution: 100 mL    Solution: 300 mL    Solution: 100 mL  Total IN: 1369.8 mL    OUT:    Indwelling Catheter - Urethral: 65 mL  Total OUT: 65 mL    Total NET: 1304.8 mL       @ 07: @ 07:00  --------------------------------------------------------  IN:    Enteral Tube Flush: 80 mL    fentaNYL Infusion.: 196.8 mL    Nepro with Carb Steady: 460 mL    propofol Infusion: 44.4 mL    propofol Infusion: 199.7 mL    Solution: 50 mL    Solution: 300 mL    Solution: 100 mL  Total IN: 1430.9 mL    OUT:    Indwelling Catheter - Urethral: 95 mL  Total OUT: 95 mL    Total NET: 1335.9 mL       @ 07:04 @ 09:20  --------------------------------------------------------  IN:    Nepro with Carb Steady: 10 mL  Total IN: 10 mL    OUT:  Total OUT: 0 mL    Total NET: 10 mL        Daily     Daily Weight in k.6 (2019 17:55)    PHYSICAL EXAM:  GENERAL: Well developed, Well nourished white female, sedated  HEENT:  Normocephalic/Atraumatic, reactive light reflex, moist mucous membranes  NECK: Supple, no JVD, LIJ line  RESP: Symmetric movement of the chest, diffuse bilateral basal rales, on Vent support   CVS: Tachycardic, S1 and S2 audible, no murmur, rubs or gallops  GI: Normal active bowel sounds present, abdomen soft, non tender, non distended  EXTREMITIES:  No edema, no clubbing, cyanosis, Lt femoral A-line, Rt Shiley femoral    MSK: 0/5 strength bilateral upper and lower extremities, Left hip scar/band, Blue hands/toes   PSYCH: Sedated  NEURO: sedated       LABS:    CBC:                          10.2   19.9  )-----------( x        ( 2019 06:33 )             32.9           BMP:        139  |  99  |  91<H>  ----------------------------<  203<H>  4.0   |  22  |  5.22<H>      138  |  99  |  81<H>  ----------------------------<  74  4.3   |  23  |  5.23<H>      141  |  99  |  63<H>  ----------------------------<  121<H>  4.0   |  25  |  4.53<H>      139  |  94<L>  |  59<H>  ----------------------------<  141<H>  4.0   |  28  |  4.68<H>    Ca    8.4      2019 06:33  Ca    8.3<L>      2019 06:06  Ca    8.4      2019 04:23  Ca    8.1<L>      2019 13:56  Phos  6.9     -  Phos  5.6     -  Phos  5.6     -  Mg     2.2     -  Mg     2.1     -  Mg     1.9     -    TPro  4.7<L>  /  Alb  2.0<L>  /  TBili  10.7<H>  /  DBili  x   /  AST  112<H>  /  ALT  226<H>  /  AlkPhos  134<H>    TPro  4.8<L>  /  Alb  2.0<L>  /  TBili  9.6<H>  /  DBili  x   /  AST  186<H>  /  ALT  385<H>  /  AlkPhos  122<H>    TPro  5.4<L>  /  Alb  2.4<L>  /  TBili  9.1<H>  /  DBili  x   /  AST  336<H>  /  ALT  658<H>  /  AlkPhos  128<H>            URINE STUDIES:  none  RADIOLOGY & ADDITIONAL STUDIES:    < from: Xray Chest 1 View- PORTABLE-Routine (19 @ 07:50) >  EXAM:  XR CHEST PORTABLE ROUTINE 1V                            PROCEDURE DATE:  2019          INTERPRETATION:  CLINICAL STATEMENT: Follow-up chest pain.    TECHNIQUE: AP view of the chest.    COMPARISON: 2019    FINDINGS/  IMPRESSION:  ETtube again noted. Left central line again noted. No pneumothorax. New   feeding tube noted tip under the diaphragm overlying expected region of   stomach.    Increased interstitial lung markings without significant change. Moderate   size right pleural effusion with adjacent opacity without significant   change    Heart size cannot be accurately assessed in this projection.      < end of copied text >  < from: US Hepatic & Pancreatic (19 @ 11:15) >  EXAM:  US LIVER AND PANCREAS                            PROCEDURE DATE:  2019          INTERPRETATION:  CLINICAL STATEMENT: Rule out gallstone..  Abdominal pain.    TECHNIQUE: Ultrasound of the right upper quadrant.    COMPARISON: None.    FINDINGS:  Liver measures 16.9 cm with normal echogenicity.    There is no gallstone or gallbladder wall thickening. 5 mm gallbladder   polyp noted    The common bile duct is not dilated measuring 4 mm.    The pancreas is not well seen.    The right kidney measures 10.8 cm in length.  There is no hydronephrosis.    IMPRESSION:  5 mm gallbladder polyp. Follow-up recommended.    < end of copied text > pt seen and examined.pts current chart reviewed and case discussed with resident covering.    SUBJECTIVE: pt was seen and examined at bedside. Pt still on sedation and ventilation, got dialysis yesterday and 1775 ml fluid removed. CXR much improved. Net overnight positive 1300ml. Plan for dialysis tormmow     REVIEW OF SYSTEMS:  unable to obtain    Current meds:    ascorbic acid 500 milliGRAM(s) Oral two times a day  calcium acetate 667 milliGRAM(s) Oral three times a day with meals  chlorhexidine 4% Liquid 1 Application(s) Topical two times a day  digoxin  Injectable 0.125 milliGRAM(s) IV Push every other day  docusate sodium Liquid 100 milliGRAM(s) Oral two times a day  epoetin monique Injectable 6000 Unit(s) IV Push <User Schedule>  fentaNYL   Infusion. 1 MICROgram(s)/kG/Hr IV Continuous <Continuous>  folic acid 1 milliGRAM(s) Oral daily  HYDROmorphone  Injectable 2 milliGRAM(s) IV Push every 4 hours PRN  insulin glargine Injectable (LANTUS) 10 Unit(s) SubCutaneous at bedtime  insulin lispro (HumaLOG) corrective regimen sliding scale   SubCutaneous every 6 hours  linezolid  IVPB 600 milliGRAM(s) IV Intermittent every 12 hours  meropenem  IVPB 500 milliGRAM(s) IV Intermittent every 12 hours  ondansetron Injectable 4 milliGRAM(s) IV Push every 6 hours PRN  pantoprazole  Injectable 40 milliGRAM(s) IV Push daily  propofol Infusion 5 MICROgram(s)/kG/Min IV Continuous <Continuous>      Vital Signs    T(F): 98.2 (19 @ 04:00), Max: 99 (19 @ 11:00)  HR: 93 (19 @ 08:32) (81 - 101)  BP: 119/55 (19 @ 08:23) (98/43 - 128/45)  ABP: 148/54 (19 @ 08:32) (23/ - 148/54)  RR: 21 (19 @ 08:32) (8 - 25)  SpO2: 100% (19 @ 08:32) (92% - 100%)  Wt(kg): --  CVP(cm H2O): --  CO: --  PCWP: --    I and O's:     @ 07:01  -  03 @ 07:00  --------------------------------------------------------  IN:    fentaNYL Infusion.: 147.6 mL    fentaNYL Infusion.: 344.4 mL    Nepro with Carb Steady: 160 mL    propofol Infusion: 217.8 mL    Solution: 100 mL    Solution: 300 mL    Solution: 100 mL  Total IN: 1369.8 mL    OUT:    Indwelling Catheter - Urethral: 65 mL  Total OUT: 65 mL    Total NET: 1304.8 mL       @ 07: @ 07:00  --------------------------------------------------------  IN:    Enteral Tube Flush: 80 mL    fentaNYL Infusion.: 196.8 mL    Nepro with Carb Steady: 460 mL    propofol Infusion: 44.4 mL    propofol Infusion: 199.7 mL    Solution: 50 mL    Solution: 300 mL    Solution: 100 mL  Total IN: 1430.9 mL    OUT:    Indwelling Catheter - Urethral: 95 mL  Total OUT: 95 mL    Total NET: 1335.9 mL       @ 07:04 @ 09:20  --------------------------------------------------------  IN:    Nepro with Carb Steady: 10 mL  Total IN: 10 mL    OUT:  Total OUT: 0 mL    Total NET: 10 mL        Daily     Daily Weight in k.6 (2019 17:55)    PHYSICAL EXAM:  GENERAL: Well developed, Well nourished white female, sedated  HEENT:  Normocephalic/Atraumatic, reactive light reflex, moist mucous membranes  NECK: Supple, no JVD, LIJ line  RESP: Symmetric movement of the chest, diffuse bilateral basal rales, on Vent support   CVS: Tachycardic, S1 and S2 audible, no murmur, rubs or gallops  GI: Normal active bowel sounds present, abdomen soft, non tender, non distended  EXTREMITIES:  No edema, no clubbing, cyanosis, Lt femoral A-line, Rt Shiley femoral    MSK: 0/5 strength bilateral upper and lower extremities, Left hip scar/band, Blue hands/toes   PSYCH: Sedated  NEURO: sedated       LABS:    CBC:                          10.2   19.9  )-----------( x        ( 2019 06:33 )             32.9           BMP:        139  |  99  |  91<H>  ----------------------------<  203<H>  4.0   |  22  |  5.22<H>      138  |  99  |  81<H>  ----------------------------<  74  4.3   |  23  |  5.23<H>      141  |  99  |  63<H>  ----------------------------<  121<H>  4.0   |  25  |  4.53<H>      139  |  94<L>  |  59<H>  ----------------------------<  141<H>  4.0   |  28  |  4.68<H>    Ca    8.4      2019 06:33  Ca    8.3<L>      2019 06:06  Ca    8.4      2019 04:23  Ca    8.1<L>      2019 13:56  Phos  6.9     -  Phos  5.6     -  Phos  5.6     -  Mg     2.2     -  Mg     2.1     -  Mg     1.9     -    TPro  4.7<L>  /  Alb  2.0<L>  /  TBili  10.7<H>  /  DBili  x   /  AST  112<H>  /  ALT  226<H>  /  AlkPhos  134<H>    TPro  4.8<L>  /  Alb  2.0<L>  /  TBili  9.6<H>  /  DBili  x   /  AST  186<H>  /  ALT  385<H>  /  AlkPhos  122<H>    TPro  5.4<L>  /  Alb  2.4<L>  /  TBili  9.1<H>  /  DBili  x   /  AST  336<H>  /  ALT  658<H>  /  AlkPhos  128<H>            URINE STUDIES:  none  RADIOLOGY & ADDITIONAL STUDIES:    < from: Xray Chest 1 View- PORTABLE-Routine (19 @ 07:50) >  EXAM:  XR CHEST PORTABLE ROUTINE 1V                            PROCEDURE DATE:  2019          INTERPRETATION:  CLINICAL STATEMENT: Follow-up chest pain.    TECHNIQUE: AP view of the chest.    COMPARISON: 2019    FINDINGS/  IMPRESSION:  ETtube again noted. Left central line again noted. No pneumothorax. New   feeding tube noted tip under the diaphragm overlying expected region of   stomach.    Increased interstitial lung markings without significant change. Moderate   size right pleural effusion with adjacent opacity without significant   change    Heart size cannot be accurately assessed in this projection.      < end of copied text >  < from: US Hepatic & Pancreatic (19 @ 11:15) >  EXAM:  US LIVER AND PANCREAS                            PROCEDURE DATE:  2019          INTERPRETATION:  CLINICAL STATEMENT: Rule out gallstone..  Abdominal pain.    TECHNIQUE: Ultrasound of the right upper quadrant.    COMPARISON: None.    FINDINGS:  Liver measures 16.9 cm with normal echogenicity.    There is no gallstone or gallbladder wall thickening. 5 mm gallbladder   polyp noted    The common bile duct is not dilated measuring 4 mm.    The pancreas is not well seen.    The right kidney measures 10.8 cm in length.  There is no hydronephrosis.    IMPRESSION:  5 mm gallbladder polyp. Follow-up recommended.    < end of copied text >

## 2019-01-04 NOTE — PROGRESS NOTE ADULT - SUBJECTIVE AND OBJECTIVE BOX
CHIEF COMPLAINT:Patient is a 79y old  Female who presents with a chief complaint of fall.Pt remains intubated.    	  REVIEW OF SYSTEMS:  [ x] Unable to obtain    PHYSICAL EXAM:  T(C): 36.8 (01-04-19 @ 10:00), Max: 37.2 (01-03-19 @ 14:00)  HR: 94 (01-04-19 @ 11:39) (81 - 101)  BP: 119/55 (01-04-19 @ 08:23) (98/43 - 128/45)  RR: 21 (01-04-19 @ 11:00) (8 - 25)  SpO2: 98% (01-04-19 @ 11:39) (95% - 100%)  Wt(kg): --  I&O's Summary    03 Jan 2019 07:01  -  04 Jan 2019 07:00  --------------------------------------------------------  IN: 1430.9 mL / OUT: 95 mL / NET: 1335.9 mL    04 Jan 2019 07:01  -  04 Jan 2019 11:58  --------------------------------------------------------  IN: 34.6 mL / OUT: 5 mL / NET: 29.6 mL        Appearance: Normal	  HEENT:   Normal oral mucosa, PERRL, EOMI	  Lymphatic: No lymphadenopathy  Cardiovascular: Normal S1 S2, No JVD, No murmurs, No edema  Respiratory:B/L ronchi  Gastrointestinal:  Soft, Non-tender, + BS	  Skin: No rashes, No ecchymoses, No cyanosis	  Extremities: Normal range of motion, No clubbing, cyanosis or edema  Vascular: Peripheral pulses palpable 2+ bilaterally    MEDICATIONS  (STANDING):  ascorbic acid 500 milliGRAM(s) Oral two times a day  calcium acetate 667 milliGRAM(s) Oral three times a day with meals  chlorhexidine 4% Liquid 1 Application(s) Topical two times a day  digoxin  Injectable 0.125 milliGRAM(s) IV Push every other day  docusate sodium Liquid 100 milliGRAM(s) Oral two times a day  epoetin monique Injectable 6000 Unit(s) IV Push <User Schedule>  fentaNYL   Infusion. 1 MICROgram(s)/kG/Hr (8.21 mL/Hr) IV Continuous <Continuous>  folic acid 1 milliGRAM(s) Oral daily  insulin glargine Injectable (LANTUS) 10 Unit(s) SubCutaneous at bedtime  insulin lispro (HumaLOG) corrective regimen sliding scale   SubCutaneous every 6 hours  linezolid  IVPB 600 milliGRAM(s) IV Intermittent every 12 hours  meropenem  IVPB 500 milliGRAM(s) IV Intermittent every 12 hours  pantoprazole  Injectable 40 milliGRAM(s) IV Push daily  propofol Infusion 5 MICROgram(s)/kG/Min (2.463 mL/Hr) IV Continuous <Continuous>      	  LABS:	 	                         10.2   19.9  )-----------( x        ( 04 Jan 2019 06:33 )             32.9     01-04    139  |  99  |  91<H>  ----------------------------<  203<H>  4.0   |  22  |  5.22<H>    Ca    8.4      04 Jan 2019 06:33  Phos  6.9     01-04  Mg     2.2     01-04    TPro  4.7<L>  /  Alb  2.0<L>  /  TBili  10.7<H>  /  DBili  x   /  AST  112<H>  /  ALT  226<H>  /  AlkPhos  134<H>  01-04    proBNP:   Lipid Profile: Cholesterol --  LDL --  HDL --    Cholesterol 82  LDL 22  HDL 40      HgA1c: Hemoglobin A1C, Whole Blood: 6.1 % (12-27 @ 09:51)  Hemoglobin A1C, Whole Blood: 6.2 % (12-26 @ 23:12)    TSH: Thyroid Stimulating Hormone, Serum: 1.19 uU/mL (12-27 @ 06:50)

## 2019-01-04 NOTE — PROGRESS NOTE ADULT - SUBJECTIVE AND OBJECTIVE BOX
Patient is a 79y old  Female who presents with a chief complaint of fall (04 Jan 2019 19:52)       Subjective:     MEDICATIONS  (STANDING):  ascorbic acid 500 milliGRAM(s) Oral two times a day  calcium acetate 667 milliGRAM(s) Oral three times a day with meals  chlorhexidine 4% Liquid 1 Application(s) Topical two times a day  digoxin  Injectable 0.125 milliGRAM(s) IV Push every other day  docusate sodium Liquid 100 milliGRAM(s) Oral two times a day  epoetin monique Injectable 6000 Unit(s) IV Push <User Schedule>  fentaNYL   Infusion. 1 MICROgram(s)/kG/Hr (8.21 mL/Hr) IV Continuous <Continuous>  folic acid 1 milliGRAM(s) Oral daily  heparin  Injectable 5000 Unit(s) SubCutaneous every 12 hours  insulin glargine Injectable (LANTUS) 10 Unit(s) SubCutaneous at bedtime  insulin lispro (HumaLOG) corrective regimen sliding scale   SubCutaneous every 6 hours  linezolid  IVPB 600 milliGRAM(s) IV Intermittent every 12 hours  meropenem  IVPB 500 milliGRAM(s) IV Intermittent every 12 hours  pantoprazole  Injectable 40 milliGRAM(s) IV Push daily    MEDICATIONS  (PRN):  HYDROmorphone  Injectable 2 milliGRAM(s) IV Push every 4 hours PRN Severe Pain (7 - 10)  ondansetron Injectable 4 milliGRAM(s) IV Push every 6 hours PRN Nausea and/or Vomiting      Allergies    No Known Allergies    Intolerances        Vital Signs Last 24 Hrs  T(C): 36.7 (04 Jan 2019 15:23), Max: 37.2 (04 Jan 2019 13:00)  T(F): 98 (04 Jan 2019 15:23), Max: 99 (04 Jan 2019 13:00)  HR: 95 (04 Jan 2019 20:37) (81 - 103)  BP: 103/56 (04 Jan 2019 20:00) (98/43 - 135/53)  BP(mean): 67 (04 Jan 2019 20:00) (56 - 71)  RR: 23 (04 Jan 2019 20:00) (16 - 26)  SpO2: 100% (04 Jan 2019 20:37) (95% - 100%)    PHYSICAL EXAM  General: adult in NAD  HEENT: clear oropharynx, icteric sclera, pink conjunctiva  Neck: supple  CV: normal S1/S2 with no murmur rubs or gallops  Lungs: positive air movement  Abdomen: soft non-tender non-distended, no hepatosplenomegaly  Ext: no clubbing cyanosis or edema  Skin: no rashes and no petechiae  Neuro: alert  LABS:                          10.2   19.9  )-----------( 74       ( 04 Jan 2019 06:33 )             32.9         Mean Cell Volume : 65.9 fl  Mean Cell Hemoglobin : 20.5 pg  Mean Cell Hemoglobin Concentration : 31.1 gm/dL  Auto Neutrophil # : 15.9 K/uL  Auto Lymphocyte # : 1.7 K/uL  Auto Monocyte # : 1.8 K/uL  Auto Eosinophil # : 0.3 K/uL  Auto Basophil # : 0.2 K/uL  Auto Neutrophil % : 80.0 %  Auto Lymphocyte % : 8.4 %  Auto Monocyte % : 9.3 %  Auto Eosinophil % : 1.4 %  Auto Basophil % : 1.0 %    Serial CBC's  01-04 @ 06:33  Hct-32.9 / Hgb-10.2 / Plat-74 / RBC-4.99 / WBC-19.9          Serial CBC's  01-03 @ 06:06  Hct-32.2 / Hgb-9.9 / Plat-56 / RBC-4.83 / WBC-18.1          Serial CBC's  01-02 @ 04:17  Hct-34.4 / Hgb-10.7 / Plat-58 / RBC-5.22 / WBC-21.6          Serial CBC's  01-01 @ 04:51  Hct-31.9 / Hgb-9.8 / Plat-61 / RBC-4.80 / WBC-19.9            01-04    139  |  99  |  91<H>  ----------------------------<  203<H>  4.0   |  22  |  5.22<H>    Ca    8.4      04 Jan 2019 06:33  Phos  6.9     01-04  Mg     2.2     01-04    TPro  4.7<L>  /  Alb  2.0<L>  /  TBili  10.7<H>  /  DBili  x   /  AST  112<H>  /  ALT  226<H>  /  AlkPhos  134<H>  01-04                                  RADIOLOGY & ADDITIONAL STUDIES:

## 2019-01-04 NOTE — PROGRESS NOTE ADULT - PROBLEM SELECTOR PLAN 3
Elevated LFTs and INR--> gradually improving  Elevated Bilirubin due to sepsis  Trend lactate  S/p NAC, K-centra, oral Vit. K and 14 FFP  US Liver showed gall bladder polyp  Dr Haywood on board

## 2019-01-04 NOTE — PROGRESS NOTE ADULT - ASSESSMENT
imp/rec    Thrombocytopenia sec to DIC post hip fx/surgery    TIN is chronic in view of low MCV. On Venofer and EPO now. When recovered, GI eval is indicated    Acute liver failure    ARF

## 2019-01-04 NOTE — PROGRESS NOTE ADULT - PROBLEM SELECTOR PLAN 1
CXR: Bilateral basal pneumonia and diffuse congestion improved  Intubated and sedated  C/w Zyvox and meropenem  Blood Cx: NTD

## 2019-01-04 NOTE — PROGRESS NOTE ADULT - ASSESSMENT
1.MARIA G due to ATN from hypotensive and septic  shock with multiorgan failure  -pts renal status is poor with minimal u/o  -Last HD completed on 01/03/19 for about 2.5hrs with 1.7kg remove with acceptable BP.  -unable to obtain urine studies because anuric.   -Adjust meds to eGFR and avoid IV Gadolinium contrast, NSAIDs, and phosphate enema.  -Monitor I/O's daily.   -Monitor SMA daily.  -plan for HD 01/04/2019    2. Acidosis ;secondary to shcok/renal failure, now improved  -f/u co2 daily  -keep PH >7.3    3. Hyperkalemia: secondary to maria g, now improved   -keep k >4 and <5  -f/u k level daily  - low k diet    4. Multiorgan failure: plan as per MICU. pt has poor prognosis.    5. Anemia:multifactorial  -c/w epogen 6000units in hd tiw  -f/u Hb daily    6.Hyperphosphatemia:mild, secondary to maria g  -f/u phos level daily  -c/w  phoslo 667mg tid   -continue hd 1.MARIA G due to ATN from hypotensive and septic  shock with multiorgan failure  -pts renal status is poor with minimal u/o  -Last HD completed on 01/03/19 for about 2.5hrs with 1.7kg remove with acceptable BP.  -unable to obtain urine studies because anuric.   -Adjust meds to eGFR and avoid IV Gadolinium contrast, NSAIDs, and phosphate enema.  -Monitor I/O's daily.   -Monitor SMA daily.  -plan for HD 01/05/2019    2. Acidosis ;secondary to shcok/renal failure, now improved  -f/u co2 daily  -keep PH >7.3    3. Hyperkalemia: secondary to maria g, now improved   -keep k >4 and <5  -f/u k level daily  - low k diet    4. Multiorgan failure: plan as per MICU. pt has poor prognosis.    5. Anemia:multifactorial  -c/w epogen 6000units in hd tiw  -f/u Hb daily    6.Hyperphosphatemia:mild, secondary to maria g  -f/u phos level daily  -c/w  phoslo 667mg tid   -continue hd

## 2019-01-04 NOTE — PROGRESS NOTE ADULT - SUBJECTIVE AND OBJECTIVE BOX
INTERVAL HPI/OVERNIGHT EVENTS:       Antimicrobial:  linezolid  IVPB 600 milliGRAM(s) IV Intermittent every 12 hours  meropenem  IVPB 500 milliGRAM(s) IV Intermittent every 12 hours    Cardiovascular:  digoxin  Injectable 0.125 milliGRAM(s) IV Push every other day    Pulmonary:    Hematalogic:    Other:  ascorbic acid 500 milliGRAM(s) Oral two times a day  calcium acetate 667 milliGRAM(s) Oral three times a day with meals  chlorhexidine 4% Liquid 1 Application(s) Topical two times a day  docusate sodium Liquid 100 milliGRAM(s) Oral two times a day  epoetin monique Injectable 6000 Unit(s) IV Push <User Schedule>  fentaNYL   Infusion. 1 MICROgram(s)/kG/Hr IV Continuous <Continuous>  folic acid 1 milliGRAM(s) Oral daily  HYDROmorphone  Injectable 2 milliGRAM(s) IV Push every 4 hours PRN  insulin glargine Injectable (LANTUS) 10 Unit(s) SubCutaneous at bedtime  insulin lispro (HumaLOG) corrective regimen sliding scale   SubCutaneous every 6 hours  ondansetron Injectable 4 milliGRAM(s) IV Push every 6 hours PRN  pantoprazole  Injectable 40 milliGRAM(s) IV Push daily  propofol Infusion 5 MICROgram(s)/kG/Min IV Continuous <Continuous>      Drug Dosing Weight  Height (cm): 154.94 (28 Dec 2018 01:00)  Weight (kg): 82.1 (28 Dec 2018 01:00)  BMI (kg/m2): 34.2 (28 Dec 2018 01:00)  BSA (m2): 1.81 (28 Dec 2018 01:00)    CENTRAL LINE: [x ] YES [ ] NO  LOCATION:   DATE INSERTED:    DE SOUZA: [ x] YES [ ] NO    DATE INSERTED:    A-LINE:  [ ] YES [x ] NO  LOCATION:   DATE INSERTED:    PMH/Social Hx/Fam Hx -reviewed admission note, no change since admission  PAST MEDICAL & SURGICAL HISTORY:  No pertinent past medical history  No significant past surgical history      T(C): 36.8 (01-04-19 @ 10:00), Max: 37.2 (01-03-19 @ 14:00)  HR: 97 (01-04-19 @ 11:00)  BP: 119/55 (01-04-19 @ 08:23)  BP(mean): 56 (01-04-19 @ 04:00)  ABP: 163/59 (01-04-19 @ 11:00)  ABP(mean): 91 (01-04-19 @ 11:00)  RR: 21 (01-04-19 @ 11:00)  SpO2: 99% (01-04-19 @ 11:00)  Wt(kg): --    ABG - ( 04 Jan 2019 05:15 )  pH, Arterial: 7.37  pH, Blood: x     /  pCO2: 39    /  pO2: 92    / HCO3: 22    / Base Excess: -2.5  /  SaO2: 96                    01-03 @ 07:01  -  01-04 @ 07:00  --------------------------------------------------------  IN: 1430.9 mL / OUT: 95 mL / NET: 1335.9 mL        Mode: AC/ CMV (Assist Control/ Continuous Mandatory Ventilation)  RR (machine): 16  TV (machine): 450  FiO2: 60  PEEP: 5  ITime: 1  MAP: 8  PIP: 20      PHYSICAL EXAM:    GENERAL: No signs of distress, comfortable  HEAD: Atraumatic, Normocephalic  EYES: EOMI, PERRLA jaundice sclera  ENMT: No erythema, exudates, or enlargement, Moist mucous membranes + ETT  NECK: Supple, normal appearance, No JVD; [  ] central line (if applicable)  CHEST/LUNG: No chest deformity, fair bilateral air entry; No rales, rhonchi, wheezing; crackles  HEART: Regular rate and rhythm; No murmurs, rubs, or gallops;   ABDOMEN: Soft, Nontender, Nondistended; Bowel sounds present  EXTREMITIES:  + Peripheral Pulses, purples toes left hip with dressing  NERVOUS SYSTEM: sedated  LYMPH: No lymphadenopathy noted  SKIN: No rashes or lesions; good turgor, warm, dry      LABS:  CBC Full  -  ( 04 Jan 2019 06:33 )  WBC Count : 19.9 K/uL  Hemoglobin : 10.2 g/dL  Hematocrit : 32.9 %  Platelet Count - Automated : x  Mean Cell Volume : 65.9 fl  Mean Cell Hemoglobin : 20.5 pg  Mean Cell Hemoglobin Concentration : 31.1 gm/dL  Auto Neutrophil # : 15.9 K/uL  Auto Lymphocyte # : 1.7 K/uL  Auto Monocyte # : 1.8 K/uL  Auto Eosinophil # : 0.3 K/uL  Auto Basophil # : 0.2 K/uL  Auto Neutrophil % : 80.0 %  Auto Lymphocyte % : 8.4 %  Auto Monocyte % : 9.3 %  Auto Eosinophil % : 1.4 %  Auto Basophil % : 1.0 %    01-04    139  |  99  |  91<H>  ----------------------------<  203<H>  4.0   |  22  |  5.22<H>    Ca    8.4      04 Jan 2019 06:33  Phos  6.9     01-04  Mg     2.2     01-04    TPro  4.7<L>  /  Alb  2.0<L>  /  TBili  10.7<H>  /  DBili  x   /  AST  112<H>  /  ALT  226<H>  /  AlkPhos  134<H>  01-04    PT/INR - ( 02 Jan 2019 14:20 )   PT: 20.0 sec;   INR: 1.77 ratio         PTT - ( 02 Jan 2019 14:20 )  PTT:34.9 sec        RADIOLOGY & ADDITIONAL STUDIES REVIEWED     CXR:< from: Xray Chest 1 View- PORTABLE-Routine (01.04.19 @ 07:11) >  EXAM:  XR CHEST PORTABLE ROUTINE 1V                            PROCEDURE DATE:  01/04/2019          INTERPRETATION:  CLINICAL STATEMENT: Follow-up chest pain.    TECHNIQUE: AP view of the chest.    COMPARISON: 1/3/2019    FINDINGS/  IMPRESSION:  Left central line, ET tube, feeding tube again noted. No pneumothorax.    Increased interstitial lung markings without significant change. Small   right pleural effusion increased in size with adjacent opacity.    Heart size cannot be accurately assessed in this projection.      < end of copied text >      IMPRESSION:  PAST MEDICAL & SURGICAL HISTORY:  No pertinent past medical history  No significant past surgical history   p/w         IMP: This is an 78 yr old woman with CAD, CHF with decrease EF,  and prior mentioned medical condition s/p fall with right hip fx. S/P Right hip IM nailing 12/27. Post op resp failure and cardiogenic shock  vs septic on pressors to maintain BP.  Pat is in MOSF.  Severe life threatening metabolic acidosis due to MARIA G, MOSF. Coagulopathy due to liver failure ( shock liver). Started on iv antibx . Pat is auto coagulopathic s/p ffp but inr is still elevated,hence no dialysis cath placement. acidosis is better with iv bicarb. Antibx changed by ID. Had fist session od dialysis.. incompete. LFT improving. Cyanotic toes and fingers due to low flow state and pressors ( now off presssors).  Started on HD , had first complete session 1/1.  OFF PRESSORS. T.Stephane is elevated.             Plan:      CNS: d/c sedation    PULMONARY: continue vent support, start sbt when awake    CARDIAC: monitor    GI: feed    RENAL: dialysis as per renal    SKIN: wound care    MUSCULOSKELETAL: boots    ID: continue antibx    HEME: monitor    DVT and GI Prophylaxis    GOALS OF CARE DISCUSSION WITH PATIENT/FAMILY/PROXY/ icu team on rounds    CRITICAL CARE TIME SPENT: 35 minutes

## 2019-01-04 NOTE — PROGRESS NOTE ADULT - PROBLEM SELECTOR PLAN 2
Multiorgan failure with Anuria with severe lactic acidosis   Rt Femoral Shiley  Plan for dialysis tomorrow   Poor urine output   Dr Lay on board

## 2019-01-04 NOTE — PROGRESS NOTE ADULT - SUBJECTIVE AND OBJECTIVE BOX
MARICEL SLAUGHTER  MR# [id number]  79yFemale        Patient is a 79y old  Female who presents with a chief complaint of fall (04 Jan 2019 11:56)      INTERVAL HPI/OVERNIGHT EVENTS:  Patient seen and examined at bedside. No notations of chest pain, palpitation, SOB, orthopnea, nausea, vomiting or abdominal pain.    ALLERGIES  No Known Allergies      MEDICATIONS  ascorbic acid 500 milliGRAM(s) Oral two times a day  calcium acetate 667 milliGRAM(s) Oral three times a day with meals  chlorhexidine 4% Liquid 1 Application(s) Topical two times a day  digoxin  Injectable 0.125 milliGRAM(s) IV Push every other day  docusate sodium Liquid 100 milliGRAM(s) Oral two times a day  epoetin monique Injectable 6000 Unit(s) IV Push <User Schedule>  fentaNYL   Infusion. 1 MICROgram(s)/kG/Hr IV Continuous <Continuous>  folic acid 1 milliGRAM(s) Oral daily  heparin  Injectable 5000 Unit(s) SubCutaneous every 12 hours  HYDROmorphone  Injectable 2 milliGRAM(s) IV Push every 4 hours PRN Severe Pain (7 - 10)  insulin glargine Injectable (LANTUS) 10 Unit(s) SubCutaneous at bedtime  insulin lispro (HumaLOG) corrective regimen sliding scale   SubCutaneous every 6 hours  linezolid  IVPB 600 milliGRAM(s) IV Intermittent every 12 hours  meropenem  IVPB 500 milliGRAM(s) IV Intermittent every 12 hours  ondansetron Injectable 4 milliGRAM(s) IV Push every 6 hours PRN Nausea and/or Vomiting  pantoprazole  Injectable 40 milliGRAM(s) IV Push daily  propofol Infusion 5 MICROgram(s)/kG/Min IV Continuous <Continuous>              REVIEW OF SYSTEMS:  CONSTITUTIONAL: No fever, weight loss, or fatigue  EYES: No eye pain, visual disturbances, or discharge  ENT:  No difficulty hearing, tinnitus, vertigo; No sinus or throat pain  NECK: No pain or stiffness  RESPIRATORY: No cough, wheezing, chills or hemoptysis; No Shortness of Breath  CARDIOVASCULAR: No chest pain, palpitations, passing out, dizziness, or leg swelling  GASTROINTESTINAL: No abdominal or epigastric pain. No nausea, vomiting, or hematemesis; No diarrhea or constipation. No melena or hematochezia.  GENITOURINARY: No dysuria, frequency, hematuria, or incontinence  NEUROLOGICAL: No headaches, memory loss, loss of strength, numbness, or tremors  SKIN: No itching, burning, rashes, or lesions   LYMPH Nodes: No enlarged glands  ENDOCRINE: No heat or cold intolerance; No hair loss  MUSCULOSKELETAL: No joint pain or swelling; No muscle, back, or extremity pain  PSYCHIATRIC: No depression, anxiety, mood swings, or difficulty sleeping  HEME/LYMPH: No easy bruising, or bleeding gums  ALLERGY AND IMMUNOLOGIC: No hives or eczema	    [ ] All others negative	  [ ] Unable to obtain      T(C): 36.7 (01-04-19 @ 15:23), Max: 37.2 (01-04-19 @ 13:00)  T(F): 98 (01-04-19 @ 15:23), Max: 99 (01-04-19 @ 13:00)  HR: 97 (01-04-19 @ 16:09) (81 - 101)  BP: 135/53 (01-04-19 @ 16:09) (98/43 - 135/53)  RR: 23 (01-04-19 @ 16:09) (8 - 26)  SpO2: 99% (01-04-19 @ 16:09) (97% - 100%)  Wt(kg): --    I&O's Summary    03 Jan 2019 07:01  -  04 Jan 2019 07:00  --------------------------------------------------------  IN: 1430.9 mL / OUT: 95 mL / NET: 1335.9 mL    04 Jan 2019 07:01  -  04 Jan 2019 16:33  --------------------------------------------------------  IN: 194.6 mL / OUT: 10 mL / NET: 184.6 mL          PHYSICAL EXAM:  A X O x  HEAD:  Atraumatic, Normocephalic  EYES: EOMI, PERRLA, conjunctiva and sclera clear  NECK: Supple, No JVD, Normal thyroid  Resp: CTAB, No crackles, wheezing,   CVS: Regular rate and rhythm; No discernable murmurs, rubs, or gallops  ABD: Soft, Nontender, Nondistended; Bowel sounds present  EXTREMITIES:  2+ Peripheral Pulses, No edema  LYMPH: No dicernable lymphadenopathy noted  GENERAL: NAD, well-groomed, well-developed      LABS:                        10.2   19.9  )-----------( 74       ( 04 Jan 2019 06:33 )             32.9     01-04    139  |  99  |  91<H>  ----------------------------<  203<H>  4.0   |  22  |  5.22<H>    Ca    8.4      04 Jan 2019 06:33  Phos  6.9     01-04  Mg     2.2     01-04    TPro  4.7<L>  /  Alb  2.0<L>  /  TBili  10.7<H>  /  DBili  x   /  AST  112<H>  /  ALT  226<H>  /  AlkPhos  134<H>  01-04        CAPILLARY BLOOD GLUCOSE      POCT Blood Glucose.: 162 mg/dL (04 Jan 2019 14:16)  POCT Blood Glucose.: 206 mg/dL (04 Jan 2019 06:13)  POCT Blood Glucose.: 196 mg/dL (03 Jan 2019 22:44)  POCT Blood Glucose.: 50 mg/dL (03 Jan 2019 22:42)  POCT Blood Glucose.: 162 mg/dL (03 Jan 2019 17:20)      Troponins:  ProBNP:  Lipid Profile:   HgA1c:  TSH:     ABG - ( 04 Jan 2019 05:15 )  pH, Arterial: 7.37  pH, Blood: x     /  pCO2: 39    /  pO2: 92    / HCO3: 22    / Base Excess: -2.5  /  SaO2: 96                    RADIOLOGY & ADDITIONAL TESTS:    Imaging Personally Reviewed:  [ ] YES  [ ] NO      Consultant(s) Notes Reviewed:  [x ] YES  [ ] NO    Care Discussed with Consultants/Other Providers [ x] YES  [ ] NO          PAST MEDICAL & SURGICAL HISTORY:  No pertinent past medical history  No significant past surgical history        Closed fracture of neck of left femur  Yes  No pertinent family history in first degree relatives  Handoff  MEWS Score  No pertinent past medical history  Closed fracture of left hip, initial encounter  Peripheral arterial disease  Drop in hemoglobin  Acute liver failure  Acute renal failure  Septic shock  Acute post-operative pain  Hypotension  Fever  Acute systolic congestive heart failure  Cardiogenic shock  Prophylactic measure  Diabetes mellitus  HTN (hypertension)  Heart failure with reduced ejection fraction  PSVT (paroxysmal supraventricular tachycardia)  Left ventricular dysfunction  Need for prophylactic measure  Closed fracture of left hip, initial encounter  No significant past surgical history  A S/P FALL

## 2019-01-04 NOTE — PROGRESS NOTE ADULT - PROBLEM SELECTOR PLAN 3
Elevated LFTs and INR--> gradually improving  Trend lactate  S/p NAC, K-centra, oral Vit. K and 14 FFP Elevated LFTs and INR--> gradually improving  Elevated Bilirubin  Trend lactate  S/p NAC, K-centra, oral Vit. K and 14 FFP  US Liver showed gall bladder polyp  Dr Haywood on board Elevated LFTs and INR--> gradually improving  Elevated Bilirubin due to sepsis  Trend lactate  S/p NAC, K-centra, oral Vit. K and 14 FFP  US Liver showed gall bladder polyp  Dr Haywood on board

## 2019-01-04 NOTE — PROGRESS NOTE ADULT - ASSESSMENT
79 y/o F pt with HTN, DM2, PMHx presents to ED c/o L hip pain s/p trip and fall from a curb today. Pt describes pain as sharp, mild and constant, 10/10 in severity. Pain of the left hip increases with ROM. Pt unable to ambulate nor bear weight. Patient denies head strike, LOC, N/V, vision changes, neurological deficits chest pain, palpitations, shortness of breath, or any other complaints. XR hip showed nondisplaced intertrochanteric fracture left femoral neck and marked osteoporosis. XR hip showed nondisplaced intertrochanteric fracture left femoral neck and marked osteoporosis. (26 Dec 2018 19:35)s/p nailing 12/27 with post op hypotension , septic vs cardiogenic shock, also found to have shock liver . on pressors and sedation. on vanco and zosyn. worsening kidney function with worsening ph and metabolic acidosis    # s/p fall with subsequent left hip fx s/p im nailing 12/27 with post op resp failure and MOSF including shock liver /lft decreasing     # resp failure sec to asp pna     # S/P septic/cardiogenic shock with MOSF . echo with very low LVEF     plan  blood cx times 2 f/u ( neg )    cont zyvox and merrem for 10-14 days   dialysis as per renal     poor prognosis   pt is dnr   thnx will f/u   d/w family and intern

## 2019-01-04 NOTE — PROGRESS NOTE ADULT - SUBJECTIVE AND OBJECTIVE BOX
Pt seen at bedside  Patient is a 79y old  Female who presents with a chief complaint of fall (04 Jan 2019 08:15)      INTERVAL HPI/OVERNIGHT EVENTS:  Pt intubated  sedated  No overnight events    Vital Signs Last 24 Hrs  T(C): 36.8 (04 Jan 2019 04:00), Max: 37.2 (03 Jan 2019 11:00)  T(F): 98.2 (04 Jan 2019 04:00), Max: 99 (03 Jan 2019 11:00)  HR: 93 (04 Jan 2019 08:32) (81 - 101)  BP: 119/55 (04 Jan 2019 08:23) (98/43 - 128/45)  BP(mean): 56 (04 Jan 2019 04:00) (56 - 62)  RR: 21 (04 Jan 2019 08:32) (8 - 25)  SpO2: 100% (04 Jan 2019 08:32) (92% - 100%)    Physical Exam:    Gen: intubated  Ext:  b/l LE toes with cyanosis, warm to touch  Vasc: + dopplerable DP/PT pulses    MEDICATIONS  (STANDING):  ascorbic acid 500 milliGRAM(s) Oral two times a day  calcium acetate 667 milliGRAM(s) Oral three times a day with meals  chlorhexidine 4% Liquid 1 Application(s) Topical two times a day  digoxin  Injectable 0.125 milliGRAM(s) IV Push every other day  docusate sodium Liquid 100 milliGRAM(s) Oral two times a day  epoetin monique Injectable 6000 Unit(s) IV Push <User Schedule>  fentaNYL   Infusion. 1 MICROgram(s)/kG/Hr (8.21 mL/Hr) IV Continuous <Continuous>  folic acid 1 milliGRAM(s) Oral daily  insulin glargine Injectable (LANTUS) 10 Unit(s) SubCutaneous at bedtime  insulin lispro (HumaLOG) corrective regimen sliding scale   SubCutaneous every 6 hours  linezolid  IVPB 600 milliGRAM(s) IV Intermittent every 12 hours  meropenem  IVPB 500 milliGRAM(s) IV Intermittent every 12 hours  pantoprazole  Injectable 40 milliGRAM(s) IV Push daily  propofol Infusion 5 MICROgram(s)/kG/Min (2.463 mL/Hr) IV Continuous <Continuous>    MEDICATIONS  (PRN):  HYDROmorphone  Injectable 2 milliGRAM(s) IV Push every 4 hours PRN Severe Pain (7 - 10)  ondansetron Injectable 4 milliGRAM(s) IV Push every 6 hours PRN Nausea and/or Vomiting                            10.2   19.9  )-----------( x        ( 04 Jan 2019 06:33 )             32.9     01-04    139  |  99  |  91<H>  ----------------------------<  203<H>  4.0   |  22  |  5.22<H>    Ca    8.4      04 Jan 2019 06:33  Phos  6.9     01-04  Mg     2.2     01-04    TPro  4.7<L>  /  Alb  2.0<L>  /  TBili  10.7<H>  /  DBili  x   /  AST  112<H>  /  ALT  226<H>  /  AlkPhos  134<H>  01-04    PT/INR - ( 02 Jan 2019 14:20 )   PT: 20.0 sec;   INR: 1.77 ratio         PTT - ( 02 Jan 2019 14:20 )  PTT:34.9 sec    I&O's Detail    03 Jan 2019 07:01  -  04 Jan 2019 07:00  --------------------------------------------------------  IN:    Enteral Tube Flush: 80 mL    fentaNYL Infusion.: 196.8 mL    Nepro with Carb Steady: 460 mL    propofol Infusion: 44.4 mL    propofol Infusion: 199.7 mL    Solution: 100 mL    Solution: 50 mL    Solution: 300 mL  Total IN: 1430.9 mL    OUT:    Indwelling Catheter - Urethral: 95 mL  Total OUT: 95 mL    Total NET: 1335.9 mL      04 Jan 2019 07:01  -  04 Jan 2019 09:34  --------------------------------------------------------  IN:    Nepro with Carb Steady: 10 mL  Total IN: 10 mL    OUT:  Total OUT: 0 mL    Total NET: 10 mL

## 2019-01-05 NOTE — PROGRESS NOTE ADULT - ASSESSMENT
78 year old female with shock s/p left IM hip nailing on 12/27. Found to have cyanosis of bilateral distal lower extremities and upper extremities    - continue to monitor progression   - not a candidate for AC due to coagulopathy  - continue current care per ICU

## 2019-01-05 NOTE — PROGRESS NOTE ADULT - ASSESSMENT
79 y/o F pt with PMHX of  HTN, DM2, who presents to ED c/o L hip pain s/p trip and fall from a curb, SVT and severe lv dysfunction,post-op septic shock,MARIA G,LLL pneumonia,and shock liver.  1.ICU monitoring.  2.Vent  support as per iCU.  3.PSVT-dig .125mg qod.  4.DM-Insulin.  5.Ortho f/u.  6.Replace vit b12.  7.ID f/u -ABX.  8.MARIA G with metabolic acidosis-Renal f/u.  9.Shock liver-f/u lft's.  10. HD as per renal.  11.GI prophylaxis.  12.Coagulopathy-Heme f/u.

## 2019-01-05 NOTE — PROGRESS NOTE ADULT - SUBJECTIVE AND OBJECTIVE BOX
Subjective: remains on the vent , sedated, on hd . wbc increasing  , seen by vascular for discolored toes and fingers       PHYSICAL EXAM:    Vital Signs Last 24 Hrs  T(C): 37 (05 Jan 2019 12:30), Max: 37 (05 Jan 2019 12:30)  T(F): 98.6 (05 Jan 2019 12:30), Max: 98.6 (05 Jan 2019 12:30)  HR: 108 (05 Jan 2019 15:00) (81 - 108)  BP: 112/54 (05 Jan 2019 15:00) (102/53 - 133/67)  BP(mean): 66 (05 Jan 2019 15:00) (63 - 97)  RR: 23 (05 Jan 2019 15:00) (0 - 30)  SpO2: 58% (05 Jan 2019 15:00) (58% - 100%)    Constitutional: sedated on vent nad   LUNGS mech br sounds  CVS s1 s2 aud no murmurs   ABDOMEN soft non tender obese   NEUROLOGY  flaccid   SKINdark discolrations of toes and fingers   EXTREMITIES no edema no cyanosis /left hip wound clean /right groin dialysis cath /left neck with central line   weber with scant marisa urine         LABS/DIAGNOSTIC TESTS                        10.8   26.6  )-----------( 98       ( 05 Jan 2019 06:43 )             34.2     01-05    136  |  97  |  110<H>  ----------------------------<  128<H>  4.5   |  20<L>  |  5.81<H>    Ca    8.8      05 Jan 2019 06:43  Phos  7.8     01-05  Mg     2.2     01-05    TPro  4.9<L>  /  Alb  2.0<L>  /  TBili  14.2<H>  /  DBili  11.1<H>  /  AST  72<H>  /  ALT  155<H>  /  AlkPhos  147<H>  01-05          meds   ascorbic acid 500 milliGRAM(s) Oral two times a day  calcium acetate 667 milliGRAM(s) Oral three times a day with meals  chlorhexidine 4% Liquid 1 Application(s) Topical two times a day  cyanocobalamin Injectable 1000 MICROGram(s) SubCutaneous daily  digoxin  Injectable 0.125 milliGRAM(s) IV Push every other day  docusate sodium Liquid 100 milliGRAM(s) Oral two times a day  epoetin monique Injectable 6000 Unit(s) IV Push <User Schedule>  fentaNYL   Infusion. 1 MICROgram(s)/kG/Hr IV Continuous <Continuous>  folic acid 1 milliGRAM(s) Oral daily  heparin  Injectable 5000 Unit(s) SubCutaneous every 12 hours  HYDROmorphone  Injectable 2 milliGRAM(s) IV Push every 4 hours PRN  insulin glargine Injectable (LANTUS) 10 Unit(s) SubCutaneous at bedtime  insulin lispro (HumaLOG) corrective regimen sliding scale   SubCutaneous every 6 hours  linezolid  IVPB 600 milliGRAM(s) IV Intermittent every 12 hours  meropenem  IVPB 500 milliGRAM(s) IV Intermittent every 12 hours  ondansetron Injectable 4 milliGRAM(s) IV Push every 6 hours PRN  pantoprazole  Injectable 40 milliGRAM(s) IV Push daily        CULTURES  Culture Results: blood  No growth at 5 days. (12-30 @ 14:42)  Culture Results: blood  No growth at 5 days. (12-29 @ 21:43)        RADIOLOGY  < from: Xray Chest 1 View- PORTABLE-Routine (01.05.19 @ 10:50) >    EXAM:  XR CHEST PORTABLE ROUTINE 1V                            PROCEDURE DATE:  01/05/2019          INTERPRETATION:  HISTORY: Fluid overload. ESRD. The patient is unable to   communicate.    Portable chest radiograph    TECHNIQUE:  Single portable frontal AP view of the chest was obtained.    FINDINGS:   Comparison radiographs including from the preceding day are  available for review.    There is no change of the right-sided pleural effusion and/or right lower   lobe infiltrate or atelectasis.No consolidation is seen in the left   lung. The platelike atelectasis is identified in the left lower lobe.   Endotracheal tube and left IJ line appear unchanged. The NG tube has been   removed. The heart and mediastinum are unchanged.  The bones are unchanged.    IMPRESSION: Right pleural effusion and/or right lower lobe infiltrate or   atelectasis, unchanged.    < end of copied text >

## 2019-01-05 NOTE — PROGRESS NOTE ADULT - PROBLEM SELECTOR PLAN 3
Elevated LFTs and INR--> gradually improving  Elevated Bilirubin due to sepsis  Trend lactate  S/p NAC, K-centra, oral Vit. K and 14 FFP  US Liver showed gall bladder polyp  Dr Haywood on board Elevated LFTs and INR--> gradually improving  Elevated Bilirubin due to sepsis  S/p NAC, K-centra, oral Vit. K and 14 FFP  US Liver showed gall bladder polyp  Dr Haywood on board

## 2019-01-05 NOTE — PROGRESS NOTE ADULT - SUBJECTIVE AND OBJECTIVE BOX
MARICEL SLAUGHTER  MR# [id number]  79yFemale        Patient is a 79y old  Female who presents with a chief complaint of fall (05 Jan 2019 20:53)      INTERVAL HPI/OVERNIGHT EVENTS:  Patient seen and examined at bedside. No notations of chest pain, palpitation, SOB, orthopnea, nausea, vomiting or abdominal pain.    ALLERGIES  No Known Allergies      MEDICATIONS  ascorbic acid 500 milliGRAM(s) Oral two times a day  calcium acetate 667 milliGRAM(s) Oral three times a day with meals  chlorhexidine 4% Liquid 1 Application(s) Topical two times a day  cyanocobalamin Injectable 1000 MICROGram(s) SubCutaneous daily  digoxin  Injectable 0.125 milliGRAM(s) IV Push every other day  docusate sodium Liquid 100 milliGRAM(s) Oral two times a day  epoetin monique Injectable 6000 Unit(s) IV Push <User Schedule>  fentaNYL   Infusion. 1 MICROgram(s)/kG/Hr IV Continuous <Continuous>  folic acid 1 milliGRAM(s) Oral daily  heparin  Injectable 5000 Unit(s) SubCutaneous every 12 hours  HYDROmorphone  Injectable 2 milliGRAM(s) IV Push every 4 hours PRN Severe Pain (7 - 10)  insulin glargine Injectable (LANTUS) 10 Unit(s) SubCutaneous at bedtime  insulin lispro (HumaLOG) corrective regimen sliding scale   SubCutaneous every 6 hours  linezolid  IVPB 600 milliGRAM(s) IV Intermittent every 12 hours  meropenem  IVPB 500 milliGRAM(s) IV Intermittent every 12 hours  ondansetron Injectable 4 milliGRAM(s) IV Push every 6 hours PRN Nausea and/or Vomiting  pantoprazole  Injectable 40 milliGRAM(s) IV Push daily              REVIEW OF SYSTEMS:  CONSTITUTIONAL: No fever, weight loss, or fatigue  EYES: No eye pain, visual disturbances, or discharge  ENT:  No difficulty hearing, tinnitus, vertigo; No sinus or throat pain  NECK: No pain or stiffness  RESPIRATORY: No cough, wheezing, chills or hemoptysis; No Shortness of Breath  CARDIOVASCULAR: No chest pain, palpitations, passing out, dizziness, or leg swelling  GASTROINTESTINAL: No abdominal or epigastric pain. No nausea, vomiting, or hematemesis; No diarrhea or constipation. No melena or hematochezia.  GENITOURINARY: No dysuria, frequency, hematuria, or incontinence  NEUROLOGICAL: No headaches, memory loss, loss of strength, numbness, or tremors  SKIN: No itching, burning, rashes, or lesions   LYMPH Nodes: No enlarged glands  ENDOCRINE: No heat or cold intolerance; No hair loss  MUSCULOSKELETAL: No joint pain or swelling; No muscle, back, or extremity pain  PSYCHIATRIC: No depression, anxiety, mood swings, or difficulty sleeping  HEME/LYMPH: No easy bruising, or bleeding gums  ALLERGY AND IMMUNOLOGIC: No hives or eczema	    [ ] All others negative	  [ ] Unable to obtain      T(C): 36.5 (01-05-19 @ 21:04), Max: 37 (01-05-19 @ 12:30)  T(F): 97.7 (01-05-19 @ 21:04), Max: 98.6 (01-05-19 @ 12:30)  HR: 89 (01-05-19 @ 20:07) (82 - 108)  BP: 95/41 (01-05-19 @ 19:00) (95/41 - 171/131)  RR: 20 (01-05-19 @ 19:00) (0 - 30)  SpO2: 99% (01-05-19 @ 20:07) (58% - 100%)  Wt(kg): --    I&O's Summary    04 Jan 2019 07:01  -  05 Jan 2019 07:00  --------------------------------------------------------  IN: 1086.8 mL / OUT: 350 mL / NET: 736.8 mL    05 Jan 2019 07:01  -  05 Jan 2019 21:50  --------------------------------------------------------  IN: 448.4 mL / OUT: 90 mL / NET: 358.4 mL          PHYSICAL EXAM:  A X O x  HEAD:  Atraumatic, Normocephalic  EYES: EOMI, PERRLA, conjunctiva and sclera clear  NECK: Supple, No JVD, Normal thyroid  Resp: CTAB, No crackles, wheezing,   CVS: Regular rate and rhythm; No discernable murmurs, rubs, or gallops  ABD: Soft, Nontender, Nondistended; Bowel sounds present  EXTREMITIES:  2+ Peripheral Pulses, No edema  LYMPH: No dicernable lymphadenopathy noted  GENERAL: NAD, well-groomed, well-developed      LABS:                        10.8   26.6  )-----------( 98       ( 05 Jan 2019 06:43 )             34.2     01-05    136  |  97  |  110<H>  ----------------------------<  128<H>  4.5   |  20<L>  |  5.81<H>    Ca    8.8      05 Jan 2019 06:43  Phos  7.8     01-05  Mg     2.2     01-05    TPro  4.9<L>  /  Alb  2.0<L>  /  TBili  14.2<H>  /  DBili  11.1<H>  /  AST  72<H>  /  ALT  155<H>  /  AlkPhos  147<H>  01-05        CAPILLARY BLOOD GLUCOSE      POCT Blood Glucose.: 238 mg/dL (05 Jan 2019 21:44)  POCT Blood Glucose.: 131 mg/dL (05 Jan 2019 17:53)  POCT Blood Glucose.: 46 mg/dL (05 Jan 2019 17:50)  POCT Blood Glucose.: 39 mg/dL (05 Jan 2019 17:48)  POCT Blood Glucose.: 112 mg/dL (05 Jan 2019 12:59)  POCT Blood Glucose.: 64 mg/dL (05 Jan 2019 12:56)  POCT Blood Glucose.: 146 mg/dL (05 Jan 2019 06:03)  POCT Blood Glucose.: 149 mg/dL (05 Jan 2019 00:01)      Troponins:  ProBNP:  Lipid Profile:   HgA1c:  TSH:     ABG - ( 05 Jan 2019 05:17 )  pH, Arterial: 7.41  pH, Blood: x     /  pCO2: 34    /  pO2: 92    / HCO3: 21    / Base Excess: -2.6  /  SaO2: 97                    RADIOLOGY & ADDITIONAL TESTS:    Imaging Personally Reviewed:  [ ] YES  [ ] NO      Consultant(s) Notes Reviewed:  [x ] YES  [ ] NO    Care Discussed with Consultants/Other Providers [ x] YES  [ ] NO          PAST MEDICAL & SURGICAL HISTORY:  No pertinent past medical history  No significant past surgical history        Closed fracture of neck of left femur  Yes  No pertinent family history in first degree relatives  Handoff  MEWS Score  No pertinent past medical history  Closed fracture of left hip, initial encounter  Peripheral arterial disease  Drop in hemoglobin  Acute liver failure  Acute renal failure  Septic shock  Acute post-operative pain  Hypotension  Fever  Acute systolic congestive heart failure  Cardiogenic shock  Prophylactic measure  Diabetes mellitus  HTN (hypertension)  Heart failure with reduced ejection fraction  PSVT (paroxysmal supraventricular tachycardia)  Left ventricular dysfunction  Need for prophylactic measure  Closed fracture of left hip, initial encounter  No significant past surgical history  A S/P FALL

## 2019-01-05 NOTE — PROGRESS NOTE ADULT - SUBJECTIVE AND OBJECTIVE BOX
INTERVAL HPI/OVERNIGHT EVENTS:   Patient is seen and examined at the bedside,   no over night events        PRESSORS: [ ] YES [x ] NO      Antimicrobial:  linezolid  IVPB 600 milliGRAM(s) IV Intermittent every 12 hours  meropenem  IVPB 500 milliGRAM(s) IV Intermittent every 12 hours    Cardiovascular:  digoxin  Injectable 0.125 milliGRAM(s) IV Push every other day    Pulmonary:    Hematalogic:  heparin  Injectable 5000 Unit(s) SubCutaneous every 12 hours    Other:  ascorbic acid 500 milliGRAM(s) Oral two times a day  calcium acetate 667 milliGRAM(s) Oral three times a day with meals  chlorhexidine 4% Liquid 1 Application(s) Topical two times a day  docusate sodium Liquid 100 milliGRAM(s) Oral two times a day  epoetin monique Injectable 6000 Unit(s) IV Push <User Schedule>  fentaNYL   Infusion. 1 MICROgram(s)/kG/Hr IV Continuous <Continuous>  folic acid 1 milliGRAM(s) Oral daily  HYDROmorphone  Injectable 2 milliGRAM(s) IV Push every 4 hours PRN  insulin glargine Injectable (LANTUS) 10 Unit(s) SubCutaneous at bedtime  insulin lispro (HumaLOG) corrective regimen sliding scale   SubCutaneous every 6 hours  ondansetron Injectable 4 milliGRAM(s) IV Push every 6 hours PRN  pantoprazole  Injectable 40 milliGRAM(s) IV Push daily    ascorbic acid 500 milliGRAM(s) Oral two times a day  calcium acetate 667 milliGRAM(s) Oral three times a day with meals  chlorhexidine 4% Liquid 1 Application(s) Topical two times a day  digoxin  Injectable 0.125 milliGRAM(s) IV Push every other day  docusate sodium Liquid 100 milliGRAM(s) Oral two times a day  epoetin monique Injectable 6000 Unit(s) IV Push <User Schedule>  fentaNYL   Infusion. 1 MICROgram(s)/kG/Hr IV Continuous <Continuous>  folic acid 1 milliGRAM(s) Oral daily  heparin  Injectable 5000 Unit(s) SubCutaneous every 12 hours  HYDROmorphone  Injectable 2 milliGRAM(s) IV Push every 4 hours PRN  insulin glargine Injectable (LANTUS) 10 Unit(s) SubCutaneous at bedtime  insulin lispro (HumaLOG) corrective regimen sliding scale   SubCutaneous every 6 hours  linezolid  IVPB 600 milliGRAM(s) IV Intermittent every 12 hours  meropenem  IVPB 500 milliGRAM(s) IV Intermittent every 12 hours  ondansetron Injectable 4 milliGRAM(s) IV Push every 6 hours PRN  pantoprazole  Injectable 40 milliGRAM(s) IV Push daily    Drug Dosing Weight  Height (cm): 154.94 (28 Dec 2018 01:00)  Weight (kg): 82.1 (28 Dec 2018 01:00)  BMI (kg/m2): 34.2 (28 Dec 2018 01:00)  BSA (m2): 1.81 (28 Dec 2018 01:00)    CENTRAL LINE: [x ] YES [ ] NO  LOCATION: [facility]  DATE INSERTED:  REMOVE: [ ] YES [ ] NO  EXPLAIN:    DE SOUZA: [x ] YES [ ] NO    DATE INSERTED:  REMOVE:  [ ] YES [ ] NO  EXPLAIN:        PMH -reviewed admission note, no change since admission  Heart faliure: acute [ ] chronic [ ] acute or chronic [ ] diastolic [ ] systolic [ ] combied systolic and diastolic[ ]  MARIA G: ATN[ ] renal medullary necrosis [ ] CKD I [ ]CKDII [ ]CKD III [ ]CKD IV [ ]CKD V [ ]Other pathological lesions [ ]  Abdominal Nutrition Status: malnutrition [ ] cachexia [ ] morbid obesity/BMI=40 [ ] Supplement ordered [___________]     ICU Vital Signs Last 24 Hrs  T(C): 36.7 (04 Jan 2019 23:38), Max: 37.2 (04 Jan 2019 13:00)  T(F): 98.1 (04 Jan 2019 23:38), Max: 99 (04 Jan 2019 13:00)  HR: 104 (05 Jan 2019 00:02) (81 - 104)  BP: 116/62 (05 Jan 2019 00:00) (98/43 - 135/53)  BP(mean): 75 (05 Jan 2019 00:00) (56 - 75)  ABP: 144/60 (05 Jan 2019 00:00) (23/20 - 168/68)  ABP(mean): 86 (05 Jan 2019 00:00) (21 - 99)  RR: 23 (05 Jan 2019 00:00) (16 - 26)  SpO2: 100% (05 Jan 2019 00:02) (95% - 100%)      ABG - ( 04 Jan 2019 05:15 )  pH, Arterial: 7.37  pH, Blood: x     /  pCO2: 39    /  pO2: 92    / HCO3: 22    / Base Excess: -2.5  /  SaO2: 96                    01-03 @ 07:01  -  01-04 @ 07:00  --------------------------------------------------------  IN: 1430.9 mL / OUT: 95 mL / NET: 1335.9 mL        Mode: AC/ CMV (Assist Control/ Continuous Mandatory Ventilation)  RR (machine): 16  TV (machine): 450  FiO2: 60  PEEP: 5  ITime: 1  MAP: 9.9  PIP: 19      PHYSICAL EXAM:    GENERAL: female in bed. In no acute distress   HEAD:  Atraumatic, Normocephalic  EYES: pupils b/l reactive to light   NECK: Supple, normal appearance, No JVD;  NERVOUS SYSTEM: AAO x0  CHEST/LUNG: intubated, breath sounds present bilaterally   HEART: Regular rate and rhythm; No murmurs, rubs, or gallops  ABDOMEN: Soft, Nontender, Nondistended; Bowel sounds present  EXTREMITIES:  2+ Peripheral Pulses, No clubbing, cyanosis, or edema, purplish discoloration on toes b/l   LYMPH: No lymphadenopathy noted  SKIN: No rashes or lesions; Good capillary refill      LABS:  CBC Full  -  ( 04 Jan 2019 06:33 )  WBC Count : 19.9 K/uL  Hemoglobin : 10.2 g/dL  Hematocrit : 32.9 %  Platelet Count - Automated : 74 K/uL  Mean Cell Volume : 65.9 fl  Mean Cell Hemoglobin : 20.5 pg  Mean Cell Hemoglobin Concentration : 31.1 gm/dL  Auto Neutrophil # : 15.9 K/uL  Auto Lymphocyte # : 1.7 K/uL  Auto Monocyte # : 1.8 K/uL  Auto Eosinophil # : 0.3 K/uL  Auto Basophil # : 0.2 K/uL  Auto Neutrophil % : 80.0 %  Auto Lymphocyte % : 8.4 %  Auto Monocyte % : 9.3 %  Auto Eosinophil % : 1.4 %  Auto Basophil % : 1.0 %    01-04    139  |  99  |  91<H>  ----------------------------<  203<H>  4.0   |  22  |  5.22<H>    Ca    8.4      04 Jan 2019 06:33  Phos  6.9     01-04  Mg     2.2     01-04    TPro  4.7<L>  /  Alb  2.0<L>  /  TBili  10.7<H>  /  DBili  x   /  AST  112<H>  /  ALT  226<H>  /  AlkPhos  134<H>  01-04            CRITICAL CARE TIME SPENT: 35 minutes INTERVAL HPI/OVERNIGHT EVENTS:   Patient is seen and examined at the bedside,   A line came out of its own     PRESSORS: [ ] YES [x ] NO      Antimicrobial:  linezolid  IVPB 600 milliGRAM(s) IV Intermittent every 12 hours  meropenem  IVPB 500 milliGRAM(s) IV Intermittent every 12 hours    Cardiovascular:  digoxin  Injectable 0.125 milliGRAM(s) IV Push every other day    Pulmonary:    Hematalogic:  heparin  Injectable 5000 Unit(s) SubCutaneous every 12 hours    Other:  ascorbic acid 500 milliGRAM(s) Oral two times a day  calcium acetate 667 milliGRAM(s) Oral three times a day with meals  chlorhexidine 4% Liquid 1 Application(s) Topical two times a day  docusate sodium Liquid 100 milliGRAM(s) Oral two times a day  epoetin monique Injectable 6000 Unit(s) IV Push <User Schedule>  fentaNYL   Infusion. 1 MICROgram(s)/kG/Hr IV Continuous <Continuous>  folic acid 1 milliGRAM(s) Oral daily  HYDROmorphone  Injectable 2 milliGRAM(s) IV Push every 4 hours PRN  insulin glargine Injectable (LANTUS) 10 Unit(s) SubCutaneous at bedtime  insulin lispro (HumaLOG) corrective regimen sliding scale   SubCutaneous every 6 hours  ondansetron Injectable 4 milliGRAM(s) IV Push every 6 hours PRN  pantoprazole  Injectable 40 milliGRAM(s) IV Push daily    ascorbic acid 500 milliGRAM(s) Oral two times a day  calcium acetate 667 milliGRAM(s) Oral three times a day with meals  chlorhexidine 4% Liquid 1 Application(s) Topical two times a day  digoxin  Injectable 0.125 milliGRAM(s) IV Push every other day  docusate sodium Liquid 100 milliGRAM(s) Oral two times a day  epoetin monique Injectable 6000 Unit(s) IV Push <User Schedule>  fentaNYL   Infusion. 1 MICROgram(s)/kG/Hr IV Continuous <Continuous>  folic acid 1 milliGRAM(s) Oral daily  heparin  Injectable 5000 Unit(s) SubCutaneous every 12 hours  HYDROmorphone  Injectable 2 milliGRAM(s) IV Push every 4 hours PRN  insulin glargine Injectable (LANTUS) 10 Unit(s) SubCutaneous at bedtime  insulin lispro (HumaLOG) corrective regimen sliding scale   SubCutaneous every 6 hours  linezolid  IVPB 600 milliGRAM(s) IV Intermittent every 12 hours  meropenem  IVPB 500 milliGRAM(s) IV Intermittent every 12 hours  ondansetron Injectable 4 milliGRAM(s) IV Push every 6 hours PRN  pantoprazole  Injectable 40 milliGRAM(s) IV Push daily    Drug Dosing Weight  Height (cm): 154.94 (28 Dec 2018 01:00)  Weight (kg): 82.1 (28 Dec 2018 01:00)  BMI (kg/m2): 34.2 (28 Dec 2018 01:00)  BSA (m2): 1.81 (28 Dec 2018 01:00)    CENTRAL LINE: [x ] YES [ ] NO  LOCATION: [facility]  DATE INSERTED:  REMOVE: [ ] YES [ ] NO  EXPLAIN:    DE SOUZA: [x ] YES [ ] NO    DATE INSERTED:  REMOVE:  [ ] YES [ ] NO  EXPLAIN:        PMH -reviewed admission note, no change since admission  Heart faliure: acute [ ] chronic [ ] acute or chronic [ ] diastolic [ ] systolic [ ] combied systolic and diastolic[ ]  MARIA G: ATN[ ] renal medullary necrosis [ ] CKD I [ ]CKDII [ ]CKD III [ ]CKD IV [ ]CKD V [ ]Other pathological lesions [ ]  Abdominal Nutrition Status: malnutrition [ ] cachexia [ ] morbid obesity/BMI=40 [ ] Supplement ordered [___________]     ICU Vital Signs Last 24 Hrs  T(C): 36.7 (04 Jan 2019 23:38), Max: 37.2 (04 Jan 2019 13:00)  T(F): 98.1 (04 Jan 2019 23:38), Max: 99 (04 Jan 2019 13:00)  HR: 104 (05 Jan 2019 00:02) (81 - 104)  BP: 116/62 (05 Jan 2019 00:00) (98/43 - 135/53)  BP(mean): 75 (05 Jan 2019 00:00) (56 - 75)  ABP: 144/60 (05 Jan 2019 00:00) (23/20 - 168/68)  ABP(mean): 86 (05 Jan 2019 00:00) (21 - 99)  RR: 23 (05 Jan 2019 00:00) (16 - 26)  SpO2: 100% (05 Jan 2019 00:02) (95% - 100%)      ABG - ( 04 Jan 2019 05:15 )  pH, Arterial: 7.37  pH, Blood: x     /  pCO2: 39    /  pO2: 92    / HCO3: 22    / Base Excess: -2.5  /  SaO2: 96                    01-03 @ 07:01  -  01-04 @ 07:00  --------------------------------------------------------  IN: 1430.9 mL / OUT: 95 mL / NET: 1335.9 mL        Mode: AC/ CMV (Assist Control/ Continuous Mandatory Ventilation)  RR (machine): 16  TV (machine): 450  FiO2: 60  PEEP: 5  ITime: 1  MAP: 9.9  PIP: 19      PHYSICAL EXAM:    GENERAL: female in bed. In no acute distress   HEAD:  Atraumatic, Normocephalic  EYES: pupils b/l reactive to light   NECK: Supple, normal appearance, No JVD;  NERVOUS SYSTEM: AAO x0  CHEST/LUNG: intubated, breath sounds present bilaterally   HEART: Regular rate and rhythm; No murmurs, rubs, or gallops  ABDOMEN: Soft, Nontender, Nondistended; Bowel sounds present  EXTREMITIES:  2+ Peripheral Pulses, No clubbing, cyanosis, or edema, purplish discoloration on toes b/l   LYMPH: No lymphadenopathy noted  SKIN: No rashes or lesions; Good capillary refill      LABS:  CBC Full  -  ( 04 Jan 2019 06:33 )  WBC Count : 19.9 K/uL  Hemoglobin : 10.2 g/dL  Hematocrit : 32.9 %  Platelet Count - Automated : 74 K/uL  Mean Cell Volume : 65.9 fl  Mean Cell Hemoglobin : 20.5 pg  Mean Cell Hemoglobin Concentration : 31.1 gm/dL  Auto Neutrophil # : 15.9 K/uL  Auto Lymphocyte # : 1.7 K/uL  Auto Monocyte # : 1.8 K/uL  Auto Eosinophil # : 0.3 K/uL  Auto Basophil # : 0.2 K/uL  Auto Neutrophil % : 80.0 %  Auto Lymphocyte % : 8.4 %  Auto Monocyte % : 9.3 %  Auto Eosinophil % : 1.4 %  Auto Basophil % : 1.0 %    01-04    139  |  99  |  91<H>  ----------------------------<  203<H>  4.0   |  22  |  5.22<H>    Ca    8.4      04 Jan 2019 06:33  Phos  6.9     01-04  Mg     2.2     01-04    TPro  4.7<L>  /  Alb  2.0<L>  /  TBili  10.7<H>  /  DBili  x   /  AST  112<H>  /  ALT  226<H>  /  AlkPhos  134<H>  01-04            CRITICAL CARE TIME SPENT: 35 minutes INTERVAL HPI/OVERNIGHT EVENTS:   Patient is seen and examined at the bedside,   Pt remains afebrile with leukocytosis. we are continuing meropenum and zyvox day 6.  Pt is going for dialysis today.  A line came out of its own. no bleeding.  pt liver enzymes are trending down, we will keep monitoring it.    PRESSORS: [ ] YES [x ] NO      Antimicrobial:  linezolid  IVPB 600 milliGRAM(s) IV Intermittent every 12 hours  meropenem  IVPB 500 milliGRAM(s) IV Intermittent every 12 hours    Cardiovascular:  digoxin  Injectable 0.125 milliGRAM(s) IV Push every other day    Pulmonary:    Hematalogic:  heparin  Injectable 5000 Unit(s) SubCutaneous every 12 hours    Other:  ascorbic acid 500 milliGRAM(s) Oral two times a day  calcium acetate 667 milliGRAM(s) Oral three times a day with meals  chlorhexidine 4% Liquid 1 Application(s) Topical two times a day  docusate sodium Liquid 100 milliGRAM(s) Oral two times a day  epoetin monique Injectable 6000 Unit(s) IV Push <User Schedule>  fentaNYL   Infusion. 1 MICROgram(s)/kG/Hr IV Continuous <Continuous>  folic acid 1 milliGRAM(s) Oral daily  HYDROmorphone  Injectable 2 milliGRAM(s) IV Push every 4 hours PRN  insulin glargine Injectable (LANTUS) 10 Unit(s) SubCutaneous at bedtime  insulin lispro (HumaLOG) corrective regimen sliding scale   SubCutaneous every 6 hours  ondansetron Injectable 4 milliGRAM(s) IV Push every 6 hours PRN  pantoprazole  Injectable 40 milliGRAM(s) IV Push daily    ascorbic acid 500 milliGRAM(s) Oral two times a day  calcium acetate 667 milliGRAM(s) Oral three times a day with meals  chlorhexidine 4% Liquid 1 Application(s) Topical two times a day  digoxin  Injectable 0.125 milliGRAM(s) IV Push every other day  docusate sodium Liquid 100 milliGRAM(s) Oral two times a day  epoetin monique Injectable 6000 Unit(s) IV Push <User Schedule>  fentaNYL   Infusion. 1 MICROgram(s)/kG/Hr IV Continuous <Continuous>  folic acid 1 milliGRAM(s) Oral daily  heparin  Injectable 5000 Unit(s) SubCutaneous every 12 hours  HYDROmorphone  Injectable 2 milliGRAM(s) IV Push every 4 hours PRN  insulin glargine Injectable (LANTUS) 10 Unit(s) SubCutaneous at bedtime  insulin lispro (HumaLOG) corrective regimen sliding scale   SubCutaneous every 6 hours  linezolid  IVPB 600 milliGRAM(s) IV Intermittent every 12 hours  meropenem  IVPB 500 milliGRAM(s) IV Intermittent every 12 hours  ondansetron Injectable 4 milliGRAM(s) IV Push every 6 hours PRN  pantoprazole  Injectable 40 milliGRAM(s) IV Push daily    Drug Dosing Weight  Height (cm): 154.94 (28 Dec 2018 01:00)  Weight (kg): 82.1 (28 Dec 2018 01:00)  BMI (kg/m2): 34.2 (28 Dec 2018 01:00)  BSA (m2): 1.81 (28 Dec 2018 01:00)    CENTRAL LINE: [x ] YES [ ] NO  LOCATION: [facility]  DATE INSERTED:  REMOVE: [ ] YES [ ] NO  EXPLAIN:    DE SOUZA: [x ] YES [ ] NO    DATE INSERTED:  REMOVE:  [ ] YES [ ] NO  EXPLAIN:        PMH -reviewed admission note, no change since admission  Heart faliure: acute [ ] chronic [ ] acute or chronic [ ] diastolic [ ] systolic [ ] combied systolic and diastolic[ ]  MARIA G: ATN[ ] renal medullary necrosis [ ] CKD I [ ]CKDII [ ]CKD III [ ]CKD IV [ ]CKD V [ ]Other pathological lesions [ ]  Abdominal Nutrition Status: malnutrition [ ] cachexia [ ] morbid obesity/BMI=40 [ ] Supplement ordered [___________]     ICU Vital Signs Last 24 Hrs  T(C): 36.7 (04 Jan 2019 23:38), Max: 37.2 (04 Jan 2019 13:00)  T(F): 98.1 (04 Jan 2019 23:38), Max: 99 (04 Jan 2019 13:00)  HR: 104 (05 Jan 2019 00:02) (81 - 104)  BP: 116/62 (05 Jan 2019 00:00) (98/43 - 135/53)  BP(mean): 75 (05 Jan 2019 00:00) (56 - 75)  ABP: 144/60 (05 Jan 2019 00:00) (23/20 - 168/68)  ABP(mean): 86 (05 Jan 2019 00:00) (21 - 99)  RR: 23 (05 Jan 2019 00:00) (16 - 26)  SpO2: 100% (05 Jan 2019 00:02) (95% - 100%)      ABG - ( 04 Jan 2019 05:15 )  pH, Arterial: 7.37  pH, Blood: x     /  pCO2: 39    /  pO2: 92    / HCO3: 22    / Base Excess: -2.5  /  SaO2: 96                    01-03 @ 07:01  -  01-04 @ 07:00  --------------------------------------------------------  IN: 1430.9 mL / OUT: 95 mL / NET: 1335.9 mL        Mode: AC/ CMV (Assist Control/ Continuous Mandatory Ventilation)  RR (machine): 16  TV (machine): 450  FiO2: 60  PEEP: 5  ITime: 1  MAP: 9.9  PIP: 19      PHYSICAL EXAM:    GENERAL: female in bed. In no acute distress   HEAD:  Atraumatic, Normocephalic  EYES: pupils b/l reactive to light   NECK: Supple, normal appearance, No JVD;  NERVOUS SYSTEM: AAO x0  CHEST/LUNG: intubated, breath sounds present bilaterally   HEART: Regular rate and rhythm; No murmurs, rubs, or gallops  ABDOMEN: Soft, Nontender, Nondistended; Bowel sounds present  EXTREMITIES:  2+ Peripheral Pulses, No clubbing, cyanosis, or edema, purplish discoloration on toes b/l   LYMPH: No lymphadenopathy noted  SKIN: No rashes or lesions; Good capillary refill      LABS:  CBC Full  -  ( 04 Jan 2019 06:33 )  WBC Count : 19.9 K/uL  Hemoglobin : 10.2 g/dL  Hematocrit : 32.9 %  Platelet Count - Automated : 74 K/uL  Mean Cell Volume : 65.9 fl  Mean Cell Hemoglobin : 20.5 pg  Mean Cell Hemoglobin Concentration : 31.1 gm/dL  Auto Neutrophil # : 15.9 K/uL  Auto Lymphocyte # : 1.7 K/uL  Auto Monocyte # : 1.8 K/uL  Auto Eosinophil # : 0.3 K/uL  Auto Basophil # : 0.2 K/uL  Auto Neutrophil % : 80.0 %  Auto Lymphocyte % : 8.4 %  Auto Monocyte % : 9.3 %  Auto Eosinophil % : 1.4 %  Auto Basophil % : 1.0 %    01-04    139  |  99  |  91<H>  ----------------------------<  203<H>  4.0   |  22  |  5.22<H>    Ca    8.4      04 Jan 2019 06:33  Phos  6.9     01-04  Mg     2.2     01-04    TPro  4.7<L>  /  Alb  2.0<L>  /  TBili  10.7<H>  /  DBili  x   /  AST  112<H>  /  ALT  226<H>  /  AlkPhos  134<H>  01-04            CRITICAL CARE TIME SPENT: 35 minutes

## 2019-01-05 NOTE — PROGRESS NOTE ADULT - SUBJECTIVE AND OBJECTIVE BOX
CC: Patient is unresponsive, on ventilator, NAD    Vital Signs Last 24 Hrs  T(C): 36.9 (05 Jan 2019 16:10), Max: 37 (05 Jan 2019 12:30)  T(F): 98.5 (05 Jan 2019 16:10), Max: 98.6 (05 Jan 2019 12:30)  HR: 89 (05 Jan 2019 20:07) (82 - 108)  BP: 95/41 (05 Jan 2019 19:00) (95/41 - 171/131)  BP(mean): 53 (05 Jan 2019 19:00) (53 - 141)  RR: 20 (05 Jan 2019 19:00) (0 - 30)  SpO2: 99% (05 Jan 2019 20:07) (58% - 100%)    01-04 @ 07:01 - 01-05 @ 07:00  --------------------------------------------------------  IN: 1086.8 mL / OUT: 350 mL / NET: 736.8 mL    01-05 @ 07:01 - 01-05 @ 20:54  --------------------------------------------------------  IN: 448.4 mL / OUT: 90 mL / NET: 358.4 mL    PHYSICAL EXAM:  GENERAL: Well developed, Well nourished white female, sedated  HEENT:  Normocephalic/Atraumatic, moist mucous membranes  NECK: Supple, no JVD  RESP: Symmetric movement of the chest, diffuse bilateral basal rales, on Vent support   CVS: Tachycardic, S1 and S2 audible, no murmur  GI: Normal active bowel sounds present, abdomen soft, non tender, non distended  EXTREMITIES:  2+ edema, positive cyanosis  Skin: Normal turgor    MEDICATIONS:  ascorbic acid 500 milliGRAM(s) Oral two times a day  calcium acetate 667 milliGRAM(s) Oral three times a day with meals  chlorhexidine 4% Liquid 1 Application(s) Topical two times a day  cyanocobalamin Injectable 1000 MICROGram(s) SubCutaneous daily  digoxin  Injectable 0.125 milliGRAM(s) IV Push every other day  docusate sodium Liquid 100 milliGRAM(s) Oral two times a day  epoetin monique Injectable 6000 Unit(s) IV Push <User Schedule>  fentaNYL   Infusion. 1 MICROgram(s)/kG/Hr IV Continuous <Continuous>  folic acid 1 milliGRAM(s) Oral daily  heparin  Injectable 5000 Unit(s) SubCutaneous every 12 hours  HYDROmorphone  Injectable 2 milliGRAM(s) IV Push every 4 hours PRN  insulin glargine Injectable (LANTUS) 10 Unit(s) SubCutaneous at bedtime  insulin lispro (HumaLOG) corrective regimen sliding scale   SubCutaneous every 6 hours  linezolid  IVPB 600 milliGRAM(s) IV Intermittent every 12 hours  meropenem  IVPB 500 milliGRAM(s) IV Intermittent every 12 hours  ondansetron Injectable 4 milliGRAM(s) IV Push every 6 hours PRN  pantoprazole  Injectable 40 milliGRAM(s) IV Push daily      LABS:                        10.8   26.6  )-----------( 98       ( 05 Jan 2019 06:43 )             34.2     01-05    136  |  97  |  110<H>  ----------------------------<  128<H>  4.5   |  20<L>  |  5.81<H>    Ca    8.8      05 Jan 2019 06:43  Phos  7.8     01-05  Mg     2.2     01-05    TPro  4.9<L>  /  Alb  2.0<L>  /  TBili  14.2<H>  /  DBili  11.1<H>  /  AST  72<H>  /  ALT  155<H>  /  AlkPhos  147<H>  01-05    ASSESSMENT AND PLAN:     1.MARIA G due to ATN from hypotensive and septic  shock with multiorgan failure. Tolerating HD  -Continue HD with UF as tolerated  -pts renal status is poor with minimal u/o  -Adjust meds to eGFR and avoid IV Gadolinium contrast, NSAIDs, and phosphate enema.  -Monitor I/O's daily.   -Monitor SMA daily.      2. Acidosis ;secondary to shock/renal failure, now improved  -f/u co2 daily  -keep PH >7.3    3. Hyperkalemia: secondary to maria g, now improved   -keep k >4 and <5  -f/u k level daily  - low k diet    4. Multiorgan failure: plan as per MICU. pt has poor prognosis.    5. Anemia:multifactorial  -c/w epogen 6000units in hd tiw  -f/u Hb daily    6.Hyperphosphatemia:mild, secondary to maria g  -f/u phos level daily  -c/w  phoslo 667mg tid   -continue hd

## 2019-01-05 NOTE — PROGRESS NOTE ADULT - PROBLEM SELECTOR PLAN 2
Multiorgan failure with Anuria with severe lactic acidosis   Rt Femoral Shiley  Plan for dialysis today  Poor urine output   Dr Lay on board

## 2019-01-05 NOTE — PROGRESS NOTE ADULT - SUBJECTIVE AND OBJECTIVE BOX
CHIEF COMPLAINT:Patient is a 79y old  Female who presents with a chief complaint of fall.Pt remains intubated.    	  REVIEW OF SYSTEMS:  	  [x ] Unable to obtain    PHYSICAL EXAM:  T(C): 36.7 (01-05-19 @ 08:10), Max: 37.2 (01-04-19 @ 13:00)  HR: 95 (01-05-19 @ 11:00) (81 - 104)  BP: 117/47 (01-05-19 @ 11:00) (102/53 - 135/53)  RR: 19 (01-05-19 @ 11:00) (0 - 30)  SpO2: 100% (01-05-19 @ 11:00) (94% - 100%)  Wt(kg): --  I&O's Summary    04 Jan 2019 07:01  -  05 Jan 2019 07:00  --------------------------------------------------------  IN: 1086.8 mL / OUT: 350 mL / NET: 736.8 mL    05 Jan 2019 07:01  -  05 Jan 2019 12:20  --------------------------------------------------------  IN: 32.8 mL / OUT: 40 mL / NET: -7.2 mL        Appearance: Normal	  HEENT:   Normal oral mucosa, PERRL, EOMI	  Lymphatic: No lymphadenopathy  Cardiovascular: Normal S1 S2, No JVD, No murmurs, +2 edema  Respiratory: B/L ronchi  Gastrointestinal:  Soft, Non-tender, + BS	  Skin: No rashes, No ecchymoses, No cyanosis	  Extremities: Normal range of motion, No clubbing, cyanosis +2 edema    MEDICATIONS  (STANDING):  ascorbic acid 500 milliGRAM(s) Oral two times a day  calcium acetate 667 milliGRAM(s) Oral three times a day with meals  chlorhexidine 4% Liquid 1 Application(s) Topical two times a day  digoxin  Injectable 0.125 milliGRAM(s) IV Push every other day  docusate sodium Liquid 100 milliGRAM(s) Oral two times a day  epoetin monique Injectable 6000 Unit(s) IV Push <User Schedule>  fentaNYL   Infusion. 1 MICROgram(s)/kG/Hr (8.21 mL/Hr) IV Continuous <Continuous>  folic acid 1 milliGRAM(s) Oral daily  heparin  Injectable 5000 Unit(s) SubCutaneous every 12 hours  insulin glargine Injectable (LANTUS) 10 Unit(s) SubCutaneous at bedtime  insulin lispro (HumaLOG) corrective regimen sliding scale   SubCutaneous every 6 hours  linezolid  IVPB 600 milliGRAM(s) IV Intermittent every 12 hours  meropenem  IVPB 500 milliGRAM(s) IV Intermittent every 12 hours  pantoprazole  Injectable 40 milliGRAM(s) IV Push daily      TELEMETRY: 	  nsr with pac's    	  LABS:	 	                         10.8   26.6  )-----------( 98       ( 05 Jan 2019 06:43 )             34.2     01-05    136  |  97  |  110<H>  ----------------------------<  128<H>  4.5   |  20<L>  |  5.81<H>    Ca    8.8      05 Jan 2019 06:43  Phos  7.8     01-05  Mg     2.2     01-05    TPro  4.9<L>  /  Alb  2.0<L>  /  TBili  14.2<H>  /  DBili  11.1<H>  /  AST  72<H>  /  ALT  155<H>  /  AlkPhos  147<H>  01-05      Cholesterol 82  LDL 22  HDL 40      HgA1c: Hemoglobin A1C, Whole Blood: 6.1 % (12-27 @ 09:51)  Hemoglobin A1C, Whole Blood: 6.2 % (12-26 @ 23:12)    TSH: Thyroid Stimulating Hormone, Serum: 1.19 uU/mL (12-27 @ 06:50)

## 2019-01-05 NOTE — PROGRESS NOTE ADULT - SUBJECTIVE AND OBJECTIVE BOX
INTERVAL HPI/OVERNIGHT EVENTS:       Antimicrobial:  linezolid  IVPB 600 milliGRAM(s) IV Intermittent every 12 hours  meropenem  IVPB 500 milliGRAM(s) IV Intermittent every 12 hours    Cardiovascular:  digoxin  Injectable 0.125 milliGRAM(s) IV Push every other day    Pulmonary:    Hematalogic:  heparin  Injectable 5000 Unit(s) SubCutaneous every 12 hours    Other:  ascorbic acid 500 milliGRAM(s) Oral two times a day  calcium acetate 667 milliGRAM(s) Oral three times a day with meals  chlorhexidine 4% Liquid 1 Application(s) Topical two times a day  cyanocobalamin Injectable 1000 MICROGram(s) SubCutaneous daily  docusate sodium Liquid 100 milliGRAM(s) Oral two times a day  epoetin monique Injectable 6000 Unit(s) IV Push <User Schedule>  fentaNYL   Infusion. 1 MICROgram(s)/kG/Hr IV Continuous <Continuous>  folic acid 1 milliGRAM(s) Oral daily  HYDROmorphone  Injectable 2 milliGRAM(s) IV Push every 4 hours PRN  insulin glargine Injectable (LANTUS) 10 Unit(s) SubCutaneous at bedtime  insulin lispro (HumaLOG) corrective regimen sliding scale   SubCutaneous every 6 hours  ondansetron Injectable 4 milliGRAM(s) IV Push every 6 hours PRN  pantoprazole  Injectable 40 milliGRAM(s) IV Push daily      Drug Dosing Weight  Height (cm): 154.94 (28 Dec 2018 01:00)  Weight (kg): 82.1 (28 Dec 2018 01:00)  BMI (kg/m2): 34.2 (28 Dec 2018 01:00)  BSA (m2): 1.81 (28 Dec 2018 01:00)    CENTRAL LINE: [ ] YES [ ] NO  LOCATION:   DATE INSERTED:    DE SOUZA: [ ] YES [ ] NO    DATE INSERTED:    A-LINE:  [ ] YES [ ] NO  LOCATION:   DATE INSERTED:    PMH/Social Hx/Fam Hx -reviewed admission note, no change since admission  PAST MEDICAL & SURGICAL HISTORY:  No pertinent past medical history  No significant past surgical history      T(C): 36.7 (01-05-19 @ 08:10), Max: 36.8 (01-05-19 @ 02:00)  HR: 95 (01-05-19 @ 11:00)  BP: 117/47 (01-05-19 @ 11:00)  BP(mean): 64 (01-05-19 @ 11:00)  ABP: 144/60 (01-05-19 @ 00:00)  ABP(mean): 86 (01-05-19 @ 00:00)  RR: 19 (01-05-19 @ 11:00)  SpO2: 100% (01-05-19 @ 11:00)  Wt(kg): --    ABG - ( 05 Jan 2019 05:17 )  pH, Arterial: 7.41  pH, Blood: x     /  pCO2: 34    /  pO2: 92    / HCO3: 21    / Base Excess: -2.6  /  SaO2: 97                    01-04 @ 07:01  -  01-05 @ 07:00  --------------------------------------------------------  IN: 1086.8 mL / OUT: 350 mL / NET: 736.8 mL        Mode: AC/ CMV (Assist Control/ Continuous Mandatory Ventilation)  RR (machine): 16  TV (machine): 450  FiO2: 60  PEEP: 5  ITime: 1  MAP: 9.3  PIP: 18      PHYSICAL EXAM:    GENERAL: No signs of distress, comfortable  HEAD: Atraumatic, Normocephalic  EYES: EOMI, PERRLA  ENMT: No erythema, exudates, or enlargement, Moist mucous membranes + ETT  NECK: Supple, normal appearance, No JVD; [  ] central line (if applicable)  CHEST/LUNG: No chest deformity, fair bilateral air entry; No rales, rhonchi, wheezing; crackles  HEART: Regular rate and rhythm; No murmurs, rubs, or gallops;   ABDOMEN: Soft, Nontender, Nondistended; Bowel sounds present  EXTREMITIES:  + Peripheral Pulses, No clubbing, cyanosis, or edema  NERVOUS SYSTEM: sedated  LYMPH: No lymphadenopathy noted  SKIN: No rashes or lesions; good turgor, warm, dry      LABS:  CBC Full  -  ( 05 Jan 2019 06:43 )  WBC Count : 26.6 K/uL  Hemoglobin : 10.8 g/dL  Hematocrit : 34.2 %  Platelet Count - Automated : 98 K/uL  Mean Cell Volume : 66.1 fl  Mean Cell Hemoglobin : 20.9 pg  Mean Cell Hemoglobin Concentration : 31.7 gm/dL  Auto Neutrophil # : x  Auto Lymphocyte # : x  Auto Monocyte # : x  Auto Eosinophil # : x  Auto Basophil # : x  Auto Neutrophil % : 87.0 %  Auto Lymphocyte % : 3.0 %  Auto Monocyte % : 6.0 %  Auto Eosinophil % : 2.0 %  Auto Basophil % : x    01-05    136  |  97  |  110<H>  ----------------------------<  128<H>  4.5   |  20<L>  |  5.81<H>    Ca    8.8      05 Jan 2019 06:43  Phos  7.8     01-05  Mg     2.2     01-05    TPro  4.9<L>  /  Alb  2.0<L>  /  TBili  14.2<H>  /  DBili  11.1<H>  /  AST  72<H>  /  ALT  155<H>  /  AlkPhos  147<H>  01-05            RADIOLOGY & ADDITIONAL STUDIES REVIEWED     CXR:  < from: Xray Chest 1 View- PORTABLE-Routine (01.05.19 @ 10:50) >    EXAM:  XR CHEST PORTABLE ROUTINE 1V                            PROCEDURE DATE:  01/05/2019          INTERPRETATION:  HISTORY: Fluid overload. ESRD. The patient is unable to   communicate.    Portable chest radiograph    TECHNIQUE:  Single portable frontal AP view of the chest was obtained.    FINDINGS:   Comparison radiographs including from the preceding day are  available for review.    There is no change of the right-sided pleural effusion and/or right lower   lobe infiltrate or atelectasis.No consolidation is seen in the left   lung. The platelike atelectasis is identified in the left lower lobe.   Endotracheal tube and left IJ line appear unchanged. The NG tube has been   removed. The heart and mediastinum are unchanged.  The bones are unchanged.    IMPRESSION: Right pleural effusion and/or right lower lobe infiltrate or   atelectasis, unchanged.                OLMAN ESPINOZA M.D. ATTENDING RADIOLOGIST  This document has been electronically signed. Jan 5 2019 11:25AM               < end of copied text >    IMPRESSION:  PAST MEDICAL & SURGICAL HISTORY:  No pertinent past medical history  No significant past surgical history   p/w             IMP: This is an 78 yr old woman with CAD, CHF with decrease EF,  and prior mentioned medical condition s/p fall with right hip fx. S/P Right hip IM nailing 12/27. Post op resp failure and cardiogenic shock  vs septic on pressors to maintain BP.  Pat is in MOSF.  Severe life threatening metabolic acidosis due to MARIA G, MOSF. Coagulopathy due to liver failure ( shock liver). Started on iv antibx . Pat is auto coagulopathic s/p ffp but inr is still elevated,hence no dialysis cath placement. acidosis is better with iv bicarb. Antibx changed by ID. Had fist session od dialysis.. incompete. LFT improving. Cyanotic toes and fingers due to low flow state and pressors ( now off presssors).  Started on HD , had first complete session 1/1.  OFF PRESSORS. T.Stephane is elevated.  shedule for dialysis today . WBC trending up but no fever/diarrhea            Plan:      CNS: d/c sedation    PULMONARY: continue vent support, start sbt when awake    CARDIAC: monitor    GI: feed    RENAL: dialysis as per renal    SKIN: wound care    MUSCULOSKELETAL: boots    ID: continue antibx    HEME: monitor    DVT and GI Prophylaxis    GOALS OF CARE DISCUSSION WITH PATIENT/FAMILY/PROXY/ icu team on rounds    CRITICAL CARE TIME SPENT: 35 minutes

## 2019-01-05 NOTE — PROGRESS NOTE ADULT - SUBJECTIVE AND OBJECTIVE BOX
79yFemale    Diagnosis:  S/p L Hip IM Nailing POD#9    Patient was seen and evaluated at bedside. Patient is intubated      Vital Signs Last 24 Hrs  T(C): 36.7 (05 Jan 2019 08:10), Max: 37.2 (04 Jan 2019 13:00)  T(F): 98.1 (05 Jan 2019 08:10), Max: 99 (04 Jan 2019 13:00)  HR: 95 (05 Jan 2019 11:00) (81 - 104)  BP: 117/47 (05 Jan 2019 11:00) (102/53 - 135/53)  BP(mean): 64 (05 Jan 2019 11:00) (63 - 97)  RR: 19 (05 Jan 2019 11:00) (0 - 30)  SpO2: 100% (05 Jan 2019 11:00) (94% - 100%)  I&O's Detail    04 Jan 2019 07:01  -  05 Jan 2019 07:00  --------------------------------------------------------  IN:    fentaNYL Infusion.: 96.8 mL    Nepro with Carb Steady: 290 mL    Solution: 150 mL    Solution: 550 mL  Total IN: 1086.8 mL    OUT:    Indwelling Catheter - Urethral: 350 mL  Total OUT: 350 mL    Total NET: 736.8 mL      05 Jan 2019 07:01  -  05 Jan 2019 12:04  --------------------------------------------------------  IN:    fentaNYL Infusion.: 32.8 mL  Total IN: 32.8 mL    OUT:    Indwelling Catheter - Urethral: 40 mL  Total OUT: 40 mL    Total NET: -7.2 mL          Physical Exam:    General: Patient currently intubated    LHip:  Dressing is C/D/I. Staples intact. No erythema. SILT.  Wound with no drainage, healing well.   Lower extremity:  Pulses palpated and with arterial doppler. Tips of all toes cyanotic B/L.                        10.8   26.6  )-----------( 98       ( 05 Jan 2019 06:43 )             34.2     01-05    136  |  97  |  110<H>  ----------------------------<  128<H>  4.5   |  20<L>  |  5.81<H>    Ca    8.8      05 Jan 2019 06:43  Phos  7.8     01-05  Mg     2.2     01-05    TPro  4.9<L>  /  Alb  2.0<L>  /  TBili  14.2<H>  /  DBili  11.1<H>  /  AST  72<H>  /  ALT  155<H>  /  AlkPhos  147<H>  01-05      Impression:  79yFemale S/p L Hip IM Nailing POD#9  Plan:  -  Pain management  -  Dvt prophylaxis with Heparin  -  Daily Physical Theapy:  WBAT of left lower extremity  -  Continue care as per ICU team

## 2019-01-05 NOTE — PROGRESS NOTE ADULT - PROBLEM SELECTOR PLAN 3
Elevated LFTs and INR--> gradually improving  Elevated Bilirubin due to sepsis  S/p NAC, K-centra, oral Vit. K and 14 FFP  US Liver showed gall bladder polyp  Dr Haywood on board

## 2019-01-05 NOTE — PROGRESS NOTE ADULT - ASSESSMENT
78 F pt with HTN, DM2, PMHx presents to ED c/o L hip pain s/p trip and fall. Patient admitted to medicine floor for left hip fracture. Underwent left IM nailing on 12/27/18. Patient brought to ICU for post op monitoring. Now undergoing multi-organ failure.     Left Femoral A line came out

## 2019-01-05 NOTE — PROGRESS NOTE ADULT - PROBLEM SELECTOR PLAN 2
Multiorgan failure with Anuria with severe lactic acidosis   Rt Femoral Shiley  Plan for dialysis tomorrow   Poor urine output   Dr Lay on board Multiorgan failure with Anuria with severe lactic acidosis   Rt Femoral Shiley  Plan for dialysis today  Poor urine output   Dr Lay on board

## 2019-01-05 NOTE — PROGRESS NOTE ADULT - SUBJECTIVE AND OBJECTIVE BOX
Patient seen and examined at bedside in ICU.  Intubated.    Vital Signs Last 24 Hrs  T(F): 98.6 (01-05-19 @ 12:30), Max: 98.6 (01-05-19 @ 12:30)  HR: 105 (01-05-19 @ 13:12)  BP: 118/54 (01-05-19 @ 13:00)  RR: 19 (01-05-19 @ 13:00)  SpO2: 100% (01-05-19 @ 13:12)  POCT Blood Glucose.: 112 mg/dL (05 Jan 2019 12:59)    EXTREMITIES: cyanosis and pallor to bilateral upper and lower extremities. All 5 digits of LLE, 1st 3 digits of RLE, second digit of LUE and 2nd, 4th and 5th digits of RUE. Arterial line removed from left groin. R groin shiley still in place.   VASCULAR: palpable DP pulse bilaterally and palpable radial pulse bilaterally, faint in LUE    I&O's Detail    04 Jan 2019 07:01  -  05 Jan 2019 07:00  --------------------------------------------------------  IN:    fentaNYL Infusion.: 96.8 mL    Nepro with Carb Steady: 290 mL    Solution: 150 mL    Solution: 550 mL  Total IN: 1086.8 mL    OUT:    Indwelling Catheter - Urethral: 350 mL  Total OUT: 350 mL    Total NET: 736.8 mL    05 Jan 2019 07:01  -  05 Jan 2019 14:14  --------------------------------------------------------  IN:    fentaNYL Infusion.: 49.2 mL  Total IN: 49.2 mL    OUT:    Indwelling Catheter - Urethral: 55 mL  Total OUT: 55 mL    Total NET: -5.8 mL    LABS:                        10.8   26.6  )-----------( 98       ( 05 Jan 2019 06:43 )             34.2     01-05    136  |  97  |  110<H>  ----------------------------<  128<H>  4.5   |  20<L>  |  5.81<H>    Ca    8.8      05 Jan 2019 06:43  Phos  7.8     01-05  Mg     2.2     01-05    TPro  4.9<L>  /  Alb  2.0<L>  /  TBili  14.2<H>  /  DBili  11.1<H>  /  AST  72<H>  /  ALT  155<H>  /  AlkPhos  147<H>  01-05

## 2019-01-05 NOTE — PROGRESS NOTE ADULT - ASSESSMENT
78 F pt with HTN, DM2, PMHx presents to ED c/o L hip pain s/p trip and fall. Patient admitted to medicine floor for left hip fracture. Underwent left IM nailing on 12/27/18. Patient brought to ICU for post op monitoring. Now undergoing multi-organ failure. 78 F pt with HTN, DM2, PMHx presents to ED c/o L hip pain s/p trip and fall. Patient admitted to medicine floor for left hip fracture. Underwent left IM nailing on 12/27/18. Patient brought to ICU for post op monitoring. Now undergoing multi-organ failure.     Left Femoral A line came out

## 2019-01-05 NOTE — PROGRESS NOTE ADULT - SUBJECTIVE AND OBJECTIVE BOX
Patient is a 79y old  Female who presents with a chief complaint of fall (05 Jan 2019 16:12)       Subjective:     MEDICATIONS  (STANDING):  ascorbic acid 500 milliGRAM(s) Oral two times a day  calcium acetate 667 milliGRAM(s) Oral three times a day with meals  chlorhexidine 4% Liquid 1 Application(s) Topical two times a day  cyanocobalamin Injectable 1000 MICROGram(s) SubCutaneous daily  digoxin  Injectable 0.125 milliGRAM(s) IV Push every other day  docusate sodium Liquid 100 milliGRAM(s) Oral two times a day  epoetin monique Injectable 6000 Unit(s) IV Push <User Schedule>  fentaNYL   Infusion. 1 MICROgram(s)/kG/Hr (8.21 mL/Hr) IV Continuous <Continuous>  folic acid 1 milliGRAM(s) Oral daily  heparin  Injectable 5000 Unit(s) SubCutaneous every 12 hours  insulin glargine Injectable (LANTUS) 10 Unit(s) SubCutaneous at bedtime  insulin lispro (HumaLOG) corrective regimen sliding scale   SubCutaneous every 6 hours  linezolid  IVPB 600 milliGRAM(s) IV Intermittent every 12 hours  meropenem  IVPB 500 milliGRAM(s) IV Intermittent every 12 hours  pantoprazole  Injectable 40 milliGRAM(s) IV Push daily    MEDICATIONS  (PRN):  HYDROmorphone  Injectable 2 milliGRAM(s) IV Push every 4 hours PRN Severe Pain (7 - 10)  ondansetron Injectable 4 milliGRAM(s) IV Push every 6 hours PRN Nausea and/or Vomiting      Allergies    No Known Allergies    Intolerances        Vital Signs Last 24 Hrs  T(C): 36.9 (05 Jan 2019 16:10), Max: 37 (05 Jan 2019 12:30)  T(F): 98.5 (05 Jan 2019 16:10), Max: 98.6 (05 Jan 2019 12:30)  HR: 89 (05 Jan 2019 20:07) (82 - 108)  BP: 95/41 (05 Jan 2019 19:00) (95/41 - 171/131)  BP(mean): 53 (05 Jan 2019 19:00) (53 - 141)  RR: 20 (05 Jan 2019 19:00) (0 - 30)  SpO2: 99% (05 Jan 2019 20:07) (58% - 100%)    PHYSICAL EXAM  General: adult in NAD  HEENT: clear oropharynx, anicteric sclera, pink conjunctiva  Neck: supple  CV: normal S1/S2 with no murmur rubs or gallops  Lungs: positive air movement b/lAbdomen: soft non-tender non-distended, no hepatosplenomegaly  Ext: no clubbing cyanosis or edema  Skin: no rashes and no petechiae  Neuro: lethargic  LABS:                          10.8   26.6  )-----------( 98       ( 05 Jan 2019 06:43 )             34.2         Mean Cell Volume : 66.1 fl  Mean Cell Hemoglobin : 20.9 pg  Mean Cell Hemoglobin Concentration : 31.7 gm/dL  Auto Neutrophil # : x  Auto Lymphocyte # : x  Auto Monocyte # : x  Auto Eosinophil # : x  Auto Basophil # : x  Auto Neutrophil % : 87.0 %  Auto Lymphocyte % : 3.0 %  Auto Monocyte % : 6.0 %  Auto Eosinophil % : 2.0 %  Auto Basophil % : x    Serial CBC's  01-05 @ 06:43  Hct-34.2 / Hgb-10.8 / Plat-98 / RBC-5.18 / WBC-26.6          Serial CBC's  01-04 @ 06:33  Hct-32.9 / Hgb-10.2 / Plat-74 / RBC-4.99 / WBC-19.9          Serial CBC's  01-03 @ 06:06  Hct-32.2 / Hgb-9.9 / Plat-56 / RBC-4.83 / WBC-18.1          Serial CBC's  01-02 @ 04:17  Hct-34.4 / Hgb-10.7 / Plat-58 / RBC-5.22 / WBC-21.6            01-05    136  |  97  |  110<H>  ----------------------------<  128<H>  4.5   |  20<L>  |  5.81<H>    Ca    8.8      05 Jan 2019 06:43  Phos  7.8     01-05  Mg     2.2     01-05    TPro  4.9<L>  /  Alb  2.0<L>  /  TBili  14.2<H>  /  DBili  11.1<H>  /  AST  72<H>  /  ALT  155<H>  /  AlkPhos  147<H>  01-05                                  RADIOLOGY & ADDITIONAL STUDIES:

## 2019-01-05 NOTE — PROGRESS NOTE ADULT - ASSESSMENT
imp/rec    Thrombocytopenia improving daily c/w resolving DIC.  Will ch repeat coag profile. Prior data notable for elev PT and nml PTT nml Fib elev TT. If elev PT remains rel isolated, will consideer Vit K    Liver failure bili has increased    ARF is worse todat    s/p hip fx.

## 2019-01-05 NOTE — PROGRESS NOTE ADULT - ASSESSMENT
79 y/o F pt with HTN, DM2, PMHx presents to ED c/o L hip pain s/p trip and fall from a curb today. Pt describes pain as sharp, mild and constant, 10/10 in severity. Pain of the left hip increases with ROM. Pt unable to ambulate nor bear weight. Patient denies head strike, LOC, N/V, vision changes, neurological deficits chest pain, palpitations, shortness of breath, or any other complaints. XR hip showed nondisplaced intertrochanteric fracture left femoral neck and marked osteoporosis. XR hip showed nondisplaced intertrochanteric fracture left femoral neck and marked osteoporosis. (26 Dec 2018 19:35)s/p nailing 12/27 with post op hypotension , septic vs cardiogenic shock, also found to have shock liver . on pressors and sedation. on vanco and zosyn. worsening kidney function with worsening ph and metabolic acidosis    # s/p fall with subsequent left hip fx s/p im nailing 12/27 with post op resp failure and MOSF including shock liver /lft decreasing     # resp failure sec to asp pna     # S/P septic/cardiogenic shock with MOSF . echo with very low LVEF     # leucocytosis reactive sec to gangrene of toes and fingers vs sec to hcap  . no diarrhea to suggest c.diff     plan  rpt blood cx    cont zyvox and merrem for 10-14 days   dialysis as per renal   rpt echo r/o veg     poor prognosis   pt is dnr   thnx will f/u   d/w family and intern

## 2019-01-06 NOTE — PROGRESS NOTE ADULT - PROBLEM SELECTOR PLAN 1
CXR: Bilateral basal pneumonia and diffuse congestion improved  Intubated and sedated  C/w Zyvox and meropenem  Blood Cx: NTD CXR: Bilateral basal pneumonia and diffuse congestion improved  Intubated and sedated  C/w Zyvox and meropenem  Blood Cx: NTD  SAT/SBT today due to improved CXR

## 2019-01-06 NOTE — PROGRESS NOTE ADULT - SUBJECTIVE AND OBJECTIVE BOX
INTERVAL HPI/OVERNIGHT EVENTS: No acute overnight events.     PRESSORS: [ ] YES [x ] NO  WHICH:    Antimicrobial:  linezolid  IVPB 600 milliGRAM(s) IV Intermittent every 12 hours  meropenem  IVPB 500 milliGRAM(s) IV Intermittent every 12 hours    Cardiovascular:  digoxin  Injectable 0.125 milliGRAM(s) IV Push every other day    Hematalogic:  heparin  Injectable 5000 Unit(s) SubCutaneous every 12 hours    Other:  ascorbic acid 500 milliGRAM(s) Oral two times a day  calcium acetate 667 milliGRAM(s) Oral three times a day with meals  chlorhexidine 4% Liquid 1 Application(s) Topical two times a day  cyanocobalamin Injectable 1000 MICROGram(s) SubCutaneous daily  docusate sodium Liquid 100 milliGRAM(s) Oral two times a day  epoetin monique Injectable 6000 Unit(s) IV Push <User Schedule>  fentaNYL   Infusion. 1 MICROgram(s)/kG/Hr IV Continuous <Continuous>  folic acid 1 milliGRAM(s) Oral daily  HYDROmorphone  Injectable 2 milliGRAM(s) IV Push every 4 hours PRN  insulin glargine Injectable (LANTUS) 10 Unit(s) SubCutaneous at bedtime  insulin lispro (HumaLOG) corrective regimen sliding scale   SubCutaneous every 6 hours  ondansetron Injectable 4 milliGRAM(s) IV Push every 6 hours PRN  pantoprazole  Injectable 40 milliGRAM(s) IV Push daily    ascorbic acid 500 milliGRAM(s) Oral two times a day  calcium acetate 667 milliGRAM(s) Oral three times a day with meals  chlorhexidine 4% Liquid 1 Application(s) Topical two times a day  cyanocobalamin Injectable 1000 MICROGram(s) SubCutaneous daily  digoxin  Injectable 0.125 milliGRAM(s) IV Push every other day  docusate sodium Liquid 100 milliGRAM(s) Oral two times a day  epoetin monique Injectable 6000 Unit(s) IV Push <User Schedule>  fentaNYL   Infusion. 1 MICROgram(s)/kG/Hr IV Continuous <Continuous>  folic acid 1 milliGRAM(s) Oral daily  heparin  Injectable 5000 Unit(s) SubCutaneous every 12 hours  HYDROmorphone  Injectable 2 milliGRAM(s) IV Push every 4 hours PRN  insulin glargine Injectable (LANTUS) 10 Unit(s) SubCutaneous at bedtime  insulin lispro (HumaLOG) corrective regimen sliding scale   SubCutaneous every 6 hours  linezolid  IVPB 600 milliGRAM(s) IV Intermittent every 12 hours  meropenem  IVPB 500 milliGRAM(s) IV Intermittent every 12 hours  ondansetron Injectable 4 milliGRAM(s) IV Push every 6 hours PRN  pantoprazole  Injectable 40 milliGRAM(s) IV Push daily    Drug Dosing Weight  Height (cm): 154.94 (28 Dec 2018 01:00)  Weight (kg): 82.1 (28 Dec 2018 01:00)  BMI (kg/m2): 34.2 (28 Dec 2018 01:00)  BSA (m2): 1.81 (28 Dec 2018 01:00)    CENTRAL LINE: [x ] YES [ ] NO  LOCATION:   DATE INSERTED:    DE SOUZA: [x ] YES [ ] NO    DATE INSERTED:    A-LINE:  [ ] YES [x ] NO  LOCATION:   DATE INSERTED:    ICU Vital Signs Last 24 Hrs  T(C): 36.8 (06 Jan 2019 00:00), Max: 37 (05 Jan 2019 12:30)  T(F): 98.3 (06 Jan 2019 00:00), Max: 98.6 (05 Jan 2019 12:30)  HR: 94 (06 Jan 2019 02:00) (82 - 108)  BP: 113/47 (06 Jan 2019 02:00) (91/42 - 171/131)  BP(mean): 61 (06 Jan 2019 02:00) (52 - 141)  ABP: --  ABP(mean): --  RR: 20 (06 Jan 2019 02:00) (0 - 30)  SpO2: 100% (06 Jan 2019 02:00) (58% - 100%)      ABG - ( 05 Jan 2019 05:17 )  pH, Arterial: 7.41  pH, Blood: x     /  pCO2: 34    /  pO2: 92    / HCO3: 21    / Base Excess: -2.6  /  SaO2: 97                    01-04 @ 07:01  -  01-05 @ 07:00  --------------------------------------------------------  IN: 1086.8 mL / OUT: 350 mL / NET: 736.8 mL        Mode: AC/ CMV (Assist Control/ Continuous Mandatory Ventilation)  RR (machine): 16  TV (machine): 450  FiO2: 60  PEEP: 5  ITime: 1  MAP: 8  PIP: 16      PHYSICAL EXAM:    >>> <<<    LABS:  CBC Full  -  ( 05 Jan 2019 06:43 )  WBC Count : 26.6 K/uL  Hemoglobin : 10.8 g/dL  Hematocrit : 34.2 %  Platelet Count - Automated : 98 K/uL  Mean Cell Volume : 66.1 fl  Mean Cell Hemoglobin : 20.9 pg  Mean Cell Hemoglobin Concentration : 31.7 gm/dL  Auto Neutrophil # : x  Auto Lymphocyte # : x  Auto Monocyte # : x  Auto Eosinophil # : x  Auto Basophil # : x  Auto Neutrophil % : 87.0 %  Auto Lymphocyte % : 3.0 %  Auto Monocyte % : 6.0 %  Auto Eosinophil % : 2.0 %  Auto Basophil % : x    01-05    136  |  97  |  110<H>  ----------------------------<  128<H>  4.5   |  20<L>  |  5.81<H>    Ca    8.8      05 Jan 2019 06:43  Phos  7.8     01-05  Mg     2.2     01-05    TPro  4.9<L>  /  Alb  2.0<L>  /  TBili  14.2<H>  /  DBili  11.1<H>  /  AST  72<H>  /  ALT  155<H>  /  AlkPhos  147<H>  01-05          [ ]GOALS OF CARE DISCUSSION WITH PATIENT/FAMILY/PROXY: DNR only    CRITICAL CARE TIME SPENT: 35 minutes INTERVAL HPI/OVERNIGHT EVENTS: No acute overnight events.     PRESSORS: [ ] YES [x ] NO    Antimicrobial:  linezolid  IVPB 600 milliGRAM(s) IV Intermittent every 12 hours  meropenem  IVPB 500 milliGRAM(s) IV Intermittent every 12 hours    Cardiovascular:  digoxin  Injectable 0.125 milliGRAM(s) IV Push every other day    Hematalogic:  heparin  Injectable 5000 Unit(s) SubCutaneous every 12 hours    Other:  ascorbic acid 500 milliGRAM(s) Oral two times a day  calcium acetate 667 milliGRAM(s) Oral three times a day with meals  chlorhexidine 4% Liquid 1 Application(s) Topical two times a day  cyanocobalamin Injectable 1000 MICROGram(s) SubCutaneous daily  docusate sodium Liquid 100 milliGRAM(s) Oral two times a day  epoetin monique Injectable 6000 Unit(s) IV Push <User Schedule>  fentaNYL   Infusion. 1 MICROgram(s)/kG/Hr IV Continuous <Continuous>  folic acid 1 milliGRAM(s) Oral daily  HYDROmorphone  Injectable 2 milliGRAM(s) IV Push every 4 hours PRN  insulin glargine Injectable (LANTUS) 10 Unit(s) SubCutaneous at bedtime  insulin lispro (HumaLOG) corrective regimen sliding scale   SubCutaneous every 6 hours  ondansetron Injectable 4 milliGRAM(s) IV Push every 6 hours PRN  pantoprazole  Injectable 40 milliGRAM(s) IV Push daily    ascorbic acid 500 milliGRAM(s) Oral two times a day  calcium acetate 667 milliGRAM(s) Oral three times a day with meals  chlorhexidine 4% Liquid 1 Application(s) Topical two times a day  cyanocobalamin Injectable 1000 MICROGram(s) SubCutaneous daily  digoxin  Injectable 0.125 milliGRAM(s) IV Push every other day  docusate sodium Liquid 100 milliGRAM(s) Oral two times a day  epoetin monique Injectable 6000 Unit(s) IV Push <User Schedule>  fentaNYL   Infusion. 1 MICROgram(s)/kG/Hr IV Continuous <Continuous>  folic acid 1 milliGRAM(s) Oral daily  heparin  Injectable 5000 Unit(s) SubCutaneous every 12 hours  HYDROmorphone  Injectable 2 milliGRAM(s) IV Push every 4 hours PRN  insulin glargine Injectable (LANTUS) 10 Unit(s) SubCutaneous at bedtime  insulin lispro (HumaLOG) corrective regimen sliding scale   SubCutaneous every 6 hours  linezolid  IVPB 600 milliGRAM(s) IV Intermittent every 12 hours  meropenem  IVPB 500 milliGRAM(s) IV Intermittent every 12 hours  ondansetron Injectable 4 milliGRAM(s) IV Push every 6 hours PRN  pantoprazole  Injectable 40 milliGRAM(s) IV Push daily    Drug Dosing Weight  Height (cm): 154.94 (28 Dec 2018 01:00)  Weight (kg): 82.1 (28 Dec 2018 01:00)  BMI (kg/m2): 34.2 (28 Dec 2018 01:00)  BSA (m2): 1.81 (28 Dec 2018 01:00)    CENTRAL LINE: [x ] YES [ ] NO      WEBER: [x ] YES [ ] NO       A-LINE:  [ ] YES [x ] NO     ICU Vital Signs Last 24 Hrs  T(C): 36.8 (06 Jan 2019 00:00), Max: 37 (05 Jan 2019 12:30)  T(F): 98.3 (06 Jan 2019 00:00), Max: 98.6 (05 Jan 2019 12:30)  HR: 94 (06 Jan 2019 02:00) (82 - 108)  BP: 113/47 (06 Jan 2019 02:00) (91/42 - 171/131)  BP(mean): 61 (06 Jan 2019 02:00) (52 - 141)  RR: 20 (06 Jan 2019 02:00) (0 - 30)  SpO2: 100% (06 Jan 2019 02:00) (58% - 100%)      ABG - ( 05 Jan 2019 05:17 )  pH, Arterial: 7.41  pH, Blood: x     /  pCO2: 34    /  pO2: 92    / HCO3: 21    / Base Excess: -2.6  /  SaO2: 97                    01-04 @ 07:01  -  01-05 @ 07:00  --------------------------------------------------------  IN: 1086.8 mL / OUT: 350 mL / NET: 736.8 mL        Mode: AC/ CMV (Assist Control/ Continuous Mandatory Ventilation)  RR (machine): 16  TV (machine): 450  FiO2: 60  PEEP: 5  ITime: 1  MAP: 8  PIP: 16      PHYSICAL EXAM:    PHYSICAL EXAM:      Constitutional: NAD    Eyes: conjunctiva clear, PERRL    ENMT: oral mucosa moist    Neck: supple, no JVD    Respiratory: ET tube, bilateral breath sounds, no adventitious sounds    Cardiovascular: RRR, no murmurs, gallops, or rubs    Gastrointestinal: soft, nontender, nondistended, bowel sounds present    Genitourinary: weber    Extremities: 2+ pitting edema on bilateral upper and lower extremities, bilateral toes cyanotic, no lesions    Vascular: palpable pulses bilaterally    Neurological: sedated, PERRL    >>> <<<    LABS:  CBC Full  -  ( 05 Jan 2019 06:43 )  WBC Count : 26.6 K/uL  Hemoglobin : 10.8 g/dL  Hematocrit : 34.2 %  Platelet Count - Automated : 98 K/uL  Mean Cell Volume : 66.1 fl  Mean Cell Hemoglobin : 20.9 pg  Mean Cell Hemoglobin Concentration : 31.7 gm/dL  Auto Neutrophil # : x  Auto Lymphocyte # : x  Auto Monocyte # : x  Auto Eosinophil # : x  Auto Basophil # : x  Auto Neutrophil % : 87.0 %  Auto Lymphocyte % : 3.0 %  Auto Monocyte % : 6.0 %  Auto Eosinophil % : 2.0 %  Auto Basophil % : x    01-05    136  |  97  |  110<H>  ----------------------------<  128<H>  4.5   |  20<L>  |  5.81<H>    Ca    8.8      05 Jan 2019 06:43  Phos  7.8     01-05  Mg     2.2     01-05    TPro  4.9<L>  /  Alb  2.0<L>  /  TBili  14.2<H>  /  DBili  11.1<H>  /  AST  72<H>  /  ALT  155<H>  /  AlkPhos  147<H>  01-05          [ ]GOALS OF CARE DISCUSSION WITH PATIENT/FAMILY/PROXY: DNR only    CRITICAL CARE TIME SPENT: 35 minutes

## 2019-01-06 NOTE — PROGRESS NOTE ADULT - ASSESSMENT
79F w/HTN, DM2 presented to ED c/o L hip pain s/p trip and fall from a curb, was found to have SVT and severe LV dysfunction, post-op septic shock, MARIA G, LLL pneumonia, and shock liver.  -shock liver resolved; now pt has rising bilirubin  -recent U/S showed no stones and a normal CBD  -pt has a rising bilirubin due to cholestasis of sepsis  -cont abx per ID  -bilirubin will normalize once pt's infection starts to improve  -further care per ICU team

## 2019-01-06 NOTE — PROGRESS NOTE ADULT - SUBJECTIVE AND OBJECTIVE BOX
CC: Patient is unresponsive, on ventilator, NAD    Vital Signs Last 24 Hrs  T(C): 36.9 (06 Jan 2019 12:00), Max: 37.3 (06 Jan 2019 05:00)  T(F): 98.5 (06 Jan 2019 12:00), Max: 99.1 (06 Jan 2019 05:00)  HR: 96 (06 Jan 2019 14:00) (89 - 108)  BP: 121/43 (06 Jan 2019 14:00) (91/42 - 171/131)  BP(mean): 59 (06 Jan 2019 14:00) (52 - 141)  RR: 22 (06 Jan 2019 14:00) (9 - 24)  SpO2: 97% (06 Jan 2019 14:00) (58% - 100%)    01-05 @ 07:01  -  01-06 @ 07:00  --------------------------------------------------------  IN: 978.6 mL / OUT: 170 mL / NET: 808.6 mL    01-06 @ 07:01  -  01-06 @ 14:49  --------------------------------------------------------  IN: 28.2 mL / OUT: 20 mL / NET: 8.2 mL    PHYSICAL EXAM:  GENERAL: Well developed, Well nourished white female, sedated  HEENT:  Normocephalic/Atraumatic, moist mucous membranes  NECK: Supple, no JVD  RESP: Symmetric movement of the chest, diffuse bilateral basal crackles  CVS: Tachycardic, S1 and S2 audible, no murmur  GI: Normal active bowel sounds present, abdomen soft, non tender, non distended  EXTREMITIES:  2+ edema, positive cyanosis  Skin: Normal turgor  MEDICATIONS:  ascorbic acid 500 milliGRAM(s) Oral two times a day  calcium acetate 667 milliGRAM(s) Oral three times a day with meals  chlorhexidine 4% Liquid 1 Application(s) Topical two times a day  cyanocobalamin Injectable 1000 MICROGram(s) SubCutaneous daily  dexmedetomidine Infusion 0.5 MICROgram(s)/kG/Hr IV Continuous <Continuous>  digoxin  Injectable 0.125 milliGRAM(s) IV Push every other day  docusate sodium Liquid 100 milliGRAM(s) Oral two times a day  epoetin monique Injectable 6000 Unit(s) IV Push <User Schedule>  fentaNYL   Infusion. 1 MICROgram(s)/kG/Hr IV Continuous <Continuous>  folic acid 1 milliGRAM(s) Oral daily  heparin  Injectable 5000 Unit(s) SubCutaneous every 12 hours  HYDROmorphone  Injectable 2 milliGRAM(s) IV Push every 4 hours PRN  insulin glargine Injectable (LANTUS) 10 Unit(s) SubCutaneous at bedtime  insulin lispro (HumaLOG) corrective regimen sliding scale   SubCutaneous every 6 hours  linezolid  IVPB 600 milliGRAM(s) IV Intermittent every 12 hours  meropenem  IVPB 500 milliGRAM(s) IV Intermittent every 12 hours  ondansetron Injectable 4 milliGRAM(s) IV Push every 6 hours PRN  pantoprazole  Injectable 40 milliGRAM(s) IV Push daily      LABS:                        9.9    26.8  )-----------( 100      ( 06 Jan 2019 06:52 )             31.3     01-06    138  |  98  |  100<H>  ----------------------------<  159<H>  4.8   |  19<L>  |  5.38<H>    Ca    8.4      06 Jan 2019 06:52  Phos  8.2     01-06  Mg     2.5     01-06    TPro  4.8<L>  /  Alb  1.8<L>  /  TBili  16.1<H>  /  DBili  x   /  AST  53<H>  /  ALT  101<H>  /  AlkPhos  146<H>  01-06    PT/INR - ( 06 Jan 2019 06:52 )   PT: 14.7 sec;   INR: 1.31 ratio    PTT - ( 06 Jan 2019 06:52 )  PTT:30.7 sec    ASSESSMENT AND PLAN:     1.MARIA G due to ATN from hypotensive and septic  shock with multiorgan failure. There is no indication for emergent HD  -pts renal status is poor with minimal u/o  -Adjust meds to eGFR and avoid IV Gadolinium contrast, NSAIDs, and phosphate enema.  -Monitor I/O's daily.   -Monitor SMA daily.  -Will observe need for dialysis on a daily basis    2. Acidosis ;secondary to shock/renal failure  -f/u co2 daily  -keep PH >7.3    3. Hyperkalemia: secondary to maria g, now improved   -keep k >4 and <5  -f/u k level daily  - low k diet    4. Multiorgan failure: plan as per MICU. pt has poor prognosis.    5. Anemia:multifactorial  -c/w epogen 6000units in hd tiw  -f/u Hb daily    6.Hyperphosphatemia:mild, secondary to maria g  -f/u phos level daily  -c/w  phoslo 667mg tid   -continue hd

## 2019-01-06 NOTE — PROGRESS NOTE ADULT - SUBJECTIVE AND OBJECTIVE BOX
Rising Bilirubin.    Meds: MEDICATIONS  (STANDING):  ascorbic acid 500 milliGRAM(s) Oral two times a day  calcium acetate 667 milliGRAM(s) Oral three times a day with meals  chlorhexidine 4% Liquid 1 Application(s) Topical two times a day  cyanocobalamin Injectable 1000 MICROGram(s) SubCutaneous daily  dexmedetomidine Infusion 0.5 MICROgram(s)/kG/Hr (10.262 mL/Hr) IV Continuous <Continuous>  digoxin  Injectable 0.125 milliGRAM(s) IV Push every other day  docusate sodium Liquid 100 milliGRAM(s) Oral two times a day  epoetin monique Injectable 6000 Unit(s) IV Push <User Schedule>  fentaNYL   Infusion. 1 MICROgram(s)/kG/Hr (8.21 mL/Hr) IV Continuous <Continuous>  folic acid 1 milliGRAM(s) Oral daily  heparin  Injectable 5000 Unit(s) SubCutaneous every 12 hours  insulin glargine Injectable (LANTUS) 10 Unit(s) SubCutaneous at bedtime  insulin lispro (HumaLOG) corrective regimen sliding scale   SubCutaneous every 6 hours  linezolid  IVPB 600 milliGRAM(s) IV Intermittent every 12 hours  meropenem  IVPB 500 milliGRAM(s) IV Intermittent every 12 hours  pantoprazole  Injectable 40 milliGRAM(s) IV Push daily    T(C): 36.9 (01-06-19 @ 12:00)  T(F): 98.5 (01-06-19 @ 12:00), Max: 99.1 (01-06-19 @ 05:00)  HR: 96 (01-06-19 @ 14:00) (89 - 100)  BP: 121/43 (01-06-19 @ 14:00) (91/42 - 171/131)    Gen: intubated, sedated  Chest: coarse BS B/L  CVS: S1/S2  Abd: S/NT/mildly distended                        9.9    26.8  )-----------( 100      ( 06 Jan 2019 06:52 )             31.3   01-06    138  |  98  |  100<H>  ----------------------------<  159<H>  4.8   |  19<L>  |  5.38<H>    Ca    8.4      06 Jan 2019 06:52  Phos  8.2     01-06  Mg     2.5     01-06    TPro  4.8<L>  /  Alb  1.8<L>  /  TBili  16.1<H>  /  DBili  x   /  AST  53<H>  /  ALT  101<H>  /  AlkPhos  146<H>  01-06    US Hepatic & Pancreatic (01.02.19 @ 11:15)  IMPRESSION:  5 mm gallbladder polyp. Follow-up recommended.

## 2019-01-06 NOTE — PROGRESS NOTE ADULT - SUBJECTIVE AND OBJECTIVE BOX
CHIEF COMPLAINT:Patient is a 79y old  Female who presents with a chief complaint of MOSF.Pt remains intubated.    	  REVIEW OF SYSTEMS:    [x ] Unable to obtain    PHYSICAL EXAM:  T(C): 36.9 (01-06-19 @ 08:00), Max: 37.3 (01-06-19 @ 05:00)  HR: 98 (01-06-19 @ 11:35) (82 - 108)  BP: 120/53 (01-06-19 @ 10:00) (91/42 - 171/131)  RR: 23 (01-06-19 @ 10:00) (14 - 24)  SpO2: 100% (01-06-19 @ 11:35) (58% - 100%)  Wt(kg): --  I&O's Summary    05 Jan 2019 07:01  -  06 Jan 2019 07:00  --------------------------------------------------------  IN: 978.6 mL / OUT: 170 mL / NET: 808.6 mL    06 Jan 2019 07:01  -  06 Jan 2019 12:16  --------------------------------------------------------  IN: 28.2 mL / OUT: 20 mL / NET: 8.2 mL        Appearance: Normal	  HEENT:   Normal oral mucosa, PERRL, EOMI	  Lymphatic: No lymphadenopathy  Cardiovascular: Normal S1 S2, No JVD, No murmurs, +1 edema  Respiratory: Lungs clear to auscultation	  Gastrointestinal:  Soft, Non-tender, + BS	  Skin: No rashes, No ecchymoses, No cyanosis	  Extremities: Normal range of motion, No clubbing, cyanosis +1 edema  Vascular: Peripheral pulses palpable 2+ bilaterally    MEDICATIONS  (STANDING):  ascorbic acid 500 milliGRAM(s) Oral two times a day  calcium acetate 667 milliGRAM(s) Oral three times a day with meals  chlorhexidine 4% Liquid 1 Application(s) Topical two times a day  cyanocobalamin Injectable 1000 MICROGram(s) SubCutaneous daily  dexmedetomidine Infusion 0.5 MICROgram(s)/kG/Hr (10.262 mL/Hr) IV Continuous <Continuous>  digoxin  Injectable 0.125 milliGRAM(s) IV Push every other day  docusate sodium Liquid 100 milliGRAM(s) Oral two times a day  epoetin monique Injectable 6000 Unit(s) IV Push <User Schedule>  fentaNYL   Infusion. 1 MICROgram(s)/kG/Hr (8.21 mL/Hr) IV Continuous <Continuous>  folic acid 1 milliGRAM(s) Oral daily  heparin  Injectable 5000 Unit(s) SubCutaneous every 12 hours  insulin glargine Injectable (LANTUS) 10 Unit(s) SubCutaneous at bedtime  insulin lispro (HumaLOG) corrective regimen sliding scale   SubCutaneous every 6 hours  linezolid  IVPB 600 milliGRAM(s) IV Intermittent every 12 hours  meropenem  IVPB 500 milliGRAM(s) IV Intermittent every 12 hours  pantoprazole  Injectable 40 milliGRAM(s) IV Push daily        	  LABS:	 	                      9.9    26.8  )-----------( 100      ( 06 Jan 2019 06:52 )             31.3     01-06    138  |  98  |  100<H>  ----------------------------<  159<H>  4.8   |  19<L>  |  5.38<H>    Ca    8.4      06 Jan 2019 06:52  Phos  8.2     01-06  Mg     2.5     01-06    TPro  4.8<L>  /  Alb  1.8<L>  /  TBili  16.1<H>  /  DBili  x   /  AST  53<H>  /  ALT  101<H>  /  AlkPhos  146<H>  01-06    EXAM:  XR CHEST PORTABLE ROUTINE 1V                            PROCEDURE DATE:  01/06/2019          INTERPRETATION:  CLINICAL INFORMATION: Short of breath. ICU follow-up   examination.    A single portable view of the chest was obtained.     Comparison: 1/5/2019.     Limited by patient rotation.    The mediastinal cardiac silhouette is unremarkable. Lines and tubes   appear unchanged.    Rotated and low lung volumes limit evaluation. Likely bilateral effusions   and bibasilar atelectasis right greater than left.    No acute osseous finding.     IMPRESSION:    Limited exam as above. No gross significant change from the prior.        Blood Gas Profile - Arterial in AM (01.06.19 @ 04:57)    pH, Arterial: 7.37    pCO2, Arterial: 35 mmHg    pO2, Arterial: 78 mmHg    HCO3, Arterial: 20 mmol/L    Base Excess, Arterial: -4.6 mmol/L    Oxygen Saturation, Arterial: 94 %    FIO2, Arterial: 60    Blood Gas Comments Arterial: AC 16/450/60/5, RR      Lipid Profile: Cholesterol --  LDL --  HDL --    Cholesterol 82  LDL 22  HDL 40      HgA1c: Hemoglobin A1C, Whole Blood: 6.1 % (12-27 @ 09:51)  Hemoglobin A1C, Whole Blood: 6.2 % (12-26 @ 23:12)    TSH: Thyroid Stimulating Hormone, Serum: 1.19 uU/mL (12-27 @ 06:50)      PT/INR - ( 06 Jan 2019 06:52 )   PT: 14.7 sec;   INR: 1.31 ratio         PTT - ( 06 Jan 2019 06:52 )  PTT:30.7 sec

## 2019-01-06 NOTE — PROGRESS NOTE ADULT - PROBLEM SELECTOR PLAN 2
Multiorgan failure with Anuria with severe lactic acidosis ; ATN 2/2 Sepsis  Rt Femoral Shiley  Plan for dialysis tomorrow   Poor urine output   Dr Lay on board

## 2019-01-06 NOTE — PROGRESS NOTE ADULT - PROBLEM SELECTOR PLAN 1
CXR: Bilateral basal pneumonia and diffuse congestion improved  Intubated and sedated  C/w Zyvox and meropenem  Blood Cx: NTD  SAT/SBT today due to improved CXR

## 2019-01-06 NOTE — PROGRESS NOTE ADULT - SUBJECTIVE AND OBJECTIVE BOX
MARICEL SLAUGHTER  MR# [id number]  79yFemale        Patient is a 79y old  Female who presents with a chief complaint of fall (06 Jan 2019 16:19)      INTERVAL HPI/OVERNIGHT EVENTS:  Patient seen and examined at bedside.           ALLERGIES  No Known Allergies      MEDICATIONS  ascorbic acid 500 milliGRAM(s) Oral two times a day  calcium acetate 667 milliGRAM(s) Oral three times a day with meals  chlorhexidine 4% Liquid 1 Application(s) Topical two times a day  cyanocobalamin Injectable 1000 MICROGram(s) SubCutaneous daily  dexmedetomidine Infusion 0.5 MICROgram(s)/kG/Hr IV Continuous <Continuous>  digoxin  Injectable 0.125 milliGRAM(s) IV Push every other day  docusate sodium Liquid 100 milliGRAM(s) Oral two times a day  epoetin monique Injectable 6000 Unit(s) IV Push <User Schedule>  fentaNYL   Infusion. 1 MICROgram(s)/kG/Hr IV Continuous <Continuous>  folic acid 1 milliGRAM(s) Oral daily  heparin  Injectable 5000 Unit(s) SubCutaneous every 12 hours  HYDROmorphone  Injectable 2 milliGRAM(s) IV Push every 4 hours PRN Severe Pain (7 - 10)  insulin glargine Injectable (LANTUS) 10 Unit(s) SubCutaneous at bedtime  insulin lispro (HumaLOG) corrective regimen sliding scale   SubCutaneous every 6 hours  linezolid  IVPB 600 milliGRAM(s) IV Intermittent every 12 hours  meropenem  IVPB 500 milliGRAM(s) IV Intermittent every 12 hours  ondansetron Injectable 4 milliGRAM(s) IV Push every 6 hours PRN Nausea and/or Vomiting  pantoprazole  Injectable 40 milliGRAM(s) IV Push daily              REVIEW OF SYSTEMS:  CONSTITUTIONAL: No fever, weight loss, or fatigue  EYES: No eye pain, visual disturbances, or discharge  ENT:  No difficulty hearing, tinnitus, vertigo; No sinus or throat pain  NECK: No pain or stiffness  RESPIRATORY: No cough, wheezing, chills or hemoptysis; No Shortness of Breath  CARDIOVASCULAR: No chest pain, palpitations, passing out, dizziness, or leg swelling  GASTROINTESTINAL: No abdominal or epigastric pain. No nausea, vomiting, or hematemesis; No diarrhea or constipation. No melena or hematochezia.  GENITOURINARY: No dysuria, frequency, hematuria, or incontinence  NEUROLOGICAL: No headaches, memory loss, loss of strength, numbness, or tremors  SKIN: No itching, burning, rashes, or lesions   LYMPH Nodes: No enlarged glands  ENDOCRINE: No heat or cold intolerance; No hair loss  MUSCULOSKELETAL: No joint pain or swelling; No muscle, back, or extremity pain  PSYCHIATRIC: No depression, anxiety, mood swings, or difficulty sleeping  HEME/LYMPH: No easy bruising, or bleeding gums  ALLERGY AND IMMUNOLOGIC: No hives or eczema	    [ ] All others negative	  [ ] Unable to obtain      T(C): 36.8 (01-06-19 @ 15:42), Max: 37.3 (01-06-19 @ 05:00)  T(F): 98.2 (01-06-19 @ 15:42), Max: 99.1 (01-06-19 @ 05:00)  HR: 78 (01-06-19 @ 16:37) (78 - 100)  BP: 121/43 (01-06-19 @ 14:00) (91/42 - 135/44)  RR: 22 (01-06-19 @ 14:00) (9 - 24)  SpO2: 99% (01-06-19 @ 16:37) (86% - 100%)  Wt(kg): --    I&O's Summary    05 Jan 2019 07:01  -  06 Jan 2019 07:00  --------------------------------------------------------  IN: 978.6 mL / OUT: 170 mL / NET: 808.6 mL    06 Jan 2019 07:01  -  06 Jan 2019 18:05  --------------------------------------------------------  IN: 28.2 mL / OUT: 20 mL / NET: 8.2 mL          PHYSICAL EXAM:  A X O x  HEAD:  Atraumatic, Normocephalic  EYES: EOMI, PERRLA, conjunctiva and sclera clear  NECK: Supple, No JVD, Normal thyroid  Resp: CTAB, No crackles, wheezing,   CVS: Regular rate and rhythm; No discernable murmurs, rubs, or gallops  ABD: Soft, Nontender, Nondistended; Bowel sounds present  EXTREMITIES:  2+ Peripheral Pulses, No edema  LYMPH: No dicernable lymphadenopathy noted  GENERAL: NAD, well-groomed, well-developed      LABS:                        9.9    26.8  )-----------( 100      ( 06 Jan 2019 06:52 )             31.3     01-06    138  |  98  |  100<H>  ----------------------------<  159<H>  4.8   |  19<L>  |  5.38<H>    Ca    8.4      06 Jan 2019 06:52  Phos  8.2     01-06  Mg     2.5     01-06    TPro  4.8<L>  /  Alb  1.8<L>  /  TBili  16.1<H>  /  DBili  x   /  AST  53<H>  /  ALT  101<H>  /  AlkPhos  146<H>  01-06    PT/INR - ( 06 Jan 2019 06:52 )   PT: 14.7 sec;   INR: 1.31 ratio         PTT - ( 06 Jan 2019 06:52 )  PTT:30.7 sec    CAPILLARY BLOOD GLUCOSE      POCT Blood Glucose.: 184 mg/dL (06 Jan 2019 12:49)  POCT Blood Glucose.: 180 mg/dL (06 Jan 2019 05:25)  POCT Blood Glucose.: 147 mg/dL (06 Jan 2019 00:32)  POCT Blood Glucose.: 238 mg/dL (05 Jan 2019 21:44)      Troponins:  ProBNP:  Lipid Profile:   HgA1c:  TSH:     ABG - ( 06 Jan 2019 04:57 )  pH, Arterial: 7.37  pH, Blood: x     /  pCO2: 35    /  pO2: 78    / HCO3: 20    / Base Excess: -4.6  /  SaO2: 94                    RADIOLOGY & ADDITIONAL TESTS:    Imaging Personally Reviewed:  [ ] YES  [ ] NO      Consultant(s) Notes Reviewed:  [x ] YES  [ ] NO    Care Discussed with Consultants/Other Providers [ x] YES  [ ] NO          PAST MEDICAL & SURGICAL HISTORY:  No pertinent past medical history  No significant past surgical history        Closed fracture of neck of left femur  Yes  No pertinent family history in first degree relatives  Handoff  MEWS Score  No pertinent past medical history  Closed fracture of left hip, initial encounter  Peripheral arterial disease  Drop in hemoglobin  Acute liver failure  Acute renal failure  Septic shock  Acute post-operative pain  Hypotension  Fever  Acute systolic congestive heart failure  Cardiogenic shock  Prophylactic measure  Diabetes mellitus  HTN (hypertension)  Heart failure with reduced ejection fraction  PSVT (paroxysmal supraventricular tachycardia)  Left ventricular dysfunction  Need for prophylactic measure  Closed fracture of left hip, initial encounter  No significant past surgical history  A S/P FALL

## 2019-01-06 NOTE — PROGRESS NOTE ADULT - ASSESSMENT
79 y/o F pt with PMHX of  HTN, DM2, who presents to ED c/o L hip pain s/p trip and fall from a curb, SVT and severe lv dysfunction,post-op septic shock,MARIA G,LLL pneumonia,and shock liver.  1.ICU monitoring.  2.Vent  support as per iCU.  3.PSVT-dig .125mg qod.  4.DM-Insulin.  5.Ortho f/u.  6.Replace vit b12.  7.ID f/u -ABX.  8.MARIA G with metabolic acidosis-Renal f/u.  9.Shock liver-f/u lft's.  10. HD as per renal.  11.GI prophylaxis.

## 2019-01-06 NOTE — PROGRESS NOTE ADULT - SUBJECTIVE AND OBJECTIVE BOX
INTERVAL HPI/OVERNIGHT EVENTS:       Antimicrobial:  linezolid  IVPB 600 milliGRAM(s) IV Intermittent every 12 hours  meropenem  IVPB 500 milliGRAM(s) IV Intermittent every 12 hours    Cardiovascular:  digoxin  Injectable 0.125 milliGRAM(s) IV Push every other day    Pulmonary:    Hematalogic:  heparin  Injectable 5000 Unit(s) SubCutaneous every 12 hours    Other:  ascorbic acid 500 milliGRAM(s) Oral two times a day  calcium acetate 667 milliGRAM(s) Oral three times a day with meals  chlorhexidine 4% Liquid 1 Application(s) Topical two times a day  cyanocobalamin Injectable 1000 MICROGram(s) SubCutaneous daily  dexmedetomidine Infusion 0.5 MICROgram(s)/kG/Hr IV Continuous <Continuous>  docusate sodium Liquid 100 milliGRAM(s) Oral two times a day  epoetin monique Injectable 6000 Unit(s) IV Push <User Schedule>  fentaNYL   Infusion. 1 MICROgram(s)/kG/Hr IV Continuous <Continuous>  folic acid 1 milliGRAM(s) Oral daily  HYDROmorphone  Injectable 2 milliGRAM(s) IV Push every 4 hours PRN  insulin glargine Injectable (LANTUS) 10 Unit(s) SubCutaneous at bedtime  insulin lispro (HumaLOG) corrective regimen sliding scale   SubCutaneous every 6 hours  ondansetron Injectable 4 milliGRAM(s) IV Push every 6 hours PRN  pantoprazole  Injectable 40 milliGRAM(s) IV Push daily      Drug Dosing Weight  Height (cm): 154.94 (28 Dec 2018 01:00)  Weight (kg): 82.1 (28 Dec 2018 01:00)  BMI (kg/m2): 34.2 (28 Dec 2018 01:00)  BSA (m2): 1.81 (28 Dec 2018 01:00)    CENTRAL LINE: [x ] YES [ ] NO  LOCATION:   DATE INSERTED:    DE SOUZA: [x ] YES [ ] NO    DATE INSERTED:    A-LINE:  [ ] YES [ ] NO  LOCATION:   DATE INSERTED:    PMH/Social Hx/[facility] Hx -reviewed admission note, no change since admission  PAST MEDICAL & SURGICAL HISTORY:  No pertinent past medical history  No significant past surgical history      T(C): 36.9 (01-06-19 @ 08:00), Max: 37.3 (01-06-19 @ 05:00)  HR: 98 (01-06-19 @ 11:35)  BP: 120/53 (01-06-19 @ 10:00)  BP(mean): 68 (01-06-19 @ 10:00)  ABP: --  ABP(mean): --  RR: 23 (01-06-19 @ 10:00)  SpO2: 100% (01-06-19 @ 11:35)  Wt(kg): --    ABG - ( 06 Jan 2019 04:57 )  pH, Arterial: 7.37  pH, Blood: x     /  pCO2: 35    /  pO2: 78    / HCO3: 20    / Base Excess: -4.6  /  SaO2: 94                    01-05 @ 07:01  -  01-06 @ 07:00  --------------------------------------------------------  IN: 978.6 mL / OUT: 170 mL / NET: 808.6 mL        Mode: PS (Pressure Support)/ Spontaneous  FiO2: 60  PEEP: 5  ITime: 1  MAP: 9.3  PIP: 18      PHYSICAL EXAM:    GENERAL: No signs of distress, comfortable  HEAD: Atraumatic, Normocephalic  EYES: EOMI, PERRLA  ENMT: No erythema, exudates, or enlargement, Moist mucous membranes + ETT  NECK: Supple, normal appearance, No JVD; [  ] central line (if applicable)  CHEST/LUNG: No chest deformity, fair bilateral air entry; No rales, rhonchi, wheezing; crackles  HEART: Regular rate and rhythm; No murmurs, rubs, or gallops;   ABDOMEN: Soft, Nontender, Nondistended; Bowel sounds present  EXTREMITIES:  + Peripheral Pulses, No clubbing, cyanosis, or edema . cyanotic toes and fingers tips  NERVOUS SYSTEM : sedated  LYMPH: No lymphadenopathy noted  SKIN: No rashes or lesions; good turgor, warm, dry      LABS:  CBC Full  -  ( 06 Jan 2019 06:52 )  WBC Count : 26.8 K/uL  Hemoglobin : 9.9 g/dL  Hematocrit : 31.3 %  Platelet Count - Automated : 100 K/uL  Mean Cell Volume : 66.1 fl  Mean Cell Hemoglobin : 21.0 pg  Mean Cell Hemoglobin Concentration : 31.7 gm/dL  Auto Neutrophil # : 22.7 K/uL  Auto Lymphocyte # : 1.6 K/uL  Auto Monocyte # : 2.3 K/uL  Auto Eosinophil # : 0.1 K/uL  Auto Basophil # : 0.1 K/uL  Auto Neutrophil % : 84.6 %  Auto Lymphocyte % : 6.1 %  Auto Monocyte % : 8.5 %  Auto Eosinophil % : 0.3 %  Auto Basophil % : 0.5 %    01-06    138  |  98  |  100<H>  ----------------------------<  159<H>  4.8   |  19<L>  |  5.38<H>    Ca    8.4      06 Jan 2019 06:52  Phos  8.2     01-06  Mg     2.5     01-06    TPro  4.8<L>  /  Alb  1.8<L>  /  TBili  16.1<H>  /  DBili  x   /  AST  53<H>  /  ALT  101<H>  /  AlkPhos  146<H>  01-06    PT/INR - ( 06 Jan 2019 06:52 )   PT: 14.7 sec;   INR: 1.31 ratio         PTT - ( 06 Jan 2019 06:52 )  PTT:30.7 sec        RADIOLOGY & ADDITIONAL STUDIES REVIEWED     CXR:< from: Xray Chest 1 View- PORTABLE-Routine (01.06.19 @ 10:07) >    EXAM:  XR CHEST PORTABLE ROUTINE 1V                            PROCEDURE DATE:  01/06/2019          INTERPRETATION:  CLINICAL INFORMATION: Short of breath. ICU follow-up   examination.    A single portable view of the chest was obtained.     Comparison: 1/5/2019.     Limited by patient rotation.    The mediastinal cardiac silhouette is unremarkable. Lines and tubes   appear unchanged.    Rotated and low lung volumes limit evaluation. Likely bilateral effusions   and bibasilar atelectasis right greater than left.    No acute osseous finding.     IMPRESSION:    Limited exam as above. No gross significant change from the prior.                SAMI RAMESH M.D., ATTENDING RADIOLOGIST  This document has been electronically signed. Jan 6 2019 10:34AM                < end of copied text >      IMPRESSION:  PAST MEDICAL & SURGICAL HISTORY:  No pertinent past medical history  No significant past surgical history   p/w     IMP: This is an 78 yr old woman with CAD, CHF with decrease EF,  and prior mentioned medical condition s/p fall with right hip fx. S/P Right hip IM nailing 12/27. Post op resp failure and cardiogenic shock  vs septic on pressors to maintain BP.  Pat is in MOSF.  Severe life threatening metabolic acidosis due to MARIA G, MOSF. Coagulopathy due to liver failure ( shock liver). Started on iv antibx . Pat is auto coagulopathic s/p ffp but inr is still elevated,hence no dialysis cath placement. acidosis is better with iv bicarb. Antibx changed by ID. Had fist session od dialysis.. incompete. LFT improving. Cyanotic toes and fingers due to low flow state and pressors ( now off presssors).  Started on HD , had first complete session 1/1.  OFF PRESSORS. T.Stephane is elevated.  shedule for dialysis today . WBC no change today. Will start sbt today            Plan:      CNS: d/c sedation    PULMONARY: continue vent support, start sbt when awake    CARDIAC: monitor    GI: feed    RENAL: dialysis as per renal    SKIN: wound care    MUSCULOSKELETAL: boots    ID: continue antibx    HEME: monitor    DVT and GI Prophylaxis    GOALS OF CARE DISCUSSION WITH PATIENT/FAMILY/PROXY/ icu team on rounds    CRITICAL CARE TIME SPENT: 35 minutes

## 2019-01-07 NOTE — ADVANCED PRACTICE NURSE CONSULT - RECOMMEDATIONS
-Clean all wounds with normal saline and apply skin prep to the surrounding skin  -Please consult Palliative Care for further evaluation  -Apply Adaptic gauze to the Sacral wound bed and cover with a Foam dressing Q 72hrs PRN  -Apply a non-adherent dry dressing to the Ruptured Bullae wounds Daily PRN  -Leave the Fingers and Toes open to air  -Elevate/float the patients heels using heel protectors and reposition the patient Q 2hrs using wedges or pillows -Clean all wounds with normal saline and apply skin prep to the surrounding skin  -Please consult Palliative Care for further evaluation  -Apply Adaptic gauze to the Sacral wound bed and cover with a Foam dressing Q 72hrs PRN  -Apply a non-adherent dry dressing to the Ruptured Bullae wounds Daily PRN  -Leave the Fingers and Toes open to air  -Apply Aquacel to the R. Ear wound and cover with a non-adherent dressing Q 72hrs PRN  -Elevate/float the patients heels using heel protectors and reposition the patient Q 2hrs using wedges or pillows

## 2019-01-07 NOTE — ADVANCED PRACTICE NURSE CONSULT - ASSESSMENT
This is a 79yr old female patient admitted for L. Femur Fracture, presenting with the following:  -There is an [place] Pressure Injury to the R. Ear with eschar, slough, pink tissue, and scant drainage  -There is a Joaquin Terminal Ulcer to the Sacrum with epidermal separation, red tissue, and scant drainage  -There are intact and ruptured Bullae to the Bilateral Upper and Lower Extremities  -There is evidence of complete Cyanosis of the Fingers and Toes without drainage  -The patient currently has Multi-System Organ Failure, Septic Shock, Peripheral Arterial Disease, L. Hip Fracture, Systolic CHF, and Diabetes. The patient was on multiple vasopressor  -The patient is experiencing signs of skin failure

## 2019-01-07 NOTE — PROGRESS NOTE ADULT - SUBJECTIVE AND OBJECTIVE BOX
MARICEL SLAUGHTER  MR# [id number]  79yFemale        Patient is a 79y old  Female who presents with a chief complaint of fall (07 Jan 2019 11:24)      INTERVAL HPI/OVERNIGHT EVENTS:  Patient seen and examined at bedside.       ALLERGIES  No Known Allergies      MEDICATIONS  ascorbic acid 500 milliGRAM(s) Oral two times a day  calcium acetate 667 milliGRAM(s) Oral three times a day with meals  chlorhexidine 4% Liquid 1 Application(s) Topical two times a day  cyanocobalamin Injectable 1000 MICROGram(s) SubCutaneous daily  digoxin  Injectable 0.125 milliGRAM(s) IV Push every other day  epoetin monique Injectable 6000 Unit(s) IV Push <User Schedule>  folic acid 1 milliGRAM(s) Oral daily  heparin  Injectable 5000 Unit(s) SubCutaneous every 12 hours  HYDROmorphone  Injectable 0.5 milliGRAM(s) IV Push every 4 hours PRN Severe Pain (7 - 10)  insulin glargine Injectable (LANTUS) 10 Unit(s) SubCutaneous at bedtime  insulin lispro (HumaLOG) corrective regimen sliding scale   SubCutaneous every 6 hours  linezolid  IVPB 600 milliGRAM(s) IV Intermittent every 12 hours  meropenem  IVPB 500 milliGRAM(s) IV Intermittent every 12 hours  ondansetron Injectable 4 milliGRAM(s) IV Push every 6 hours PRN Nausea and/or Vomiting  pantoprazole  Injectable 40 milliGRAM(s) IV Push daily              REVIEW OF SYSTEMS:  CONSTITUTIONAL: No fever, weight loss, or fatigue  EYES: No eye pain, visual disturbances, or discharge  ENT:  No difficulty hearing, tinnitus, vertigo; No sinus or throat pain  NECK: No pain or stiffness  RESPIRATORY: No cough, wheezing, chills or hemoptysis; No Shortness of Breath  CARDIOVASCULAR: No chest pain, palpitations, passing out, dizziness, or leg swelling  GASTROINTESTINAL: No abdominal or epigastric pain. No nausea, vomiting, or hematemesis; No diarrhea or constipation. No melena or hematochezia.  GENITOURINARY: No dysuria, frequency, hematuria, or incontinence  NEUROLOGICAL: No headaches, memory loss, loss of strength, numbness, or tremors  SKIN: No itching, burning, rashes, or lesions   LYMPH Nodes: No enlarged glands  ENDOCRINE: No heat or cold intolerance; No hair loss  MUSCULOSKELETAL: No joint pain or swelling; No muscle, back, or extremity pain  PSYCHIATRIC: No depression, anxiety, mood swings, or difficulty sleeping  HEME/LYMPH: No easy bruising, or bleeding gums  ALLERGY AND IMMUNOLOGIC: No hives or eczema	    [ ] All others negative	  [ ] Unable to obtain      T(C): 37.7 (01-07-19 @ 05:00), Max: 37.7 (01-07-19 @ 05:00)  T(F): 99.9 (01-07-19 @ 05:00), Max: 99.9 (01-07-19 @ 05:00)  HR: 76 (01-07-19 @ 15:00) (66 - 97)  BP: 112/40 (01-07-19 @ 15:00) (84/33 - 141/61)  RR: 19 (01-07-19 @ 15:00) (7 - 31)  SpO2: 97% (01-07-19 @ 15:00) (92% - 100%)  Wt(kg): --    I&O's Summary    06 Jan 2019 07:01  -  07 Jan 2019 07:00  --------------------------------------------------------  IN: 1186.4 mL / OUT: 265 mL / NET: 921.4 mL          PHYSICAL EXAM:  A X O x  HEAD:  Atraumatic, Normocephalic  EYES: EOMI, PERRLA, conjunctiva and sclera clear  NECK: Supple, No JVD, Normal thyroid  Resp: CTAB, No crackles, wheezing,   CVS: Regular rate and rhythm; No discernable murmurs, rubs, or gallops  ABD: Soft, Nontender, Nondistended; Bowel sounds present  EXTREMITIES:  2+ Peripheral Pulses, No edema  LYMPH: No dicernable lymphadenopathy noted  GENERAL: NAD, well-groomed, well-developed      LABS:                        9.1    20.1  )-----------( 84       ( 07 Jan 2019 06:11 )             28.6     01-07    136  |  99  |  127<H>  ----------------------------<  175<H>  4.8   |  18<L>  |  6.18<H>    Ca    8.5      07 Jan 2019 06:11  Phos  8.5     01-07  Mg     2.5     01-07    TPro  4.6<L>  /  Alb  1.7<L>  /  TBili  16.7<H>  /  DBili  x   /  AST  48<H>  /  ALT  72<H>  /  AlkPhos  136<H>  01-07    PT/INR - ( 06 Jan 2019 06:52 )   PT: 14.7 sec;   INR: 1.31 ratio         PTT - ( 06 Jan 2019 06:52 )  PTT:30.7 sec    CAPILLARY BLOOD GLUCOSE      POCT Blood Glucose.: 117 mg/dL (07 Jan 2019 11:00)  POCT Blood Glucose.: 200 mg/dL (07 Jan 2019 05:26)  POCT Blood Glucose.: 193 mg/dL (06 Jan 2019 22:10)  POCT Blood Glucose.: 47 mg/dL (06 Jan 2019 22:08)  POCT Blood Glucose.: 103 mg/dL (06 Jan 2019 18:30)      Troponins:  ProBNP:  Lipid Profile:   HgA1c:  TSH:     ABG - ( 07 Jan 2019 04:12 )  pH, Arterial: 7.42  pH, Blood: x     /  pCO2: 28    /  pO2: 78    / HCO3: 18    / Base Excess: -5.6  /  SaO2: 95                    RADIOLOGY & ADDITIONAL TESTS:    Imaging Personally Reviewed:  [ ] YES  [ ] NO      Consultant(s) Notes Reviewed:  [x ] YES  [ ] NO    Care Discussed with Consultants/Other Providers [ x] YES  [ ] NO          PAST MEDICAL & SURGICAL HISTORY:  No pertinent past medical history  No significant past surgical history        Closed fracture of neck of left femur  Yes  No pertinent family history in first degree relatives  Handoff  MEWS Score  No pertinent past medical history  Closed fracture of left hip, initial encounter  Peripheral arterial disease  Drop in hemoglobin  Acute liver failure  Acute renal failure  Septic shock  Acute post-operative pain  Hypotension  Fever  Acute systolic congestive heart failure  Cardiogenic shock  Prophylactic measure  Diabetes mellitus  HTN (hypertension)  Heart failure with reduced ejection fraction  PSVT (paroxysmal supraventricular tachycardia)  Left ventricular dysfunction  Need for prophylactic measure  Closed fracture of left hip, initial encounter  No significant past surgical history  A S/P FALL

## 2019-01-07 NOTE — PROGRESS NOTE ADULT - ASSESSMENT
Assessment and Plan:    1.MARIA G due to ATN from hypotensive and septic  shock with multiorgan failure. There is no indication for emergent HD  -pts renal status is poor with minimal u/o  -Adjust meds to eGFR and avoid IV Gadolinium contrast, NSAIDs, and phosphate enema.  -Monitor I/O's daily.   -Monitor SMA daily.  -Will observe need for dialysis on a daily basis    2. Acidosis ;secondary to shock/renal failure  -f/u co2 daily  -keep PH >7.3    3. Hyperkalemia: secondary to maria g, now improved   -keep k >4 and <5  -f/u k level daily  - low k diet    4. Multiorgan failure: plan as per MICU. pt has poor prognosis.    5. Anemia:multifactorial  -c/w epogen 6000units in hd tiw  -f/u Hb daily    6.Hyperphosphatemia:mild, secondary to maria g  -f/u phos level daily  -c/w  phoslo 667mg tid   -continue hd Assessment and Plan:    1.MARIA G due to ATN from hypotensive and septic  shock with multiorgan failure. There is no indication for emergent HD today  -pts renal status is poor with minimal u/o  -Adjust meds to eGFR and avoid IV Gadolinium contrast, NSAIDs, and phosphate enema.  -Monitor I/O's daily.   -Monitor SMA daily.  -Will plan for hd tomorrow    2. Acidosis ;secondary to shock/renal failure  -f/u co2 daily  -keep PH >7.3    3. Hyperkalemia: secondary to maria g, now improved   -keep k >4 and <5  -f/u k level daily  - low k diet    4. Multiorgan failure: plan as per MICU. pt has poor prognosis.    5. Anemia:multifactorial  -c/w epogen 6000units in hd tiw  -f/u Hb daily    6.Hyperphosphatemia:mild, secondary to maria g  -f/u phos level daily  -c/w  phoslo 667mg tid   -continue hd

## 2019-01-07 NOTE — PROGRESS NOTE ADULT - SUBJECTIVE AND OBJECTIVE BOX
pt seen and examined.pts current chart reviewed and case discussed with resident covering.    SUBJECTIVE: pt was seen and examined at bedside, pt still on sedation with precedex and venti support. I&O 1100/260, net positive 900cc overnight. last HD on Sat with 1600cc fluid removed. CXR this am noted improved.     REVIEW OF SYSTEMS:  unable to obtain due to sedated     Current meds:    ascorbic acid 500 milliGRAM(s) Oral two times a day  calcium acetate 667 milliGRAM(s) Oral three times a day with meals  chlorhexidine 4% Liquid 1 Application(s) Topical two times a day  cyanocobalamin Injectable 1000 MICROGram(s) SubCutaneous daily  digoxin  Injectable 0.125 milliGRAM(s) IV Push every other day  epoetin monique Injectable 6000 Unit(s) IV Push <User Schedule>  folic acid 1 milliGRAM(s) Oral daily  heparin  Injectable 5000 Unit(s) SubCutaneous every 12 hours  HYDROmorphone  Injectable 0.5 milliGRAM(s) IV Push every 4 hours PRN  insulin glargine Injectable (LANTUS) 10 Unit(s) SubCutaneous at bedtime  insulin lispro (HumaLOG) corrective regimen sliding scale   SubCutaneous every 6 hours  linezolid  IVPB 600 milliGRAM(s) IV Intermittent every 12 hours  meropenem  IVPB 500 milliGRAM(s) IV Intermittent every 12 hours  ondansetron Injectable 4 milliGRAM(s) IV Push every 6 hours PRN  pantoprazole  Injectable 40 milliGRAM(s) IV Push daily      Vital Signs    T(F): 99.9 (01-07-19 @ 05:00), Max: 99.9 (01-07-19 @ 05:00)  HR: 97 (01-07-19 @ 10:00) (71 - 98)  BP: 141/61 (01-07-19 @ 10:00) (94/44 - 141/61)  ABP: --  RR: 29 (01-07-19 @ 10:00) (7 - 31)  SpO2: 92% (01-07-19 @ 10:00) (92% - 100%)  Wt(kg): --  CVP(cm H2O): --  CO: --  PCWP: --    I and O's:    01-05 @ 07:01  -  01-06 @ 07:00  --------------------------------------------------------  IN:    fentaNYL Infusion.: 82 mL    fentaNYL Infusion.: 106.6 mL    Nepro with Carb Steady: 90 mL    Solution: 600 mL    Solution: 100 mL  Total IN: 978.6 mL    OUT:    Indwelling Catheter - Urethral: 170 mL  Total OUT: 170 mL    Total NET: 808.6 mL      01-06 @ 07:01  -  01-07 @ 07:00  --------------------------------------------------------  IN:    dexmedetomidine Infusion: 131.9 mL    Enteral Tube Flush: 20 mL    fentaNYL Infusion.: 34.5 mL    Nepro with Carb Steady: 300 mL    Solution: 100 mL    Solution: 600 mL  Total IN: 1186.4 mL    OUT:    Indwelling Catheter - Urethral: 265 mL  Total OUT: 265 mL    Total NET: 921.4 mL        Daily     Daily     PHYSICAL EXAM:  GENERAL: Well developed, Well nourished white female, sedated  HEENT:  Normocephalic/Atraumatic, reactive light reflex, moist mucous membranes  NECK: Supple, no JVD, LIJ line  RESP: Symmetric movement of the chest, diffuse bilateral basal rales, on Vent support   CVS: Tachycardic, S1 and S2 audible, no murmur, rubs or gallops  GI: Normal active bowel sounds present, abdomen soft, non tender, non distended  EXTREMITIES:  No edema, no clubbing, cyanosis, Lt femoral A-line, Rt Shiley femoral    MSK: 0/5 strength bilateral upper and lower extremities, Left hip scar/band, Blue hands/toes   PSYCH: Sedated  NEURO: sedated       LABS:    CBC:                          9.1    20.1  )-----------( 84       ( 07 Jan 2019 06:11 )             28.6           BMP:    01-07    136  |  99  |  127<H>  ----------------------------<  175<H>  4.8   |  18<L>  |  6.18<H>  01-06    138  |  98  |  100<H>  ----------------------------<  159<H>  4.8   |  19<L>  |  5.38<H>  01-05    136  |  97  |  110<H>  ----------------------------<  128<H>  4.5   |  20<L>  |  5.81<H>    Ca    8.5      07 Jan 2019 06:11  Ca    8.4      06 Jan 2019 06:52  Ca    8.8      05 Jan 2019 06:43  Phos  8.5     01-07  Phos  8.2     01-06  Phos  7.8     01-05  Mg     2.5     01-07  Mg     2.5     01-06  Mg     2.2     01-05    TPro  4.6<L>  /  Alb  1.7<L>  /  TBili  16.7<H>  /  DBili  x   /  AST  48<H>  /  ALT  72<H>  /  AlkPhos  136<H>  01-07  TPro  4.8<L>  /  Alb  1.8<L>  /  TBili  16.1<H>  /  DBili  x   /  AST  53<H>  /  ALT  101<H>  /  AlkPhos  146<H>  01-06  TPro  4.9<L>  /  Alb  2.0<L>  /  TBili  14.2<H>  /  DBili  11.1<H>  /  AST  72<H>  /  ALT  155<H>  /  AlkPhos  147<H>  01-05            URINE STUDIES:  none  RADIOLOGY & ADDITIONAL STUDIES:    < from: Xray Chest 1 View- PORTABLE-Routine (01.02.19 @ 07:50) >  EXAM:  XR CHEST PORTABLE ROUTINE 1V                            PROCEDURE DATE:  01/02/2019          INTERPRETATION:  CLINICAL STATEMENT: Follow-up chest pain.    TECHNIQUE: AP view of the chest.    COMPARISON: 1/1/2019    FINDINGS/  IMPRESSION:  ETtube again noted. Left central line again noted. No pneumothorax. New   feeding tube noted tip under the diaphragm overlying expected region of   stomach.    Increased interstitial lung markings without significant change. Moderate   size right pleural effusion with adjacent opacity without significant   change    Heart size cannot be accurately assessed in this projection.      < end of copied text >  < from: US Hepatic & Pancreatic (01.02.19 @ 11:15) >  EXAM:  US LIVER AND PANCREAS                            PROCEDURE DATE:  01/02/2019          INTERPRETATION:  CLINICAL STATEMENT: Rule out gallstone..  Abdominal pain.    TECHNIQUE: Ultrasound of the right upper quadrant.    COMPARISON: None.    FINDINGS:  Liver measures 16.9 cm with normal echogenicity.    There is no gallstone or gallbladder wall thickening. 5 mm gallbladder   polyp noted    The common bile duct is not dilated measuring 4 mm.    The pancreas is not well seen.    The right kidney measures 10.8 cm in length.  There is no hydronephrosis.    IMPRESSION:  5 mm gallbladder polyp. Follow-up recommended.    < end of copied text >

## 2019-01-07 NOTE — PROGRESS NOTE ADULT - PROBLEM SELECTOR PLAN 1
CXR: Bilateral basal pneumonia and diffuse congestion  Intubated and sedated  C/w Zyvox and meropenem  Blood Cx: NTD CXR: Bilateral basal pneumonia and diffuse congestion  Intubated and sedated  C/w Zyvox and meropenem for 10-14 days  Blood Cx: NTD

## 2019-01-07 NOTE — PROGRESS NOTE ADULT - PROBLEM SELECTOR PLAN 2
Multiorgan failure with Anuria and severe lactic acidosis   Rt Femoral Shiley  Dialysis 3 times a week  Poor urine output   Dr Lay on board

## 2019-01-07 NOTE — PROGRESS NOTE ADULT - ASSESSMENT
78 F pt with HTN, DM2, PMHx presents to ED c/o L hip pain s/p trip and fall. Patient admitted to medicine floor for left hip fracture. Underwent left IM nailing on 12/27/18. Patient brought to ICU for post op monitoring and multi-organ failure.

## 2019-01-07 NOTE — PROGRESS NOTE ADULT - SUBJECTIVE AND OBJECTIVE BOX
79yFemale    Diagnosis:  S/p Left Hip IM Nailing POD#11    Patient was seen and evaluated at bedside. Patient intubated and sedated.    Vital Signs Last 24 Hrs  T(C): 37.7 (07 Jan 2019 05:00), Max: 37.7 (07 Jan 2019 05:00)  T(F): 99.9 (07 Jan 2019 05:00), Max: 99.9 (07 Jan 2019 05:00)  HR: 80 (07 Jan 2019 06:00) (71 - 98)  BP: 119/47 (07 Jan 2019 06:00) (94/44 - 135/44)  BP(mean): 64 (07 Jan 2019 06:00) (51 - 68)  RR: 21 (07 Jan 2019 06:00) (7 - 27)  SpO2: 99% (07 Jan 2019 06:00) (97% - 100%)  I&O's Detail    06 Jan 2019 07:01  -  07 Jan 2019 07:00  --------------------------------------------------------  IN:    dexmedetomidine Infusion: 131.9 mL    Enteral Tube Flush: 20 mL    fentaNYL Infusion.: 34.5 mL    Nepro with Carb Steady: 300 mL    Solution: 100 mL    Solution: 600 mL  Total IN: 1186.4 mL    OUT:    Indwelling Catheter - Urethral: 265 mL  Total OUT: 265 mL    Total NET: 921.4 mL    Physical Exam:    General: NAD, resting comfortably in bed.    Left Hip:  Dressing with serosanguinous staining at distal aspect of dressing-->dressing changed. Skin is pink and warm. Staples intact. No erythema. SILT.  Wound with no drainage, healing well.   Lower extremity: calves are soft. All toes are cyanotic b/l. +pulses on doppler. Palpable DP pulses b/l.                        9.1    20.1  )-----------( 84       ( 07 Jan 2019 06:11 )             28.6     01-07    136  |  99  |  127<H>  ----------------------------<  175<H>  4.8   |  18<L>  |  6.18<H>    Ca    8.5      07 Jan 2019 06:11  Phos  8.5     01-07  Mg     2.5     01-07    TPro  4.6<L>  /  Alb  1.7<L>  /  TBili  16.7<H>  /  DBili  x   /  AST  48<H>  /  ALT  72<H>  /  AlkPhos  136<H>  01-07      Impression:  79yFemale S/p Left Hip IM Nailing POD#11  Plan:  -  Pain management  -  Dvt prophylaxis  -  Daily Physical Therapy  WBAT Left lower extremity  -  C/w care as per ICU  -  Dressing changed today  -  Case d/w DR. Bruce

## 2019-01-07 NOTE — PROGRESS NOTE ADULT - ASSESSMENT
77 y/o F pt with PMHX of  HTN, DM2, who presents to ED c/o L hip pain s/p trip and fall from a curb, SVT and severe lv dysfunction,post-op septic shock,MARIA G,LLL pneumonia,and shock liver.  1.ICU monitoring.  2.Vent  support as per ICU.  3.PSVT-dig .125mg qod.  4.DM-Insulin.  5.Ortho f/u.  6.Replace vit b12.  7.ID f/u -ABX.  8.MARIA G with metabolic acidosis-Renal f/u.  9.Shock liver-f/u lft's,jaundice,GI f/u noted..  10. HD as per renal.  11.GI prophylaxis.  12.Thrombocytopenia-Heme F/U.

## 2019-01-07 NOTE — PROGRESS NOTE ADULT - SUBJECTIVE AND OBJECTIVE BOX
INTERVAL HPI/OVERNIGHT EVENTS: ***    PRESSORS: [ ] YES [x] NO    ANTIBIOTICS: Zyvox                           DATE STARTED: 12/31/18  ANTIBIOTICS: Meropenem                 DATE STARTED: 12/31/18    Antimicrobial:  linezolid  IVPB 600 milliGRAM(s) IV Intermittent every 12 hours  meropenem  IVPB 500 milliGRAM(s) IV Intermittent every 12 hours    Cardiovascular:  digoxin  Injectable 0.125 milliGRAM(s) IV Push every other day    Hematalogic:  heparin  Injectable 5000 Unit(s) SubCutaneous every 12 hours    Other:  ascorbic acid 500 milliGRAM(s) Oral two times a day  calcium acetate 667 milliGRAM(s) Oral three times a day with meals  chlorhexidine 4% Liquid 1 Application(s) Topical two times a day  cyanocobalamin Injectable 1000 MICROGram(s) SubCutaneous daily  dexmedetomidine Infusion 0.5 MICROgram(s)/kG/Hr IV Continuous <Continuous>  docusate sodium Liquid 100 milliGRAM(s) Oral two times a day  epoetin monique Injectable 6000 Unit(s) IV Push <User Schedule>  folic acid 1 milliGRAM(s) Oral daily  HYDROmorphone  Injectable 2 milliGRAM(s) IV Push every 4 hours PRN  insulin glargine Injectable (LANTUS) 10 Unit(s) SubCutaneous at bedtime  insulin lispro (HumaLOG) corrective regimen sliding scale   SubCutaneous every 6 hours  ondansetron Injectable 4 milliGRAM(s) IV Push every 6 hours PRN  pantoprazole  Injectable 40 milliGRAM(s) IV Push daily    ascorbic acid 500 milliGRAM(s) Oral two times a day  calcium acetate 667 milliGRAM(s) Oral three times a day with meals  chlorhexidine 4% Liquid 1 Application(s) Topical two times a day  cyanocobalamin Injectable 1000 MICROGram(s) SubCutaneous daily  dexmedetomidine Infusion 0.5 MICROgram(s)/kG/Hr IV Continuous <Continuous>  digoxin  Injectable 0.125 milliGRAM(s) IV Push every other day  docusate sodium Liquid 100 milliGRAM(s) Oral two times a day  epoetin monique Injectable 6000 Unit(s) IV Push <User Schedule>  folic acid 1 milliGRAM(s) Oral daily  heparin  Injectable 5000 Unit(s) SubCutaneous every 12 hours  HYDROmorphone  Injectable 2 milliGRAM(s) IV Push every 4 hours PRN  insulin glargine Injectable (LANTUS) 10 Unit(s) SubCutaneous at bedtime  insulin lispro (HumaLOG) corrective regimen sliding scale   SubCutaneous every 6 hours  linezolid  IVPB 600 milliGRAM(s) IV Intermittent every 12 hours  meropenem  IVPB 500 milliGRAM(s) IV Intermittent every 12 hours  ondansetron Injectable 4 milliGRAM(s) IV Push every 6 hours PRN  pantoprazole  Injectable 40 milliGRAM(s) IV Push daily    Drug Dosing Weight  Height (cm): 154.94 (28 Dec 2018 01:00)  Weight (kg): 82.1 (28 Dec 2018 01:00)  BMI (kg/m2): 34.2 (28 Dec 2018 01:00)  BSA (m2): 1.81 (28 Dec 2018 01:00)    CENTRAL LINE: [x] YES [ ] NO  LOCATION:   DATE INSERTED:    DE SOUZA: [x] YES [ ] NO    DATE INSERTED:    A-LINE:  [ ] YES [x] NO  LOCATION:   DATE INSERTED:    ICU Vital Signs Last 24 Hrs  T(C): 37.1 (06 Jan 2019 23:00), Max: 37.1 (06 Jan 2019 23:00)  T(F): 98.7 (06 Jan 2019 23:00), Max: 98.7 (06 Jan 2019 23:00)  HR: 82 (07 Jan 2019 05:10) (71 - 98)  BP: 118/47 (07 Jan 2019 04:00) (94/44 - 135/44)  BP(mean): 64 (07 Jan 2019 04:00) (51 - 68)  ABP: --  ABP(mean): --  RR: 24 (07 Jan 2019 04:00) (7 - 27)  SpO2: 99% (07 Jan 2019 05:10) (97% - 100%)      ABG - ( 07 Jan 2019 04:12 )  pH, Arterial: 7.42  pH, Blood: x     /  pCO2: 28    /  pO2: 78    / HCO3: 18    / Base Excess: -5.6  /  SaO2: 95                    01-05 @ 07:01  -  01-06 @ 07:00  --------------------------------------------------------  IN: 978.6 mL / OUT: 170 mL / NET: 808.6 mL        Mode: AC/ CMV (Assist Control/ Continuous Mandatory Ventilation)  RR (machine): 16  TV (machine): 450  FiO2: 50  PEEP: 5  ITime: 1  MAP: 11  PIP: 23      PHYSICAL EXAM:    GENERAL: Well developed, Well nourished white female, sedated  HEENT:  Normocephalic/Atraumatic, reactive light reflex, moist mucous membranes, yellow eyes  NECK: Supple, no JVD, LIJ line  RESP: Symmetric movement of the chest, clear to auscultation bilaterally, diffuse bilateral basal rales   CVS: Irregular, S1 and S2 audible, no murmur  GI: Hypoactive bowel sounds present, abdomen soft, non tender, non distended  EXTREMITIES:  No edema, no clubbing, cyanosis, Rt Shiley femoral    MSK: 0/5 strength bilateral upper and lower extremities, Left hip scar/band, Blue hands/toes   PSYCH: Sedated  NEURO: Alert and oriented x 0      LABS:  CBC Full  -  ( 06 Jan 2019 06:52 )  WBC Count : 26.8 K/uL  Hemoglobin : 9.9 g/dL  Hematocrit : 31.3 %  Platelet Count - Automated : 100 K/uL  Mean Cell Volume : 66.1 fl  Mean Cell Hemoglobin : 21.0 pg  Mean Cell Hemoglobin Concentration : 31.7 gm/dL  Auto Neutrophil # : 22.7 K/uL  Auto Lymphocyte # : 1.6 K/uL  Auto Monocyte # : 2.3 K/uL  Auto Eosinophil # : 0.1 K/uL  Auto Basophil # : 0.1 K/uL  Auto Neutrophil % : 84.6 %  Auto Lymphocyte % : 6.1 %  Auto Monocyte % : 8.5 %  Auto Eosinophil % : 0.3 %  Auto Basophil % : 0.5 %    01-06    138  |  98  |  100<H>  ----------------------------<  159<H>  4.8   |  19<L>  |  5.38<H>    Ca    8.4      06 Jan 2019 06:52  Phos  8.2     01-06  Mg     2.5     01-06    TPro  4.8<L>  /  Alb  1.8<L>  /  TBili  16.1<H>  /  DBili  x   /  AST  53<H>  /  ALT  101<H>  /  AlkPhos  146<H>  01-06    PT/INR - ( 06 Jan 2019 06:52 )   PT: 14.7 sec;   INR: 1.31 ratio         PTT - ( 06 Jan 2019 06:52 )  PTT:30.7 sec      [ ]GOALS OF CARE DISCUSSION WITH PATIENT/FAMILY/PROXY: DNR only    CRITICAL CARE TIME SPENT: 35 minutes INTERVAL HPI/OVERNIGHT EVENTS: No acute events.    PRESSORS: [ ] YES [x] NO    ANTIBIOTICS: Zyvox                           DATE STARTED: 12/31/18  ANTIBIOTICS: Meropenem                 DATE STARTED: 12/31/18    Antimicrobial:  linezolid  IVPB 600 milliGRAM(s) IV Intermittent every 12 hours  meropenem  IVPB 500 milliGRAM(s) IV Intermittent every 12 hours    Cardiovascular:  digoxin  Injectable 0.125 milliGRAM(s) IV Push every other day    Hematalogic:  heparin  Injectable 5000 Unit(s) SubCutaneous every 12 hours    Other:  ascorbic acid 500 milliGRAM(s) Oral two times a day  calcium acetate 667 milliGRAM(s) Oral three times a day with meals  chlorhexidine 4% Liquid 1 Application(s) Topical two times a day  cyanocobalamin Injectable 1000 MICROGram(s) SubCutaneous daily  dexmedetomidine Infusion 0.5 MICROgram(s)/kG/Hr IV Continuous <Continuous>  docusate sodium Liquid 100 milliGRAM(s) Oral two times a day  epoetin monique Injectable 6000 Unit(s) IV Push <User Schedule>  folic acid 1 milliGRAM(s) Oral daily  HYDROmorphone  Injectable 2 milliGRAM(s) IV Push every 4 hours PRN  insulin glargine Injectable (LANTUS) 10 Unit(s) SubCutaneous at bedtime  insulin lispro (HumaLOG) corrective regimen sliding scale   SubCutaneous every 6 hours  ondansetron Injectable 4 milliGRAM(s) IV Push every 6 hours PRN  pantoprazole  Injectable 40 milliGRAM(s) IV Push daily    ascorbic acid 500 milliGRAM(s) Oral two times a day  calcium acetate 667 milliGRAM(s) Oral three times a day with meals  chlorhexidine 4% Liquid 1 Application(s) Topical two times a day  cyanocobalamin Injectable 1000 MICROGram(s) SubCutaneous daily  dexmedetomidine Infusion 0.5 MICROgram(s)/kG/Hr IV Continuous <Continuous>  digoxin  Injectable 0.125 milliGRAM(s) IV Push every other day  docusate sodium Liquid 100 milliGRAM(s) Oral two times a day  epoetin monique Injectable 6000 Unit(s) IV Push <User Schedule>  folic acid 1 milliGRAM(s) Oral daily  heparin  Injectable 5000 Unit(s) SubCutaneous every 12 hours  HYDROmorphone  Injectable 2 milliGRAM(s) IV Push every 4 hours PRN  insulin glargine Injectable (LANTUS) 10 Unit(s) SubCutaneous at bedtime  insulin lispro (HumaLOG) corrective regimen sliding scale   SubCutaneous every 6 hours  linezolid  IVPB 600 milliGRAM(s) IV Intermittent every 12 hours  meropenem  IVPB 500 milliGRAM(s) IV Intermittent every 12 hours  ondansetron Injectable 4 milliGRAM(s) IV Push every 6 hours PRN  pantoprazole  Injectable 40 milliGRAM(s) IV Push daily    Drug Dosing Weight  Height (cm): 154.94 (28 Dec 2018 01:00)  Weight (kg): 82.1 (28 Dec 2018 01:00)  BMI (kg/m2): 34.2 (28 Dec 2018 01:00)  BSA (m2): 1.81 (28 Dec 2018 01:00)    CENTRAL LINE: [x] YES [ ] NO  LOCATION:   DATE INSERTED:    DE SOUZA: [x] YES [ ] NO    DATE INSERTED:    A-LINE:  [ ] YES [x] NO  LOCATION:   DATE INSERTED:    ICU Vital Signs Last 24 Hrs  T(C): 37.1 (06 Jan 2019 23:00), Max: 37.1 (06 Jan 2019 23:00)  T(F): 98.7 (06 Jan 2019 23:00), Max: 98.7 (06 Jan 2019 23:00)  HR: 82 (07 Jan 2019 05:10) (71 - 98)  BP: 118/47 (07 Jan 2019 04:00) (94/44 - 135/44)  BP(mean): 64 (07 Jan 2019 04:00) (51 - 68)  ABP: --  ABP(mean): --  RR: 24 (07 Jan 2019 04:00) (7 - 27)  SpO2: 99% (07 Jan 2019 05:10) (97% - 100%)      ABG - ( 07 Jan 2019 04:12 )  pH, Arterial: 7.42  pH, Blood: x     /  pCO2: 28    /  pO2: 78    / HCO3: 18    / Base Excess: -5.6  /  SaO2: 95                    01-05 @ 07:01  -  01-06 @ 07:00  --------------------------------------------------------  IN: 978.6 mL / OUT: 170 mL / NET: 808.6 mL        Mode: AC/ CMV (Assist Control/ Continuous Mandatory Ventilation)  RR (machine): 16  TV (machine): 450  FiO2: 50  PEEP: 5  ITime: 1  MAP: 11  PIP: 23      PHYSICAL EXAM:    GENERAL: Well developed, Well nourished white female, sedated  HEENT:  Normocephalic/Atraumatic, reactive light reflex, moist mucous membranes, yellow eyes  NECK: Supple, no JVD, LIJ line  RESP: Symmetric movement of the chest, clear to auscultation bilaterally, diffuse bilateral basal rales   CVS: Irregular, S1 and S2 audible, no murmur  GI: Hypoactive bowel sounds present, abdomen soft, non tender, non distended  EXTREMITIES:  No edema, no clubbing, cyanosis, Rt Shiley femoral    MSK: 0/5 strength bilateral upper and lower extremities, Left hip scar/band, Blue hands/toes   PSYCH: Sedated  NEURO: Alert and oriented x 0      LABS:  CBC Full  -  ( 06 Jan 2019 06:52 )  WBC Count : 26.8 K/uL  Hemoglobin : 9.9 g/dL  Hematocrit : 31.3 %  Platelet Count - Automated : 100 K/uL  Mean Cell Volume : 66.1 fl  Mean Cell Hemoglobin : 21.0 pg  Mean Cell Hemoglobin Concentration : 31.7 gm/dL  Auto Neutrophil # : 22.7 K/uL  Auto Lymphocyte # : 1.6 K/uL  Auto Monocyte # : 2.3 K/uL  Auto Eosinophil # : 0.1 K/uL  Auto Basophil # : 0.1 K/uL  Auto Neutrophil % : 84.6 %  Auto Lymphocyte % : 6.1 %  Auto Monocyte % : 8.5 %  Auto Eosinophil % : 0.3 %  Auto Basophil % : 0.5 %    01-06    138  |  98  |  100<H>  ----------------------------<  159<H>  4.8   |  19<L>  |  5.38<H>    Ca    8.4      06 Jan 2019 06:52  Phos  8.2     01-06  Mg     2.5     01-06    TPro  4.8<L>  /  Alb  1.8<L>  /  TBili  16.1<H>  /  DBili  x   /  AST  53<H>  /  ALT  101<H>  /  AlkPhos  146<H>  01-06    PT/INR - ( 06 Jan 2019 06:52 )   PT: 14.7 sec;   INR: 1.31 ratio         PTT - ( 06 Jan 2019 06:52 )  PTT:30.7 sec      [ ]GOALS OF CARE DISCUSSION WITH PATIENT/FAMILY/PROXY: DNR only    CRITICAL CARE TIME SPENT: 35 minutes INTERVAL HPI/OVERNIGHT EVENTS: No acute events.    PRESSORS: [ ] YES [x] NO    ANTIBIOTICS: Zyvox                           DATE STARTED: 12/31/18  ANTIBIOTICS: Meropenem                 DATE STARTED: 12/31/18    Antimicrobial:  linezolid  IVPB 600 milliGRAM(s) IV Intermittent every 12 hours  meropenem  IVPB 500 milliGRAM(s) IV Intermittent every 12 hours    Cardiovascular:  digoxin  Injectable 0.125 milliGRAM(s) IV Push every other day    Hematalogic:  heparin  Injectable 5000 Unit(s) SubCutaneous every 12 hours    Other:  ascorbic acid 500 milliGRAM(s) Oral two times a day  calcium acetate 667 milliGRAM(s) Oral three times a day with meals  chlorhexidine 4% Liquid 1 Application(s) Topical two times a day  cyanocobalamin Injectable 1000 MICROGram(s) SubCutaneous daily  dexmedetomidine Infusion 0.5 MICROgram(s)/kG/Hr IV Continuous <Continuous>  docusate sodium Liquid 100 milliGRAM(s) Oral two times a day  epoetin monique Injectable 6000 Unit(s) IV Push <User Schedule>  folic acid 1 milliGRAM(s) Oral daily  HYDROmorphone  Injectable 2 milliGRAM(s) IV Push every 4 hours PRN  insulin glargine Injectable (LANTUS) 10 Unit(s) SubCutaneous at bedtime  insulin lispro (HumaLOG) corrective regimen sliding scale   SubCutaneous every 6 hours  ondansetron Injectable 4 milliGRAM(s) IV Push every 6 hours PRN  pantoprazole  Injectable 40 milliGRAM(s) IV Push daily    ascorbic acid 500 milliGRAM(s) Oral two times a day  calcium acetate 667 milliGRAM(s) Oral three times a day with meals  chlorhexidine 4% Liquid 1 Application(s) Topical two times a day  cyanocobalamin Injectable 1000 MICROGram(s) SubCutaneous daily  dexmedetomidine Infusion 0.5 MICROgram(s)/kG/Hr IV Continuous <Continuous>  digoxin  Injectable 0.125 milliGRAM(s) IV Push every other day  docusate sodium Liquid 100 milliGRAM(s) Oral two times a day  epoetin monique Injectable 6000 Unit(s) IV Push <User Schedule>  folic acid 1 milliGRAM(s) Oral daily  heparin  Injectable 5000 Unit(s) SubCutaneous every 12 hours  HYDROmorphone  Injectable 2 milliGRAM(s) IV Push every 4 hours PRN  insulin glargine Injectable (LANTUS) 10 Unit(s) SubCutaneous at bedtime  insulin lispro (HumaLOG) corrective regimen sliding scale   SubCutaneous every 6 hours  linezolid  IVPB 600 milliGRAM(s) IV Intermittent every 12 hours  meropenem  IVPB 500 milliGRAM(s) IV Intermittent every 12 hours  ondansetron Injectable 4 milliGRAM(s) IV Push every 6 hours PRN  pantoprazole  Injectable 40 milliGRAM(s) IV Push daily    Drug Dosing Weight  Height (cm): 154.94 (28 Dec 2018 01:00)  Weight (kg): 82.1 (28 Dec 2018 01:00)  BMI (kg/m2): 34.2 (28 Dec 2018 01:00)  BSA (m2): 1.81 (28 Dec 2018 01:00)    CENTRAL LINE: [x] YES [ ] NO  LOCATION:   DATE INSERTED:    DE SOUZA: [x] YES [ ] NO    DATE INSERTED:    A-LINE:  [ ] YES [x] NO  LOCATION:   DATE INSERTED:    ICU Vital Signs Last 24 Hrs  T(C): 37.1 (06 Jan 2019 23:00), Max: 37.1 (06 Jan 2019 23:00)  T(F): 98.7 (06 Jan 2019 23:00), Max: 98.7 (06 Jan 2019 23:00)  HR: 82 (07 Jan 2019 05:10) (71 - 98)  BP: 118/47 (07 Jan 2019 04:00) (94/44 - 135/44)  BP(mean): 64 (07 Jan 2019 04:00) (51 - 68)  ABP: --  ABP(mean): --  RR: 24 (07 Jan 2019 04:00) (7 - 27)  SpO2: 99% (07 Jan 2019 05:10) (97% - 100%)      ABG - ( 07 Jan 2019 04:12 )  pH, Arterial: 7.42  pH, Blood: x     /  pCO2: 28    /  pO2: 78    / HCO3: 18    / Base Excess: -5.6  /  SaO2: 95                    01-05 @ 07:01  -  01-06 @ 07:00  --------------------------------------------------------  IN: 978.6 mL / OUT: 170 mL / NET: 808.6 mL        Mode: AC/ CMV (Assist Control/ Continuous Mandatory Ventilation)  RR (machine): 16  TV (machine): 450  FiO2: 50  PEEP: 5  ITime: 1  MAP: 11  PIP: 23      PHYSICAL EXAM:    GENERAL: Well developed, Well nourished white female, sedated  HEENT:  Normocephalic/Atraumatic, reactive light reflex, moist mucous membranes, Scleral icterus   NECK: Supple, no JVD, LIJ line  RESP: Symmetric movement of the chest, clear to auscultation bilaterally, diffuse bilateral basal rales   CVS: Irregular, S1 and S2 audible, no murmur  GI: Hypoactive bowel sounds present, abdomen soft, non tender, non distended  EXTREMITIES:  No edema, no clubbing, cyanosis, Rt Shiley femoral    MSK: 0/5 strength bilateral upper and lower extremities, Left hip scar/band, Blue hands/toes   PSYCH: Sedated  NEURO: Alert and oriented x 0      LABS:  CBC Full  -  ( 06 Jan 2019 06:52 )  WBC Count : 26.8 K/uL  Hemoglobin : 9.9 g/dL  Hematocrit : 31.3 %  Platelet Count - Automated : 100 K/uL  Mean Cell Volume : 66.1 fl  Mean Cell Hemoglobin : 21.0 pg  Mean Cell Hemoglobin Concentration : 31.7 gm/dL  Auto Neutrophil # : 22.7 K/uL  Auto Lymphocyte # : 1.6 K/uL  Auto Monocyte # : 2.3 K/uL  Auto Eosinophil # : 0.1 K/uL  Auto Basophil # : 0.1 K/uL  Auto Neutrophil % : 84.6 %  Auto Lymphocyte % : 6.1 %  Auto Monocyte % : 8.5 %  Auto Eosinophil % : 0.3 %  Auto Basophil % : 0.5 %    01-06    138  |  98  |  100<H>  ----------------------------<  159<H>  4.8   |  19<L>  |  5.38<H>    Ca    8.4      06 Jan 2019 06:52  Phos  8.2     01-06  Mg     2.5     01-06    TPro  4.8<L>  /  Alb  1.8<L>  /  TBili  16.1<H>  /  DBili  x   /  AST  53<H>  /  ALT  101<H>  /  AlkPhos  146<H>  01-06    PT/INR - ( 06 Jan 2019 06:52 )   PT: 14.7 sec;   INR: 1.31 ratio         PTT - ( 06 Jan 2019 06:52 )  PTT:30.7 sec      [ ]GOALS OF CARE DISCUSSION WITH PATIENT/FAMILY/PROXY: DNR only    CRITICAL CARE TIME SPENT: 35 minutes

## 2019-01-08 NOTE — PROGRESS NOTE ADULT - SUBJECTIVE AND OBJECTIVE BOX
Subjective: encephalopathic, intubated off sedation .no fevers . toes and fingers getting progressively black , not on pressors . skin markedly icteric       PHYSICAL EXAM:    Vital Signs Last 24 Hrs  T(C): 36.9 (08 Jan 2019 10:00), Max: 36.9 (08 Jan 2019 10:00)  T(F): 98.5 (08 Jan 2019 10:00), Max: 98.5 (08 Jan 2019 10:00)  HR: 92 (08 Jan 2019 14:36) (59 - 99)  BP: 130/92 (08 Jan 2019 14:00) (82/32 - 133/46)  BP(mean): 103 (08 Jan 2019 14:00) (43 - 103)  RR: 21 (08 Jan 2019 14:00) (20 - 31)  SpO2: 98% (08 Jan 2019 14:36) (96% - 100%)    Constitutional: on vent nad , poorly responsive   LUNGS mech br sounds  CVS s1 s2 aud no murmurs   ABDOMEN soft non tender obese   NEUROLOGY  flaccid   SKINdark discolrations of toes and fingers /markedly icteric   EXTREMITIES no edema no cyanosis /left hip wound clean /right groin dialysis cath /left neck with central line   weber with scant marisa urine           LABS/DIAGNOSTIC TESTS                        8.4    20.4  )-----------( 75       ( 08 Jan 2019 06:04 )             26.5     01-08    136  |  99  |  148<H>  ----------------------------<  220<H>  5.1   |  18<L>  |  6.27<H>    Ca    8.2<L>      08 Jan 2019 06:04  Phos  8.3     01-08  Mg     2.5     01-08    TPro  4.4<L>  /  Alb  1.6<L>  /  TBili  16.8<H>  /  DBili  x   /  AST  57<H>  /  ALT  61<H>  /  AlkPhos  133<H>  01-08            ascorbic acid 500 milliGRAM(s) Oral two times a day  calcium acetate 667 milliGRAM(s) Oral three times a day with meals  chlorhexidine 4% Liquid 1 Application(s) Topical two times a day  cyanocobalamin Injectable 1000 MICROGram(s) SubCutaneous daily  digoxin  Injectable 0.125 milliGRAM(s) IV Push every other day  epoetin monique Injectable 6000 Unit(s) IV Push <User Schedule>  folic acid 1 milliGRAM(s) Oral daily  heparin  Injectable 5000 Unit(s) SubCutaneous every 12 hours  HYDROmorphone  Injectable 0.5 milliGRAM(s) IV Push every 4 hours PRN  insulin glargine Injectable (LANTUS) 10 Unit(s) SubCutaneous at bedtime  insulin lispro (HumaLOG) corrective regimen sliding scale   SubCutaneous every 6 hours  linezolid  IVPB 600 milliGRAM(s) IV Intermittent every 12 hours  meropenem  IVPB 500 milliGRAM(s) IV Intermittent every 12 hours  ondansetron Injectable 4 milliGRAM(s) IV Push every 6 hours PRN  pantoprazole  Injectable 40 milliGRAM(s) IV Push daily        CULTURES  no new cx         RADIOLOGY  < from: Xray Chest 1 View- PORTABLE-Routine (01.08.19 @ 14:55) >  EXAM:  XR CHEST PORTABLE ROUTINE 1V                            PROCEDURE DATE:  01/08/2019          INTERPRETATION:  CHEST X-RAY:    CLINICAL HISTORY: 79 years  Female with pneumonia.    COMPARISON: 1/7/2019    AP view of the chest demonstrates a developing right lower lobe   infiltrate with a small right pleural effusion. Left basilar discoid   atelectasis is unchanged. There is no left pleural effusion.    The heart, mediastinum and jenny cannot be assessed due to rotation. The   pulmonary vasculature is normal. There is no pneumothorax.    The tip of the NG tube is below the diaphragm. A left IJ catheter is   reidentified. Position cannot be assessed due to rotation.    Mild thoracic degenerative changes are present.    IMPRESSION:    Developing right lower lobe pneumonia with small right pleural effusion.    Left central line and NG tube in situ.                MELVINA MARTEL M.D., ATTENDING RADIOLOGIST  This document has been electronically signed. Jan 8 2019  3:07PM    < end of copied text >

## 2019-01-08 NOTE — PROGRESS NOTE ADULT - SUBJECTIVE AND OBJECTIVE BOX
MARICEL SLAUGHTER  MR# [id number]  79yFemale        Patient is a 79y old  Female who presents with a chief complaint of fall (08 Jan 2019 15:11)      INTERVAL HPI/OVERNIGHT EVENTS:  Patient seen and examined at bedside. No notations of chest pain, palpitation, SOB, orthopnea, nausea, vomiting or abdominal pain.    ALLERGIES  No Known Allergies      MEDICATIONS  aluminum hydroxide Suspension 30 milliLiter(s) Oral three times a day  ascorbic acid 500 milliGRAM(s) Oral two times a day  calcium acetate 667 milliGRAM(s) Oral three times a day with meals  chlorhexidine 4% Liquid 1 Application(s) Topical two times a day  cyanocobalamin Injectable 1000 MICROGram(s) SubCutaneous daily  digoxin  Injectable 0.125 milliGRAM(s) IV Push every other day  epoetin monique Injectable 6000 Unit(s) IV Push <User Schedule>  folic acid 1 milliGRAM(s) Oral daily  heparin  Injectable 5000 Unit(s) SubCutaneous every 12 hours  HYDROmorphone  Injectable 0.5 milliGRAM(s) IV Push every 4 hours PRN Severe Pain (7 - 10)  insulin glargine Injectable (LANTUS) 10 Unit(s) SubCutaneous at bedtime  insulin lispro (HumaLOG) corrective regimen sliding scale   SubCutaneous every 6 hours  linezolid  IVPB 600 milliGRAM(s) IV Intermittent every 12 hours  meropenem  IVPB 500 milliGRAM(s) IV Intermittent every 12 hours  ondansetron Injectable 4 milliGRAM(s) IV Push every 6 hours PRN Nausea and/or Vomiting  pantoprazole  Injectable 40 milliGRAM(s) IV Push daily              REVIEW OF SYSTEMS:  CONSTITUTIONAL: No fever, weight loss, or fatigue  EYES: No eye pain, visual disturbances, or discharge  ENT:  No difficulty hearing, tinnitus, vertigo; No sinus or throat pain  NECK: No pain or stiffness  RESPIRATORY: No cough, wheezing, chills or hemoptysis; No Shortness of Breath  CARDIOVASCULAR: No chest pain, palpitations, passing out, dizziness, or leg swelling  GASTROINTESTINAL: No abdominal or epigastric pain. No nausea, vomiting, or hematemesis; No diarrhea or constipation. No melena or hematochezia.  GENITOURINARY: No dysuria, frequency, hematuria, or incontinence  NEUROLOGICAL: No headaches, memory loss, loss of strength, numbness, or tremors  SKIN: No itching, burning, rashes, or lesions   LYMPH Nodes: No enlarged glands  ENDOCRINE: No heat or cold intolerance; No hair loss  MUSCULOSKELETAL: No joint pain or swelling; No muscle, back, or extremity pain  PSYCHIATRIC: No depression, anxiety, mood swings, or difficulty sleeping  HEME/LYMPH: No easy bruising, or bleeding gums  ALLERGY AND IMMUNOLOGIC: No hives or eczema	    [ ] All others negative	  [ ] Unable to obtain      T(C): 37.2 (01-08-19 @ 14:45), Max: 37.2 (01-08-19 @ 14:45)  T(F): 98.9 (01-08-19 @ 14:45), Max: 98.9 (01-08-19 @ 14:45)  HR: 104 (01-08-19 @ 18:00) (59 - 104)  BP: 112/48 (01-08-19 @ 14:45) (82/32 - 133/46)  RR: 28 (01-08-19 @ 14:45) (20 - 31)  SpO2: 100% (01-08-19 @ 18:00) (96% - 100%)  Wt(kg): --    I&O's Summary    07 Jan 2019 07:01  -  08 Jan 2019 07:00  --------------------------------------------------------  IN: 1230 mL / OUT: 415 mL / NET: 815 mL    08 Jan 2019 07:01  -  08 Jan 2019 18:24  --------------------------------------------------------  IN: 140 mL / OUT: 190 mL / NET: -50 mL          PHYSICAL EXAM:  A X O x  HEAD:  Atraumatic, Normocephalic  EYES: EOMI, PERRLA, conjunctiva and sclera clear  NECK: Supple, No JVD, Normal thyroid  Resp: CTAB, No crackles, wheezing,   CVS: Regular rate and rhythm; No discernable murmurs, rubs, or gallops  ABD: Soft, Nontender, Nondistended; Bowel sounds present  EXTREMITIES:  2+ Peripheral Pulses, No edema  LYMPH: No dicernable lymphadenopathy noted  GENERAL: NAD, well-groomed, well-developed      LABS:                        8.4    20.4  )-----------( 75       ( 08 Jan 2019 06:04 )             26.5     01-08    136  |  99  |  148<H>  ----------------------------<  220<H>  5.1   |  18<L>  |  6.27<H>    Ca    8.2<L>      08 Jan 2019 06:04  Phos  8.3     01-08  Mg     2.5     01-08    TPro  4.4<L>  /  Alb  1.6<L>  /  TBili  16.8<H>  /  DBili  x   /  AST  57<H>  /  ALT  61<H>  /  AlkPhos  133<H>  01-08        CAPILLARY BLOOD GLUCOSE      POCT Blood Glucose.: 138 mg/dL (08 Jan 2019 17:38)  POCT Blood Glucose.: 200 mg/dL (08 Jan 2019 11:54)  POCT Blood Glucose.: 203 mg/dL (08 Jan 2019 05:53)  POCT Blood Glucose.: 93 mg/dL (08 Jan 2019 00:02)      Troponins:  ProBNP:  Lipid Profile:   HgA1c:  TSH:     ABG - ( 08 Jan 2019 03:56 )  pH, Arterial: 7.40  pH, Blood: x     /  pCO2: 28    /  pO2: 88    / HCO3: 17    / Base Excess: -7.0  /  SaO2: 96                    RADIOLOGY & ADDITIONAL TESTS:    Imaging Personally Reviewed:  [ ] YES  [ ] NO      Consultant(s) Notes Reviewed:  [x ] YES  [ ] NO    Care Discussed with Consultants/Other Providers [ x] YES  [ ] NO          PAST MEDICAL & SURGICAL HISTORY:  No pertinent past medical history  No significant past surgical history        Closed fracture of neck of left femur  Yes  No pertinent family history in first degree relatives  Handoff  MEWS Score  No pertinent past medical history  Closed fracture of left hip, initial encounter  Peripheral arterial disease  Drop in hemoglobin  Acute liver failure  Acute renal failure  Septic shock  Acute post-operative pain  Hypotension  Fever  Acute systolic congestive heart failure  Cardiogenic shock  Prophylactic measure  Diabetes mellitus  HTN (hypertension)  Heart failure with reduced ejection fraction  PSVT (paroxysmal supraventricular tachycardia)  Left ventricular dysfunction  Need for prophylactic measure  Closed fracture of left hip, initial encounter  No significant past surgical history  A S/P FALL

## 2019-01-08 NOTE — PROGRESS NOTE ADULT - ASSESSMENT
79 y/o F pt with PMHX of  HTN, DM2, who presents to ED c/o L hip pain s/p trip and fall from a curb, SVT and severe lv dysfunction,post-op septic shock,MARIA G,LLL pneumonia,and shock liver.  1.ICU monitoring.  2.Vent  support as per ICU.  3.PSVT-dig .125mg qod.  4.DM-Insulin.  5.Ortho f/u.  6.Replace vit b12.  7.ID f/u -ABX.  8.MARIA G with metabolic acidosis-Renal f/u.  9.Shock liver-f/u lft's,jaundice,GI f/u noted..  10. HD as per renal.  11.GI prophylaxis.  12.Thrombocytopenia-Heme F/U.

## 2019-01-08 NOTE — PROGRESS NOTE ADULT - ASSESSMENT
77 y/o F pt with HTN, DM2, PMHx presents to ED c/o L hip pain s/p trip and fall from a curb today. Pt describes pain as sharp, mild and constant, 10/10 in severity. Pain of the left hip increases with ROM. Pt unable to ambulate nor bear weight. Patient denies head strike, LOC, N/V, vision changes, neurological deficits chest pain, palpitations, shortness of breath, or any other complaints. XR hip showed nondisplaced intertrochanteric fracture left femoral neck and marked osteoporosis. XR hip showed nondisplaced intertrochanteric fracture left femoral neck and marked osteoporosis. (26 Dec 2018 19:35)s/p nailing 12/27 with post op hypotension , septic vs cardiogenic shock, also found to have shock liver . on pressors and sedation. on vanco and zosyn. worsening kidney function with worsening ph and metabolic acidosis    # s/p fall with subsequent left hip fx s/p im nailing 12/27 with post op resp failure and MOSF including shock liver /lft decreasing     # resp failure sec to asp pna     # S/P septic/cardiogenic shock with MOSF . echo with very low LVEF     # leucocytosis reactive sec to gangrene of toes and fingers vs sec to hcap  . no diarrhea to suggest c.diff     plan  cont zyvox and merrem for 3 days more   dialysis as per renal   rpt echo r/o veg   extubation as per micu   renal f/u     poor prognosis   pt is dnr   thnx will f/u   d/w family and intern

## 2019-01-08 NOTE — PROGRESS NOTE ADULT - ASSESSMENT
Assessment and Plan:    1.MARIA G due to ATN from hypotensive and septic  shock with multiorgan failure. There is no indication for emergent HD today  -pts renal status is poor with minimal u/o  -Adjust meds to eGFR and avoid IV Gadolinium contrast, NSAIDs, and phosphate enema.  -Monitor I/O's daily.   -Monitor SMA daily.  -Plan for HD today    2. Acidosis ;secondary to shock/renal failure  -f/u co2 daily  -keep PH >7.3    3. Hyperkalemia: secondary to maria g, now improved   -keep k >4 and <5  -f/u k level daily  - low k diet    4. Multiorgan failure: plan as per MICU. pt has poor prognosis.    5. Anemia:multifactorial  -c/w epogen 6000units in hd tiw  -f/u Hb daily    6.Hyperphosphatemia:mild, secondary to maria g  -f/u phos level daily  -c/w  phoslo 667mg tid   -continue hd Assessment and Plan:    1.MARIA G due to ATN from hypotensive and septic  shock with multiorgan failure. There is no indication for emergent HD today  -pts renal status is poor with minimal u/o  -Adjust meds to eGFR and avoid IV Gadolinium contrast, NSAIDs, and phosphate enema.  -Monitor I/O's daily.   -Monitor SMA daily.  -Plan for HD today    2. Acidosis ;secondary to shock/renal failure  -f/u co2 daily  -keep PH >7.3    3. Hyperkalemia: secondary to maria g, now improved   -keep k >4 and <5  -f/u k level daily  - low k diet    4. Multiorgan failure: plan as per MICU. pt has poor prognosis.    5. Anemia:multifactorial  -c/w epogen 6000units in hd tiw  -f/u Hb daily    6.Hyperphosphatemia:mild, secondary to maria g  -f/u phos level daily  -c/w  phoslo 667mg tid   -add amphogel 30mg tid   -continue hd Assessment and Plan:    1.MARIAG  due to ATN from hypotensive and septic  shock with multiorgan failure. There is no indication for emergent HD today  -pts renal status is poor with minimal u/o and egfr is unchanged -no recovery yet ,u/o mildly better last 24 hrs ?  -Adjust meds to eGFR and avoid IV Gadolinium contrast, NSAIDs, and phosphate enema.  -Monitor I/O's daily.   -Monitor SMA daily.  -Plan for HD today.Hd orders written and discussed with dialysis RN    2. Acidosis ;secondary to shock/renal failure  -f/u co2 daily  -keep PH >7.3    3. Hyperkalemia: secondary to maria g, now improved   -keep k >4 and <5  -f/u k level daily  - low k diet    4. Multiorgan failure: plan as per MICU. pt has poor prognosis.    5. Anemia:multifactorial  -c/w epogen 6000units in hd tiw  -f/u Hb daily    6.Hyperphosphatemia: worsening , secondary to maria g  -f/u phos level daily  -c/w  phoslo 667mg tid   -add amphogel 30mg tid   -continue hd

## 2019-01-08 NOTE — PROGRESS NOTE ADULT - SUBJECTIVE AND OBJECTIVE BOX
INTERVAL HPI/OVERNIGHT EVENTS: No acute events. Worsening of blue toes and hands.    PRESSORS: [ ] YES [x] NO    ANTIBIOTICS: Zyvox                           DATE STARTED: 12/31/18  ANTIBIOTICS: Meropenem                 DATE STARTED: 12/31/18    Antimicrobial:  linezolid  IVPB 600 milliGRAM(s) IV Intermittent every 12 hours  meropenem  IVPB 500 milliGRAM(s) IV Intermittent every 12 hours    Cardiovascular:  digoxin  Injectable 0.125 milliGRAM(s) IV Push every other day    Hematalogic:  heparin  Injectable 5000 Unit(s) SubCutaneous every 12 hours    Other:  ascorbic acid 500 milliGRAM(s) Oral two times a day  calcium acetate 667 milliGRAM(s) Oral three times a day with meals  chlorhexidine 4% Liquid 1 Application(s) Topical two times a day  cyanocobalamin Injectable 1000 MICROGram(s) SubCutaneous daily  epoetin monique Injectable 6000 Unit(s) IV Push <User Schedule>  folic acid 1 milliGRAM(s) Oral daily  HYDROmorphone  Injectable 0.5 milliGRAM(s) IV Push every 4 hours PRN  insulin glargine Injectable (LANTUS) 10 Unit(s) SubCutaneous at bedtime  insulin lispro (HumaLOG) corrective regimen sliding scale   SubCutaneous every 6 hours  ondansetron Injectable 4 milliGRAM(s) IV Push every 6 hours PRN  pantoprazole  Injectable 40 milliGRAM(s) IV Push daily    ascorbic acid 500 milliGRAM(s) Oral two times a day  calcium acetate 667 milliGRAM(s) Oral three times a day with meals  chlorhexidine 4% Liquid 1 Application(s) Topical two times a day  cyanocobalamin Injectable 1000 MICROGram(s) SubCutaneous daily  digoxin  Injectable 0.125 milliGRAM(s) IV Push every other day  epoetin monique Injectable 6000 Unit(s) IV Push <User Schedule>  folic acid 1 milliGRAM(s) Oral daily  heparin  Injectable 5000 Unit(s) SubCutaneous every 12 hours  HYDROmorphone  Injectable 0.5 milliGRAM(s) IV Push every 4 hours PRN  insulin glargine Injectable (LANTUS) 10 Unit(s) SubCutaneous at bedtime  insulin lispro (HumaLOG) corrective regimen sliding scale   SubCutaneous every 6 hours  linezolid  IVPB 600 milliGRAM(s) IV Intermittent every 12 hours  meropenem  IVPB 500 milliGRAM(s) IV Intermittent every 12 hours  ondansetron Injectable 4 milliGRAM(s) IV Push every 6 hours PRN  pantoprazole  Injectable 40 milliGRAM(s) IV Push daily    Drug Dosing Weight  Height (cm): 154.94 (28 Dec 2018 01:00)  Weight (kg): 82.1 (28 Dec 2018 01:00)  BMI (kg/m2): 34.2 (28 Dec 2018 01:00)  BSA (m2): 1.81 (28 Dec 2018 01:00)    CENTRAL LINE: [x] YES [ ] NO  LOCATION:   DATE INSERTED:    DE SOUZA: [x] YES [ ] NO    DATE INSERTED:    A-LINE:  [ ] YES [x] NO  LOCATION:   DATE INSERTED:    ICU Vital Signs Last 24 Hrs  T(C): 36.1 (08 Jan 2019 06:00), Max: 36.4 (08 Jan 2019 00:32)  T(F): 97 (08 Jan 2019 06:00), Max: 97.5 (08 Jan 2019 00:32)  HR: 82 (08 Jan 2019 06:00) (59 - 97)  BP: 123/40 (08 Jan 2019 06:00) (82/32 - 141/61)  BP(mean): 61 (08 Jan 2019 06:00) (43 - 78)  ABP: --  ABP(mean): --  RR: 26 (08 Jan 2019 06:00) (17 - 29)  SpO2: 98% (08 Jan 2019 06:00) (92% - 100%)      ABG - ( 08 Jan 2019 03:56 )  pH, Arterial: 7.40  pH, Blood: x     /  pCO2: 28    /  pO2: 88    / HCO3: 17    / Base Excess: -7.0  /  SaO2: 96                    01-07 @ 07:01  -  01-08 @ 07:00  --------------------------------------------------------  IN: 1210 mL / OUT: 395 mL / NET: 815 mL        Mode: AC/ CMV (Assist Control/ Continuous Mandatory Ventilation)  RR (machine): 16  TV (machine): 450  FiO2: 40  PEEP: 5  ITime: 1  MAP: 10  PIP: 20      PHYSICAL EXAM:    GENERAL: Well developed, Well nourished white female, sedated  HEENT:  Normocephalic/Atraumatic, reactive light reflex, moist mucous membranes, Scleral icterus   NECK: Supple, no JVD, LIJ line  RESP: Symmetric movement of the chest, clear to auscultation bilaterally, diffuse bilateral basal rales   CVS: Irregular, S1 and S2 audible, no murmur  GI: Hypoactive bowel sounds present, abdomen soft, non tender, non distended  EXTREMITIES:  No edema, no clubbing, cyanosis, Rt Shiley femoral    MSK: 0/5 strength bilateral upper and lower extremities, Left hip scar/band, Blue hands/toes   PSYCH: Sedated  NEURO: Alert and oriented x 1    LABS:  CBC Full  -  ( 08 Jan 2019 06:04 )  WBC Count : 20.4 K/uL  Hemoglobin : 8.4 g/dL  Hematocrit : 26.5 %  Platelet Count - Automated : 75 K/uL  Mean Cell Volume : 65.8 fl  Mean Cell Hemoglobin : 20.9 pg  Mean Cell Hemoglobin Concentration : 31.7 gm/dL  Auto Neutrophil # : 17.9 K/uL  Auto Lymphocyte # : 1.2 K/uL  Auto Monocyte # : 1.1 K/uL  Auto Eosinophil # : 0.2 K/uL  Auto Basophil # : 0.1 K/uL  Auto Neutrophil % : 87.7 %  Auto Lymphocyte % : 5.7 %  Auto Monocyte % : 5.2 %  Auto Eosinophil % : 0.9 %  Auto Basophil % : 0.5 %    01-08    136  |  99  |  148<H>  ----------------------------<  220<H>  5.1   |  18<L>  |  6.27<H>    Ca    8.2<L>      08 Jan 2019 06:04  Phos  8.3     01-08  Mg     2.5     01-08    TPro  4.4<L>  /  Alb  1.6<L>  /  TBili  16.8<H>  /  DBili  x   /  AST  57<H>  /  ALT  61<H>  /  AlkPhos  133<H>  01-08        [ ]GOALS OF CARE DISCUSSION WITH PATIENT/FAMILY/PROXY: DNR only    CRITICAL CARE TIME SPENT: 35 minutes none INTERVAL HPI/OVERNIGHT EVENTS: No acute events. Worsening of blue toes and hands.    PRESSORS: [ ] YES [x] NO    ANTIBIOTICS: Zyvox                           DATE STARTED: 12/31/18  ANTIBIOTICS: Meropenem                 DATE STARTED: 12/31/18    Antimicrobial:  linezolid  IVPB 600 milliGRAM(s) IV Intermittent every 12 hours  meropenem  IVPB 500 milliGRAM(s) IV Intermittent every 12 hours    Cardiovascular:  digoxin  Injectable 0.125 milliGRAM(s) IV Push every other day    Hematalogic:  heparin  Injectable 5000 Unit(s) SubCutaneous every 12 hours    Other:  ascorbic acid 500 milliGRAM(s) Oral two times a day  calcium acetate 667 milliGRAM(s) Oral three times a day with meals  chlorhexidine 4% Liquid 1 Application(s) Topical two times a day  cyanocobalamin Injectable 1000 MICROGram(s) SubCutaneous daily  epoetin monique Injectable 6000 Unit(s) IV Push <User Schedule>  folic acid 1 milliGRAM(s) Oral daily  HYDROmorphone  Injectable 0.5 milliGRAM(s) IV Push every 4 hours PRN  insulin glargine Injectable (LANTUS) 10 Unit(s) SubCutaneous at bedtime  insulin lispro (HumaLOG) corrective regimen sliding scale   SubCutaneous every 6 hours  ondansetron Injectable 4 milliGRAM(s) IV Push every 6 hours PRN  pantoprazole  Injectable 40 milliGRAM(s) IV Push daily    ascorbic acid 500 milliGRAM(s) Oral two times a day  calcium acetate 667 milliGRAM(s) Oral three times a day with meals  chlorhexidine 4% Liquid 1 Application(s) Topical two times a day  cyanocobalamin Injectable 1000 MICROGram(s) SubCutaneous daily  digoxin  Injectable 0.125 milliGRAM(s) IV Push every other day  epoetin monique Injectable 6000 Unit(s) IV Push <User Schedule>  folic acid 1 milliGRAM(s) Oral daily  heparin  Injectable 5000 Unit(s) SubCutaneous every 12 hours  HYDROmorphone  Injectable 0.5 milliGRAM(s) IV Push every 4 hours PRN  insulin glargine Injectable (LANTUS) 10 Unit(s) SubCutaneous at bedtime  insulin lispro (HumaLOG) corrective regimen sliding scale   SubCutaneous every 6 hours  linezolid  IVPB 600 milliGRAM(s) IV Intermittent every 12 hours  meropenem  IVPB 500 milliGRAM(s) IV Intermittent every 12 hours  ondansetron Injectable 4 milliGRAM(s) IV Push every 6 hours PRN  pantoprazole  Injectable 40 milliGRAM(s) IV Push daily    Drug Dosing Weight  Height (cm): 154.94 (28 Dec 2018 01:00)  Weight (kg): 82.1 (28 Dec 2018 01:00)  BMI (kg/m2): 34.2 (28 Dec 2018 01:00)  BSA (m2): 1.81 (28 Dec 2018 01:00)    CENTRAL LINE: [x] YES [ ] NO  LOCATION:   DATE INSERTED:    DE SOUZA: [x] YES [ ] NO    DATE INSERTED:    A-LINE:  [ ] YES [x] NO  LOCATION:   DATE INSERTED:    ICU Vital Signs Last 24 Hrs  T(C): 36.1 (08 Jan 2019 06:00), Max: 36.4 (08 Jan 2019 00:32)  T(F): 97 (08 Jan 2019 06:00), Max: 97.5 (08 Jan 2019 00:32)  HR: 82 (08 Jan 2019 06:00) (59 - 97)  BP: 123/40 (08 Jan 2019 06:00) (82/32 - 141/61)  BP(mean): 61 (08 Jan 2019 06:00) (43 - 78)  ABP: --  ABP(mean): --  RR: 26 (08 Jan 2019 06:00) (17 - 29)  SpO2: 98% (08 Jan 2019 06:00) (92% - 100%)    ABG - ( 08 Jan 2019 03:56 )  pH, Arterial: 7.40  pH, Blood: x     /  pCO2: 28    /  pO2: 88    / HCO3: 17    / Base Excess: -7.0  /  SaO2: 96        01-07 @ 07:01  -  01-08 @ 07:00  --------------------------------------------------------  IN: 1210 mL / OUT: 395 mL / NET: 815 mL    Mode: AC/ CMV (Assist Control/ Continuous Mandatory Ventilation)  RR (machine): 16  TV (machine): 450  FiO2: 40  PEEP: 5  ITime: 1  MAP: 10  PIP: 20    PHYSICAL EXAM:  GENERAL: Well developed, Well nourished white female, sedated  HEENT:  Normocephalic/Atraumatic, reactive light reflex, moist mucous membranes, Scleral icterus   NECK: Supple, no JVD, LIJ line  RESP: Symmetric movement of the chest, clear to auscultation bilaterally, diffuse bilateral basal rales   CVS: Irregular, S1 and S2 audible, no murmur  GI: Hypoactive bowel sounds present, abdomen soft, non tender, non distended  EXTREMITIES:  No edema, no clubbing, cyanosis, Rt Shiley femoral    MSK: 0/5 strength bilateral upper and lower extremities, Left hip scar/band, Blue hands/toes   PSYCH: Sedated  NEURO: Alert and oriented x 1    LABS:  CBC Full  -  ( 08 Jan 2019 06:04 )  WBC Count : 20.4 K/uL  Hemoglobin : 8.4 g/dL  Hematocrit : 26.5 %  Platelet Count - Automated : 75 K/uL  Mean Cell Volume : 65.8 fl  Mean Cell Hemoglobin : 20.9 pg  Mean Cell Hemoglobin Concentration : 31.7 gm/dL  Auto Neutrophil # : 17.9 K/uL  Auto Lymphocyte # : 1.2 K/uL  Auto Monocyte # : 1.1 K/uL  Auto Eosinophil # : 0.2 K/uL  Auto Basophil # : 0.1 K/uL  Auto Neutrophil % : 87.7 %  Auto Lymphocyte % : 5.7 %  Auto Monocyte % : 5.2 %  Auto Eosinophil % : 0.9 %  Auto Basophil % : 0.5 %    01-08    136  |  99  |  148<H>  ----------------------------<  220<H>  5.1   |  18<L>  |  6.27<H>    Ca    8.2<L>      08 Jan 2019 06:04  Phos  8.3     01-08  Mg     2.5     01-08    TPro  4.4<L>  /  Alb  1.6<L>  /  TBili  16.8<H>  /  DBili  x   /  AST  57<H>  /  ALT  61<H>  /  AlkPhos  133<H>  01-08        [ ]GOALS OF CARE DISCUSSION WITH PATIENT/FAMILY/PROXY: DNR only

## 2019-01-08 NOTE — PROGRESS NOTE ADULT - PROBLEM SELECTOR PLAN 3
Elevated LFTs and INR--> gradually improving  Elevated Bilirubin due to sepsis  Normal ammonia levels   Trend lactate  S/p NAC, K-centra, oral Vit. K and 14 FFP  US Liver showed gall bladder polyp  Dr Haywood on board

## 2019-01-08 NOTE — PROGRESS NOTE ADULT - PROBLEM SELECTOR PLAN 1
CXR: Bilateral basal pneumonia and diffuse congestion  Intubated and sedated  C/w Zyvox and meropenem for 10-14 days  Blood Cx: NTD CXR: Bilateral basal pneumonia and diffuse congestion  Intubated and sedated  C/w Zyvox and meropenem for 10-14 days  Blood Cx: NTD  SBT today after dialysis

## 2019-01-08 NOTE — PROGRESS NOTE ADULT - SUBJECTIVE AND OBJECTIVE BOX
pt seen and examined.pts current chart reviewed and case discussed with resident covering.    SUBJECTIVE: pt was seen and examined at bedside, pt still on sedated and venti support. I&O 1200/390, net positive 800cc overnight. last HD on Sat with 1600cc fluid removed.     REVIEW OF SYSTEMS:  unable to obtain due to sedated     Current meds:    ascorbic acid 500 milliGRAM(s) Oral two times a day  calcium acetate 667 milliGRAM(s) Oral three times a day with meals  chlorhexidine 4% Liquid 1 Application(s) Topical two times a day  cyanocobalamin Injectable 1000 MICROGram(s) SubCutaneous daily  digoxin  Injectable 0.125 milliGRAM(s) IV Push every other day  epoetin monique Injectable 6000 Unit(s) IV Push <User Schedule>  folic acid 1 milliGRAM(s) Oral daily  heparin  Injectable 5000 Unit(s) SubCutaneous every 12 hours  HYDROmorphone  Injectable 0.5 milliGRAM(s) IV Push every 4 hours PRN  insulin glargine Injectable (LANTUS) 10 Unit(s) SubCutaneous at bedtime  insulin lispro (HumaLOG) corrective regimen sliding scale   SubCutaneous every 6 hours  linezolid  IVPB 600 milliGRAM(s) IV Intermittent every 12 hours  meropenem  IVPB 500 milliGRAM(s) IV Intermittent every 12 hours  ondansetron Injectable 4 milliGRAM(s) IV Push every 6 hours PRN  pantoprazole  Injectable 40 milliGRAM(s) IV Push daily      Vital Signs Last 24 Hrs  T(C): 36.1 (08 Jan 2019 06:00), Max: 36.4 (08 Jan 2019 00:32)  T(F): 97 (08 Jan 2019 06:00), Max: 97.5 (08 Jan 2019 00:32)  HR: 82 (08 Jan 2019 06:00) (59 - 97)  BP: 123/40 (08 Jan 2019 06:00) (82/32 - 141/61)  BP(mean): 61 (08 Jan 2019 06:00) (43 - 78)  RR: 26 (08 Jan 2019 06:00) (17 - 29)  SpO2: 98% (08 Jan 2019 06:00) (92% - 100%)    I and O's:    01-05 @ 07:01  -  01-06 @ 07:00  --------------------------------------------------------  IN:    fentaNYL Infusion.: 82 mL    fentaNYL Infusion.: 106.6 mL    Nepro with Carb Steady: 90 mL    Solution: 600 mL    Solution: 100 mL  Total IN: 978.6 mL    OUT:    Indwelling Catheter - Urethral: 170 mL  Total OUT: 170 mL    Total NET: 808.6 mL      01-06 @ 07:01  -  01-07 @ 07:00  --------------------------------------------------------  IN:    dexmedetomidine Infusion: 131.9 mL    Enteral Tube Flush: 20 mL    fentaNYL Infusion.: 34.5 mL    Nepro with Carb Steady: 300 mL    Solution: 100 mL    Solution: 600 mL  Total IN: 1186.4 mL    OUT:    Indwelling Catheter - Urethral: 265 mL  Total OUT: 265 mL    Total NET: 921.4 mL        Daily     Daily     PHYSICAL EXAM:  GENERAL: Well developed, Well nourished white female, sedated  HEENT:  Normocephalic/Atraumatic, reactive light reflex, moist mucous membranes  NECK: Supple, no JVD, LIJ line  RESP: Symmetric movement of the chest, diffuse bilateral basal rales, on Vent support   CVS: Tachycardic, S1 and S2 audible, no murmur, rubs or gallops  GI: Normal active bowel sounds present, abdomen soft, non tender, non distended  EXTREMITIES:  No edema, no clubbing, cyanosis, Lt femoral A-line, Rt Shiley femoral    MSK: 0/5 strength bilateral upper and lower extremities, Left hip scar/band, Blue hands/toes   PSYCH: Sedated  NEURO: sedated       LABS:    CBC:                          9.1    20.1  )-----------( 84       ( 07 Jan 2019 06:11 )             28.6           BMP:    01-07    136  |  99  |  127<H>  ----------------------------<  175<H>  4.8   |  18<L>  |  6.18<H>  01-06    138  |  98  |  100<H>  ----------------------------<  159<H>  4.8   |  19<L>  |  5.38<H>  01-05    136  |  97  |  110<H>  ----------------------------<  128<H>  4.5   |  20<L>  |  5.81<H>    Ca    8.5      07 Jan 2019 06:11  Ca    8.4      06 Jan 2019 06:52  Ca    8.8      05 Jan 2019 06:43  Phos  8.5     01-07  Phos  8.2     01-06  Phos  7.8     01-05  Mg     2.5     01-07  Mg     2.5     01-06  Mg     2.2     01-05    TPro  4.6<L>  /  Alb  1.7<L>  /  TBili  16.7<H>  /  DBili  x   /  AST  48<H>  /  ALT  72<H>  /  AlkPhos  136<H>  01-07  TPro  4.8<L>  /  Alb  1.8<L>  /  TBili  16.1<H>  /  DBili  x   /  AST  53<H>  /  ALT  101<H>  /  AlkPhos  146<H>  01-06  TPro  4.9<L>  /  Alb  2.0<L>  /  TBili  14.2<H>  /  DBili  11.1<H>  /  AST  72<H>  /  ALT  155<H>  /  AlkPhos  147<H>  01-05            URINE STUDIES:  none  RADIOLOGY & ADDITIONAL STUDIES:    < from: Xray Chest 1 View- PORTABLE-Routine (01.02.19 @ 07:50) >  EXAM:  XR CHEST PORTABLE ROUTINE 1V                            PROCEDURE DATE:  01/02/2019          INTERPRETATION:  CLINICAL STATEMENT: Follow-up chest pain.    TECHNIQUE: AP view of the chest.    COMPARISON: 1/1/2019    FINDINGS/  IMPRESSION:  ETtube again noted. Left central line again noted. No pneumothorax. New   feeding tube noted tip under the diaphragm overlying expected region of   stomach.    Increased interstitial lung markings without significant change. Moderate   size right pleural effusion with adjacent opacity without significant   change    Heart size cannot be accurately assessed in this projection.      < end of copied text >  < from: US Hepatic & Pancreatic (01.02.19 @ 11:15) >  EXAM:  US LIVER AND PANCREAS                            PROCEDURE DATE:  01/02/2019          INTERPRETATION:  CLINICAL STATEMENT: Rule out gallstone..  Abdominal pain.    TECHNIQUE: Ultrasound of the right upper quadrant.    COMPARISON: None.    FINDINGS:  Liver measures 16.9 cm with normal echogenicity.    There is no gallstone or gallbladder wall thickening. 5 mm gallbladder   polyp noted    The common bile duct is not dilated measuring 4 mm.    The pancreas is not well seen.    The right kidney measures 10.8 cm in length.  There is no hydronephrosis.    IMPRESSION:  5 mm gallbladder polyp. Follow-up recommended.    < end of copied text > pt seen and examined.pts current chart reviewed and case discussed with resident covering.    SUBJECTIVE: pt was seen and examined at bedside, pt still on sedated and venti support. I&O 1200/390, net positive 800cc overnight. last HD on Sat with 1600cc fluid removed.     REVIEW OF SYSTEMS:  unable to obtain due to sedated     Current meds:    ascorbic acid 500 milliGRAM(s) Oral two times a day  calcium acetate 667 milliGRAM(s) Oral three times a day with meals  chlorhexidine 4% Liquid 1 Application(s) Topical two times a day  cyanocobalamin Injectable 1000 MICROGram(s) SubCutaneous daily  digoxin  Injectable 0.125 milliGRAM(s) IV Push every other day  epoetin monique Injectable 6000 Unit(s) IV Push <User Schedule>  folic acid 1 milliGRAM(s) Oral daily  heparin  Injectable 5000 Unit(s) SubCutaneous every 12 hours  HYDROmorphone  Injectable 0.5 milliGRAM(s) IV Push every 4 hours PRN  insulin glargine Injectable (LANTUS) 10 Unit(s) SubCutaneous at bedtime  insulin lispro (HumaLOG) corrective regimen sliding scale   SubCutaneous every 6 hours  linezolid  IVPB 600 milliGRAM(s) IV Intermittent every 12 hours  meropenem  IVPB 500 milliGRAM(s) IV Intermittent every 12 hours  ondansetron Injectable 4 milliGRAM(s) IV Push every 6 hours PRN  pantoprazole  Injectable 40 milliGRAM(s) IV Push daily      Vital Signs Last 24 Hrs  T(C): 36.1 (08 Jan 2019 06:00), Max: 36.4 (08 Jan 2019 00:32)  T(F): 97 (08 Jan 2019 06:00), Max: 97.5 (08 Jan 2019 00:32)  HR: 82 (08 Jan 2019 06:00) (59 - 97)  BP: 123/40 (08 Jan 2019 06:00) (82/32 - 141/61)  BP(mean): 61 (08 Jan 2019 06:00) (43 - 78)  RR: 26 (08 Jan 2019 06:00) (17 - 29)  SpO2: 98% (08 Jan 2019 06:00) (92% - 100%)    I and O's:    01-05 @ 07:01  -  01-06 @ 07:00  --------------------------------------------------------  IN:    fentaNYL Infusion.: 82 mL    fentaNYL Infusion.: 106.6 mL    Nepro with Carb Steady: 90 mL    Solution: 600 mL    Solution: 100 mL  Total IN: 978.6 mL    OUT:    Indwelling Catheter - Urethral: 170 mL  Total OUT: 170 mL    Total NET: 808.6 mL      01-06 @ 07:01  -  01-07 @ 07:00  --------------------------------------------------------  IN:    dexmedetomidine Infusion: 131.9 mL    Enteral Tube Flush: 20 mL    fentaNYL Infusion.: 34.5 mL    Nepro with Carb Steady: 300 mL    Solution: 100 mL    Solution: 600 mL  Total IN: 1186.4 mL    OUT:    Indwelling Catheter - Urethral: 265 mL  Total OUT: 265 mL    Total NET: 921.4 mL        Daily     Daily     PHYSICAL EXAM:  GENERAL: Well developed, Well nourished white female, sedated  HEENT:  Normocephalic/Atraumatic, reactive light reflex, moist mucous membranes  NECK: Supple, no JVD, LIJ line  RESP: Symmetric movement of the chest, diffuse bilateral basal rales, on Vent support   CVS: Tachycardic, S1 and S2 audible, no murmur, rubs or gallops  GI: Normal active bowel sounds present, abdomen soft, non tender, non distended  EXTREMITIES:  No edema, no clubbing, cyanosis, Lt femoral A-line, Rt Shiley femoral    MSK: 0/5 strength bilateral upper and lower extremities, Left hip scar/band, Blue hands/toes   PSYCH: Sedated  NEURO: sedated       LABS:    CBC:                          9.1    20.1  )-----------( 84       ( 07 Jan 2019 06:11 )             28.6           BMP:    01-07    136  |  99  |  127<H>  ----------------------------<  175<H>  4.8   |  18<L>  |  6.18<H>  01-06    138  |  98  |  100<H>  ----------------------------<  159<H>  4.8   |  19<L>  |  5.38<H>  01-05    136  |  97  |  110<H>  ----------------------------<  128<H>  4.5   |  20<L>  |  5.81<H>    Ca    8.5      07 Jan 2019 06:11  Ca    8.4      06 Jan 2019 06:52  Ca    8.8      05 Jan 2019 06:43  Phos  8.5     01-07  Phos  8.2     01-06  Phos  7.8     01-05  Mg     2.5     01-07  Mg     2.5     01-06  Mg     2.2     01-05    TPro  4.6<L>  /  Alb  1.7<L>  /  TBili  16.7<H>  /  DBili  x   /  AST  48<H>  /  ALT  72<H>  /  AlkPhos  136<H>  01-07  TPro  4.8<L>  /  Alb  1.8<L>  /  TBili  16.1<H>  /  DBili  x   /  AST  53<H>  /  ALT  101<H>  /  AlkPhos  146<H>  01-06  TPro  4.9<L>  /  Alb  2.0<L>  /  TBili  14.2<H>  /  DBili  11.1<H>  /  AST  72<H>  /  ALT  155<H>  /  AlkPhos  147<H>  01-05        < from: Xray Chest 1 View- PORTABLE-Routine (01.08.19 @ 14:55) >  EXAM:  XR CHEST PORTABLE ROUTINE 1V                            PROCEDURE DATE:  01/08/2019          INTERPRETATION:  CHEST X-RAY:    CLINICAL HISTORY: 79 years  Female with pneumonia.    COMPARISON: 1/7/2019    AP view of the chest demonstrates a developing right lower lobe   infiltrate with a small right pleural effusion. Left basilar discoid   atelectasis is unchanged. There is no left pleural effusion.    The heart, mediastinum and jenny cannot be assessed due to rotation. The   pulmonary vasculature is normal. There is no pneumothorax.    The tip of the NG tube is below the diaphragm. A left IJ catheter is   reidentified. Position cannot be assessed due to rotation.    Mild thoracic degenerative changes are present.    IMPRESSION:    Developing right lower lobe pneumonia with small right pleural effusion.    Left central line and NG tube in situ.      < end of copied text >

## 2019-01-08 NOTE — PROGRESS NOTE ADULT - SUBJECTIVE AND OBJECTIVE BOX
79yFemale    Diagnosis:  S/p L Hip IM Nailing POD#12    Patient was seen and evaluated at bedside in ICU. Patient intubated and sedated.    Vital Signs Last 24 Hrs  T(C): 36.9 (08 Jan 2019 10:00), Max: 36.9 (08 Jan 2019 10:00)  T(F): 98.5 (08 Jan 2019 10:00), Max: 98.5 (08 Jan 2019 10:00)  HR: 91 (08 Jan 2019 10:00) (59 - 91)  BP: 123/46 (08 Jan 2019 10:00) (82/32 - 133/46)  BP(mean): 65 (08 Jan 2019 10:00) (43 - 68)  RR: 31 (08 Jan 2019 10:00) (17 - 31)  SpO2: 99% (08 Jan 2019 10:00) (97% - 100%)  I&O's Detail    07 Jan 2019 07:01  -  08 Jan 2019 07:00  --------------------------------------------------------  IN:    Enteral Tube Flush: 180 mL    Nepro with Carb Steady: 400 mL    Solution: 600 mL    Solution: 50 mL  Total IN: 1230 mL    OUT:    Indwelling Catheter - Urethral: 395 mL  Total OUT: 395 mL    Total NET: 835 mL      08 Jan 2019 07:01  -  08 Jan 2019 11:13  --------------------------------------------------------  IN:    Nepro with Carb Steady: 60 mL  Total IN: 60 mL    OUT:  Total OUT: 0 mL    Total NET: 60 mL          Physical Exam:    General: AAOx3, NAD, resting comfortably in bed.    L Hip:  Dressing is C/D/I. Swelling to LLE [place] intact.  Lower extremity:  Calves are soft. Palpable pulses distally B/L. All toes cyanotic B/L.                          8.4    20.4  )-----------( 75       ( 08 Jan 2019 06:04 )             26.5     01-08    136  |  99  |  148<H>  ----------------------------<  220<H>  5.1   |  18<L>  |  6.27<H>    Ca    8.2<L>      08 Jan 2019 06:04  Phos  8.3     01-08  Mg     2.5     01-08    TPro  4.4<L>  /  Alb  1.6<L>  /  TBili  16.8<H>  /  DBili  x   /  AST  57<H>  /  ALT  61<H>  /  AlkPhos  133<H>  01-08      Impression:  79yFemale S/p L Hip IM Nailing POD#12  Plan:  -  Pain management  -  Dvt prophylaxis  -  Daily Physical Theapy:  WBAT of left lower extremity  -  Continue care as per ICU

## 2019-01-08 NOTE — PROGRESS NOTE ADULT - SUBJECTIVE AND OBJECTIVE BOX
CHIEF COMPLAINT:Patient is a 79y old  Female who presents with a chief complaint of fall.Pt remains intubated.    	  REVIEW OF SYSTEMS:  [x ] Unable to obtain    PHYSICAL EXAM:  T(C): 36.9 (01-08-19 @ 10:00), Max: 36.9 (01-08-19 @ 10:00)  HR: 90 (01-08-19 @ 11:00) (59 - 91)  BP: 122/38 (01-08-19 @ 11:00) (82/32 - 133/46)  RR: 30 (01-08-19 @ 11:00) (17 - 31)  SpO2: 100% (01-08-19 @ 11:00) (97% - 100%)  Wt(kg): --  I&O's Summary    07 Jan 2019 07:01  -  08 Jan 2019 07:00  --------------------------------------------------------  IN: 1230 mL / OUT: 415 mL / NET: 815 mL    08 Jan 2019 07:01  -  08 Jan 2019 11:34  --------------------------------------------------------  IN: 80 mL / OUT: 120 mL / NET: -40 mL        Appearance: Normal	  HEENT:   Normal oral mucosa, PERRL, EOMI	  Lymphatic: No lymphadenopathy  Cardiovascular: Normal S1 S2, No JVD, No murmurs, No edema  Respiratory: Lungs clear to auscultation	  Gastrointestinal:  Soft, Non-tender, + BS	  Skin: No rashes, No ecchymoses, No cyanosis	  Extremities: Normal range of motion, No clubbing, cyanosis or edema      MEDICATIONS  (STANDING):  ascorbic acid 500 milliGRAM(s) Oral two times a day  calcium acetate 667 milliGRAM(s) Oral three times a day with meals  chlorhexidine 4% Liquid 1 Application(s) Topical two times a day  cyanocobalamin Injectable 1000 MICROGram(s) SubCutaneous daily  digoxin  Injectable 0.125 milliGRAM(s) IV Push every other day  epoetin monique Injectable 6000 Unit(s) IV Push <User Schedule>  folic acid 1 milliGRAM(s) Oral daily  heparin  Injectable 5000 Unit(s) SubCutaneous every 12 hours  insulin glargine Injectable (LANTUS) 10 Unit(s) SubCutaneous at bedtime  insulin lispro (HumaLOG) corrective regimen sliding scale   SubCutaneous every 6 hours  linezolid  IVPB 600 milliGRAM(s) IV Intermittent every 12 hours  meropenem  IVPB 500 milliGRAM(s) IV Intermittent every 12 hours  pantoprazole  Injectable 40 milliGRAM(s) IV Push daily        	  LABS:	 	                        8.4    20.4  )-----------( 75       ( 08 Jan 2019 06:04 )             26.5     01-08    136  |  99  |  148<H>  ----------------------------<  220<H>  5.1   |  18<L>  |  6.27<H>    Ca    8.2<L>      08 Jan 2019 06:04  Phos  8.3     01-08  Mg     2.5     01-08    TPro  4.4<L>  /  Alb  1.6<L>  /  TBili  16.8<H>  /  DBili  x   /  AST  57<H>  /  ALT  61<H>  /  AlkPhos  133<H>  01-08      Cholesterol 82  LDL 22  HDL 40      HgA1c: Hemoglobin A1C, Whole Blood: 6.1 % (12-27 @ 09:51)  Hemoglobin A1C, Whole Blood: 6.2 % (12-26 @ 23:12)    TSH: Thyroid Stimulating Hormone, Serum: 1.19 uU/mL (12-27 @ 06:50)      	  Blood Gas Profile - Arterial in AM (01.08.19 @ 03:56)    pH, Arterial: 7.40    pCO2, Arterial: 28 mmHg    pO2, Arterial: 88 mmHg    HCO3, Arterial: 17 mmol/L    Base Excess, Arterial: -7.0 mmol/L    Oxygen Saturation, Arterial: 96 %    FIO2, Arterial: 40    Blood Gas Comments Arterial: RR

## 2019-01-08 NOTE — PROGRESS NOTE ADULT - PROBLEM SELECTOR PLAN 5
Blue toes and hands worsening  Anticoagulation as per Vascular   F/u Arterial doppler when stable  Consulted Vascular Surgery

## 2019-01-08 NOTE — PROGRESS NOTE ADULT - PROBLEM SELECTOR PLAN 5
Blue toes and hands  Anticoagulation as per Vascular   F/u Arterial doppler when stable  Consulted Vascular Surgery Blue toes and hands worsening  Anticoagulation as per Vascular   F/u Arterial doppler when stable  Consulted Vascular Surgery

## 2019-01-08 NOTE — PROGRESS NOTE ADULT - PROBLEM SELECTOR PLAN 1
CXR: Bilateral basal pneumonia and diffuse congestion  Intubated and sedated  C/w Zyvox and meropenem for 10-14 days  Blood Cx: NTD  SBT today after dialysis

## 2019-01-09 NOTE — CONSULT NOTE ADULT - SUBJECTIVE AND OBJECTIVE BOX
HPI:  79 y/o F pt with HTN, DM2, PMHx presents to ED c/o L hip pain s/p trip and fall from a curb today. Pt describes pain as sharp, mild and constant, 10/10 in severity. Pain of the left hip increases with ROM. Pt unable to ambulate nor bear weight. Patient denies head strike, LOC, N/V, vision changes, neurological deficits chest pain, palpitations, shortness of breath, or any other complaints. XR hip showed nondisplaced intertrochanteric fracture left femoral neck and marked osteoporosis. XR hip showed nondisplaced intertrochanteric fracture left femoral neck and marked osteoporosis. (26 Dec 2018 19:35)      PAST MEDICAL & SURGICAL HISTORY:  No pertinent past medical history  No significant past surgical history      SOCIAL HISTORY:    Admitted from:  home  (with HHA)           assisted living             Substance abuse history:              Tobacco hx:                  Alcohol hx:              Home Opioid hx:  Adventist:                                    Preferred Language:    Surrogate     HCP   Guardian:            Phone#:    FAMILY HISTORY:  No pertinent family history in first degree relatives   unable to obtain from patient due to poor mentation, family unable to give information  Baseline ADLs (prior to admission):    Allergies    No Known Allergies    Intolerances      Present Symptoms:   Dyspnea:   Nausea/Vomiting:   Anxiety:  Depressed   Fatigue:  Loss of appetite:   Pain:                                location:          Review of Systems: [All others negative or Unable to obtain due to poor mentation]    MEDICATIONS  (STANDING):  aluminum hydroxide Suspension 30 milliLiter(s) Enteral Tube three times a day  ascorbic acid 500 milliGRAM(s) Oral two times a day  calcium acetate 667 milliGRAM(s) Oral three times a day with meals  chlorhexidine 4% Liquid 1 Application(s) Topical two times a day  cyanocobalamin Injectable 1000 MICROGram(s) SubCutaneous daily  digoxin  Injectable 0.125 milliGRAM(s) IV Push every other day  epoetin monique Injectable 6000 Unit(s) IV Push <User Schedule>  folic acid 1 milliGRAM(s) Oral daily  heparin  Injectable 5000 Unit(s) SubCutaneous every 12 hours  insulin glargine Injectable (LANTUS) 10 Unit(s) SubCutaneous at bedtime  insulin lispro (HumaLOG) corrective regimen sliding scale   SubCutaneous every 6 hours  linezolid  IVPB 600 milliGRAM(s) IV Intermittent every 12 hours  meropenem  IVPB 500 milliGRAM(s) IV Intermittent every 12 hours  pantoprazole  Injectable 40 milliGRAM(s) IV Push daily    MEDICATIONS  (PRN):  HYDROmorphone  Injectable 0.5 milliGRAM(s) IV Push every 4 hours PRN Severe Pain (7 - 10)  ondansetron Injectable 4 milliGRAM(s) IV Push every 6 hours PRN Nausea and/or Vomiting      PHYSICAL EXAM:    Vital Signs Last 24 Hrs  T(C): 36.6 (09 Jan 2019 12:00), Max: 37.2 (09 Jan 2019 04:28)  T(F): 97.8 (09 Jan 2019 12:00), Max: 98.9 (09 Jan 2019 04:28)  HR: 96 (09 Jan 2019 15:00) (91 - 113)  BP: 109/40 (09 Jan 2019 15:00) (95/33 - 129/51)  BP(mean): 56 (09 Jan 2019 15:00) (46 - 73)  RR: 26 (09 Jan 2019 15:00) (21 - 32)  SpO2: 100% (09 Jan 2019 15:00) (60% - 100%)    General: alert  oriented x ____    [lethargic distressed cachexia  nonverbal  unarousable verbal]  Karnofsky Performance Score/Palliative Performance Status Version2:     %    HEENT: normal  dry mouth  ET tube/trach oral lesions:  Lungs: comfortable tachypnea/labored breathing  excessive secretions  CV: RRR, S1S2, tachycardia  GI: soft non distended non tender  incontinent               PEG/NG/OG tube  constipation  last BM:   : incontinent  oliguria/anuria  weber  Musculoskeletal: weakness  edema   ambulatory with assistance bedbound/wheelchair bound  Skin: normal turgor, pressure ulcer stage:   Neuro: no deficits, cognitive impairment dsyphagia/dysarthria paresis  Oral intake ability: unable/only mouth care [minimal moderate full capability]    LABS:                        9.5    33.8  )-----------( 92       ( 09 Jan 2019 03:30 )             30.6     01-09    140  |  102  |  129<H>  ----------------------------<  122<H>  4.6   |  17<L>  |  5.79<H>    Ca    8.6      09 Jan 2019 03:30  Phos  8.1     01-09  Mg     2.3     01-09    TPro  4.9<L>  /  Alb  1.8<L>  /  TBili  20.5<H>  /  DBili  x   /  AST  66<H>  /  ALT  64<H>  /  AlkPhos  166<H>  01-09        RADIOLOGY & ADDITIONAL STUDIES:    ADVANCE DIRECTIVES: HPI:  77 y/o F pt with HTN, DM2, PMHx presents to ED c/o L hip pain s/p trip and fall from a curb today. Pt describes pain as sharp, mild and constant, 10/10 in severity. Pain of the left hip increases with ROM. Pt unable to ambulate nor bear weight. Patient denies head strike, LOC, N/V, vision changes, neurological deficits chest pain, palpitations, shortness of breath, or any other complaints. XR hip showed nondisplaced intertrochanteric fracture left femoral neck and marked osteoporosis. XR hip showed nondisplaced intertrochanteric fracture left femoral neck and marked osteoporosis. (26 Dec 2018 19:35)      PAST MEDICAL & SURGICAL HISTORY:  No pertinent past medical history  No significant past surgical history      SOCIAL HISTORY:    Admitted from:  home   Substance abuse history:              Tobacco hx:                  Alcohol hx:              Home Opioid hx:      Surrogate     HCP   Guardian:            Phone#:    FAMILY HISTORY:  No pertinent family history in first degree relatives   unable to obtain from patient due to poor mentation, family unable to give information  Baseline ADLs (prior to admission):    Allergies    No Known Allergies    Intolerances      Present Symptoms:        Review of Systems:  Unable to obtain due to poor mentation]    MEDICATIONS  (STANDING):  aluminum hydroxide Suspension 30 milliLiter(s) Enteral Tube three times a day  ascorbic acid 500 milliGRAM(s) Oral two times a day  calcium acetate 667 milliGRAM(s) Oral three times a day with meals  chlorhexidine 4% Liquid 1 Application(s) Topical two times a day  cyanocobalamin Injectable 1000 MICROGram(s) SubCutaneous daily  digoxin  Injectable 0.125 milliGRAM(s) IV Push every other day  epoetin monique Injectable 6000 Unit(s) IV Push <User Schedule>  folic acid 1 milliGRAM(s) Oral daily  heparin  Injectable 5000 Unit(s) SubCutaneous every 12 hours  insulin glargine Injectable (LANTUS) 10 Unit(s) SubCutaneous at bedtime  insulin lispro (HumaLOG) corrective regimen sliding scale   SubCutaneous every 6 hours  linezolid  IVPB 600 milliGRAM(s) IV Intermittent every 12 hours  meropenem  IVPB 500 milliGRAM(s) IV Intermittent every 12 hours  pantoprazole  Injectable 40 milliGRAM(s) IV Push daily    MEDICATIONS  (PRN):  HYDROmorphone  Injectable 0.5 milliGRAM(s) IV Push every 4 hours PRN Severe Pain (7 - 10)  ondansetron Injectable 4 milliGRAM(s) IV Push every 6 hours PRN Nausea and/or Vomiting      PHYSICAL EXAM:    Vital Signs Last 24 Hrs  T(C): 36.6 (09 Jan 2019 12:00), Max: 37.2 (09 Jan 2019 04:28)  T(F): 97.8 (09 Jan 2019 12:00), Max: 98.9 (09 Jan 2019 04:28)  HR: 96 (09 Jan 2019 15:00) (91 - 113)  BP: 109/40 (09 Jan 2019 15:00) (95/33 - 129/51)  BP(mean): 56 (09 Jan 2019 15:00) (46 - 73)  RR: 26 (09 Jan 2019 15:00) (21 - 32)  SpO2: 100% (09 Jan 2019 15:00) (60% - 100%)    General:  nonverbal  unarousable   Karnofsky Performance Score/Palliative Performance Status Version2: 20 %    HEENT: dry mouth  ET tube in place  Lungs: on full vent support  GI: soft non distended non tender  incontinent OG tube in place  : weber in place  Musculoskeletal: weakness  edema x4   bedbound  Skin: cyanotic digits  Neuro: unarousable, responds to painful stimulic  Oral intake ability: unable/only mouth care    LABS:                        9.5    33.8  )-----------( 92       ( 09 Jan 2019 03:30 )             30.6     01-09    140  |  102  |  129<H>  ----------------------------<  122<H>  4.6   |  17<L>  |  5.79<H>    Ca    8.6      09 Jan 2019 03:30  Phos  8.1     01-09  Mg     2.3     01-09    TPro  4.9<L>  /  Alb  1.8<L>  /  TBili  20.5<H>  /  DBili  x   /  AST  66<H>  /  ALT  64<H>  /  AlkPhos  166<H>  01-09        RADIOLOGY & ADDITIONAL STUDIES: reviewed    ADVANCE DIRECTIVES: DNR

## 2019-01-09 NOTE — CONSULT NOTE ADULT - PROBLEM SELECTOR RECOMMENDATION 4
- BP currently stable  - continue to monitor.
still not recovering well from shock liver and renal failure, still with increasing bilirubin, tolerating dialysis but pt is not cognitively recovering.

## 2019-01-09 NOTE — PROGRESS NOTE ADULT - PROBLEM SELECTOR PLAN 3
Multiorgan failure with Anuria and lactic acidosis   Rt Femoral Shiley  Dialysis 3 times a week  Poor urine output   Dr Lay on board

## 2019-01-09 NOTE — PROGRESS NOTE ADULT - SUBJECTIVE AND OBJECTIVE BOX
79yFemale    Diagnosis:  S/p L Hip IM Nailing POD#13    Patient was seen and evaluated at bedside. Patient currently intubated.    Vital Signs Last 24 Hrs  T(C): 37.2 (09 Jan 2019 04:28), Max: 37.2 (08 Jan 2019 14:45)  T(F): 98.9 (09 Jan 2019 04:28), Max: 98.9 (08 Jan 2019 14:45)  HR: 94 (09 Jan 2019 07:00) (89 - 113)  BP: 118/44 (09 Jan 2019 07:00) (95/33 - 130/92)  BP(mean): 62 (09 Jan 2019 07:00) (46 - 103)  RR: 28 (09 Jan 2019 07:00) (21 - 31)  SpO2: 79% (09 Jan 2019 07:00) (60% - 100%)  I&O's Detail    08 Jan 2019 07:01  -  09 Jan 2019 07:00  --------------------------------------------------------  IN:    Enteral Tube Flush: 150 mL    Nepro with Carb Steady: 400 mL    Solution: 100 mL    Solution: 300 mL  Total IN: 950 mL    OUT:    Indwelling Catheter - Urethral: 295 mL  Total OUT: 295 mL    Total NET: 655 mL          Physical Exam:    General: Intubated, NAD, resting comfortably in bed.  Left Hip:  Dressing is C/D/I. Skin is pink and warm. Staples intact. No erythema. Wound with no drainage, healing well.   Lower extremity:  All toes cyanotic b/l. Palpable DP pulses.                      9.5    33.8  )-----------( 92       ( 09 Jan 2019 03:30 )             30.6     01-09    140  |  102  |  129<H>  ----------------------------<  122<H>  4.6   |  17<L>  |  5.79<H>    Ca    8.6      09 Jan 2019 03:30  Phos  8.1     01-09  Mg     2.3     01-09    TPro  4.9<L>  /  Alb  1.8<L>  /  TBili  20.5<H>  /  DBili  x   /  AST  66<H>  /  ALT  64<H>  /  AlkPhos  166<H>  01-09      Impression:  79yFemale S/p L Hip IM Nailing POD#13  Plan:  -  Pain management  -  Dvt prophylaxis  -  Daily Physical Therapy  WBAT left lower extremity  -  Continue care as per ICU   -  Case d/w DR. Bruce

## 2019-01-09 NOTE — CONSULT NOTE ADULT - PROBLEM SELECTOR RECOMMENDATION 5
- continue with HSS   - monitor FS every 6 hrs.
spoke with daughter, nieces at bedside with ICU team - discussed above medical issues, family already agreed for DNR last week. When discussing pt will not likely to recover neurologically, will need trach to continue artificial life support and permacath for dialysis - family is asking for palliative extubation to allow natural death. This includes stopping tube feeds, dialysis, all other medications that will prolong pt's life. They do not want pt to continue to be kept alive in this condition and made to "suffer" any more. Pt's son will be made aware,  is very debilitated functionally and medically (also on dialysis and will not be emotionally and mentally able to "handle" this outcome). MAde them aware that pt may survive for few hours to days, if more than one day, will agree for inpt hospice referral. Family decided on not delaying and for the extubation to occur tomorrow morning around 10pm.   Support and contact info given.  referral made.     Rec stopping all unnec meds, tube feeds, blood work.     Advance care planning discussion time spent: 45min

## 2019-01-09 NOTE — CONSULT NOTE ADULT - PROBLEM SELECTOR RECOMMENDATION 2
- Echo showed reduced ejection fraction of 20-25%.   Cardiology recommendations noted.
likely due to severe septic and cardiogenic shock in setting of advanced CHF - despite aggressive ICU management, pt appears to have suffered anoxic encephalopathy due to poor perfusion. CT head done which does not show any gross pathology

## 2019-01-09 NOTE — PROGRESS NOTE ADULT - SUBJECTIVE AND OBJECTIVE BOX
MARICEL SLAUGHTER  MR# [id number]  79yFemale        Patient is a 79y old  Female who presents with a chief complaint of fall (09 Jan 2019 16:21)      INTERVAL HPI/OVERNIGHT EVENTS:  Patient seen and examined at bedside.       ALLERGIES  No Known Allergies      MEDICATIONS  aluminum hydroxide Suspension 30 milliLiter(s) Enteral Tube three times a day  chlorhexidine 4% Liquid 1 Application(s) Topical two times a day  HYDROmorphone  Injectable 0.5 milliGRAM(s) IV Push every 4 hours PRN Severe Pain (7 - 10)  linezolid  IVPB 600 milliGRAM(s) IV Intermittent every 12 hours  meropenem  IVPB 500 milliGRAM(s) IV Intermittent every 12 hours  pantoprazole  Injectable 40 milliGRAM(s) IV Push daily              REVIEW OF SYSTEMS:  CONSTITUTIONAL: No fever, weight loss, or fatigue  EYES: No eye pain, visual disturbances, or discharge  ENT:  No difficulty hearing, tinnitus, vertigo; No sinus or throat pain  NECK: No pain or stiffness  RESPIRATORY: No cough, wheezing, chills or hemoptysis; No Shortness of Breath  CARDIOVASCULAR: No chest pain, palpitations, passing out, dizziness, or leg swelling  GASTROINTESTINAL: No abdominal or epigastric pain. No nausea, vomiting, or hematemesis; No diarrhea or constipation. No melena or hematochezia.  GENITOURINARY: No dysuria, frequency, hematuria, or incontinence  NEUROLOGICAL: No headaches, memory loss, loss of strength, numbness, or tremors  SKIN: No itching, burning, rashes, or lesions   LYMPH Nodes: No enlarged glands  ENDOCRINE: No heat or cold intolerance; No hair loss  MUSCULOSKELETAL: No joint pain or swelling; No muscle, back, or extremity pain  PSYCHIATRIC: No depression, anxiety, mood swings, or difficulty sleeping  HEME/LYMPH: No easy bruising, or bleeding gums  ALLERGY AND IMMUNOLOGIC: No hives or eczema	    [ ] All others negative	  [ ] Unable to obtain      T(C): 36.4 (01-09-19 @ 17:00), Max: 37.2 (01-09-19 @ 04:28)  T(F): 97.6 (01-09-19 @ 17:00), Max: 98.9 (01-09-19 @ 04:28)  HR: 102 (01-09-19 @ 21:00) (91 - 113)  BP: 127/54 (01-09-19 @ 21:00) (98/32 - 129/51)  RR: 29 (01-09-19 @ 21:00) (21 - 32)  SpO2: 97% (01-09-19 @ 21:00) (60% - 100%)  Wt(kg): --    I&O's Summary    08 Jan 2019 07:01  -  09 Jan 2019 07:00  --------------------------------------------------------  IN: 950 mL / OUT: 295 mL / NET: 655 mL    09 Jan 2019 07:01  -  09 Jan 2019 22:14  --------------------------------------------------------  IN: 550 mL / OUT: 275 mL / NET: 275 mL          PHYSICAL EXAM:  A X O x  HEAD:  Atraumatic, Normocephalic  EYES: EOMI, PERRLA, conjunctiva and sclera clear  NECK: Supple, No JVD, Normal thyroid  Resp: CTAB, No crackles, wheezing,   CVS: Regular rate and rhythm; No discernable murmurs, rubs, or gallops  ABD: Soft, Nontender, Nondistended; Bowel sounds present  EXTREMITIES:  2+ Peripheral Pulses, No edema  LYMPH: No dicernable lymphadenopathy noted  GENERAL: NAD, well-groomed, well-developed      LABS:                        9.5    33.8  )-----------( 92       ( 09 Jan 2019 03:30 )             30.6     01-09    140  |  102  |  129<H>  ----------------------------<  122<H>  4.6   |  17<L>  |  5.79<H>    Ca    8.6      09 Jan 2019 03:30  Phos  8.1     01-09  Mg     2.3     01-09    TPro  4.9<L>  /  Alb  1.8<L>  /  TBili  20.5<H>  /  DBili  x   /  AST  66<H>  /  ALT  64<H>  /  AlkPhos  166<H>  01-09        CAPILLARY BLOOD GLUCOSE      POCT Blood Glucose.: 166 mg/dL (09 Jan 2019 11:56)  POCT Blood Glucose.: 152 mg/dL (09 Jan 2019 06:22)  POCT Blood Glucose.: 208 mg/dL (08 Jan 2019 23:53)      Troponins:  ProBNP:  Lipid Profile:   HgA1c:  TSH:     ABG - ( 08 Jan 2019 03:56 )  pH, Arterial: 7.40  pH, Blood: x     /  pCO2: 28    /  pO2: 88    / HCO3: 17    / Base Excess: -7.0  /  SaO2: 96                    RADIOLOGY & ADDITIONAL TESTS:    Imaging Personally Reviewed:  [ ] YES  [ ] NO      Consultant(s) Notes Reviewed:  [x ] YES  [ ] NO    Care Discussed with Consultants/Other Providers [ x] YES  [ ] NO          PAST MEDICAL & SURGICAL HISTORY:  No pertinent past medical history  No significant past surgical history        Closed fracture of neck of left femur  Yes  No pertinent family history in first degree relatives  Handoff  MEWS Score  No pertinent past medical history  Closed fracture of left hip, initial encounter  Encephalopathy  Peripheral arterial disease  Drop in hemoglobin  Acute liver failure  Acute renal failure  Septic shock  Acute post-operative pain  Hypotension  Fever  Acute systolic congestive heart failure  Cardiogenic shock  Prophylactic measure  Diabetes mellitus  HTN (hypertension)  Heart failure with reduced ejection fraction  PSVT (paroxysmal supraventricular tachycardia)  Left ventricular dysfunction  Need for prophylactic measure  Closed fracture of left hip, initial encounter  No significant past surgical history  A S/P FALL

## 2019-01-09 NOTE — PROGRESS NOTE ADULT - PROBLEM SELECTOR PLAN 4
Elevated LFTs and INR--> gradually improving  Elevated Bilirubin due to sepsis  Normal ammonia levels   S/p NAC, K-centra, oral Vit. K and 14 FFP  US Liver showed gall bladder polyp  Dr Haywood on board

## 2019-01-09 NOTE — CONSULT NOTE ADULT - NSHPATTENDINGPLANDISCUSS_GEN_ALL_CORE
micu team, dialysis nurse and daughter (iesha)
family, resident, Dr. Valencia
ICU team
ICU nursing and house staff

## 2019-01-09 NOTE — PROGRESS NOTE ADULT - SUBJECTIVE AND OBJECTIVE BOX
pt seen and examined.pts current chart reviewed and case discussed with resident covering.    SUBJECTIVE: Pt off sedation bu still lethargic, failed SBT yesterday, still on veti support. S/p HD last night with  ??? fluid removed. Net over last 24 hrs is  ???    REVIEW OF SYSTEMS:  unable to obation     Current meds:    aluminum hydroxide Suspension 30 milliLiter(s) Oral three times a day  ascorbic acid 500 milliGRAM(s) Oral two times a day  calcium acetate 667 milliGRAM(s) Oral three times a day with meals  chlorhexidine 4% Liquid 1 Application(s) Topical two times a day  cyanocobalamin Injectable 1000 MICROGram(s) SubCutaneous daily  digoxin  Injectable 0.125 milliGRAM(s) IV Push every other day  epoetin monique Injectable 6000 Unit(s) IV Push <User Schedule>  folic acid 1 milliGRAM(s) Oral daily  heparin  Injectable 5000 Unit(s) SubCutaneous every 12 hours  HYDROmorphone  Injectable 0.5 milliGRAM(s) IV Push every 4 hours PRN  insulin glargine Injectable (LANTUS) 10 Unit(s) SubCutaneous at bedtime  insulin lispro (HumaLOG) corrective regimen sliding scale   SubCutaneous every 6 hours  linezolid  IVPB 600 milliGRAM(s) IV Intermittent every 12 hours  meropenem  IVPB 500 milliGRAM(s) IV Intermittent every 12 hours  ondansetron Injectable 4 milliGRAM(s) IV Push every 6 hours PRN  pantoprazole  Injectable 40 milliGRAM(s) IV Push daily      Vital Signs    T(F): 98.4 (19 @ 08:00), Max: 98.9 (19 @ 14:45)  HR: 97 (19 @ 10:00) (90 - 113)  BP: 127/52 (19 @ 10:00) (95/33 - 130/92)  ABP: --  RR: 29 (19 @ 10:00) (21 - 32)  SpO2: 100% (19 @ 10:00) (60% - 100%)  Wt(kg): --  CVP(cm H2O): --  CO: --  PCWP: --    I and O's:     @ 07:01  -   @ 07:00  --------------------------------------------------------  IN:    Enteral Tube Flush: 180 mL    Nepro with Carb Steady: 400 mL    Solution: 600 mL    Solution: 50 mL  Total IN: 1230 mL    OUT:    Indwelling Catheter - Urethral: 415 mL  Total OUT: 415 mL    Total NET: 815 mL       @ 07:01  -   @ 07:00  --------------------------------------------------------  IN:    Enteral Tube Flush: 150 mL    Nepro with Carb Steady: 400 mL    Solution: 100 mL    Solution: 300 mL  Total IN: 950 mL    OUT:    Indwelling Catheter - Urethral: 295 mL  Total OUT: 295 mL    Total NET: 655 mL       @ 07:  -   @ 10:49  --------------------------------------------------------  IN:    Nepro with Carb Steady: 60 mL  Total IN: 60 mL    OUT:  Total OUT: 0 mL    Total NET: 60 mL        Daily     Daily Weight in k.2 (2019 08:00)    PHYSICAL EXAM:  GENERAL: Well developed, Well nourished white female, lethargic   HEENT:  Normocephalic/Atraumatic, reactive light reflex, moist mucous membranes  NECK: Supple, no JVD, LIJ line  RESP: Symmetric movement of the chest, diffuse bilateral basal rales, on Vent support   CVS: Tachycardic, S1 and S2 audible, no murmur, rubs or gallops  GI: Normal active bowel sounds present, abdomen soft, non tender, non distended  EXTREMITIES:  No edema, no clubbing, cyanosis, Lt femoral A-line, Rt Shiley femoral    MSK: 0/5 strength bilateral upper and lower extremities, Left hip scar/band, Blue hands/toes   NEURO: lethargic       LABS:    CBC:                          9.5    33.8  )-----------( 92       ( 2019 03:30 )             30.6           BMP:        140  |  102  |  129<H>  ----------------------------<  122<H>  4.6   |  17<L>  |  5.79<H>      136  |  99  |  148<H>  ----------------------------<  220<H>  5.1   |  18<L>  |  6.27<H>      136  |  99  |  127<H>  ----------------------------<  175<H>  4.8   |  18<L>  |  6.18<H>    Ca    8.6      2019 03:30  Ca    8.2<L>      2019 06:04  Ca    8.5      2019 06:11  Phos  8.1       Phos  8.3       Phos  8.5       Mg     2.3       Mg     2.5       Mg     2.5         TPro  4.9<L>  /  Alb  1.8<L>  /  TBili  20.5<H>  /  DBili  x   /  AST  66<H>  /  ALT  64<H>  /  AlkPhos  166<H>    TPro  4.4<L>  /  Alb  1.6<L>  /  TBili  16.8<H>  /  DBili  x   /  AST  57<H>  /  ALT  61<H>  /  AlkPhos  133<H>    TPro  4.6<L>  /  Alb  1.7<L>  /  TBili  16.7<H>  /  DBili  x   /  AST  48<H>  /  ALT  72<H>  /  AlkPhos  136<H>      < from: Xray Chest 1 View- PORTABLE-Routine (19 @ 14:55) >  EXAM:  XR CHEST PORTABLE ROUTINE 1V                            PROCEDURE DATE:  2019          INTERPRETATION:  CHEST X-RAY:    CLINICAL HISTORY: 79 years  Female with pneumonia.    COMPARISON: 2019    AP view of the chest demonstrates a developing right lower lobe   infiltrate with a small right pleural effusion. Left basilar discoid   atelectasis is unchanged. There is no left pleural effusion.    The heart, mediastinum and jenny cannot be assessed due to rotation. The   pulmonary vasculature is normal. There is no pneumothorax.    The tip of the NG tube is below the diaphragm. A left IJ catheter is   reidentified. Position cannot be assessed due to rotation.    Mild thoracic degenerative changes are present.    IMPRESSION:    Developing right lower lobe pneumonia with small right pleural effusion.    Left central line and NG tube in situ.      < end of copied text > pt seen and examined.pts current chart reviewed and case discussed with resident covering.    SUBJECTIVE: Pt off sedation bu still lethargic, failed SBT yesterday, still on veti support. S/p HD last night with 1290cc fluid removed. Net over last 24 hrs is negative 635cc.     REVIEW OF SYSTEMS:  unable to obtain.     Current meds:    aluminum hydroxide Suspension 30 milliLiter(s) Oral three times a day  ascorbic acid 500 milliGRAM(s) Oral two times a day  calcium acetate 667 milliGRAM(s) Oral three times a day with meals  chlorhexidine 4% Liquid 1 Application(s) Topical two times a day  cyanocobalamin Injectable 1000 MICROGram(s) SubCutaneous daily  digoxin  Injectable 0.125 milliGRAM(s) IV Push every other day  epoetin monique Injectable 6000 Unit(s) IV Push <User Schedule>  folic acid 1 milliGRAM(s) Oral daily  heparin  Injectable 5000 Unit(s) SubCutaneous every 12 hours  HYDROmorphone  Injectable 0.5 milliGRAM(s) IV Push every 4 hours PRN  insulin glargine Injectable (LANTUS) 10 Unit(s) SubCutaneous at bedtime  insulin lispro (HumaLOG) corrective regimen sliding scale   SubCutaneous every 6 hours  linezolid  IVPB 600 milliGRAM(s) IV Intermittent every 12 hours  meropenem  IVPB 500 milliGRAM(s) IV Intermittent every 12 hours  ondansetron Injectable 4 milliGRAM(s) IV Push every 6 hours PRN  pantoprazole  Injectable 40 milliGRAM(s) IV Push daily      Vital Signs    T(F): 98.4 (19 @ 08:00), Max: 98.9 (19 @ 14:45)  HR: 97 (19 @ 10:00) (90 - 113)  BP: 127/52 (19 @ 10:00) (95/33 - 130/92)  ABP: --  RR: 29 (19 @ 10:00) (21 - 32)  SpO2: 100% (19 @ 10:00) (60% - 100%)  Wt(kg): --  CVP(cm H2O): --  CO: --  PCWP: --    I and O's:     @ 07:01  -   @ 07:00  --------------------------------------------------------  IN:    Enteral Tube Flush: 180 mL    Nepro with Carb Steady: 400 mL    Solution: 600 mL    Solution: 50 mL  Total IN: 1230 mL    OUT:    Indwelling Catheter - Urethral: 415 mL  Total OUT: 415 mL    Total NET: 815 mL       @ 07:  -   @ 07:00  --------------------------------------------------------  IN:    Enteral Tube Flush: 150 mL    Nepro with Carb Steady: 400 mL    Solution: 100 mL    Solution: 300 mL  Total IN: 950 mL    OUT:    Indwelling Catheter - Urethral: 295 mL  Total OUT: 295 mL    Total NET: 655 mL       @ 07: @ 10:49  --------------------------------------------------------  IN:    Nepro with Carb Steady: 60 mL  Total IN: 60 mL    OUT:  Total OUT: 0 mL    Total NET: 60 mL        Daily     Daily Weight in k.2 (2019 08:00)    PHYSICAL EXAM:  GENERAL: Well developed, Well nourished white female, lethargic   HEENT:  Normocephalic/Atraumatic, reactive light reflex, moist mucous membranes  NECK: Supple, no JVD, LIJ line  RESP: Symmetric movement of the chest, diffuse bilateral basal rales, on Vent support   CVS: Tachycardic, S1 and S2 audible, no murmur, rubs or gallops  GI: Normal active bowel sounds present, abdomen soft, non tender, non distended  EXTREMITIES:  No edema, no clubbing, cyanosis, Lt femoral A-line, Rt Shiley femoral    MSK: 0/5 strength bilateral upper and lower extremities, Left hip scar/band, Blue hands/toes   NEURO: lethargic       LABS:    CBC:                          9.5    33.8  )-----------( 92       ( 2019 03:30 )             30.6           BMP:        140  |  102  |  129<H>  ----------------------------<  122<H>  4.6   |  17<L>  |  5.79<H>      136  |  99  |  148<H>  ----------------------------<  220<H>  5.1   |  18<L>  |  6.27<H>      136  |  99  |  127<H>  ----------------------------<  175<H>  4.8   |  18<L>  |  6.18<H>    Ca    8.6      2019 03:30  Ca    8.2<L>      2019 06:04  Ca    8.5      2019 06:11  Phos  8.1       Phos  8.3       Phos  8.5       Mg     2.3       Mg     2.5       Mg     2.5         TPro  4.9<L>  /  Alb  1.8<L>  /  TBili  20.5<H>  /  DBili  x   /  AST  66<H>  /  ALT  64<H>  /  AlkPhos  166<H>    TPro  4.4<L>  /  Alb  1.6<L>  /  TBili  16.8<H>  /  DBili  x   /  AST  57<H>  /  ALT  61<H>  /  AlkPhos  133<H>    TPro  4.6<L>  /  Alb  1.7<L>  /  TBili  16.7<H>  /  DBili  x   /  AST  48<H>  /  ALT  72<H>  /  AlkPhos  136<H>      < from: Xray Chest 1 View- PORTABLE-Routine (19 @ 14:55) >  EXAM:  XR CHEST PORTABLE ROUTINE 1V                            PROCEDURE DATE:  2019          INTERPRETATION:  CHEST X-RAY:    CLINICAL HISTORY: 79 years  Female with pneumonia.    COMPARISON: 2019    AP view of the chest demonstrates a developing right lower lobe   infiltrate with a small right pleural effusion. Left basilar discoid   atelectasis is unchanged. There is no left pleural effusion.    The heart, mediastinum and jenny cannot be assessed due to rotation. The   pulmonary vasculature is normal. There is no pneumothorax.    The tip of the NG tube is below the diaphragm. A left IJ catheter is   reidentified. Position cannot be assessed due to rotation.    Mild thoracic degenerative changes are present.    IMPRESSION:    Developing right lower lobe pneumonia with small right pleural effusion.    Left central line and NG tube in situ.      < end of copied text >

## 2019-01-09 NOTE — PROGRESS NOTE ADULT - SUBJECTIVE AND OBJECTIVE BOX
INTERVAL HPI/OVERNIGHT EVENTS: Pt had dialysis last evening. Lethargic without sedative meds.     PRESSORS: [ ] YES [x] NO    ANTIBIOTICS: Zyvox                           DATE STARTED: 12/31/18  ANTIBIOTICS: Meropenem                 DATE STARTED: 12/31/18    Antimicrobial:  linezolid  IVPB 600 milliGRAM(s) IV Intermittent every 12 hours  meropenem  IVPB 500 milliGRAM(s) IV Intermittent every 12 hours    Cardiovascular:  digoxin  Injectable 0.125 milliGRAM(s) IV Push every other day    Hematalogic:  heparin  Injectable 5000 Unit(s) SubCutaneous every 12 hours    Other:  aluminum hydroxide Suspension 30 milliLiter(s) Oral three times a day  ascorbic acid 500 milliGRAM(s) Oral two times a day  calcium acetate 667 milliGRAM(s) Oral three times a day with meals  chlorhexidine 4% Liquid 1 Application(s) Topical two times a day  cyanocobalamin Injectable 1000 MICROGram(s) SubCutaneous daily  epoetin monique Injectable 6000 Unit(s) IV Push <User Schedule>  folic acid 1 milliGRAM(s) Oral daily  HYDROmorphone  Injectable 0.5 milliGRAM(s) IV Push every 4 hours PRN  insulin glargine Injectable (LANTUS) 10 Unit(s) SubCutaneous at bedtime  insulin lispro (HumaLOG) corrective regimen sliding scale   SubCutaneous every 6 hours  ondansetron Injectable 4 milliGRAM(s) IV Push every 6 hours PRN  pantoprazole  Injectable 40 milliGRAM(s) IV Push daily    aluminum hydroxide Suspension 30 milliLiter(s) Oral three times a day  ascorbic acid 500 milliGRAM(s) Oral two times a day  calcium acetate 667 milliGRAM(s) Oral three times a day with meals  chlorhexidine 4% Liquid 1 Application(s) Topical two times a day  cyanocobalamin Injectable 1000 MICROGram(s) SubCutaneous daily  digoxin  Injectable 0.125 milliGRAM(s) IV Push every other day  epoetin monique Injectable 6000 Unit(s) IV Push <User Schedule>  folic acid 1 milliGRAM(s) Oral daily  heparin  Injectable 5000 Unit(s) SubCutaneous every 12 hours  HYDROmorphone  Injectable 0.5 milliGRAM(s) IV Push every 4 hours PRN  insulin glargine Injectable (LANTUS) 10 Unit(s) SubCutaneous at bedtime  insulin lispro (HumaLOG) corrective regimen sliding scale   SubCutaneous every 6 hours  linezolid  IVPB 600 milliGRAM(s) IV Intermittent every 12 hours  meropenem  IVPB 500 milliGRAM(s) IV Intermittent every 12 hours  ondansetron Injectable 4 milliGRAM(s) IV Push every 6 hours PRN  pantoprazole  Injectable 40 milliGRAM(s) IV Push daily    Drug Dosing Weight  Height (cm): 154.94 (28 Dec 2018 01:00)  Weight (kg): 82.1 (28 Dec 2018 01:00)  BMI (kg/m2): 34.2 (28 Dec 2018 01:00)  BSA (m2): 1.81 (28 Dec 2018 01:00)    CENTRAL LINE: [x] YES [ ] NO  LOCATION:   DATE INSERTED:    DE SOUZA: [x] YES [ ] NO    DATE INSERTED:    A-LINE:  [ ] YES [x] NO  LOCATION:   DATE INSERTED:    ICU Vital Signs Last 24 Hrs  T(C): 37.2 (09 Jan 2019 04:28), Max: 37.2 (08 Jan 2019 14:45)  T(F): 98.9 (09 Jan 2019 04:28), Max: 98.9 (08 Jan 2019 14:45)  HR: 94 (09 Jan 2019 07:00) (86 - 113)  BP: 118/44 (09 Jan 2019 07:00) (95/33 - 130/92)  BP(mean): 62 (09 Jan 2019 07:00) (46 - 103)  ABP: --  ABP(mean): --  RR: 28 (09 Jan 2019 07:00) (21 - 31)  SpO2: 79% (09 Jan 2019 07:00) (60% - 100%)      ABG - ( 08 Jan 2019 03:56 )  pH, Arterial: 7.40  pH, Blood: x     /  pCO2: 28    /  pO2: 88    / HCO3: 17    / Base Excess: -7.0  /  SaO2: 96                    01-08 @ 07:01  -  01-09 @ 07:00  --------------------------------------------------------  IN: 950 mL / OUT: 295 mL / NET: 655 mL        Mode: AC/ CMV (Assist Control/ Continuous Mandatory Ventilation)  RR (machine): 16  TV (machine): 450  FiO2: 30  PEEP: 5  PS:   ITime: 1  MAP: 10  PIP: 23      PHYSICAL EXAM:    GENERAL: Well developed, Well nourished white female, sedated  HEENT:  Normocephalic/Atraumatic, reactive light reflex, moist mucous membranes, Scleral icterus   NECK: Supple, no JVD, LIJ line  RESP: Symmetric movement of the chest, clear to auscultation bilaterally, diffuse bilateral basal rales   CVS: Irregular, S1 and S2 audible, no murmur  GI: Hypoactive bowel sounds present, abdomen soft, non tender, non distended  EXTREMITIES:  No edema, no clubbing, cyanosis, Rt Shiley femoral    MSK: 0/5 strength bilateral upper and lower extremities, Left hip scar/band, Blue hands/toes   PSYCH: Sedated  NEURO: Alert and oriented x 1    LABS:  CBC Full  -  ( 09 Jan 2019 03:30 )  WBC Count : 33.8 K/uL  Hemoglobin : 9.5 g/dL  Hematocrit : 30.6 %  Platelet Count - Automated : 92 K/uL  Mean Cell Volume : 65.6 fl  Mean Cell Hemoglobin : 20.4 pg  Mean Cell Hemoglobin Concentration : 31.2 gm/dL  Auto Neutrophil # : 30.2 K/uL  Auto Lymphocyte # : 1.3 K/uL  Auto Monocyte # : 2.0 K/uL  Auto Eosinophil # : 0.1 K/uL  Auto Basophil # : 0.2 K/uL  Auto Neutrophil % : 89.5 %  Auto Lymphocyte % : 3.9 %  Auto Monocyte % : 6.0 %  Auto Eosinophil % : 0.2 %  Auto Basophil % : 0.5 %    01-09    140  |  102  |  129<H>  ----------------------------<  122<H>  4.6   |  17<L>  |  5.79<H>    Ca    8.6      09 Jan 2019 03:30  Phos  8.1     01-09  Mg     2.3     01-09    TPro  4.9<L>  /  Alb  1.8<L>  /  TBili  20.5<H>  /  DBili  x   /  AST  66<H>  /  ALT  64<H>  /  AlkPhos  166<H>  01-09        [ ]GOALS OF CARE DISCUSSION WITH PATIENT/FAMILY/PROXY: DNR only    CRITICAL CARE TIME SPENT: 35 minutes INTERVAL HPI/OVERNIGHT EVENTS: Pt had dialysis last evening. Lethargic without sedative meds. Failed SBT today.     PRESSORS: [ ] YES [x] NO    ANTIBIOTICS: Zyvox                           DATE STARTED: 12/31/18  ANTIBIOTICS: Meropenem                 DATE STARTED: 12/31/18    Antimicrobial:  linezolid  IVPB 600 milliGRAM(s) IV Intermittent every 12 hours  meropenem  IVPB 500 milliGRAM(s) IV Intermittent every 12 hours    Cardiovascular:  digoxin  Injectable 0.125 milliGRAM(s) IV Push every other day    Hematalogic:  heparin  Injectable 5000 Unit(s) SubCutaneous every 12 hours    Other:  aluminum hydroxide Suspension 30 milliLiter(s) Oral three times a day  ascorbic acid 500 milliGRAM(s) Oral two times a day  calcium acetate 667 milliGRAM(s) Oral three times a day with meals  chlorhexidine 4% Liquid 1 Application(s) Topical two times a day  cyanocobalamin Injectable 1000 MICROGram(s) SubCutaneous daily  epoetin monique Injectable 6000 Unit(s) IV Push <User Schedule>  folic acid 1 milliGRAM(s) Oral daily  HYDROmorphone  Injectable 0.5 milliGRAM(s) IV Push every 4 hours PRN  insulin glargine Injectable (LANTUS) 10 Unit(s) SubCutaneous at bedtime  insulin lispro (HumaLOG) corrective regimen sliding scale   SubCutaneous every 6 hours  ondansetron Injectable 4 milliGRAM(s) IV Push every 6 hours PRN  pantoprazole  Injectable 40 milliGRAM(s) IV Push daily    aluminum hydroxide Suspension 30 milliLiter(s) Oral three times a day  ascorbic acid 500 milliGRAM(s) Oral two times a day  calcium acetate 667 milliGRAM(s) Oral three times a day with meals  chlorhexidine 4% Liquid 1 Application(s) Topical two times a day  cyanocobalamin Injectable 1000 MICROGram(s) SubCutaneous daily  digoxin  Injectable 0.125 milliGRAM(s) IV Push every other day  epoetin omnique Injectable 6000 Unit(s) IV Push <User Schedule>  folic acid 1 milliGRAM(s) Oral daily  heparin  Injectable 5000 Unit(s) SubCutaneous every 12 hours  HYDROmorphone  Injectable 0.5 milliGRAM(s) IV Push every 4 hours PRN  insulin glargine Injectable (LANTUS) 10 Unit(s) SubCutaneous at bedtime  insulin lispro (HumaLOG) corrective regimen sliding scale   SubCutaneous every 6 hours  linezolid  IVPB 600 milliGRAM(s) IV Intermittent every 12 hours  meropenem  IVPB 500 milliGRAM(s) IV Intermittent every 12 hours  ondansetron Injectable 4 milliGRAM(s) IV Push every 6 hours PRN  pantoprazole  Injectable 40 milliGRAM(s) IV Push daily    Drug Dosing Weight  Height (cm): 154.94 (28 Dec 2018 01:00)  Weight (kg): 82.1 (28 Dec 2018 01:00)  BMI (kg/m2): 34.2 (28 Dec 2018 01:00)  BSA (m2): 1.81 (28 Dec 2018 01:00)    CENTRAL LINE: [x] YES [ ] NO  LOCATION:   DATE INSERTED:    DE SOUZA: [x] YES [ ] NO    DATE INSERTED:    A-LINE:  [ ] YES [x] NO  LOCATION:   DATE INSERTED:    ICU Vital Signs Last 24 Hrs  T(C): 37.2 (09 Jan 2019 04:28), Max: 37.2 (08 Jan 2019 14:45)  T(F): 98.9 (09 Jan 2019 04:28), Max: 98.9 (08 Jan 2019 14:45)  HR: 94 (09 Jan 2019 07:00) (86 - 113)  BP: 118/44 (09 Jan 2019 07:00) (95/33 - 130/92)  BP(mean): 62 (09 Jan 2019 07:00) (46 - 103)  ABP: --  ABP(mean): --  RR: 28 (09 Jan 2019 07:00) (21 - 31)  SpO2: 79% (09 Jan 2019 07:00) (60% - 100%)      ABG - ( 08 Jan 2019 03:56 )  pH, Arterial: 7.40  pH, Blood: x     /  pCO2: 28    /  pO2: 88    / HCO3: 17    / Base Excess: -7.0  /  SaO2: 96                    01-08 @ 07:01  -  01-09 @ 07:00  --------------------------------------------------------  IN: 950 mL / OUT: 295 mL / NET: 655 mL        Mode: AC/ CMV (Assist Control/ Continuous Mandatory Ventilation)  RR (machine): 16  TV (machine): 450  FiO2: 30  PEEP: 5  PS:   ITime: 1  MAP: 10  PIP: 23      PHYSICAL EXAM:    GENERAL: Well developed, Well nourished white female, sedated  HEENT:  Normocephalic/Atraumatic, reactive light reflex, moist mucous membranes, Scleral icterus   NECK: Supple, no JVD, LIJ line  RESP: Symmetric movement of the chest, clear to auscultation bilaterally, diffuse bilateral basal rales   CVS: Irregular, S1 and S2 audible, no murmur  GI: Hypoactive bowel sounds present, abdomen soft, non tender, non distended  EXTREMITIES:  No edema, no clubbing, cyanosis, Rt Shiley femoral    MSK: 0/5 strength bilateral upper and lower extremities, Left hip scar/band, Blue hands/toes   PSYCH: Sedated  NEURO: Alert and oriented x 1    LABS:  CBC Full  -  ( 09 Jan 2019 03:30 )  WBC Count : 33.8 K/uL  Hemoglobin : 9.5 g/dL  Hematocrit : 30.6 %  Platelet Count - Automated : 92 K/uL  Mean Cell Volume : 65.6 fl  Mean Cell Hemoglobin : 20.4 pg  Mean Cell Hemoglobin Concentration : 31.2 gm/dL  Auto Neutrophil # : 30.2 K/uL  Auto Lymphocyte # : 1.3 K/uL  Auto Monocyte # : 2.0 K/uL  Auto Eosinophil # : 0.1 K/uL  Auto Basophil # : 0.2 K/uL  Auto Neutrophil % : 89.5 %  Auto Lymphocyte % : 3.9 %  Auto Monocyte % : 6.0 %  Auto Eosinophil % : 0.2 %  Auto Basophil % : 0.5 %    01-09    140  |  102  |  129<H>  ----------------------------<  122<H>  4.6   |  17<L>  |  5.79<H>    Ca    8.6      09 Jan 2019 03:30  Phos  8.1     01-09  Mg     2.3     01-09    TPro  4.9<L>  /  Alb  1.8<L>  /  TBili  20.5<H>  /  DBili  x   /  AST  66<H>  /  ALT  64<H>  /  AlkPhos  166<H>  01-09        [ ]GOALS OF CARE DISCUSSION WITH PATIENT/FAMILY/PROXY: DNR only    CRITICAL CARE TIME SPENT: 35 minutes INTERVAL HPI/OVERNIGHT EVENTS: Pt had dialysis last evening. Lethargic without sedative meds. Failed SBT today.     PRESSORS: [ ] YES [x] NO    ANTIBIOTICS: Zyvox                           DATE STARTED: 12/31/18  ANTIBIOTICS: Meropenem                 DATE STARTED: 12/31/18    Antimicrobial:  linezolid  IVPB 600 milliGRAM(s) IV Intermittent every 12 hours  meropenem  IVPB 500 milliGRAM(s) IV Intermittent every 12 hours    Cardiovascular:  digoxin  Injectable 0.125 milliGRAM(s) IV Push every other day    Hematalogic:  heparin  Injectable 5000 Unit(s) SubCutaneous every 12 hours    Other:  aluminum hydroxide Suspension 30 milliLiter(s) Oral three times a day  ascorbic acid 500 milliGRAM(s) Oral two times a day  calcium acetate 667 milliGRAM(s) Oral three times a day with meals  chlorhexidine 4% Liquid 1 Application(s) Topical two times a day  cyanocobalamin Injectable 1000 MICROGram(s) SubCutaneous daily  epoetin monique Injectable 6000 Unit(s) IV Push <User Schedule>  folic acid 1 milliGRAM(s) Oral daily  HYDROmorphone  Injectable 0.5 milliGRAM(s) IV Push every 4 hours PRN  insulin glargine Injectable (LANTUS) 10 Unit(s) SubCutaneous at bedtime  insulin lispro (HumaLOG) corrective regimen sliding scale   SubCutaneous every 6 hours  ondansetron Injectable 4 milliGRAM(s) IV Push every 6 hours PRN  pantoprazole  Injectable 40 milliGRAM(s) IV Push daily    aluminum hydroxide Suspension 30 milliLiter(s) Oral three times a day  ascorbic acid 500 milliGRAM(s) Oral two times a day  calcium acetate 667 milliGRAM(s) Oral three times a day with meals  chlorhexidine 4% Liquid 1 Application(s) Topical two times a day  cyanocobalamin Injectable 1000 MICROGram(s) SubCutaneous daily  digoxin  Injectable 0.125 milliGRAM(s) IV Push every other day  epoetin monique Injectable 6000 Unit(s) IV Push <User Schedule>  folic acid 1 milliGRAM(s) Oral daily  heparin  Injectable 5000 Unit(s) SubCutaneous every 12 hours  HYDROmorphone  Injectable 0.5 milliGRAM(s) IV Push every 4 hours PRN  insulin glargine Injectable (LANTUS) 10 Unit(s) SubCutaneous at bedtime  insulin lispro (HumaLOG) corrective regimen sliding scale   SubCutaneous every 6 hours  linezolid  IVPB 600 milliGRAM(s) IV Intermittent every 12 hours  meropenem  IVPB 500 milliGRAM(s) IV Intermittent every 12 hours  ondansetron Injectable 4 milliGRAM(s) IV Push every 6 hours PRN  pantoprazole  Injectable 40 milliGRAM(s) IV Push daily    Drug Dosing Weight  Height (cm): 154.94 (28 Dec 2018 01:00)  Weight (kg): 82.1 (28 Dec 2018 01:00)  BMI (kg/m2): 34.2 (28 Dec 2018 01:00)  BSA (m2): 1.81 (28 Dec 2018 01:00)    CENTRAL LINE: [x] YES [ ] NO  LOCATION:   DATE INSERTED:    DE SOUZA: [x] YES [ ] NO    DATE INSERTED:    A-LINE:  [ ] YES [x] NO  LOCATION:   DATE INSERTED:    ICU Vital Signs Last 24 Hrs  T(C): 37.2 (09 Jan 2019 04:28), Max: 37.2 (08 Jan 2019 14:45)  T(F): 98.9 (09 Jan 2019 04:28), Max: 98.9 (08 Jan 2019 14:45)  HR: 94 (09 Jan 2019 07:00) (86 - 113)  BP: 118/44 (09 Jan 2019 07:00) (95/33 - 130/92)  BP(mean): 62 (09 Jan 2019 07:00) (46 - 103)  ABP: --  ABP(mean): --  RR: 28 (09 Jan 2019 07:00) (21 - 31)  SpO2: 79% (09 Jan 2019 07:00) (60% - 100%)      ABG - ( 08 Jan 2019 03:56 )  pH, Arterial: 7.40  pH, Blood: x     /  pCO2: 28    /  pO2: 88    / HCO3: 17    / Base Excess: -7.0  /  SaO2: 96                    01-08 @ 07:01  -  01-09 @ 07:00  --------------------------------------------------------  IN: 950 mL / OUT: 295 mL / NET: 655 mL        Mode: AC/ CMV (Assist Control/ Continuous Mandatory Ventilation)  RR (machine): 16  TV (machine): 450  FiO2: 30  PEEP: 5  PS:   ITime: 1  MAP: 10  PIP: 23      PHYSICAL EXAM:    GENERAL: Well developed, Well nourished white female, sedated  HEENT:  Normocephalic/Atraumatic, reactive light reflex, moist mucous membranes, Scleral icterus   NECK: Supple, no JVD, LIJ line  RESP: Symmetric movement of the chest, clear to auscultation bilaterally, diffuse bilateral basal rales   CVS: Irregular, S1 and S2 audible, no murmur  GI: Hypoactive bowel sounds present, abdomen soft, non tender, non distended  EXTREMITIES:  No edema, no clubbing, cyanosis, Rt Shiley femoral    MSK: 0/5 strength bilateral upper and lower extremities, Left hip scar/band, Blue hands/toes   PSYCH: Sedated  NEURO: Alert and oriented x 1    LABS:  CBC Full  -  ( 09 Jan 2019 03:30 )  WBC Count : 33.8 K/uL  Hemoglobin : 9.5 g/dL  Hematocrit : 30.6 %  Platelet Count - Automated : 92 K/uL  Mean Cell Volume : 65.6 fl  Mean Cell Hemoglobin : 20.4 pg  Mean Cell Hemoglobin Concentration : 31.2 gm/dL  Auto Neutrophil # : 30.2 K/uL  Auto Lymphocyte # : 1.3 K/uL  Auto Monocyte # : 2.0 K/uL  Auto Eosinophil # : 0.1 K/uL  Auto Basophil # : 0.2 K/uL  Auto Neutrophil % : 89.5 %  Auto Lymphocyte % : 3.9 %  Auto Monocyte % : 6.0 %  Auto Eosinophil % : 0.2 %  Auto Basophil % : 0.5 %    01-09    140  |  102  |  129<H>  ----------------------------<  122<H>  4.6   |  17<L>  |  5.79<H>    Ca    8.6      09 Jan 2019 03:30  Phos  8.1     01-09  Mg     2.3     01-09    TPro  4.9<L>  /  Alb  1.8<L>  /  TBili  20.5<H>  /  DBili  x   /  AST  66<H>  /  ALT  64<H>  /  AlkPhos  166<H>  01-09        [ ]GOALS OF CARE DISCUSSION WITH PATIENT/FAMILY/PROXY: DNR onl

## 2019-01-09 NOTE — CONSULT NOTE ADULT - CONSULT REASON
acute respiratory failure s/p hip fx, multiorgan failure - GOC
Left hip IT fx
Post op monitoring s/p hip surgery
Pre-op clearance
Shock Liver
acidosis and ellen
cyanosis of toes on bilateral lower extremity
distal toe discolorations
post op pain
septic shock /ab rx
coagulopathy

## 2019-01-09 NOTE — PROGRESS NOTE ADULT - ASSESSMENT
1.MARIA G due to ATN from hypotensive and septic  shock with multiorgan failure. There is no indication for emergent HD today  -pts renal status is poor with minimal u/o and egfr is unchanged -no recovery yet ,u/o mildly better   -Adjust meds to eGFR and avoid IV Gadolinium contrast, NSAIDs, and phosphate enema.  -Monitor I/O's daily.   -Monitor SMA daily.  -s/p HD yesterday     2. Acidosis ;secondary to shock/renal failure  -f/u co2 daily  -keep PH >7.3    3. Hyperkalemia: secondary to maria g, now improved   -keep k >4 and <5  -f/u k level daily  - low k diet    4. Multiorgan failure: plan as per MICU. pt has poor prognosis.    5. Anemia:multifactorial  -c/w epogen 6000units in hd tiw  -f/u Hb daily    6.Hyperphosphatemia: worsening , secondary to maria g  -f/u phos level daily  -c/w  phoslo 667mg tid   -add amphogel 30mg tid   -continue hd 1.MARIA G due to ATN from hypotensive and septic  shock with multiorgan failure. There is no indication for emergent HD today  -pts renal status is poor with minimal u/o and egfr is unchanged -no recovery yet ,u/o mildly better   -Adjust meds to eGFR and avoid IV Gadolinium contrast, NSAIDs, and phosphate enema.  -Monitor I/O's daily.   -Monitor SMA daily.  -s/p HD yesterday with 1290ml fluid removed       2. Acidosis ;secondary to shock/renal failure  -f/u co2 daily  -keep PH >7.3    3. Hyperkalemia: secondary to maria g, now improved   -keep k >4 and <5  -f/u k level daily  - low k diet    4. Multiorgan failure: plan as per MICU. pt has poor prognosis.    5. Anemia:multifactorial  -c/w epogen 6000units in hd tiw  -f/u Hb daily    6.Hyperphosphatemia: worsening , secondary to maria g  -f/u phos level daily  -c/w  phoslo 667mg tid   -add amphogel 30mg tid   -continue hd 1.MARIA G due to ATN from hypotensive and septic  shock with multiorgan failure. There is no indication for emergent HD today  -pts renal status is poor with minimal u/o and egfr is unchanged -no recovery yet ,u/o still poor  -Adjust meds to eGFR and avoid IV Gadolinium contrast, NSAIDs, and phosphate enema.  -Monitor I/O's daily.   -Monitor SMA daily.  -s/p HD yesterday with 1290ml fluid removed   -we will plan for hd tomorrow      2. Acidosis ;secondary to shock/renal failure  -f/u co2 daily  -keep PH >7.3    3. Hyperkalemia: secondary to maria g, now improved   -keep k >4 and <5  -f/u k level daily  - low k diet    4. Multiorgan failure: plan as per MICU. pt has poor prognosis.    5. Anemia:multifactorial  -c/w epogen 6000units in hd tiw  -f/u Hb daily    6.Hyperphosphatemia: worsening , secondary to maria g  -f/u phos level daily  -c/w  phoslo 667mg tid   -continue  amphogel 30mg tid   -continue hd

## 2019-01-09 NOTE — CONSULT NOTE ADULT - PROVIDER SPECIALTY LIST ADULT
Cardiology
Critical Care
Gastroenterology
Infectious Disease
Nephrology
Orthopedics
Pain Medicine
Palliative Care
Podiatry
Vascular Surgery
Heme/Onc

## 2019-01-09 NOTE — PROGRESS NOTE ADULT - PROBLEM SELECTOR PROBLEM 9
Pharmacy faxed refill request     Sudafed 30 mg 1-2 tablets every morning as needed  Qty 120 w/ 1 refill  Last filled 7/5/17   Diabetes mellitus

## 2019-01-09 NOTE — PROGRESS NOTE ADULT - SUBJECTIVE AND OBJECTIVE BOX
CHIEF COMPLAINT:Patient is a 79y old  Female who presents with a chief complaint of fall .Pt remains intubated.    	  REVIEW OF SYSTEMS:  [x ] Unable to obtain    PHYSICAL EXAM:  T(C): 36.9 (01-09-19 @ 08:00), Max: 37.2 (01-08-19 @ 14:45)  HR: 97 (01-09-19 @ 10:00) (90 - 113)  BP: 127/52 (01-09-19 @ 10:00) (95/33 - 130/92)  RR: 29 (01-09-19 @ 10:00) (21 - 32)  SpO2: 100% (01-09-19 @ 10:00) (60% - 100%)  Wt(kg): --  I&O's Summary    08 Jan 2019 07:01  -  09 Jan 2019 07:00  --------------------------------------------------------  IN: 950 mL / OUT: 295 mL / NET: 655 mL    09 Jan 2019 07:01  -  09 Jan 2019 10:48  --------------------------------------------------------  IN: 60 mL / OUT: 0 mL / NET: 60 mL        Appearance: Normal	  HEENT:   Normal oral mucosa, PERRL, EOMI	  Lymphatic: No lymphadenopathy  Cardiovascular: Normal S1 S2, No JVD, No murmurs, No edema  Respiratory: B/L ronchi  Gastrointestinal:  Soft, Non-tender, + BS	  Skin: No rashes, No ecchymoses, No cyanosis	  Extremities: Normal range of motion, No clubbing, cyanosis or edema      MEDICATIONS  (STANDING):  aluminum hydroxide Suspension 30 milliLiter(s) Oral three times a day  ascorbic acid 500 milliGRAM(s) Oral two times a day  calcium acetate 667 milliGRAM(s) Oral three times a day with meals  chlorhexidine 4% Liquid 1 Application(s) Topical two times a day  cyanocobalamin Injectable 1000 MICROGram(s) SubCutaneous daily  digoxin  Injectable 0.125 milliGRAM(s) IV Push every other day  epoetin monique Injectable 6000 Unit(s) IV Push <User Schedule>  folic acid 1 milliGRAM(s) Oral daily  heparin  Injectable 5000 Unit(s) SubCutaneous every 12 hours  insulin glargine Injectable (LANTUS) 10 Unit(s) SubCutaneous at bedtime  insulin lispro (HumaLOG) corrective regimen sliding scale   SubCutaneous every 6 hours  linezolid  IVPB 600 milliGRAM(s) IV Intermittent every 12 hours  meropenem  IVPB 500 milliGRAM(s) IV Intermittent every 12 hours  pantoprazole  Injectable 40 milliGRAM(s) IV Push daily      LABS:	 	                        9.5    33.8  )-----------( 92       ( 09 Jan 2019 03:30 )             30.6     01-09    140  |  102  |  129<H>  ----------------------------<  122<H>  4.6   |  17<L>  |  5.79<H>    Ca    8.6      09 Jan 2019 03:30  Phos  8.1     01-09  Mg     2.3     01-09    TPro  4.9<L>  /  Alb  1.8<L>  /  TBili  20.5<H>  /  DBili  x   /  AST  66<H>  /  ALT  64<H>  /  AlkPhos  166<H>  01-09      Lipid Profile: Cholesterol --  LDL --  HDL --    Cholesterol 82  LDL 22  HDL 40      HgA1c: Hemoglobin A1C, Whole Blood: 6.1 % (12-27 @ 09:51)  Hemoglobin A1C, Whole Blood: 6.2 % (12-26 @ 23:12)    TSH: Thyroid Stimulating Hormone, Serum: 1.19 uU/mL (12-27 @ 06:50)

## 2019-01-09 NOTE — CONSULT NOTE ADULT - PROBLEM SELECTOR PROBLEM 1
Acute post-operative pain
Closed fracture of left hip, initial encounter
Acute respiratory failure with hypoxia and hypercapnia

## 2019-01-09 NOTE — PROGRESS NOTE ADULT - PROBLEM SELECTOR PLAN 2
CXR: Bilateral basal pneumonia and diffuse congestion  Intubated and sedated  C/w Zyvox and meropenem for 3 more days  Blood Cx: NTD

## 2019-01-09 NOTE — CONSULT NOTE ADULT - PROBLEM SELECTOR RECOMMENDATION 9
due to pt's unable to recover from encephalopathy and septic/cardiogenic shock - pt is still after 2 weeks unable to safely wean off the ventilator. Occ has shown ability to tolerate some SBT but unable to sustain long enough to wean.

## 2019-01-09 NOTE — CONSULT NOTE ADULT - CONSULT REQUESTED DATE/TIME
09-Jan-2019 15:22
02-Jan-2019 20:07
26-Dec-2018 16:02
27-Dec-2018 10:53
27-Dec-2018 17:46
28-Dec-2018 16:15
29-Dec-2018 13:43
29-Dec-2018 16:58
30-Dec-2018 15:55
31-Dec-2018
30-Dec-2018 16:52

## 2019-01-09 NOTE — PROGRESS NOTE ADULT - PROBLEM SELECTOR PLAN 1
Multiorgan involvement and severe sepsis  Poor mental status  CT head today -neuro assessment on spontaneous breathing  -proceeding toward multiorgan failure, palliative consulted

## 2019-01-09 NOTE — CONSULT NOTE ADULT - PROBLEM SELECTOR RECOMMENDATION 3
- continue with metoprolol 25 bid  - resume aspirin from tomorrow.
and cardiogenic shock - aspiration and bilateral lobar pneumonia - despite antibiotics and off pressors - pt is not recovering well - recurrent pneumonia and leukocytosis.

## 2019-01-09 NOTE — CONSULT NOTE ADULT - CONSULT REQUESTED BY NAME
Claribel Valencia
Dr. Castillo
Dr. Hayden/ Dr. Bruce
Dr. Perez
Dr. Valencia
Dr. Valencia
Medicine
dr guzman
dr. turner
[facility] for [place]
[place]

## 2019-01-10 NOTE — PROGRESS NOTE ADULT - ASSESSMENT
79 y/o F pt with PMHX of  HTN, DM2, who presents to ED c/o L hip pain s/p trip and fall from a curb, SVT and severe lv dysfunction,post-op septic shock,MARIA G,LLL pneumonia,shock liver, not responsive..  1.Palliative f/u.  2.Pt still unresponsive, consider neurology eval and EEG.  3.D/W pt's family at bedside.

## 2019-01-10 NOTE — PROGRESS NOTE ADULT - PROBLEM SELECTOR PLAN 2
likely due to severe septic and cardiogenic shock in setting of advanced CHF.  Despite aggressive medical management in the ICU, pt appears to have suffered anoxic encephalopathy due to poor perfusion. CT head did not show any gross pathology. likely due to severe septic and cardiogenic shock in setting of advanced CHF.  Despite aggressive medical management in the ICU, pt appears to have suffered anoxic encephalopathy due to poor perfusion. CT head did not show any gross pathology.  Hypoxia following extubation may contribute to worsening mental status.  Ativan 2 mg IV q 1 hr PRN (agitation) ordered.

## 2019-01-10 NOTE — PROGRESS NOTE ADULT - PROBLEM SELECTOR PLAN 3
Multiorgan failure with Anuria and lactic acidosis   Rt Femoral Shiley  Stopped dialysis for comfort care  Poor urine output   Dr Lay on board

## 2019-01-10 NOTE — PROGRESS NOTE ADULT - SUBJECTIVE AND OBJECTIVE BOX
79yFemale    Diagnosis:  S/p Left Hip IM Nailing POD#14    Patient was seen and evaluated at bedside. Patient intubated,  Patients family has decided to proceed with palliative care.    Vital Signs Last 24 Hrs  T(C): 37 (10 Boby 2019 04:00), Max: 37.4 (10 Boby 2019 00:00)  T(F): 98.6 (10 Boby 2019 04:00), Max: 99.4 (10 Boby 2019 00:00)  HR: 88 (10 Boby 2019 08:16) (85 - 107)  BP: 117/48 (10 Boby 2019 07:00) (98/32 - 138/53)  BP(mean): 63 (10 Boby 2019 07:00) (49 - 74)  RR: 20 (10 Boby 2019 07:00) (20 - 29)  SpO2: 99% (10 Boby 2019 08:16) (77% - 100%)  I&O's Detail    09 Jan 2019 07:01  -  10 Boby 2019 07:00  --------------------------------------------------------  IN:    Enteral Tube Flush: 200 mL    Nepro with Carb Steady: 200 mL    Solution: 600 mL    Solution: 150 mL  Total IN: 1150 mL    OUT:    Indwelling Catheter - Urethral: 420 mL  Total OUT: 420 mL    Total NET: 730 mL          Physical Exam:    General: Intubated, resting in bed    Left Hip:  Dressing is with minimal serosanguinous staining. Skin is pink and warm. Staples intact. No erythema.  Wound with no drainage  Lower extremity:  All toes gangrenous b/l. Palpable DP pulses                          9.5    33.8  )-----------( 92       ( 09 Jan 2019 03:30 )             30.6     01-09    140  |  102  |  129<H>  ----------------------------<  122<H>  4.6   |  17<L>  |  5.79<H>    Ca    8.6      09 Jan 2019 03:30  Phos  8.1     01-09  Mg     2.3     01-09    TPro  4.9<L>  /  Alb  1.8<L>  /  TBili  20.5<H>  /  DBili  x   /  AST  66<H>  /  ALT  64<H>  /  AlkPhos  166<H>  01-09      Impression:  79yFemale S/p Left Hip IM Nailing POD#14  Plan:  -  Pain management  -  Dvt prophylaxis  -  Daily Physical Therapy:  WBAT of the left lower extremity  -  Patient is to be palliatively extubated today - as per palliative care note  -  Care as per ICU  -  No further orthopedic intervention necessary  -  Case d/w DR. Bruce

## 2019-01-10 NOTE — PROGRESS NOTE ADULT - SUBJECTIVE AND OBJECTIVE BOX
MARICEL SLAUGHTER  MR# [id number]  79yFemale        Patient is a 79y old  Female who presents with a chief complaint of fall (10 Boby 2019 18:42)      INTERVAL HPI/OVERNIGHT EVENTS:  Patient seen and examined at bedside. No notations of chest pain, palpitation, SOB, orthopnea, nausea, vomiting or abdominal pain.    ALLERGIES  No Known Allergies      MEDICATIONS  acetaminophen  Suppository .. 650 milliGRAM(s) Rectal every 6 hours PRN Temp greater or equal to 38C (100.4F)  chlorhexidine 4% Liquid 1 Application(s) Topical two times a day  glycopyrrolate Injectable 0.4 milliGRAM(s) IV Push every 4 hours PRN secretions  HYDROmorphone  Injectable 1 milliGRAM(s) IV Push every 30 minutes PRN respiratory distress  LORazepam   Injectable 2 milliGRAM(s) IV Push every 1 hour PRN Agitation              REVIEW OF SYSTEMS:  CONSTITUTIONAL: No fever, weight loss, or fatigue  EYES: No eye pain, visual disturbances, or discharge  ENT:  No difficulty hearing, tinnitus, vertigo; No sinus or throat pain  NECK: No pain or stiffness  RESPIRATORY: No cough, wheezing, chills or hemoptysis; No Shortness of Breath  CARDIOVASCULAR: No chest pain, palpitations, passing out, dizziness, or leg swelling  GASTROINTESTINAL: No abdominal or epigastric pain. No nausea, vomiting, or hematemesis; No diarrhea or constipation. No melena or hematochezia.  GENITOURINARY: No dysuria, frequency, hematuria, or incontinence  NEUROLOGICAL: No headaches, memory loss, loss of strength, numbness, or tremors  SKIN: No itching, burning, rashes, or lesions   LYMPH Nodes: No enlarged glands  ENDOCRINE: No heat or cold intolerance; No hair loss  MUSCULOSKELETAL: No joint pain or swelling; No muscle, back, or extremity pain  PSYCHIATRIC: No depression, anxiety, mood swings, or difficulty sleeping  HEME/LYMPH: No easy bruising, or bleeding gums  ALLERGY AND IMMUNOLOGIC: No hives or eczema	    [ ] All others negative	  [ ] Unable to obtain      T(C): 37.4 (01-10-19 @ 16:50), Max: 37.4 (01-10-19 @ 00:00)  T(F): 99.3 (01-10-19 @ 16:50), Max: 99.4 (01-10-19 @ 00:00)  HR: 100 (01-10-19 @ 21:00) (85 - 107)  BP: 113/43 (01-10-19 @ 21:00) (92/49 - 142/43)  RR: 24 (01-10-19 @ 21:00) (17 - 29)  SpO2: 93% (01-10-19 @ 21:00) (93% - 99%)  Wt(kg): --    I&O's Summary    09 Jan 2019 07:01  -  10 Boby 2019 07:00  --------------------------------------------------------  IN: 1150 mL / OUT: 420 mL / NET: 730 mL    10 Boby 2019 07:01  -  10 Boby 2019 21:12  --------------------------------------------------------  IN: 0 mL / OUT: 300 mL / NET: -300 mL          PHYSICAL EXAM:  A X O x  HEAD:  Atraumatic, Normocephalic  EYES: EOMI, PERRLA, conjunctiva and sclera clear  NECK: Supple, No JVD, Normal thyroid  Resp: CTAB, No crackles, wheezing,   CVS: Regular rate and rhythm; No discernable murmurs, rubs, or gallops  ABD: Soft, Nontender, Nondistended; Bowel sounds present  EXTREMITIES:  2+ Peripheral Pulses, No edema  LYMPH: No dicernable lymphadenopathy noted  GENERAL: NAD, well-groomed, well-developed      LABS:                        9.5    33.8  )-----------( 92       ( 09 Jan 2019 03:30 )             30.6     01-09    140  |  102  |  129<H>  ----------------------------<  122<H>  4.6   |  17<L>  |  5.79<H>    Ca    8.6      09 Jan 2019 03:30  Phos  8.1     01-09  Mg     2.3     01-09    TPro  4.9<L>  /  Alb  1.8<L>  /  TBili  20.5<H>  /  DBili  x   /  AST  66<H>  /  ALT  64<H>  /  AlkPhos  166<H>  01-09        CAPILLARY BLOOD GLUCOSE          Troponins:  ProBNP:  Lipid Profile:   HgA1c:  TSH:           RADIOLOGY & ADDITIONAL TESTS:    Imaging Personally Reviewed:  [ ] YES  [ ] NO      Consultant(s) Notes Reviewed:  [x ] YES  [ ] NO    Care Discussed with Consultants/Other Providers [ x] YES  [ ] NO          PAST MEDICAL & SURGICAL HISTORY:  No pertinent past medical history  No significant past surgical history        Closed fracture of neck of left femur  Yes  No pertinent family history in first degree relatives  Handoff  MEWS Score  No pertinent past medical history  Closed fracture of left hip, initial encounter  Palliative care encounter  Multiple organ failure with heart failure  Anoxic encephalopathy  Acute respiratory failure with hypoxia and hypercapnia  Encephalopathy  Peripheral arterial disease  Drop in hemoglobin  Acute liver failure  Acute renal failure  Septic shock  Acute post-operative pain  Hypotension  Fever  Acute systolic congestive heart failure  Cardiogenic shock  Prophylactic measure  Diabetes mellitus  HTN (hypertension)  Heart failure with reduced ejection fraction  PSVT (paroxysmal supraventricular tachycardia)  Left ventricular dysfunction  Need for prophylactic measure  Closed fracture of left hip, initial encounter  No significant past surgical history  A S/P FALL

## 2019-01-10 NOTE — PROGRESS NOTE ADULT - SUBJECTIVE AND OBJECTIVE BOX
OVERNIGHT EVENTS:    Present Symptoms:   Dyspnea:   Nausea/Vomiting:   Anxiety:    Depressed Mood:   Fatigue:   Loss of appetite:   Pain:                   location:   Review of Systems: [All others negative or Unable to obtain due to poor mentation]    MEDICATIONS  (STANDING):  chlorhexidine 4% Liquid 1 Application(s) Topical two times a day    MEDICATIONS  (PRN):  glycopyrrolate Injectable 0.4 milliGRAM(s) IV Push every 4 hours PRN secretions  HYDROmorphone  Injectable 1 milliGRAM(s) IV Push every 30 minutes PRN respiratory distress  LORazepam   Injectable 2 milliGRAM(s) IV Push every 1 hour PRN Agitation      PHYSICAL EXAM:  Vital Signs Last 24 Hrs  T(C): 36.7 (10 Boby 2019 08:30), Max: 37.4 (10 Boby 2019 00:00)  T(F): 98 (10 Boby 2019 08:30), Max: 99.4 (10 Boby 2019 00:00)  HR: 90 (10 Boby 2019 10:00) (85 - 107)  BP: 130/49 (10 Boby 2019 10:00) (98/32 - 138/53)  BP(mean): 68 (10 Boby 2019 10:00) (49 - 74)  RR: 25 (10 Boby 2019 10:00) (20 - 29)  SpO2: 96% (10 Boby 2019 10:00) (84% - 100%)    General: alert  oriented x ____    [lethargic distressed cachexia  nonverbal  unarousable verbal]  Karnofsky Performance Score/Palliative Performance Status Version2:     %    HEENT: normal  dry mouth  ET tube/trach oral lesions:  Lungs: comfortable tachypnea/labored breathing  excessive secretions  CV: RRR, S1S2, tachycardia  GI: soft non distended non tender  incontinent               PEG/NG/OG tube  constipation  last BM:   : incontinent  oliguria/anuria  weber  Musculoskeletal: weakness  edema   ambulatory with assistance bedbound/wheelchair bound  Skin: normal turgor, pressure ulcer stage:   Neuro: no deficits, cognitive impairment dsyphagia/dysarthria paresis  Oral intake ability: unable/only mouth care [minimal moderate full capability]LABS:                          9.5    33.8  )-----------( 92       ( 09 Jan 2019 03:30 )             30.6     01-09    140  |  102  |  129<H>  ----------------------------<  122<H>  4.6   |  17<L>  |  5.79<H>    Ca    8.6      09 Jan 2019 03:30  Phos  8.1     01-09  Mg     2.3     01-09    TPro  4.9<L>  /  Alb  1.8<L>  /  TBili  20.5<H>  /  DBili  x   /  AST  66<H>  /  ALT  64<H>  /  AlkPhos  166<H>  01-09        RADIOLOGY & ADDITIONAL STUDIES:    ADVANCE DIRECTIVES:

## 2019-01-10 NOTE — PROGRESS NOTE ADULT - SUBJECTIVE AND OBJECTIVE BOX
CHIEF COMPLAINT:Patient is a 79y old  Female who presents with a chief complaint of fall .Pt s/p extubation,still not responsive.    	  REVIEW OF SYSTEMS:	  [ x] Unable to obtain    PHYSICAL EXAM:  T(C): 36.7 (01-10-19 @ 08:30), Max: 37.4 (01-10-19 @ 00:00)  HR: 98 (01-10-19 @ 11:00) (85 - 107)  BP: 130/51 (01-10-19 @ 11:00) (92/49 - 138/53)  RR: 22 (01-10-19 @ 11:00) (20 - 29)  SpO2: 96% (01-10-19 @ 11:00) (84% - 100%)  Wt(kg): --  I&O's Summary    09 Jan 2019 07:01  -  10 Boby 2019 07:00  --------------------------------------------------------  IN: 1150 mL / OUT: 420 mL / NET: 730 mL        Appearance: Normal	  HEENT:   Normal oral mucosa, PERRL, EOMI	  Lymphatic: No lymphadenopathy  Cardiovascular: Normal S1 S2, No JVD, No murmurs, No edema  Respiratory: Lungs clear to auscultation	  Gastrointestinal:  Soft, Non-tender, + BS	  Skin: No rashes, No ecchymoses, No cyanosis	  Extremities: Normal range of motion, No clubbing, cyanosis or edema      MEDICATIONS  (STANDING):  chlorhexidine 4% Liquid 1 Application(s) Topical two times a day      TELEMETRY: sr	      	  LABS:	 	                       9.5    33.8  )-----------( 92       ( 09 Jan 2019 03:30 )             30.6     01-09    140  |  102  |  129<H>  ----------------------------<  122<H>  4.6   |  17<L>  |  5.79<H>    Ca    8.6      09 Jan 2019 03:30  Phos  8.1     01-09  Mg     2.3     01-09    TPro  4.9<L>  /  Alb  1.8<L>  /  TBili  20.5<H>  /  DBili  x   /  AST  66<H>  /  ALT  64<H>  /  AlkPhos  166<H>  01-09    proBNP:   Lipid Profile: Cholesterol --  LDL --  HDL --    Cholesterol 82  LDL 22  HDL 40      HgA1c: Hemoglobin A1C, Whole Blood: 6.1 % (12-27 @ 09:51)  Hemoglobin A1C, Whole Blood: 6.2 % (12-26 @ 23:12)    TSH: Thyroid Stimulating Hormone, Serum: 1.19 uU/mL (12-27 @ 06:50)      	  EXAM:  CT BRAIN                            PROCEDURE DATE:  01/09/2019          INTERPRETATION:  CT HEAD WITHOUT CONTRAST    INDICATION: 79 years old. Female. encephalopathy. liver shock, multi   organ failure.    COMPARISON: None available.    TECHNIQUE: Noncontrast axial CT head was obtained from the skull base to   vertex.    FINDINGS:  Overlying streak artifact limits evaluation at the skull base and of the   posterior fossa.    No acute intracranial hemorrhage, mass effect or midline shift.  No CT evidence of acute large territory vascular infarct.  The ventricles and cortical sulci are prominent reflecting parenchymal   volume loss.  Scattered hypodensities in the periventricular white matter are   nonspecific, but likely sequela of small vessel ischemic disease.  Intracranial atherosclerotic calcifications are present.    Frothy air-fluid level in the left maxillary sinus, may be related to   retained secretions. Partial opacification of the bilateral mastoid air   cells. The right native ocular lens is surgically absent. Moderate soft   tissue swelling overlying the right side of the face. No displaced   calvarial fracture.    An endotracheal and oroenteric tubes are noted on the  view.    IMPRESSION:  No acute intracranial hemorrhage or mass effect.

## 2019-01-10 NOTE — PROGRESS NOTE ADULT - PROBLEM SELECTOR PLAN 3
With shock liver and renal failure in the setting of advanced CHF.  Decision made to shift focus to comfort. With shock liver and renal failure in the setting of advanced CHF.  Decision made to shift focus to management of symptoms, and limiting interventions to those that will  contribute to clinical improvement.  Following extubation, family at bedside questioned this initial plan, as patient spoke to the family and was hemodynamically stable.  They appeared to be struggling with the patient being awake and limiting medical interventions.  In speaking with nephrology, critical care and palliative care, daughter questioned whether dialysis should be re-started.  It was explained that dialysis could be re-started, however, its initiation would be consistent with an overall plan oriented towards life-prolongation and include intubation.  Following this explanation, patient's family expressed that they do not want her to suffer and do not want to prolong her dying.  They affirmed a care plan to manage patient's symptoms and to limit interventions to those that are likely to help patient recover. With shock liver and renal failure in the setting of advanced CHF.  Decision made to shift focus to management of symptoms, and limiting interventions to those that will  contribute to clinical improvement.  Following extubation, family at bedside questioned this initial plan, as patient spoke to the family and was hemodynamically stable.  They appeared to be struggling with the patient being awake and limiting medical interventions.  In speaking with nephrology, critical care and palliative care together, daughter questioned whether dialysis should be re-started.  It was explained that dialysis could be re-started, however, its initiation would be consistent with an overall plan oriented towards life-prolongation and include intubation.  Following this explanation, patient's family expressed that they do not want her to suffer and do not want to prolong her dying.  They affirmed a care plan to manage patient's symptoms and to limit interventions to those that are likely to help patient recover. With shock liver and renal failure in the setting of advanced CHF.  Decision made to shift focus to management of symptoms, and limiting interventions to those that will  contribute to clinical improvement.  Following extubation, family at bedside questioned this initial plan, as patient spoke to the family and was hemodynamically stable.  They appeared to be struggling with the patient being momentarily awake and discontinuing medical interventions.  In speaking with nephrology, critical care and palliative care together, daughter questioned whether dialysis should be re-started.  It was explained that dialysis could be re-started, however, its initiation would be consistent with an overall plan oriented towards life-prolongation and include intubation.  Following this explanation, patient's family expressed that they do not want her to suffer and do not want to prolong her dying.  They affirmed a care plan to manage patient's symptoms and to limit interventions that do not clinically benefit patient.

## 2019-01-10 NOTE — CHART NOTE - NSCHARTNOTEFT_GEN_A_CORE
Upon Nutritional Assessment by the Registered Dietitian your patient was determined to meet criteria / has evidence of the following diagnosis/diagnoses:          [ ]  Mild Protein Calorie Malnutrition        [ ]  Moderate Protein Calorie Malnutrition        [ x ] Severe Protein Calorie Malnutrition        [ ] Unspecified Protein Calorie Malnutrition        [ ] Underweight / BMI <19        [ ] Morbid Obesity / BMI > 40      Findings as based on:  •  Comprehensive nutrition assessment and consultation  •  Calorie counts (nutrient intake analysis)  •  Food acceptance and intake status from observations by staff  •  Follow up  •  Patient education  •  Intervention secondary to interdisciplinary rounds  •   concerns      Treatment:    The following diet has been recommended: Nepro 30 x 24 hrs provides 720 ml 1296 kcals, 58 gm protein, 523 ml free water. MD to adjust free water as medically feasible. RD available.       PROVIDER Section:     By signing this assessment you are acknowledging and agree with the diagnosis/diagnoses assigned by the Registered Dietitian    Comments:

## 2019-01-10 NOTE — PROGRESS NOTE ADULT - PROBLEM SELECTOR PLAN 1
Patient has been unable to be weaned off of ventilator, has not been able to recover from encephalopathy and septic/cardiogenic shock.  Following family discussion, decision made for palliative extubation, which took place today. Patient has been unable to be weaned off of ventilator, has not been able to recover from encephalopathy and septic/cardiogenic shock.  Following family discussion, decision made for palliative extubation, which took place today.  Dilaudid 1 mg IV q 1 hr PRN (respiratory distress) and glycopyrrolate 0.4 mg IV q 4 hrs PRN (secretions) ordered for symptom management following extubation. Patient has been unable to be weaned off of ventilator, has not been able to recover from encephalopathy and septic/cardiogenic shock.  Following family discussion, decision made for palliative extubation, which took place today.  Dilaudid 1 mg IV q 1 hr PRN (respiratory distress) and glycopyrrolate 0.4 mg IV q 4 hrs PRN (secretions) ordered for symptom management following extubation.  Good effect noted from Dilaudid and glycopyrrolate.

## 2019-01-10 NOTE — PROGRESS NOTE ADULT - SUBJECTIVE AND OBJECTIVE BOX
Subjective: seen by palliative care s/p terminal extubation as per family wishes  . lethargic , sob at rest on VM . all ab d/soco .hd stopped  for hospice /terminal care       PHYSICAL EXAM:    Vital Signs Last 24 Hrs  T(C): 37.4 (10 Boby 2019 16:50), Max: 37.4 (10 Boby 2019 00:00)  T(F): 99.3 (10 Boby 2019 16:50), Max: 99.4 (10 Boby 2019 00:00)  HR: 96 (10 Boby 2019 16:00) (85 - 107)  BP: 138/42 (10 Boby 2019 16:00) (92/49 - 142/43)  BP(mean): 67 (10 Boby 2019 16:00) (54 - 74)  RR: 20 (10 Boby 2019 16:00) (20 - 29)  SpO2: 94% (10 Boby 2019 16:00) (94% - 99%)    Constitutional: s/p terminal extubation  , poorly responsive , lethargic, sob   LUNGS mech br sounds  CVS s1 s2 aud no murmurs   ABDOMEN soft non tender obese   NEUROLOGY  flaccid   SKINdark discolrations of toes and fingers /markedly icteric   EXTREMITIES no edema no cyanosis /left hip wound clean   weber with scant marisa urine       LABS/DIAGNOSTIC TESTS                        9.5    33.8  )-----------( 92       ( 09 Jan 2019 03:30 )             30.6     01-09    140  |  102  |  129<H>  ----------------------------<  122<H>  4.6   |  17<L>  |  5.79<H>    Ca    8.6      09 Jan 2019 03:30  Phos  8.1     01-09  Mg     2.3     01-09    TPro  4.9<L>  /  Alb  1.8<L>  /  TBili  20.5<H>  /  DBili  x   /  AST  66<H>  /  ALT  64<H>  /  AlkPhos  166<H>  01-09          meds   chlorhexidine 4% Liquid 1 Application(s) Topical two times a day  glycopyrrolate Injectable 0.4 milliGRAM(s) IV Push every 4 hours PRN  HYDROmorphone  Injectable 1 milliGRAM(s) IV Push every 30 minutes PRN  LORazepam   Injectable 2 milliGRAM(s) IV Push every 1 hour PRN        CULTURES  no new cx       RADIOLOGY  < from: CT Head No Cont (01.09.19 @ 13:11) >  EXAM:  CT BRAIN                            PROCEDURE DATE:  01/09/2019          INTERPRETATION:  CT HEAD WITHOUT CONTRAST    INDICATION: 79 years old. Female. encephalopathy. liver shock, multi   organ failure.    COMPARISON: None available.    TECHNIQUE: Noncontrast axial CT head was obtained from the skull base to   vertex.    FINDINGS:  Overlying streak artifact limits evaluation at the skull base and of the   posterior fossa.    No acute intracranial hemorrhage, mass effect or midline shift.  No CT evidence of acute large territory vascular infarct.  The ventricles and cortical sulci are prominent reflecting parenchymal   volume loss.  Scattered hypodensities in the periventricular white matter are   nonspecific, but likely sequela of small vessel ischemic disease.  Intracranial atherosclerotic calcifications are present.    Frothy air-fluid level in the left maxillary sinus, may be related to   retained secretions. Partial opacification of the bilateral mastoid air   cells. The right native ocular lens is surgically absent. Moderate soft   tissue swelling overlying the right side of the face. No displaced   calvarial fracture.    An endotracheal and oroenteric tubes are noted on the  view.    IMPRESSION:  No acute intracranial hemorrhage or mass effect.                RC LEE M.D., ATTENDING RADIOLOGIST  This document has been electronically signed. Jan 9 2019  1:54PM    < end of copied text >    < from: Xray Chest 1 View- PORTABLE-Routine (01.08.19 @ 14:55) >  EXAM:  XR CHEST PORTABLE ROUTINE 1V                            PROCEDURE DATE:  01/08/2019          INTERPRETATION:  CHEST X-RAY:    CLINICAL HISTORY: 79 years  Female with pneumonia.    COMPARISON: 1/7/2019    AP view of the chest demonstrates a developing right lower lobe   infiltrate with a small right pleural effusion. Left basilar discoid   atelectasis is unchanged. There is no left pleural effusion.    The heart, mediastinum and jenny cannot be assessed due to rotation. The   pulmonary vasculature is normal. There is no pneumothorax.    The tip of the NG tube is below the diaphragm. A left IJ catheter is   reidentified. Position cannot be assessed due to rotation.    Mild thoracic degenerative changes are present.    IMPRESSION:    Developing right lower lobe pneumonia with small right pleural effusion.    Left central line and NG tube in situ.                MELVINA MARTEL M.D., ATTENDING RADIOLOGIST  This document has been electronically signed. Jan 8 2019  3:07PM    < end of copied text >

## 2019-01-10 NOTE — PROGRESS NOTE ADULT - PROBLEM SELECTOR PLAN 1
Multiorgan involvement and severe sepsis  Poor mental status  Terminal extubation today  Palliative on board for comfort care

## 2019-01-10 NOTE — PROGRESS NOTE ADULT - ASSESSMENT
79 y/o F pt with HTN, DM2, PMHx presents to ED c/o L hip pain s/p trip and fall from a curb today. Pt describes pain as sharp, mild and constant, 10/10 in severity. Pain of the left hip increases with ROM. Pt unable to ambulate nor bear weight. Patient denies head strike, LOC, N/V, vision changes, neurological deficits chest pain, palpitations, shortness of breath, or any other complaints. XR hip showed nondisplaced intertrochanteric fracture left femoral neck and marked osteoporosis. XR hip showed nondisplaced intertrochanteric fracture left femoral neck and marked osteoporosis. (26 Dec 2018 19:35)s/p nailing 12/27 with post op hypotension , septic vs cardiogenic shock, also found to have shock liver . on pressors and sedation. on vanco and zosyn. worsening kidney function with worsening ph and metabolic acidosis    # s/p fall with subsequent left hip fx s/p im nailing 12/27 with post op resp failure and MOSF including shock liver /lft decreasing     # resp failure sec to asp pna s/p terminal extubation as per family wishes     # S/P septic/cardiogenic shock with MOSF . echo with very low LVEF     # leucocytosis reactive sec to gangrene of toes and fingers vs sec to hcap  . no diarrhea to suggest c.diff     plan  all ab d/soco as per family wishes since now transitioned to comfort /hospice care   dialysis  stopped   for transition to hospice care     poor prognosis on comfort care   pt is dnr /dni   thnx will f/u   d/w family and intern

## 2019-01-10 NOTE — PROGRESS NOTE ADULT - ASSESSMENT
1.MARIA G due to ATN from hypotensive and septic  shock with multiorgan failure. There is no indication for emergent HD today  -pts renal status is poor with minimal u/o and egfr is unchanged -no recovery yet ,u/o still poor  -Adjust meds to eGFR and avoid IV Gadolinium contrast, NSAIDs, and phosphate enema.  -family is asking for palliative extubation to allow natural death  -stop HD     2. Acidosis ;secondary to shock/renal failure  -keep PH >7.3    3. Hyperkalemia: secondary to maria g, now improved   -keep k >4 and <5  - low k diet    4. Multiorgan failure: plan as per MICU. pt has poor prognosis.    5. Anemia:multifactorial  -c/w epogen 6000units in hd tiw    6.Hyperphosphatemia: worsening , secondary to maria g  -c/w  phoslo 667mg tid   -continue  amphogel 30mg tid 1.MARIA G due to ATN from hypotensive and septic  shock with multiorgan failure. There is no indication for emergent HD today  -pts renal status is poor with minimal u/o and egfr is unchanged -no recovery yet ,u/o still poor  -Adjust meds to eGFR and avoid IV Gadolinium contrast, NSAIDs, and phosphate enema.  -Pt was extubated around 10:45am this morning, labor breathing, initially family wanted one more dialysis today to see if pt mental status will improve or not.  Dr. Lay, DR. Tucker and ICU attending spoke with pt family together and explained pt has very poor prognosis with multi organ failure. Pt family then changed their minds and do not want dialysis. All questions answered at bedside.         2. Acidosis ;secondary to shock/renal failure  -keep PH >7.3    3. Hyperkalemia: secondary to maria g, now improved   -keep k >4 and <5  - low k diet    4. Multiorgan failure: plan as per MICU. pt has poor prognosis.    5. Anemia:multifactorial  -c/w epogen 6000units in hd tiw    6.Hyperphosphatemia: worsening , secondary to maria g  -c/w  phoslo 667mg tid   -continue  amphogel 30mg tid

## 2019-01-10 NOTE — PROGRESS NOTE ADULT - SUBJECTIVE AND OBJECTIVE BOX
OVERNIGHT EVENTS:    NAOE.  Planned extubation for today.    Patient extubated this morning.  Patient's children provided education regarding process involved with extubation, medications given for comfort, transfer of care out of the ICU following assessment for stability.  Emotional support offered throughout in advance of and following extubation.  Family appropriately emotional given sudden change in condition and anticipated loss of patient.    Review of Systems: Unable to obtain 2/2 mental status.    MEDICATIONS  (STANDING):  chlorhexidine 4% Liquid 1 Application(s) Topical two times a day    MEDICATIONS  (PRN):  glycopyrrolate Injectable 0.4 milliGRAM(s) IV Push every 4 hours PRN secretions  HYDROmorphone  Injectable 1 milliGRAM(s) IV Push every 30 minutes PRN respiratory distress  LORazepam   Injectable 2 milliGRAM(s) IV Push every 1 hour PRN Agitation      PHYSICAL EXAM:  Vital Signs Last 24 Hrs  T(C): 36.8 (10 Boby 2019 13:00), Max: 37.4 (10 Boby 2019 00:00)  T(F): 98.2 (10 Boby 2019 13:00), Max: 99.4 (10 Boby 2019 00:00)  HR: 99 (10 Boby 2019 14:00) (85 - 107)  BP: 139/50 (10 Boby 2019 14:00) (92/49 - 142/43)  BP(mean): 68 (10 Boby 2019 14:00) (54 - 74)  RR: 23 (10 Boby 2019 14:00) (20 - 29)  SpO2: 95% (10 Boby 2019 14:00) (95% - 100%)  General: alert  oriented x ____    [lethargic distressed cachexia  nonverbal  unarousable verbal]  Karnofsky Performance Score/Palliative Performance Status Version2:    %    HEENT: dry oral mucosa  Lungs: diminished  CV: normal  tachycardia  GI: soft, non-tender, + bowel sounds  :  weber without gross hematuria  Musculoskeletal: bedbound  Skin: jaundiced  Neuro: not responsive to verbal stimuli, responsive to tactile stimuli  Diet: NPO    LABS:                          9.5    33.8  )-----------( 92       ( 09 Jan 2019 03:30 )             30.6     01-09    140  |  102  |  129<H>  ----------------------------<  122<H>  4.6   |  17<L>  |  5.79<H>    Ca    8.6      09 Jan 2019 03:30  Phos  8.1     01-09  Mg     2.3     01-09    TPro  4.9<L>  /  Alb  1.8<L>  /  TBili  20.5<H>  /  DBili  x   /  AST  66<H>  /  ALT  64<H>  /  AlkPhos  166<H>  01-09        RADIOLOGY & ADDITIONAL STUDIES:    ADVANCE DIRECTIVES:  Advanced Care Planning discussion total time spent: OVERNIGHT EVENTS:    No acute overnight events.  Planned palliative extubation took place today at approximately 10:35 am.  In advance of extubation, patient received glycopyrrolate 0.4 mg IV and Dilaudid 1 mg IV.  Following extubation, patient's family (daughter, son, and niece) returned to the bedside.  Patient opened eyes, and said to them, "Your father."  Work of breathing, efforts to move noted following her communication with family.  Ativan 2 mg IV given.  Following dose, patient became lethargic, closed eyes, and hasn't communicated since.     Patient's loved ones provided education regarding process involved with extubation, medications given for comfort, transfer of care out of the ICU following assessment for stability.  Emotional support offered throughout, in advance of and following extubation.  Family at bedside this morning (daughter, niece and son) were appropriately emotional given sudden change in condition-patient came to the hospital following a fall, and had been taking care of her  at home-and the anticipated loss of patient.    Review of Systems: Unable to obtain 2/2 mental status.    MEDICATIONS  (STANDING):  chlorhexidine 4% Liquid 1 Application(s) Topical two times a day    MEDICATIONS  (PRN):  glycopyrrolate Injectable 0.4 milliGRAM(s) IV Push every 4 hours PRN secretions  HYDROmorphone  Injectable 1 milliGRAM(s) IV Push every 30 minutes PRN respiratory distress  LORazepam   Injectable 2 milliGRAM(s) IV Push every 1 hour PRN Agitation      PHYSICAL EXAM:  Vital Signs Last 24 Hrs  T(C): 36.8 (10 Boby 2019 13:00), Max: 37.4 (10 Boby 2019 00:00)  T(F): 98.2 (10 Boby 2019 13:00), Max: 99.4 (10 Boby 2019 00:00)  HR: 99 (10 Boby 2019 14:00) (85 - 107)  BP: 139/50 (10 Boby 2019 14:00) (92/49 - 142/43)  BP(mean): 68 (10 Boby 2019 14:00) (54 - 74)  RR: 23 (10 Boby 2019 14:00) (20 - 29)  SpO2: 95% (10 Boby 2019 14:00) (95% - 100%)  General: alert x oriented x 0     Karnofsky Performance Score/Palliative Performance Status Version2:    20%    HEENT: icteric sclerae, dry oral mucosa  Lungs: diminished  CV: normal  tachycardia  GI: soft, non-tender, + bowel sounds  :  weber without gross hematuria  Musculoskeletal: bedbound  Skin: jaundiced  Neuro: not responsive to verbal stimuli, responsive to tactile stimuli  Diet: NPO    LABS:                          9.5    33.8  )-----------( 92       ( 09 Jan 2019 03:30 )             30.6     01-09    140  |  102  |  129<H>  ----------------------------<  122<H>  4.6   |  17<L>  |  5.79<H>    Ca    8.6      09 Jan 2019 03:30  Phos  8.1     01-09  Mg     2.3     01-09    TPro  4.9<L>  /  Alb  1.8<L>  /  TBili  20.5<H>  /  DBili  x   /  AST  66<H>  /  ALT  64<H>  /  AlkPhos  166<H>  01-09        RADIOLOGY & ADDITIONAL STUDIES:    ADVANCE DIRECTIVES: DNR, no reintubation  Advanced Care Planning discussion total time spent:  60 OVERNIGHT EVENTS:    No acute overnight events.  Planned palliative extubation took place today at approximately 10:35 am.  In advance of extubation, patient received glycopyrrolate 0.4 mg IV and Dilaudid 1 mg IV.  Following extubation, patient's family (daughter, son, and niece) returned to the bedside.  Patient opened eyes, and said to them, "Your father."  Work of breathing, efforts to move noted following her communication with family.  Ativan 2 mg IV given.  Following dose, patient became lethargic, closed eyes, and hasn't communicated since.     Patient's loved ones provided education regarding process involved with extubation, medications given for comfort, transfer of care out of the ICU following assessment for stability,, and hospice.  Emotional support offered throughout, in advance of and following extubation.  Family at bedside this morning (daughter, niece and son) were appropriately emotional given sudden change in condition-patient came to the hospital following a fall, and had been taking care of her  at home-and the anticipated loss of patient.    Review of Systems: Unable to obtain 2/2 mental status.    MEDICATIONS  (STANDING):  chlorhexidine 4% Liquid 1 Application(s) Topical two times a day    MEDICATIONS  (PRN):  glycopyrrolate Injectable 0.4 milliGRAM(s) IV Push every 4 hours PRN secretions  HYDROmorphone  Injectable 1 milliGRAM(s) IV Push every 30 minutes PRN respiratory distress  LORazepam   Injectable 2 milliGRAM(s) IV Push every 1 hour PRN Agitation      PHYSICAL EXAM:  Vital Signs Last 24 Hrs  T(C): 36.8 (10 Boby 2019 13:00), Max: 37.4 (10 Boby 2019 00:00)  T(F): 98.2 (10 Boby 2019 13:00), Max: 99.4 (10 Boby 2019 00:00)  HR: 99 (10 Boby 2019 14:00) (85 - 107)  BP: 139/50 (10 Boby 2019 14:00) (92/49 - 142/43)  BP(mean): 68 (10 Boby 2019 14:00) (54 - 74)  RR: 23 (10 Boby 2019 14:00) (20 - 29)  SpO2: 95% (10 Boby 2019 14:00) (95% - 100%)  General: alert x oriented x 0     Karnofsky Performance Score/Palliative Performance Status Version2:    20%    HEENT: icteric sclerae, dry oral mucosa  Lungs: diminished  CV: normal  tachycardia  GI: soft, non-tender, + bowel sounds  :  weber without gross hematuria  Musculoskeletal: bedbound  Skin: jaundiced  Neuro: not responsive to verbal stimuli, responsive to tactile stimuli  Diet: NPO    LABS:                          9.5    33.8  )-----------( 92       ( 09 Jan 2019 03:30 )             30.6     01-09    140  |  102  |  129<H>  ----------------------------<  122<H>  4.6   |  17<L>  |  5.79<H>    Ca    8.6      09 Jan 2019 03:30  Phos  8.1     01-09  Mg     2.3     01-09    TPro  4.9<L>  /  Alb  1.8<L>  /  TBili  20.5<H>  /  DBili  x   /  AST  66<H>  /  ALT  64<H>  /  AlkPhos  166<H>  01-09        RADIOLOGY & ADDITIONAL STUDIES:    ADVANCE DIRECTIVES: DNR, no reintubation  Advanced Care Planning discussion total time spent: OVERNIGHT EVENTS:    No acute overnight events.  Planned palliative extubation took place today at approximately 10:35 am.  In advance of extubation, patient received glycopyrrolate 0.4 mg IV and Dilaudid 1 mg IV.  Following extubation, patient's family (daughter, son, and niece) returned to the bedside.  Patient opened eyes, and said to them, "Your father."  Work of breathing, efforts to move noted following her communication with family.  Ativan 2 mg IV given. Patient returned back to lethargic state with closed eyes, and hasn't communicated since.     Patient's loved ones provided education regarding process involved with extubation, medications given for comfort, transfer of care out of the ICU following assessment for continued care in hospice unit.  Emotional support offered throughout, in advance of and following extubation.  Family at bedside this morning (daughter, niece and son) were appropriately emotional given sudden change in condition-patient came to the hospital following a fall, and had been taking care of her  at home-and the anticipated loss of patient.    Review of Systems: Unable to obtain 2/2 mental status.    MEDICATIONS  (STANDING):  chlorhexidine 4% Liquid 1 Application(s) Topical two times a day    MEDICATIONS  (PRN):  glycopyrrolate Injectable 0.4 milliGRAM(s) IV Push every 4 hours PRN secretions  HYDROmorphone  Injectable 1 milliGRAM(s) IV Push every 30 minutes PRN respiratory distress  LORazepam   Injectable 2 milliGRAM(s) IV Push every 1 hour PRN Agitation      PHYSICAL EXAM:  Vital Signs Last 24 Hrs  T(C): 36.8 (10 Boby 2019 13:00), Max: 37.4 (10 Boby 2019 00:00)  T(F): 98.2 (10 Boby 2019 13:00), Max: 99.4 (10 Boby 2019 00:00)  HR: 99 (10 Boby 2019 14:00) (85 - 107)  BP: 139/50 (10 Boby 2019 14:00) (92/49 - 142/43)  BP(mean): 68 (10 Boby 2019 14:00) (54 - 74)  RR: 23 (10 Boby 2019 14:00) (20 - 29)  SpO2: 95% (10 Boby 2019 14:00) (95% - 100%)  General: alert x oriented x 0 unarousable, morbidly obese  Karnofsky Performance Score/Palliative Performance Status Version2:    20%    HEENT: icteric sclerae, dry oral mucosa  Lungs: diminished, mildly tachypneic  CV: tachycardia  GI: soft, non-tender, + bowel sounds  :  weber without gross hematuria  Musculoskeletal: bedbound, edema x4, anasarca  Skin: jaundiced  Neuro: not responsive to verbal stimuli, responsive to tactile stimuli  Diet: NPO    LABS:                          9.5    33.8  )-----------( 92       ( 09 Jan 2019 03:30 )             30.6     01-09    140  |  102  |  129<H>  ----------------------------<  122<H>  4.6   |  17<L>  |  5.79<H>    Ca    8.6      09 Jan 2019 03:30  Phos  8.1     01-09  Mg     2.3     01-09    TPro  4.9<L>  /  Alb  1.8<L>  /  TBili  20.5<H>  /  DBili  x   /  AST  66<H>  /  ALT  64<H>  /  AlkPhos  166<H>  01-09        RADIOLOGY & ADDITIONAL STUDIES:    ADVANCE DIRECTIVES: DNR, no reintubation  Advanced Care Planning discussion total time spent:  45

## 2019-01-10 NOTE — PROGRESS NOTE ADULT - SUBJECTIVE AND OBJECTIVE BOX
pt seen and examined.pts current chart reviewed and case discussed with resident covering.    SUBJECTIVE: Pt off sedation bu still lethargic, failed SBT. Palliative consulted and family is asking for palliative extubation to allow natural death.     REVIEW OF SYSTEMS:  unable to obtain.     Current meds:    aluminum hydroxide Suspension 30 milliLiter(s) Oral three times a day  ascorbic acid 500 milliGRAM(s) Oral two times a day  calcium acetate 667 milliGRAM(s) Oral three times a day with meals  chlorhexidine 4% Liquid 1 Application(s) Topical two times a day  cyanocobalamin Injectable 1000 MICROGram(s) SubCutaneous daily  digoxin  Injectable 0.125 milliGRAM(s) IV Push every other day  epoetin monique Injectable 6000 Unit(s) IV Push <User Schedule>  folic acid 1 milliGRAM(s) Oral daily  heparin  Injectable 5000 Unit(s) SubCutaneous every 12 hours  HYDROmorphone  Injectable 0.5 milliGRAM(s) IV Push every 4 hours PRN  insulin glargine Injectable (LANTUS) 10 Unit(s) SubCutaneous at bedtime  insulin lispro (HumaLOG) corrective regimen sliding scale   SubCutaneous every 6 hours  linezolid  IVPB 600 milliGRAM(s) IV Intermittent every 12 hours  meropenem  IVPB 500 milliGRAM(s) IV Intermittent every 12 hours  ondansetron Injectable 4 milliGRAM(s) IV Push every 6 hours PRN  pantoprazole  Injectable 40 milliGRAM(s) IV Push daily      Vital Signs    Vital Signs Last 24 Hrs  T(C): 36.7 (10 Boby 2019 08:30), Max: 37.4 (10 Boby 2019 00:00)  T(F): 98 (10 Boby 2019 08:30), Max: 99.4 (10 Boby 2019 00:00)  HR: 88 (10 Boby 2019 08:30) (85 - 107)  BP: 110/45 (10 Boby 2019 08:30) (98/32 - 138/53)  BP(mean): 58 (10 Boby 2019 08:30) (49 - 74)  RR: 26 (10 Boby 2019 08:30) (20 - 29)  SpO2: 95% (10 Boby 2019 08:30) (84% - 100%)  Wt(kg): --  CVP(cm H2O): --  CO: --  PCWP: --    I and O's:     @ 07:01  -   @ 07:00  --------------------------------------------------------  IN:    Enteral Tube Flush: 180 mL    Nepro with Carb Steady: 400 mL    Solution: 600 mL    Solution: 50 mL  Total IN: 1230 mL    OUT:    Indwelling Catheter - Urethral: 415 mL  Total OUT: 415 mL    Total NET: 815 mL       @ 07:  -   @ 07:00  --------------------------------------------------------  IN:    Enteral Tube Flush: 150 mL    Nepro with Carb Steady: 400 mL    Solution: 100 mL    Solution: 300 mL  Total IN: 950 mL    OUT:    Indwelling Catheter - Urethral: 295 mL  Total OUT: 295 mL    Total NET: 655 mL       @ 07: @ 10:49  --------------------------------------------------------  IN:    Nepro with Carb Steady: 60 mL  Total IN: 60 mL    OUT:  Total OUT: 0 mL    Total NET: 60 mL        Daily     Daily Weight in k.2 (2019 08:00)    PHYSICAL EXAM:  GENERAL: Well developed, Well nourished white female, lethargic   HEENT:  Normocephalic/Atraumatic, reactive light reflex, moist mucous membranes  NECK: Supple, no JVD, LIJ line  RESP: Symmetric movement of the chest, diffuse bilateral basal rales, on Vent support   CVS: Tachycardic, S1 and S2 audible, no murmur, rubs or gallops  GI: Normal active bowel sounds present, abdomen soft, non tender, non distended  EXTREMITIES:  No edema, no clubbing, cyanosis, Lt femoral A-line, Rt Shiley femoral    MSK: 0/5 strength bilateral upper and lower extremities, Left hip scar/band, Blue hands/toes   NEURO: lethargic       LABS:    CBC:                          9.5    33.8  )-----------( 92       ( 2019 03:30 )             30.6           BMP:        140  |  102  |  129<H>  ----------------------------<  122<H>  4.6   |  17<L>  |  5.79<H>      136  |  99  |  148<H>  ----------------------------<  220<H>  5.1   |  18<L>  |  6.27<H>      136  |  99  |  127<H>  ----------------------------<  175<H>  4.8   |  18<L>  |  6.18<H>    Ca    8.6      2019 03:30  Ca    8.2<L>      2019 06:04  Ca    8.5      2019 06:11  Phos  8.1       Phos  8.3       Phos  8.5       Mg     2.3       Mg     2.5       Mg     2.5         TPro  4.9<L>  /  Alb  1.8<L>  /  TBili  20.5<H>  /  DBili  x   /  AST  66<H>  /  ALT  64<H>  /  AlkPhos  166<H>    TPro  4.4<L>  /  Alb  1.6<L>  /  TBili  16.8<H>  /  DBili  x   /  AST  57<H>  /  ALT  61<H>  /  AlkPhos  133<H>    TPro  4.6<L>  /  Alb  1.7<L>  /  TBili  16.7<H>  /  DBili  x   /  AST  48<H>  /  ALT  72<H>  /  AlkPhos  136<H>      < from: Xray Chest 1 View- PORTABLE-Routine (19 @ 14:55) >  EXAM:  XR CHEST PORTABLE ROUTINE 1V                            PROCEDURE DATE:  2019          INTERPRETATION:  CHEST X-RAY:    CLINICAL HISTORY: 79 years  Female with pneumonia.    COMPARISON: 2019    AP view of the chest demonstrates a developing right lower lobe   infiltrate with a small right pleural effusion. Left basilar discoid   atelectasis is unchanged. There is no left pleural effusion.    The heart, mediastinum and jenny cannot be assessed due to rotation. The   pulmonary vasculature is normal. There is no pneumothorax.    The tip of the NG tube is below the diaphragm. A left IJ catheter is   reidentified. Position cannot be assessed due to rotation.    Mild thoracic degenerative changes are present.    IMPRESSION:    Developing right lower lobe pneumonia with small right pleural effusion.    Left central line and NG tube in situ.      < end of copied text > pt seen and examined.pts current chart reviewed and case discussed with resident covering.    SUBJECTIVE: Pt off sedation bu still lethargic, failed SBT. Palliative consulted and family was asking for palliative extubation to allow natural death yesterday. Pt was extubated around 10:45am this morning, labor breathing, initially family wanted one more dialysis today to see if pt mental status will improve or not.  Dr. Lay, DR. Tucker and ICU attending spoke with pt family together and explained pt has very poor prognosis with multi organ failure. Pt family then changed their minds and do not want dialysis. All questions answered at bedside.       REVIEW OF SYSTEMS:  unable to obtain.     Current meds:    aluminum hydroxide Suspension 30 milliLiter(s) Oral three times a day  ascorbic acid 500 milliGRAM(s) Oral two times a day  calcium acetate 667 milliGRAM(s) Oral three times a day with meals  chlorhexidine 4% Liquid 1 Application(s) Topical two times a day  cyanocobalamin Injectable 1000 MICROGram(s) SubCutaneous daily  digoxin  Injectable 0.125 milliGRAM(s) IV Push every other day  epoetin monique Injectable 6000 Unit(s) IV Push <User Schedule>  folic acid 1 milliGRAM(s) Oral daily  heparin  Injectable 5000 Unit(s) SubCutaneous every 12 hours  HYDROmorphone  Injectable 0.5 milliGRAM(s) IV Push every 4 hours PRN  insulin glargine Injectable (LANTUS) 10 Unit(s) SubCutaneous at bedtime  insulin lispro (HumaLOG) corrective regimen sliding scale   SubCutaneous every 6 hours  linezolid  IVPB 600 milliGRAM(s) IV Intermittent every 12 hours  meropenem  IVPB 500 milliGRAM(s) IV Intermittent every 12 hours  ondansetron Injectable 4 milliGRAM(s) IV Push every 6 hours PRN  pantoprazole  Injectable 40 milliGRAM(s) IV Push daily      Vital Signs    Vital Signs Last 24 Hrs  T(C): 36.7 (10 Boby 2019 08:30), Max: 37.4 (10 Boby 2019 00:00)  T(F): 98 (10 Bboy 2019 08:30), Max: 99.4 (10 Boby 2019 00:00)  HR: 88 (10 Boby 2019 08:30) (85 - 107)  BP: 110/45 (10 Boby 2019 08:30) (98/32 - 138/53)  BP(mean): 58 (10 Boby 2019 08:30) (49 - 74)  RR: 26 (10 Boby 2019 08:30) (20 - 29)  SpO2: 95% (10 Boby 2019 08:30) (84% - 100%)  Wt(kg): --  CVP(cm H2O): --  CO: --  PCWP: --    I and O's:     @ 07:01  -   @ 07:00  --------------------------------------------------------  IN:    Enteral Tube Flush: 180 mL    Nepro with Carb Steady: 400 mL    Solution: 600 mL    Solution: 50 mL  Total IN: 1230 mL    OUT:    Indwelling Catheter - Urethral: 415 mL  Total OUT: 415 mL    Total NET: 815 mL       @ 07:01  -   @ 07:00  --------------------------------------------------------  IN:    Enteral Tube Flush: 150 mL    Nepro with Carb Steady: 400 mL    Solution: 100 mL    Solution: 300 mL  Total IN: 950 mL    OUT:    Indwelling Catheter - Urethral: 295 mL  Total OUT: 295 mL    Total NET: 655 mL       @ 07:  -   @ 10:49  --------------------------------------------------------  IN:    Nepro with Carb Steady: 60 mL  Total IN: 60 mL    OUT:  Total OUT: 0 mL    Total NET: 60 mL        Daily     Daily Weight in k.2 (2019 08:00)    PHYSICAL EXAM:  GENERAL: Well developed, Well nourished white female, lethargic   HEENT:  Normocephalic/Atraumatic, reactive light reflex, moist mucous membranes  NECK: Supple, no JVD, LIJ line  RESP: Symmetric movement of the chest, diffuse bilateral basal rales, on Vent support   CVS: Tachycardic, S1 and S2 audible, no murmur, rubs or gallops  GI: Normal active bowel sounds present, abdomen soft, non tender, non distended  EXTREMITIES:  No edema, no clubbing, cyanosis, Lt femoral A-line, Rt Shiley femoral    MSK: 0/5 strength bilateral upper and lower extremities, Left hip scar/band, Blue hands/toes   NEURO: lethargic       LABS:    CBC:                          9.5    33.8  )-----------( 92       ( 2019 03:30 )             30.6           BMP:        140  |  102  |  129<H>  ----------------------------<  122<H>  4.6   |  17<L>  |  5.79<H>      136  |  99  |  148<H>  ----------------------------<  220<H>  5.1   |  18<L>  |  6.27<H>      136  |  99  |  127<H>  ----------------------------<  175<H>  4.8   |  18<L>  |  6.18<H>    Ca    8.6      2019 03:30  Ca    8.2<L>      2019 06:04  Ca    8.5      2019 06:11  Phos  8.1       Phos  8.3       Phos  8.5       Mg     2.3       Mg     2.5       Mg     2.5         TPro  4.9<L>  /  Alb  1.8<L>  /  TBili  20.5<H>  /  DBili  x   /  AST  66<H>  /  ALT  64<H>  /  AlkPhos  166<H>    TPro  4.4<L>  /  Alb  1.6<L>  /  TBili  16.8<H>  /  DBili  x   /  AST  57<H>  /  ALT  61<H>  /  AlkPhos  133<H>    TPro  4.6<L>  /  Alb  1.7<L>  /  TBili  16.7<H>  /  DBili  x   /  AST  48<H>  /  ALT  72<H>  /  AlkPhos  136<H>      < from: Xray Chest 1 View- PORTABLE-Routine (19 @ 14:55) >  EXAM:  XR CHEST PORTABLE ROUTINE 1V                            PROCEDURE DATE:  2019          INTERPRETATION:  CHEST X-RAY:    CLINICAL HISTORY: 79 years  Female with pneumonia.    COMPARISON: 2019    AP view of the chest demonstrates a developing right lower lobe   infiltrate with a small right pleural effusion. Left basilar discoid   atelectasis is unchanged. There is no left pleural effusion.    The heart, mediastinum and jenny cannot be assessed due to rotation. The   pulmonary vasculature is normal. There is no pneumothorax.    The tip of the NG tube is below the diaphragm. A left IJ catheter is   reidentified. Position cannot be assessed due to rotation.    Mild thoracic degenerative changes are present.    IMPRESSION:    Developing right lower lobe pneumonia with small right pleural effusion.    Left central line and NG tube in situ.      < end of copied text > pt seen and examined.pts current chart reviewed and case discussed with resident covering.    SUBJECTIVE: Pt off sedation bu still lethargic, failed SBT. Palliative consulted and family was asking for palliative extubation to allow natural death yesterday. Pt was extubated around 10:45am this morning, labor breathing, initially family wanted one more dialysis today to see if pt mental status will improve or not.  Dr. Lay, DR. Tucker and ICU attending spoke with pt family together and explained pt has very poor prognosis with multi organ failure. Pt family then changed their minds and do not want dialysis. All questions answered at bedside.       REVIEW OF SYSTEMS:  unable to obtain.     Current meds:    aluminum hydroxide Suspension 30 milliLiter(s) Oral three times a day  ascorbic acid 500 milliGRAM(s) Oral two times a day  calcium acetate 667 milliGRAM(s) Oral three times a day with meals  chlorhexidine 4% Liquid 1 Application(s) Topical two times a day  cyanocobalamin Injectable 1000 MICROGram(s) SubCutaneous daily  digoxin  Injectable 0.125 milliGRAM(s) IV Push every other day  epoetin monique Injectable 6000 Unit(s) IV Push <User Schedule>  folic acid 1 milliGRAM(s) Oral daily  heparin  Injectable 5000 Unit(s) SubCutaneous every 12 hours  HYDROmorphone  Injectable 0.5 milliGRAM(s) IV Push every 4 hours PRN  insulin glargine Injectable (LANTUS) 10 Unit(s) SubCutaneous at bedtime  insulin lispro (HumaLOG) corrective regimen sliding scale   SubCutaneous every 6 hours  linezolid  IVPB 600 milliGRAM(s) IV Intermittent every 12 hours  meropenem  IVPB 500 milliGRAM(s) IV Intermittent every 12 hours  ondansetron Injectable 4 milliGRAM(s) IV Push every 6 hours PRN  pantoprazole  Injectable 40 milliGRAM(s) IV Push daily      Vital Signs    Vital Signs Last 24 Hrs  T(C): 36.7 (10 Boby 2019 08:30), Max: 37.4 (10 Boby 2019 00:00)  T(F): 98 (10 Boby 2019 08:30), Max: 99.4 (10 Boby 2019 00:00)  HR: 88 (10 Boby 2019 08:30) (85 - 107)  BP: 110/45 (10 Boby 2019 08:30) (98/32 - 138/53)  BP(mean): 58 (10 Boby 2019 08:30) (49 - 74)  RR: 26 (10 Obby 2019 08:30) (20 - 29)  SpO2: 95% (10 Obby 2019 08:30) (84% - 100%)  Wt(kg): --  CVP(cm H2O): --  CO: --  PCWP: --    I and O's:     @ 07:01  -   @ 07:00  --------------------------------------------------------  IN:    Enteral Tube Flush: 180 mL    Nepro with Carb Steady: 400 mL    Solution: 600 mL    Solution: 50 mL  Total IN: 1230 mL    OUT:    Indwelling Catheter - Urethral: 415 mL  Total OUT: 415 mL    Total NET: 815 mL       @ 07:01  -   @ 07:00  --------------------------------------------------------  IN:    Enteral Tube Flush: 150 mL    Nepro with Carb Steady: 400 mL    Solution: 100 mL    Solution: 300 mL  Total IN: 950 mL    OUT:    Indwelling Catheter - Urethral: 295 mL  Total OUT: 295 mL    Total NET: 655 mL       @ 07:  -   @ 10:49  --------------------------------------------------------  IN:    Nepro with Carb Steady: 60 mL  Total IN: 60 mL    OUT:  Total OUT: 0 mL    Total NET: 60 mL        Daily     Daily Weight in k.2 (2019 08:00)    PHYSICAL EXAM:  GENERAL: Well developed, Well nourished white female, lethargic   HEENT:  Normocephalic/Atraumatic, reactive light reflex, moist mucous membranes  NECK: Supple, no JVD, LIJ line  RESP: Symmetric movement of the chest, diffuse bilateral basal rales, on Vent support   CVS: Tachycardic, S1 and S2 audible, no murmur, rubs or gallops  GI: Normal active bowel sounds present, abdomen soft, non tender, non distended  EXTREMITIES:  No edema, no clubbing, cyanosis, Lt femoral A-line, Rt Shiley femoral    MSK: 0/5 strength bilateral upper and lower extremities, Left hip scar/band, Blue hands/toes   NEURO: lethargic       LABS:    CBC:                          9.5    33.8  )-----------( 92       ( 2019 03:30 )             30.6           BMP:        140  |  102  |  129<H>  ----------------------------<  122<H>  4.6   |  17<L>  |  5.79<H>      136  |  99  |  148<H>  ----------------------------<  220<H>  5.1   |  18<L>  |  6.27<H>      136  |  99  |  127<H>  ----------------------------<  175<H>  4.8   |  18<L>  |  6.18<H>    Ca    8.6      2019 03:30  Ca    8.2<L>      2019 06:04  Ca    8.5      2019 06:11  Phos  8.1       Phos  8.3       Phos  8.5       Mg     2.3       Mg     2.5       Mg     2.5         TPro  4.9<L>  /  Alb  1.8<L>  /  TBili  20.5<H>  /  DBili  x   /  AST  66<H>  /  ALT  64<H>  /  AlkPhos  166<H>    TPro  4.4<L>  /  Alb  1.6<L>  /  TBili  16.8<H>  /  DBili  x   /  AST  57<H>  /  ALT  61<H>  /  AlkPhos  133<H>    TPro  4.6<L>  /  Alb  1.7<L>  /  TBili  16.7<H>  /  DBili  x   /  AST  48<H>  /  ALT  72<H>  /  AlkPhos  136<H>      < from: Xray Chest 1 View- PORTABLE-Routine (19 @ 14:55) >  EXAM:  XR CHEST PORTABLE ROUTINE 1V                            PROCEDURE DATE:  2019          INTERPRETATION:  CHEST X-RAY:    CLINICAL HISTORY: 79 years  Female with pneumonia.    COMPARISON: 2019    AP view of the chest demonstrates a developing right lower lobe   infiltrate with a small right pleural effusion. Left basilar discoid   atelectasis is unchanged. There is no left pleural effusion.    The heart, mediastinum and jenny cannot be assessed due to rotation. The   pulmonary vasculature is normal. There is no pneumothorax.    The tip of the NG tube is below the diaphragm. A left IJ catheter is   reidentified. Position cannot be assessed due to rotation.    Mild thoracic degenerative changes are present.    IMPRESSION:    Developing right lower lobe pneumonia with small right pleural effusion.    Left central line and NG tube in situ.  < from: CT Head No Cont (19 @ 13:11) >  EXAM:  CT BRAIN                            PROCEDURE DATE:  2019          INTERPRETATION:  CT HEAD WITHOUT CONTRAST    INDICATION: 79 years old. Female. encephalopathy. liver shock, multi   organ failure.    COMPARISON: None available.    TECHNIQUE: Noncontrast axial CT head was obtained from the skull base to   vertex.    FINDINGS:  Overlying streak artifact limits evaluation at the skull base and of the   posterior fossa.    No acute intracranial hemorrhage, mass effect or midline shift.  No CT evidence of acute large territory vascular infarct.  The ventricles and cortical sulci are prominent reflecting parenchymal   volume loss.  Scattered hypodensities in the periventricular white matter are   nonspecific, but likely sequela of small vessel ischemic disease.  Intracranial atherosclerotic calcifications are present.    Frothy air-fluid level in the left maxillary sinus, may be related to   retained secretions. Partial opacification of the bilateral mastoid air   cells. The right native ocular lens is surgically absent. Moderate soft   tissue swelling overlying the right side of the face. No displaced   calvarial fracture.    An endotracheal and oroenteric tubes are noted on the  view.    IMPRESSION:  No acute intracranial hemorrhage or mass effect.    < end of copied text >      < end of copied text >

## 2019-01-10 NOTE — PROGRESS NOTE ADULT - PROBLEM SELECTOR PLAN 4
Shift to a comfort focused plan of care.  Hospice discussed with family.  Family amenable. Shift to a  a care plan to manage patient's symptoms and to limit interventions to those that are likely to help patient recover.  Hospice discussed with family; family is amenable.  COsult placed.  Appropriate for IPU. Shift to a  a care plan to manage patient's symptoms and to limit interventions to those that are likely to help patient recover.  Hospice discussed with family; family is amenable.  Consult placed.  Appropriate for hospice IPU. Shift to a care plan to manage patient's symptoms only.  Hospice discussed with family; family is amenable.  Consult placed.  Appropriate for hospice IPU.

## 2019-01-10 NOTE — CHART NOTE - NSCHARTNOTEFT_GEN_A_CORE
Assessment:   Patient is a 79y old  Female who presents with a chief complaint of fall. Patient being followed by palliative care and is on hemodialysis. Patient presents with 4+ edema on left and right hands, and 3+ generalized.  Patient currently intubated and has NGT placed.       Factors impacting intake: [ ] none [ ] nausea  [ ] vomiting [ ] diarrhea [ ] constipation  [ ]chewing problems [ ] swallowing issues  [ x ] other: intubated, MOD    Diet Presciption: Diet, NPO (19 @ 16:42)    Intake: N/A NPO    Daily Weight in k.2 (2019 08:00)  Weight in k.4 (2019 08:00)  Weight in k.7 (2019 08:00)  Weight in k.1 (2019 16:10)  Weight in k.7 (2019 12:30)  Weight in k.4 (2019 08:00)  Weight in k (2019 10:00)  Weight in k.6 (2019 17:55)  Weight in k.2 (2019 14:55)    % Weight Change    Pertinent Medications: MEDICATIONS  (STANDING):  chlorhexidine 4% Liquid 1 Application(s) Topical two times a day    MEDICATIONS  (PRN):  glycopyrrolate Injectable 0.4 milliGRAM(s) IV Push every 4 hours PRN secretions  HYDROmorphone  Injectable 1 milliGRAM(s) IV Push every 30 minutes PRN respiratory distress  LORazepam   Injectable 2 milliGRAM(s) IV Push every 1 hour PRN Agitation    Pertinent Labs:  Na140 mmol/L Glu 122 mg/dL<H> K+ 4.6 mmol/L Cr  5.79 mg/dL<H>  mg/dL<H>  Phos 8.1 mg/dL<H>  Alb 1.8 g/dL<L>  FyeeownfibA3L 6.1 %<H>  Chol --    LDL --    HDL --    Trig 165 mg/dL<H>     CAPILLARY BLOOD GLUCOSE    Skin: Patient also presents with skin tears: left wrist, right thigh, and right lower quadrant. Patient also presents with pressure injuries: Joaquin terminal ulcer on sacrum, unstageable right ear, and suspected deep tissue on right hip.    Estimated Needs:   [ x ] no change since previous assessment  [ ] recalculated:     Previous Nutrition Diagnosis:   [ ] Inadequate Energy Intake [ x ]Inadequate Oral Intake [ ] Excessive Energy Intake   [ ] Underweight [ ] Increased Nutrient Needs [ ] Overweight/Obesity   [ ] Altered GI Function [ ] Unintended Weight Loss [ ] Food & Nutrition Related Knowledge Deficit [ ] Malnutrition     Nutrition Diagnosis is [ x ] ongoing  [ ] resolved [ ] not applicable     New Nutrition Diagnosis: [ ] not applicable     Interventions:   Recommend  [ ] Change Diet To:  [ ] Nutrition Supplement  [ x ] Nutrition Support: Nepro 30 x 24 hrs provides 720 ml 1296 kcals, 58 gm protein, 523 ml free water. MD to adjust free water as medically feasible. RD available.   [ ] Other:     Monitoring and Evaluation:   [ ] PO intake [ x ] Tolerance to diet prescription [ x ] weights [ x ] labs[ x ] follow up per protocol  [ ] other: Assessment:   Patient is a 79y old  Female who presents with a chief complaint of fall. Patient being followed by palliative care and is on hemodialysis. Patient presents with 4+ edema on left and right hands, and 3+ generalized weeping.  Patient currently intubated and has NGT placed. Patient currently NPO.       Factors impacting intake: [ ] none [ ] nausea  [ ] vomiting [ ] diarrhea [ ] constipation  [ ]chewing problems [ ] swallowing issues  [ x ] other: intubated, MOD    Diet Presciption: Diet, NPO (19 @ 16:42)    Intake: N/A NPO    Daily Weight in k.2 (2019 08:00)  Weight in k.4 (2019 08:00)  Weight in k.7 (2019 08:00)  Weight in k.1 (2019 16:10)  Weight in k.7 (2019 12:30)  Weight in k.4 (2019 08:00)  Weight in k (2019 10:00)  Weight in k.6 (2019 17:55)  Weight in k.2 (2019 14:55)    % Weight Change    Pertinent Medications: MEDICATIONS  (STANDING):  chlorhexidine 4% Liquid 1 Application(s) Topical two times a day    MEDICATIONS  (PRN):  glycopyrrolate Injectable 0.4 milliGRAM(s) IV Push every 4 hours PRN secretions  HYDROmorphone  Injectable 1 milliGRAM(s) IV Push every 30 minutes PRN respiratory distress  LORazepam   Injectable 2 milliGRAM(s) IV Push every 1 hour PRN Agitation    Pertinent Labs:  Na140 mmol/L Glu 122 mg/dL<H> K+ 4.6 mmol/L Cr  5.79 mg/dL<H>  mg/dL<H>  Phos 8.1 mg/dL<H>  Alb 1.8 g/dL<L>  [id number] 6.1 %<H>  Chol --    LDL --    HDL --    Trig 165 mg/dL<H>     CAPILLARY BLOOD GLUCOSE    Skin: Patient also presents with skin tears: left wrist, right thigh, and right lower quadrant. Patient also presents with pressure injuries: Joaquin terminal ulcer on sacrum, unstageable right ear, and suspected deep tissue on right hip.    Estimated Needs:   [ x ] no change since previous assessment  [ ] recalculated:     Previous Nutrition Diagnosis:   [ ] Inadequate Energy Intake [ x ]Inadequate Oral Intake [ ] Excessive Energy Intake   [ ] Underweight [ ] Increased Nutrient Needs [ ] Overweight/Obesity   [ ] Altered GI Function [ ] Unintended Weight Loss [ ] Food & Nutrition Related Knowledge Deficit [ ] Malnutrition     Nutrition Diagnosis is [ x ] ongoing  [ ] resolved [ ] not applicable     New Nutrition Diagnosis: Malnutrition (SEVERE) related to acute critical illness/prolonged, MOD as seen by meeting < 50% of nutrition needs > 5 days, 4+ edema on left and right hands and 3+ generalized weeping.     Interventions:   Recommend  [ ] Change Diet To:  [ ] Nutrition Supplement  [ x ] Nutrition Support: Nepro 30 x 24 hrs provides 720 ml 1296 kcals, 58 gm protein, 523 ml free water. MD to adjust free water as medically feasible. RD available.   [ ] Other:     Monitoring and Evaluation:   [ ] PO intake [ x ] Tolerance to diet prescription [ x ] weights [ x ] labs[ x ] follow up per protocol  [ ] other:

## 2019-01-10 NOTE — PROGRESS NOTE ADULT - SUBJECTIVE AND OBJECTIVE BOX
INTERVAL HPI/OVERNIGHT EVENTS: No events. Poor mental status without sedatives. Unable to wake up. Reactive pupils. CT head negative. Poor urine output of 400 ccc only.     PRESSORS: [ ] YES [x] NO    ANTIBIOTICS: Zyvox                           DATE STARTED: 12/31/18  ANTIBIOTICS: Meropenem                 DATE STARTED: 12/31/18    Antimicrobial:  linezolid  IVPB 600 milliGRAM(s) IV Intermittent every 12 hours  meropenem  IVPB 500 milliGRAM(s) IV Intermittent every 12 hours    Other:  chlorhexidine 4% Liquid 1 Application(s) Topical two times a day  glycopyrrolate Injectable 0.4 milliGRAM(s) IV Push every 4 hours PRN  HYDROmorphone  Injectable 0.5 milliGRAM(s) IV Push every 4 hours PRN  HYDROmorphone  Injectable 1 milliGRAM(s) IV Push every 30 minutes PRN  LORazepam   Injectable 2 milliGRAM(s) IV Push every 1 hour PRN  pantoprazole  Injectable 40 milliGRAM(s) IV Push daily    chlorhexidine 4% Liquid 1 Application(s) Topical two times a day  glycopyrrolate Injectable 0.4 milliGRAM(s) IV Push every 4 hours PRN  HYDROmorphone  Injectable 0.5 milliGRAM(s) IV Push every 4 hours PRN  HYDROmorphone  Injectable 1 milliGRAM(s) IV Push every 30 minutes PRN  linezolid  IVPB 600 milliGRAM(s) IV Intermittent every 12 hours  LORazepam   Injectable 2 milliGRAM(s) IV Push every 1 hour PRN  meropenem  IVPB 500 milliGRAM(s) IV Intermittent every 12 hours  pantoprazole  Injectable 40 milliGRAM(s) IV Push daily    Drug Dosing Weight  Height (cm): 154.94 (28 Dec 2018 01:00)  Weight (kg): 82.1 (28 Dec 2018 01:00)  BMI (kg/m2): 34.2 (28 Dec 2018 01:00)  BSA (m2): 1.81 (28 Dec 2018 01:00)    CENTRAL LINE: [x] YES [ ] NO  LOCATION:   DATE INSERTED:    DE SOUZA: [x] YES [ ] NO    DATE INSERTED:    A-LINE:  [ ] YES [x] NO  LOCATION:   DATE INSERTED:    ICU Vital Signs Last 24 Hrs  T(C): 37 (10 Boby 2019 04:00), Max: 37.4 (10 Boby 2019 00:00)  T(F): 98.6 (10 Boby 2019 04:00), Max: 99.4 (10 Boby 2019 00:00)  HR: 85 (10 Boby 2019 07:00) (85 - 107)  BP: 117/48 (10 Boby 2019 07:00) (98/32 - 138/53)  BP(mean): 63 (10 Boby 2019 07:00) (49 - 74)  ABP: --  ABP(mean): --  RR: 20 (10 Boby 2019 07:00) (20 - 32)  SpO2: 96% (10 Boby 2019 07:00) (77% - 100%)            01-09 @ 07:01  -  01-10 @ 07:00  --------------------------------------------------------  IN: 1150 mL / OUT: 420 mL / NET: 730 mL        Mode: AC/ CMV (Assist Control/ Continuous Mandatory Ventilation)  RR (machine): 16  TV (machine): 450  FiO2: 30  PEEP: 5  ITime: 1  MAP: 11  PIP: 21      PHYSICAL EXAM:    GENERAL: Well developed, Well nourished white female, sedated  HEENT:  Normocephalic/Atraumatic, reactive light reflex, moist mucous membranes, Scleral icterus   NECK: Supple, no JVD, LIJ line  RESP: Symmetric movement of the chest, clear to auscultation bilaterally, diffuse bilateral basal rales   CVS: Irregular, S1 and S2 audible, no murmur  GI: Hypoactive bowel sounds present, abdomen soft, non tender, non distended  EXTREMITIES:  No edema, no clubbing, cyanosis, Rt Shiley femoral    MSK: 0/5 strength bilateral upper and lower extremities, Left hip scar/band, Blue hands/toes   PSYCH: Sedated  NEURO: Alert and oriented x 0    LABS:  CBC Full  -  ( 09 Jan 2019 03:30 )  WBC Count : 33.8 K/uL  Hemoglobin : 9.5 g/dL  Hematocrit : 30.6 %  Platelet Count - Automated : 92 K/uL  Mean Cell Volume : 65.6 fl  Mean Cell Hemoglobin : 20.4 pg  Mean Cell Hemoglobin Concentration : 31.2 gm/dL  Auto Neutrophil # : 30.2 K/uL  Auto Lymphocyte # : 1.3 K/uL  Auto Monocyte # : 2.0 K/uL  Auto Eosinophil # : 0.1 K/uL  Auto Basophil # : 0.2 K/uL  Auto Neutrophil % : 89.5 %  Auto Lymphocyte % : 3.9 %  Auto Monocyte % : 6.0 %  Auto Eosinophil % : 0.2 %  Auto Basophil % : 0.5 %    01-09    140  |  102  |  129<H>  ----------------------------<  122<H>  4.6   |  17<L>  |  5.79<H>    Ca    8.6      09 Jan 2019 03:30  Phos  8.1     01-09  Mg     2.3     01-09    TPro  4.9<L>  /  Alb  1.8<L>  /  TBili  20.5<H>  /  DBili  x   /  AST  66<H>  /  ALT  64<H>  /  AlkPhos  166<H>  01-09        [ ]GOALS OF CARE DISCUSSION WITH PATIENT/FAMILY/PROXY: DNR/DNI/Hospice    CRITICAL CARE TIME SPENT: 35 minutes

## 2019-01-11 NOTE — PROGRESS NOTE ADULT - PROBLEM SELECTOR PLAN 1
Patient has been unable to be weaned off of ventilator, has not been able to recover from encephalopathy and septic/cardiogenic shock.  Following family discussion, decision made for palliative extubation, which took place yesterday.  Maintain Dilaudid 1 mg IV q 30 min PRN (respiratory distress) and glycopyrrolate 0.4 mg IV q 4 hrs PRN (secretions).  Two doses of Dilaudid given overnight.  No further need for glycopyrrolate since extubation.

## 2019-01-11 NOTE — H&P ADULT - PROBLEM SELECTOR PLAN 3
due to cardiogenic and septic shock with liver/renal failure despite full resuscitative measures including dialysis - family decided on symptom management only and to allow natural death.

## 2019-01-11 NOTE — PROGRESS NOTE ADULT - ASSESSMENT
77 y/o F pt with PMHX of  HTN, DM2, who presents to ED c/o L hip pain s/p trip and fall from a curb, SVT and severe lv dysfunction,post-op septic shock,MARIA G,LLL pneumonia,shock liver, not responsive,s/p terminal extubation.  1.Palliative f/u.  2.Supportive care.  3.Will sign-off,call if any questions.

## 2019-01-11 NOTE — H&P ADULT - HISTORY OF PRESENT ILLNESS
79 y/o F pt with HTN, DM2, PMHx presents to ED c/o L hip pain s/p trip and fall from a curb today. Pt describes pain as sharp, mild and constant, 10/10 in severity. Pain of the left hip increases with ROM. Pt unable to ambulate nor bear weight. Patient denies head strike, LOC, N/V, vision changes, neurological deficits chest pain, palpitations, shortness of breath, or any other complaints. XR hip showed nondisplaced intertrochanteric fracture left femoral neck and marked osteoporosis. XR hip showed nondisplaced intertrochanteric fracture left femoral neck and marked osteoporosis. S/P Left hip nailing IM POD #14.   However pt was unable to be weaned s/p surgery and quickly developed  cardiogenic shock due to underlying endstage CHF (20% - undiagnosed) and septic shock from b/l pneumonia, with shock liver and renal failure. Despite full resuscitative measures for two weeks including dialysis, pt was unable to be recover from encephalopathy and be weaned, family decided on palliative extubation 1/10/19, and only symptom management and to allow natural death. Family also chose inpatient hospice for continued care at end of life.

## 2019-01-11 NOTE — PROGRESS NOTE ADULT - PROBLEM SELECTOR PROBLEM 10
Prophylactic measure

## 2019-01-11 NOTE — H&P ADULT - NSHPPHYSICALEXAM_GEN_ALL_CORE
barely arousable, morbidly obese  jaundiced and sclera icterus  shallow, coarse BS b/l, mild tachypnea with labored breathing  soft NT, abd  weber  extensive edema, weakness x4

## 2019-01-11 NOTE — PROGRESS NOTE ADULT - PROBLEM SELECTOR PLAN 3
With shock liver and renal failure in the setting of advanced CHF.  Decision made to shift focus to management of symptoms, and limiting interventions to those that will  contribute to clinical improvement.  Family continues to struggle with decision emotionally, but acknowledged both yesterday and today that they didn't want to prolong dying or see her suffer, and current plan is most consistent with this goal.  Normalized struggle. EMotional support provided. With shock liver and renal failure in the setting of advanced CHF.  Decision made to shift focus to management of symptoms, and limiting interventions to those that will  contribute to clinical improvement.  Family continues to struggle with decision emotionally, but acknowledged both yesterday and today that they didn't want to prolong dying or see her suffer, and current plan is most consistent with this goal.  Normalized struggle. Emotional support provided.

## 2019-01-11 NOTE — PROGRESS NOTE ADULT - SUBJECTIVE AND OBJECTIVE BOX
CHIEF COMPLAINT:Patient is a 79y old  Female who presents with a chief complaint of fall .Pt appears comfortable.    	  REVIEW OF SYSTEMS:  [x ] Unable to obtain    PHYSICAL EXAM:  T(C): 36.7 (01-11-19 @ 09:00), Max: 37.4 (01-10-19 @ 16:50)  HR: 93 (01-11-19 @ 09:00) (93 - 101)  BP: 120/43 (01-11-19 @ 09:00) (106/46 - 142/43)  RR: 23 (01-11-19 @ 09:00) (17 - 49)  SpO2: 93% (01-11-19 @ 09:00) (91% - 96%)  Wt(kg): --  I&O's Summary    10 Boby 2019 07:01  -  11 Jan 2019 07:00  --------------------------------------------------------  IN: 0 mL / OUT: 500 mL / NET: -500 mL        Appearance: Normal	  HEENT:   Normal oral mucosa, PERRL, EOMI	  Lymphatic: No lymphadenopathy  Cardiovascular: Normal S1 S2, No JVD, No murmurs, No edema  Respiratory: Lungs clear to auscultation	  Gastrointestinal:  Soft, Non-tender, + BS	  Skin: No rashes, No ecchymoses, No cyanosis	  Extremities: Normal range of motion, No clubbing, cyanosis or edema    MEDICATIONS  (STANDING):  chlorhexidine 4% Liquid 1 Application(s) Topical two times a day      	  LABS:	 	      Cholesterol 82  LDL 22  HDL 40      HgA1c: Hemoglobin A1C, Whole Blood: 6.1 % (12-27 @ 09:51)  Hemoglobin A1C, Whole Blood: 6.2 % (12-26 @ 23:12)    TSH: Thyroid Stimulating Hormone, Serum: 1.19 uU/mL (12-27 @ 06:50)

## 2019-01-11 NOTE — PROGRESS NOTE ADULT - PROVIDER SPECIALTY LIST ADULT
Cardiology
Critical Care
Gastroenterology
Heme/Onc
Infectious Disease
Internal Medicine
Nephrology
Orthopedics
Palliative Care
Vascular Surgery
Heme/Onc
Internal Medicine
Cardiology
Critical Care
Critical Care
Heme/Onc
Infectious Disease
Internal Medicine

## 2019-01-11 NOTE — PROGRESS NOTE ADULT - PROBLEM SELECTOR PLAN 1
Multiorgan involvement and severe sepsis  Poor mental status  Terminally extubated  Palliative on board for comfort care

## 2019-01-11 NOTE — PROGRESS NOTE ADULT - ATTENDING COMMENTS
agree with PA A/P. L IT fx. risks and benefits discussed with patient and family especially given the reduced EF.  family agrees.  Benefits still outweigh the risks.  we will proceed.
patient seen and examined.  intubated/sedated.  multi-organ failure.  on pressors.  poor prognosis unfortunately.  I discussed this with the family which is already aware.  Management per ICU/cards/pulm teams.  Hip incision c/d/i.  No precautions from ortho standpoint.
pt has poor prognosis with multi-organ failure.  -suggest Pallative care consult
pt now will be accepted to hospice care  we will sign off
pts renal status remains  poor and unchanged  with multiple co-morbid conditions and very poor prognosis.  -we will d/c dialysis as pts family wants only comfort care and hospice care.
pt seen with renal resident  Pts current chart reviewed   pt with ellen ,secondary to ATN , on hd   pts renal status is poor ,needs hd as per schedule  pt has poor prognosis with multiorgan failure.
Patient seen and examined with resident, Addendum to above.    78 F pt with HTN, DM2, PMHx presents to ED c/o L hip pain s/p trip and fall. s/p IM nailing. Post op patient with shock and now in multiorgan failure including heart failure, liver failure, acute renal failure, hypoxic respiratory failure. Off vasopressors at this time. Poor awakening this morning, waxing waning mental status. Not tolerating SBT today.     Assessment:  1. Shock - undifferentiated - cardiogenic vs. distributive   2. Acute hypoxic respiratory failure  3. Acute renal failure  4. Shock liver  5. Metabolic acidosis, with lactic acidosis  6. Auto coagulopathy  7. Left hip fracture  8. Cyanosis of the distal extremities   9. Diabetes mellitus     - Ventilatory support, daily SBT  - Nephrology recs appreciated   - Antibiotics per ID recs x 14 days  - Neurovascular checks  - Basal/bolus insulin regimen  - Prognosis is poor given multiorgan dysfunction.   - CT head today  - Palliative care follow up as patient is clinically not making progress  - DVT/GI prophylaxis  - 35 min of critical care time spent on this patient's care
Patient seen and examined with resident, Addendum to above.    78 F pt with HTN, DM2, PMHx presents to ED c/o L hip pain s/p trip and fall. s/p IM nailing. Post op patient with shock and now in multiorgan failure including heart failure, liver failure, acute renal failure, hypoxic respiratory failure. Off vasopressors at this time.    Assessment:  1. Shock - undifferentiated - cardiogenic vs. distributive   2. Acute hypoxic respiratory failure  3. Acute renal failure  4. Shock liver  5. Metabolic acidosis, with lactic acidosis  6. Auto coagulopathy  7. Left hip fracture  8. Cyanosis of the distal extremities   9. Diabetes mellitus     - Ventilatory support, daily SBT  - D/c all sedatives   - LFTs improving  - Check Ammonia level  - Vascular surgery consult given cyanosis of extremities  - Nephrology recs appreciated   - Antibiotics per ID recs  - Neurovascular checks  - Basal/bolus insulin regimen  - Prognosis is poor given multiorgan dysfunction.   - Palliative care consult  - DVT/GI prophylaxis  - 35 min of critical care time spent on this patient's care
Patient seen and examined with resident, Addendum to above.    78 F pt with HTN, DM2, PMHx presents to ED c/o L hip pain s/p trip and fall. s/p IM nailing. Post op patient with shock and now in multiorgan failure including heart failure, liver failure, acute renal failure, hypoxic respiratory failure. Off vasopressors at this time. More interactive today. On CPAP with PS of 5.     Assessment:  1. Shock - undifferentiated - cardiogenic vs. distributive   2. Acute hypoxic respiratory failure  3. Acute renal failure  4. Shock liver  5. Metabolic acidosis, with lactic acidosis  6. Auto coagulopathy  7. Left hip fracture  8. Cyanosis of the distal extremities   9. Diabetes mellitus     - Ventilatory support, daily SBT  - LFTs improving  - Vascular surgery consult given cyanosis of extremities  - Nephrology recs appreciated . ?Need for Permacath  - Antibiotics per ID recs x 14 days  - Neurovascular checks  - Basal/bolus insulin regimen  - Prognosis is poor given multiorgan dysfunction.   - Palliative care consult  - DVT/GI prophylaxis  - 36 min of critical care time spent on this patient's care
Patient seen and examined with resident, Addendum to above.    78 F pt with HTN, DM2, PMHx presents to ED c/o L hip pain s/p trip and fall. s/p IM nailing. Post op patient with shock and now in multiorgan failure including heart failure, liver failure, acute renal failure, hypoxic respiratory failure. Off vasopressors at this time. Terminal wean today.     Assessment:  1. Shock - undifferentiated - cardiogenic vs. distributive   2. Acute hypoxic respiratory failure  3. Acute renal failure  4. Shock liver  5. Metabolic acidosis, with lactic acidosis  6. Auto coagulopathy  7. Left hip fracture  8. Cyanosis of the distal extremities   9. Diabetes mellitus     - Comfort care only  - D/c antibiotics   - Dilaudid and Ativan as needed   - Palliative care recs appreciated  - Hospice referral
Please refer to my note from today.
Patient seen and examined with resident, Addendum to above.    78 F pt with HTN, DM2, PMHx presents to ED c/o L hip pain s/p trip and fall. s/p IM nailing. Post op patient with shock and now in multiorgan failure including heart failure, liver failure, acute renal failure, hypoxic respiratory failure. Off vasopressors at this time. Poor awakening this morning, waxing waning mental status. Not tolerating SBT today.     Assessment:  1. Shock - undifferentiated - cardiogenic vs. distributive   2. Acute hypoxic respiratory failure  3. Acute renal failure  4. Shock liver  5. Metabolic acidosis, with lactic acidosis  6. Auto coagulopathy  7. Left hip fracture  8. Cyanosis of the distal extremities   9. Diabetes mellitus     - Ventilatory support, daily SBT  - Nephrology recs appreciated   - Antibiotics per ID recs x 14 days  - Neurovascular checks  - Basal/bolus insulin regimen  - Prognosis is poor given multiorgan dysfunction.   - CT head today  - Palliative care follow up as patient is clinically not making progress  - DVT/GI prophylaxis  - 35 min of critical care time spent on this patient's care
Please refer to my note from today.
Patient seen and examined with resident, Addendum to above.    78 F pt with HTN, DM2, PMHx presents to ED c/o L hip pain s/p trip and fall. s/p IM nailing. Post op patient with Shock and now in multiorgan failure including heart failure, liver failure, acute renal failure, hypoxic respiratory failure.     Assessment:  1. Shock - undifferentiated - cardiogenic vs. distributive   2. Acute hypoxic respiratory failure  3. Acute renal failure  4. Shock liver  5. Metabolic acidosis, with lactic acidosis  6. Auto coagulopathy  7. Left hip fracture  8. Cyanosis of the distal extremities   9. Diabetes mellitus     - Cont. vasopressor support  - Cont. respiratory support  - Will attempt HD today  - LFTs improving  - Sedation vacation  - Vascular surgery consult given cyanosis of extremities  - Nephrology recs appreciated   - Antibiotics per ID recs for bilateral pneumonia   - Neurovascular checks  - Basal/bolus insulin regimen  - Prognosis is poor given multiorgan dysfunction. Spoke with daughter at bedside. She understands. She has voiced DNR code status at this time. Wants to continue current level of care though.  - 45 min of critical care time spent on this patient's care
Please refer to my note from today.

## 2019-01-11 NOTE — PROGRESS NOTE ADULT - NSHPATTENDINGPLANDISCUSS_GEN_ALL_CORE
nurse and micu team.
nurse, dialysis nurse and ICU team.
primary team and nurse.
micu resident
micu resident
pts family and micu resident
ICU team
micu resident
micu resident
pts family at bedside along with icu team and Dr Tucker
micu resident
ICU, nephrology, cardiology, nursing
ICU team, SW, hospice,

## 2019-01-11 NOTE — DISCHARGE NOTE ADULT - PLAN OF CARE
continue with comfort care pt presented with septic shock and went to multiorgan failure. Pt has intubated due to acute hypoxic respiratory failure on 12/27. pt failed multiple trials of extubation and was terminally extubated on 1/10.pt is being transferred to hospice care. pt developed multiorgan failure. pt was undergoing dialysis. but since pt is being accepted to hospice so no more dialysis. pt developed liver failure as part of septic shock.

## 2019-01-11 NOTE — PROGRESS NOTE ADULT - SUBJECTIVE AND OBJECTIVE BOX
OVERNIGHT EVENTS:  S/P extubation yesterday morning.  Care focused on symptom management with a hospice consult ordered.  Patient received 2 doses of Dilaudid 1 mg IVP overnight.    Spoke to family (son and niece) at the bedside, together with Dr. Tucker and PT.  Family is struggling, given that patient intermittently opens her eyes.  They had requested an EEG.  Reviewed patient's clinical condition.  Discussed patient's multi-organ system failure.  Again, raised that shift in focus of care could take place, but noted that patient's reliance on dialysis and end-stage heart disease are not expected to resolve.  Acknowledged the difficulty of the situation.  They verbalize that they wish to proceed with a hospice eval.  Alex Resendez offered to act as contact; son is amenable.      Review of Systems: Unable to obtain 2/2 mental status    MEDICATIONS  (STANDING):  chlorhexidine 4% Liquid 1 Application(s) Topical two times a day    MEDICATIONS  (PRN):  acetaminophen  Suppository .. 650 milliGRAM(s) Rectal every 6 hours PRN Temp greater or equal to 38C (100.4F)  glycopyrrolate Injectable 0.4 milliGRAM(s) IV Push every 4 hours PRN secretions  HYDROmorphone  Injectable 1 milliGRAM(s) IV Push every 30 minutes PRN respiratory distress  LORazepam   Injectable 2 milliGRAM(s) IV Push every 1 hour PRN Agitation      PHYSICAL EXAM:  Vital Signs Last 24 Hrs  T(C): 36.7 (11 Jan 2019 09:00), Max: 37.4 (10 Boby 2019 16:50)  T(F): 98 (11 Jan 2019 09:00), Max: 99.3 (10 Boby 2019 16:50)  HR: 93 (11 Jan 2019 09:00) (93 - 101)  BP: 120/43 (11 Jan 2019 09:00) (106/46 - 142/43)  BP(mean): 61 (11 Jan 2019 09:00) (55 - 91)  RR: 23 (11 Jan 2019 09:00) (17 - 49)  SpO2: 93% (11 Jan 2019 09:00) (91% - 96%)  General: alert  oriented x 0 lethargic.  Opens eyes to verbal stimuli then quickly closes them  Karnofsky Performance Score/Palliative Performance Status Version2:  20  %    HEENT: dry oral mucosa  Lungs: unlabored  CV: S1, S2  GI: soft, non-tender  : weber without gross hematuria  Musculoskeletal: nanasarca  Skin: jaundiced, weeping LUE  Neuro: minimally responsive to verbal stimuli  Oral intake ability: mouth care  Diet: PO intake as tolerated    LABS:                RADIOLOGY & ADDITIONAL STUDIES:    ADVANCE DIRECTIVES:  Advanced Care Planning discussion total time spent: OVERNIGHT EVENTS:  S/P extubation yesterday morning.  Care focused on symptom management with a hospice consult ordered.  Patient received 2 doses of Dilaudid 1 mg IVP overnight.    Spoke to family (son and niece) at the bedside, together with Dr. Tucker and PT.  Family is struggling, given that patient intermittently opens her eyes.  They had requested an EEG.  Reviewed patient's clinical condition.  Discussed patient's multi-organ system failure.  Again, raised that shift in focus of care could take place, but noted that patient's reliance on dialysis and end-stage heart disease are not expected to resolve.  Acknowledged the difficulty of the situation.  They verbalize that they wish to proceed with a hospice eval.  Alex Resendez (niece) offered to act as contact; son is amenable.      Review of Systems: Unable to obtain 2/2 mental status    MEDICATIONS  (STANDING):  chlorhexidine 4% Liquid 1 Application(s) Topical two times a day    MEDICATIONS  (PRN):  acetaminophen  Suppository .. 650 milliGRAM(s) Rectal every 6 hours PRN Temp greater or equal to 38C (100.4F)  glycopyrrolate Injectable 0.4 milliGRAM(s) IV Push every 4 hours PRN secretions  HYDROmorphone  Injectable 1 milliGRAM(s) IV Push every 30 minutes PRN respiratory distress  LORazepam   Injectable 2 milliGRAM(s) IV Push every 1 hour PRN Agitation      PHYSICAL EXAM:  Vital Signs Last 24 Hrs  T(C): 36.7 (11 Jan 2019 09:00), Max: 37.4 (10 Boby 2019 16:50)  T(F): 98 (11 Jan 2019 09:00), Max: 99.3 (10 Boby 2019 16:50)  HR: 93 (11 Jan 2019 09:00) (93 - 101)  BP: 120/43 (11 Jan 2019 09:00) (106/46 - 142/43)  BP(mean): 61 (11 Jan 2019 09:00) (55 - 91)  RR: 23 (11 Jan 2019 09:00) (17 - 49)  SpO2: 93% (11 Jan 2019 09:00) (91% - 96%)  General: alert  oriented x 0 lethargic.  Opens eyes to verbal stimuli then quickly closes them  Karnofsky Performance Score/Palliative Performance Status Version2:  20  %    HEENT: dry oral mucosa  Lungs: unlabored  CV: S1, S2  GI: soft, non-tender  : weber without gross hematuria  Musculoskeletal: nanasarca  Skin: jaundiced, weeping LUE  Neuro: minimally responsive to verbal stimuli  Oral intake ability: mouth care  Diet: PO intake as tolerated    LABS:                RADIOLOGY & ADDITIONAL STUDIES:    ADVANCE DIRECTIVES:  Advanced Care Planning discussion total time spent: OVERNIGHT EVENTS:  S/P extubation yesterday morning.  Care focused on symptom management with a hospice consult ordered.  Patient received 2 doses of Dilaudid 1 mg IVP overnight.    Spoke to family (son and niece) at the bedside, together with Dr. Tucker and PT.  Family is struggling, given that patient intermittently opens her eyes.  They had requested an EEG.  Reviewed patient's clinical condition.  Discussed patient's multi-organ system failure.  Again, raised that shift in focus of care could take place, but noted that patient's reliance on dialysis and end-stage heart disease are not expected to resolve.  Acknowledged the difficulty of the situation.  They verbalize that they wish to proceed with a hospice eval.  Alex Resendez (niece) offered to act as contact; son is amenable.      Review of Systems: Unable to obtain 2/2 mental status    MEDICATIONS  (STANDING):  chlorhexidine 4% Liquid 1 Application(s) Topical two times a day    MEDICATIONS  (PRN):  acetaminophen  Suppository .. 650 milliGRAM(s) Rectal every 6 hours PRN Temp greater or equal to 38C (100.4F)  glycopyrrolate Injectable 0.4 milliGRAM(s) IV Push every 4 hours PRN secretions  HYDROmorphone  Injectable 1 milliGRAM(s) IV Push every 30 minutes PRN respiratory distress  LORazepam   Injectable 2 milliGRAM(s) IV Push every 1 hour PRN Agitation      PHYSICAL EXAM:  Vital Signs Last 24 Hrs  T(C): 36.7 (11 Jan 2019 09:00), Max: 37.4 (10 Boby 2019 16:50)  T(F): 98 (11 Jan 2019 09:00), Max: 99.3 (10 Boby 2019 16:50)  HR: 93 (11 Jan 2019 09:00) (93 - 101)  BP: 120/43 (11 Jan 2019 09:00) (106/46 - 142/43)  BP(mean): 61 (11 Jan 2019 09:00) (55 - 91)  RR: 23 (11 Jan 2019 09:00) (17 - 49)  SpO2: 93% (11 Jan 2019 09:00) (91% - 96%)  General: alert  oriented x 0 lethargic.  Opens eyes to verbal stimuli then quickly closes them, morbidly obese  Karnofsky Performance Score/Palliative Performance Status Version2:  20  %    HEENT: dry oral mucosa  Lungs: unlabored  CV: S1, S2 RRR  GI: soft, non-tender, distended  : weber without gross hematuria  Musculoskeletal: nanasarca  Skin: jaundiced, weeping LUE  Neuro: minimally responsive to verbal stimuli  Oral intake ability: mouth care  Diet: PO intake as tolerated    LABS:    no new labs            RADIOLOGY & ADDITIONAL STUDIES: reviewed    ADVANCE DIRECTIVES: DNR/DNI

## 2019-01-11 NOTE — DISCHARGE NOTE ADULT - CARE PLAN
Principal Discharge DX:	Septic shock  Goal:	continue with comfort care  Assessment and plan of treatment:	pt presented with septic shock and went to multiorgan failure. Pt has intubated due to acute hypoxic respiratory failure on 12/27. pt failed multiple trials of extubation and was terminally extubated on 1/10.pt is being transferred to hospice care.  Secondary Diagnosis:	Acute renal failure  Goal:	continue with comfort care  Assessment and plan of treatment:	pt developed multiorgan failure. pt was undergoing dialysis. but since pt is being accepted to hospice so no more dialysis.  Secondary Diagnosis:	Acute liver failure  Goal:	continue with comfort care  Assessment and plan of treatment:	pt developed liver failure as part of septic shock.

## 2019-01-11 NOTE — PROGRESS NOTE ADULT - PROBLEM SELECTOR PLAN 3
Multiorgan failure with Anuria and lactic acidosis   Rt Femoral Shiley  Stopped dialysis for comfort care  Mildly incraesed urine output   Dr Lay on board

## 2019-01-11 NOTE — PROGRESS NOTE ADULT - ASSESSMENT
1.MARIA G due to ATN from hypotensive and septic  shock with multiorgan failure.  -pts renal status is poor with minimal u/o and egfr is unchanged -no recovery yet ,u/o still poor  -S/P extubated yesterday  -d/c dialysis as pts family wants only comfort care and hospice care  2. Acidosis ;secondary to shock/renal failure  3. Hyperkalemia: secondary to maria g,  4. Multiorgan failure: plan as per MICU. pt has poor prognosis.  5. Anemia:multifactorial  6.Hyperphosphatemia: worsening , secondary to maria g

## 2019-01-11 NOTE — PROGRESS NOTE ADULT - SUBJECTIVE AND OBJECTIVE BOX
pt seen and examined.pts current chart reviewed and case discussed with resident covering.    SUBJECTIVE: s/p palliative extubation yesterday, labor breathing, unresponsiveness, pending for hospice IPU.     REVIEW OF SYSTEMS:  unable to obtain     Current meds:    acetaminophen  Suppository .. 650 milliGRAM(s) Rectal every 6 hours PRN  chlorhexidine 4% Liquid 1 Application(s) Topical two times a day  glycopyrrolate Injectable 0.4 milliGRAM(s) IV Push every 4 hours PRN  HYDROmorphone  Injectable 1 milliGRAM(s) IV Push every 30 minutes PRN  LORazepam   Injectable 2 milliGRAM(s) IV Push every 1 hour PRN      Vital Signs    T(F): 98 (01-11-19 @ 09:00), Max: 99.3 (01-10-19 @ 16:50)  HR: 93 (01-11-19 @ 09:00) (93 - 101)  BP: 120/43 (01-11-19 @ 09:00) (106/46 - 142/43)  ABP: --  RR: 23 (01-11-19 @ 09:00) (17 - 49)  SpO2: 93% (01-11-19 @ 09:00) (91% - 96%)  Wt(kg): --  CVP(cm H2O): --  CO: --  PCWP: --    I and O's:    01-09 @ 07:01  -  01-10 @ 07:00  --------------------------------------------------------  IN:    Enteral Tube Flush: 200 mL    Nepro with Carb Steady: 200 mL    Solution: 600 mL    Solution: 150 mL  Total IN: 1150 mL    OUT:    Indwelling Catheter - Urethral: 420 mL  Total OUT: 420 mL    Total NET: 730 mL      01-10 @ 07:01  -  01-11 @ 07:00  --------------------------------------------------------  IN:  Total IN: 0 mL    OUT:    Indwelling Catheter - Urethral: 500 mL  Total OUT: 500 mL    Total NET: -500 mL        Daily     Daily     PHYSICAL EXAM:  HEENT: icteric sclerae, dry oral mucosa  Lungs: diminished, mildly tachypneic  CV: tachycardia  GI: soft, non-tender, + bowel sounds  :  weber without gross hematuria  Musculoskeletal: bedbound, edema x4, anasarca  Skin: jaundiced  Neuro: not responsive to verbal stimuli, responsive to tactile stimuli  Diet: NPO        LABS:    CBC:            BMP:    01-09    140  |  102  |  129<H>  ----------------------------<  122<H>  4.6   |  17<L>  |  5.79<H>    Ca    8.6      09 Jan 2019 03:30  Phos  8.1     01-09  Mg     2.3     01-09    TPro  4.9<L>  /  Alb  1.8<L>  /  TBili  20.5<H>  /  DBili  x   /  AST  66<H>  /  ALT  64<H>  /  AlkPhos  166<H>  01-09 FINAL NOTE   pt seen and examined.pts current chart reviewed and case discussed with resident covering.    SUBJECTIVE: s/p palliative extubation yesterday, labor breathing, unresponsiveness, pending for hospice IPU.     REVIEW OF SYSTEMS:  unable to obtain     Current meds:    acetaminophen  Suppository .. 650 milliGRAM(s) Rectal every 6 hours PRN  chlorhexidine 4% Liquid 1 Application(s) Topical two times a day  glycopyrrolate Injectable 0.4 milliGRAM(s) IV Push every 4 hours PRN  HYDROmorphone  Injectable 1 milliGRAM(s) IV Push every 30 minutes PRN  LORazepam   Injectable 2 milliGRAM(s) IV Push every 1 hour PRN      Vital Signs    T(F): 98 (01-11-19 @ 09:00), Max: 99.3 (01-10-19 @ 16:50)  HR: 93 (01-11-19 @ 09:00) (93 - 101)  BP: 120/43 (01-11-19 @ 09:00) (106/46 - 142/43)  ABP: --  RR: 23 (01-11-19 @ 09:00) (17 - 49)  SpO2: 93% (01-11-19 @ 09:00) (91% - 96%)  Wt(kg): --  CVP(cm H2O): --  CO: --  PCWP: --    I and O's:    01-09 @ 07:01  -  01-10 @ 07:00  --------------------------------------------------------  IN:    Enteral Tube Flush: 200 mL    Nepro with Carb Steady: 200 mL    Solution: 600 mL    Solution: 150 mL  Total IN: 1150 mL    OUT:    Indwelling Catheter - Urethral: 420 mL  Total OUT: 420 mL    Total NET: 730 mL      01-10 @ 07:01  -  01-11 @ 07:00  --------------------------------------------------------  IN:  Total IN: 0 mL    OUT:    Indwelling Catheter - Urethral: 500 mL  Total OUT: 500 mL    Total NET: -500 mL        Daily     Daily     PHYSICAL EXAM:  HEENT: icteric sclerae, dry oral mucosa  Lungs: diminished, mildly tachypneic  CV: tachycardia  GI: soft, non-tender, + bowel sounds  :  weber without gross hematuria  Musculoskeletal: bedbound, edema x4, anasarca  Skin: jaundiced  Neuro: not responsive to verbal stimuli, responsive to tactile stimuli  Diet: NPO        LABS: no new labs    CBC:            BMP:    01-09    140  |  102  |  129<H>  ----------------------------<  122<H>  4.6   |  17<L>  |  5.79<H>    Ca    8.6      09 Jan 2019 03:30  Phos  8.1     01-09  Mg     2.3     01-09    TPro  4.9<L>  /  Alb  1.8<L>  /  TBili  20.5<H>  /  DBili  x   /  AST  66<H>  /  ALT  64<H>  /  AlkPhos  166<H>  01-09

## 2019-01-11 NOTE — PROGRESS NOTE ADULT - SUBJECTIVE AND OBJECTIVE BOX
MARICEL SLAUGHTER  MR# [id number]  79yFemale        Patient is a 79y old  Female who presents with a chief complaint of fall (11 Jan 2019 10:51)      INTERVAL HPI/OVERNIGHT EVENTS:  Patient seen and examined at bedside.       ALLERGIES  No Known Allergies      MEDICATIONS  acetaminophen  Suppository .. 650 milliGRAM(s) Rectal every 6 hours PRN Temp greater or equal to 38C (100.4F)  chlorhexidine 4% Liquid 1 Application(s) Topical two times a day  glycopyrrolate Injectable 0.4 milliGRAM(s) IV Push every 4 hours PRN secretions  HYDROmorphone  Injectable 1 milliGRAM(s) IV Push every 30 minutes PRN respiratory distress  LORazepam   Injectable 2 milliGRAM(s) IV Push every 1 hour PRN Agitation              REVIEW OF SYSTEMS:  CONSTITUTIONAL: No fever, weight loss, or fatigue  EYES: No eye pain, visual disturbances, or discharge  ENT:  No difficulty hearing, tinnitus, vertigo; No sinus or throat pain  NECK: No pain or stiffness  RESPIRATORY: No cough, wheezing, chills or hemoptysis; No Shortness of Breath  CARDIOVASCULAR: No chest pain, palpitations, passing out, dizziness, or leg swelling  GASTROINTESTINAL: No abdominal or epigastric pain. No nausea, vomiting, or hematemesis; No diarrhea or constipation. No melena or hematochezia.  GENITOURINARY: No dysuria, frequency, hematuria, or incontinence  NEUROLOGICAL: No headaches, memory loss, loss of strength, numbness, or tremors  SKIN: No itching, burning, rashes, or lesions   LYMPH Nodes: No enlarged glands  ENDOCRINE: No heat or cold intolerance; No hair loss  MUSCULOSKELETAL: No joint pain or swelling; No muscle, back, or extremity pain  PSYCHIATRIC: No depression, anxiety, mood swings, or difficulty sleeping  HEME/LYMPH: No easy bruising, or bleeding gums  ALLERGY AND IMMUNOLOGIC: No hives or eczema	    [ ] All others negative	  [ ] Unable to obtain      T(C): 36.7 (01-11-19 @ 09:00), Max: 37.4 (01-10-19 @ 16:50)  T(F): 98 (01-11-19 @ 09:00), Max: 99.3 (01-10-19 @ 16:50)  HR: 93 (01-11-19 @ 09:00) (93 - 101)  BP: 120/43 (01-11-19 @ 09:00) (106/46 - 142/43)  RR: 23 (01-11-19 @ 09:00) (17 - 49)  SpO2: 93% (01-11-19 @ 09:00) (91% - 96%)  Wt(kg): --    I&O's Summary    10 Boby 2019 07:01  -  11 Jan 2019 07:00  --------------------------------------------------------  IN: 0 mL / OUT: 500 mL / NET: -500 mL          PHYSICAL EXAM:  A X O x  HEAD:  Atraumatic, Normocephalic  EYES: EOMI, PERRLA, conjunctiva and sclera clear  NECK: Supple, No JVD, Normal thyroid  Resp: CTAB, No crackles, wheezing,   CVS: Regular rate and rhythm; No discernable murmurs, rubs, or gallops  ABD: Soft, Nontender, Nondistended; Bowel sounds present  EXTREMITIES:  2+ Peripheral Pulses, No edema  LYMPH: No dicernable lymphadenopathy noted  GENERAL: NAD, well-groomed, well-developed      LABS:              CAPILLARY BLOOD GLUCOSE          Troponins:  ProBNP:  Lipid Profile:   HgA1c:  TSH:           RADIOLOGY & ADDITIONAL TESTS:    Imaging Personally Reviewed:  [ ] YES  [ ] NO      Consultant(s) Notes Reviewed:  [x ] YES  [ ] NO    Care Discussed with Consultants/Other Providers [ x] YES  [ ] NO          PAST MEDICAL & SURGICAL HISTORY:  No pertinent past medical history  No significant past surgical history        Closed fracture of neck of left femur  Yes  No pertinent family history in first degree relatives  Handoff  MEWS Score  No pertinent past medical history  Closed fracture of left hip, initial encounter  Palliative care encounter  Multiple organ failure with heart failure  Anoxic encephalopathy  Acute respiratory failure with hypoxia and hypercapnia  Encephalopathy  Peripheral arterial disease  Drop in hemoglobin  Acute liver failure  Acute renal failure  Septic shock  Acute post-operative pain  Hypotension  Fever  Acute systolic congestive heart failure  Cardiogenic shock  Prophylactic measure  Diabetes mellitus  HTN (hypertension)  Heart failure with reduced ejection fraction  PSVT (paroxysmal supraventricular tachycardia)  Left ventricular dysfunction  Need for prophylactic measure  Closed fracture of left hip, initial encounter  No significant past surgical history  A S/P FALL

## 2019-01-11 NOTE — PROGRESS NOTE ADULT - SUBJECTIVE AND OBJECTIVE BOX
INTERVAL HPI/OVERNIGHT EVENTS: No acute events.    PRESSORS: [ ] YES [x] NO    Other:  acetaminophen  Suppository .. 650 milliGRAM(s) Rectal every 6 hours PRN  chlorhexidine 4% Liquid 1 Application(s) Topical two times a day  glycopyrrolate Injectable 0.4 milliGRAM(s) IV Push every 4 hours PRN  HYDROmorphone  Injectable 1 milliGRAM(s) IV Push every 30 minutes PRN  LORazepam   Injectable 2 milliGRAM(s) IV Push every 1 hour PRN    acetaminophen  Suppository .. 650 milliGRAM(s) Rectal every 6 hours PRN  chlorhexidine 4% Liquid 1 Application(s) Topical two times a day  glycopyrrolate Injectable 0.4 milliGRAM(s) IV Push every 4 hours PRN  HYDROmorphone  Injectable 1 milliGRAM(s) IV Push every 30 minutes PRN  LORazepam   Injectable 2 milliGRAM(s) IV Push every 1 hour PRN    Drug Dosing Weight  Height (cm): 154.94 (28 Dec 2018 01:00)  Weight (kg): 82.1 (28 Dec 2018 01:00)  BMI (kg/m2): 34.2 (28 Dec 2018 01:00)  BSA (m2): 1.81 (28 Dec 2018 01:00)    CENTRAL LINE: [x] YES [ ] NO  LOCATION:   DATE INSERTED:    DE SOUZA: [x] YES [ ] NO    DATE INSERTED:    A-LINE:  [ ] YES [x] NO  LOCATION:   DATE INSERTED:    ICU Vital Signs Last 24 Hrs  T(C): 37.1 (11 Jan 2019 04:00), Max: 37.4 (10 Boby 2019 16:50)  T(F): 98.7 (11 Jan 2019 04:00), Max: 99.3 (10 Boby 2019 16:50)  HR: 96 (11 Jan 2019 08:00) (88 - 101)  BP: 118/41 (11 Jan 2019 08:00) (106/46 - 142/43)  BP(mean): 60 (11 Jan 2019 08:00) (55 - 91)  ABP: --  ABP(mean): --  RR: 24 (11 Jan 2019 08:00) (17 - 49)  SpO2: 92% (11 Jan 2019 08:00) (91% - 96%)            01-10 @ 07:01  -  01-11 @ 07:00  --------------------------------------------------------  IN: 0 mL / OUT: 500 mL / NET: -500 mL            PHYSICAL EXAM:    >>> <<<        [ ]GOALS OF CARE DISCUSSION WITH PATIENT/FAMILY/PROXY:    CRITICAL CARE TIME SPENT: 35 minutes INTERVAL HPI/OVERNIGHT EVENTS: No acute events.    PRESSORS: [ ] YES [x] NO    Other:  acetaminophen  Suppository .. 650 milliGRAM(s) Rectal every 6 hours PRN  chlorhexidine 4% Liquid 1 Application(s) Topical two times a day  glycopyrrolate Injectable 0.4 milliGRAM(s) IV Push every 4 hours PRN  HYDROmorphone  Injectable 1 milliGRAM(s) IV Push every 30 minutes PRN  LORazepam   Injectable 2 milliGRAM(s) IV Push every 1 hour PRN    acetaminophen  Suppository .. 650 milliGRAM(s) Rectal every 6 hours PRN  chlorhexidine 4% Liquid 1 Application(s) Topical two times a day  glycopyrrolate Injectable 0.4 milliGRAM(s) IV Push every 4 hours PRN  HYDROmorphone  Injectable 1 milliGRAM(s) IV Push every 30 minutes PRN  LORazepam   Injectable 2 milliGRAM(s) IV Push every 1 hour PRN    Drug Dosing Weight  Height (cm): 154.94 (28 Dec 2018 01:00)  Weight (kg): 82.1 (28 Dec 2018 01:00)  BMI (kg/m2): 34.2 (28 Dec 2018 01:00)  BSA (m2): 1.81 (28 Dec 2018 01:00)    CENTRAL LINE: [x] YES [ ] NO  LOCATION:   DATE INSERTED:    DE SOUZA: [x] YES [ ] NO    DATE INSERTED:    A-LINE:  [ ] YES [x] NO  LOCATION:   DATE INSERTED:    ICU Vital Signs Last 24 Hrs  T(C): 37.1 (11 Jan 2019 04:00), Max: 37.4 (10 Boby 2019 16:50)  T(F): 98.7 (11 Jan 2019 04:00), Max: 99.3 (10 Boby 2019 16:50)  HR: 96 (11 Jan 2019 08:00) (88 - 101)  BP: 118/41 (11 Jan 2019 08:00) (106/46 - 142/43)  BP(mean): 60 (11 Jan 2019 08:00) (55 - 91)  ABP: --  ABP(mean): --  RR: 24 (11 Jan 2019 08:00) (17 - 49)  SpO2: 92% (11 Jan 2019 08:00) (91% - 96%)            01-10 @ 07:01  -  01-11 @ 07:00  --------------------------------------------------------  IN: 0 mL / OUT: 500 mL / NET: -500 mL            PHYSICAL EXAM:    GENERAL: Well developed, Well nourished white female, sedated  HEENT:  Normocephalic/Atraumatic, reactive light reflex, moist mucous membranes, Scleral icterus   NECK: Supple, no JVD, LIJ line  RESP: Symmetric movement of the chest, clear to auscultation bilaterally, diffuse bilateral basal rales   CVS: Irregular, S1 and S2 audible, no murmur  GI: Hypoactive bowel sounds present, abdomen soft, non tender, non distended  EXTREMITIES:  No edema, no clubbing, cyanosis, Rt Shiley femoral, Joaquin ulcer   MSK: 0/5 strength bilateral upper and lower extremities, Left hip scar/band, Blue hands/toes   PSYCH: Sedated  NEURO: Alert and oriented x 0        [ ]GOALS OF CARE DISCUSSION WITH PATIENT/FAMILY/PROXY:    CRITICAL CARE TIME SPENT: 35 minutes

## 2019-01-11 NOTE — DISCHARGE NOTE ADULT - PATIENT PORTAL LINK FT
You can access the [facility] Patient Portal, offered by [facility], by registering with the following website: [email or web address]

## 2019-01-11 NOTE — DISCHARGE NOTE ADULT - MEDICATION SUMMARY - MEDICATIONS TO TAKE
I will START or STAY ON the medications listed below when I get home from the hospital:    acetaminophen 650 mg rectal suppository  -- 1 suppository(ies) rectally every 6 hours, As needed, Temp greater or equal to 38C (100.4F)  -- Indication: For Fever    HYDROmorphone  -- Indication: For Comfort measures    LORazepam  -- Indication: For Agitation    glycopyrrolate 0.2 mg/mL injectable solution  --  injectable   -- Indication: For Comfort measures

## 2019-01-11 NOTE — DISCHARGE NOTE ADULT - HOSPITAL COURSE
78 F pt with HTN, DM2, PMHx presents to ED c/o L hip pain s/p trip and fall. Patient admitted to medicine floor for left hip fracture. Underwent left IM nailing on 12/27/18. Patient brought to ICU for post op monitoring. Now undergoing multi-organ failure.    Pt has intubated due to acute hypoxic respiratory failure on 12/27. pt failed multiple trials of extubation and was terminally extubated on 1/10.pt is being transferred to hospice care. 78 F pt with HTN, DM2, PMHx presents to ED c/o L hip pain s/p trip and fall. Patient admitted to medicine floor for left hip fracture. Underwent left IM nailing on 12/27/18. Patient brought to ICU for post op monitoring. Now undergoing multi-organ failure.    Pt has intubated due to acute hypoxic respiratory failure on 12/27. pt failed multiple trials of extubation and was terminally extubated on 1/10.pt is being transferred to hospice care.      For full account of hospital course please refer to actual medical records. As this is a brief summery.

## 2019-01-11 NOTE — PROGRESS NOTE ADULT - SUBJECTIVE AND OBJECTIVE BOX
Progress Note:   · Provider Specialty	Critical Care	    Reason for Admission:    Reason for Admission:  · Reason for Admission	fall	      · Subjective and Objective: 	  INTERVAL HPI/OVERNIGHT EVENTS: No acute events.    PRESSORS: [ ] YES [x] NO    Other:  acetaminophen  Suppository .. 650 milliGRAM(s) Rectal every 6 hours PRN  chlorhexidine 4% Liquid 1 Application(s) Topical two times a day  glycopyrrolate Injectable 0.4 milliGRAM(s) IV Push every 4 hours PRN  HYDROmorphone  Injectable 1 milliGRAM(s) IV Push every 30 minutes PRN  LORazepam   Injectable 2 milliGRAM(s) IV Push every 1 hour PRN    acetaminophen  Suppository .. 650 milliGRAM(s) Rectal every 6 hours PRN  chlorhexidine 4% Liquid 1 Application(s) Topical two times a day  glycopyrrolate Injectable 0.4 milliGRAM(s) IV Push every 4 hours PRN  HYDROmorphone  Injectable 1 milliGRAM(s) IV Push every 30 minutes PRN  LORazepam   Injectable 2 milliGRAM(s) IV Push every 1 hour PRN    Drug Dosing Weight  Height (cm): 154.94 (28 Dec 2018 01:00)  Weight (kg): 82.1 (28 Dec 2018 01:00)  BMI (kg/m2): 34.2 (28 Dec 2018 01:00)  BSA (m2): 1.81 (28 Dec 2018 01:00)    CENTRAL LINE: [x] YES [ ] NO  LOCATION:   DATE INSERTED:    DE SOUZA: [x] YES [ ] NO    DATE INSERTED:    A-LINE:  [ ] YES [x] NO  LOCATION:   DATE INSERTED:    ICU Vital Signs Last 24 Hrs  T(C): 37.1 (11 Jan 2019 04:00), Max: 37.4 (10 Boby 2019 16:50)  T(F): 98.7 (11 Jan 2019 04:00), Max: 99.3 (10 Boby 2019 16:50)  HR: 96 (11 Jan 2019 08:00) (88 - 101)  BP: 118/41 (11 Jan 2019 08:00) (106/46 - 142/43)  BP(mean): 60 (11 Jan 2019 08:00) (55 - 91)  ABP: --  ABP(mean): --  RR: 24 (11 Jan 2019 08:00) (17 - 49)  SpO2: 92% (11 Jan 2019 08:00) (91% - 96%)            01-10 @ 07:01  -  01-11 @ 07:00  --------------------------------------------------------  IN: 0 mL / OUT: 500 mL / NET: -500 mL            PHYSICAL EXAM:    GENERAL: Well developed, Well nourished white female, sedated  HEENT:  Normocephalic/Atraumatic, reactive light reflex, moist mucous membranes, Scleral icterus   NECK: Supple, no JVD, LIJ line  RESP: Symmetric movement of the chest, clear to auscultation bilaterally, diffuse bilateral basal rales   CVS: Irregular, S1 and S2 audible, no murmur  GI: Hypoactive bowel sounds present, abdomen soft, non tender, non distended  EXTREMITIES:  No edema, no clubbing, cyanosis, Rt Shiley femoral, Joaquin ulcer   MSK: 0/5 strength bilateral upper and lower extremities, Left hip scar/band, Blue hands/toes   PSYCH: Sedated  NEURO: Alert and oriented x 0        [ ]GOALS OF CARE DISCUSSION WITH PATIENT/FAMILY/PROXY:    CRITICAL CARE TIME SPENT: 35 minutes    Assessment and Plan:   · Assessment		  78 F pt with HTN, DM2, PMHx presents to ED c/o L hip pain s/p trip and fall. Patient admitted to medicine floor for left hip fracture. Underwent left IM nailing on 12/27/18. Patient brought to ICU for post op monitoring and multi-organ failure. . terminal extubated 1/10.        Problem/Plan - 1:  ·  Problem: Encephalopathy.  Plan: Multiorgan involvement and severe sepsis  Poor mental status  Terminally extubated  Palliative on board for comfort care.      Problem/Plan - 2:  ·  Problem: Septic shock.  Plan: CXR: Bilateral basal pneumonia and diffuse congestion  D/c Zyvox and meropenem  Blood Cx: NTD.      Problem/Plan - 3:  ·  Problem: Acute renal failure.  Plan: Multiorgan failure with Anuria and lactic acidosis   Rt Femoral Shiley  Stopped dialysis for comfort care  Mildly incraesed urine output   Dr Lay on board.      Problem/Plan - 4:  ·  Problem: Acute liver failure.  Plan: Elevated Bilirubin due to sepsis  Normal ammonia levels.      Problem/Plan - 5:  ·  Problem: Drop in hemoglobin.  Plan: Stable after 2 PRBC  Baseline Hb 10.      Problem/Plan - 6:  Problem: Peripheral arterial disease. Plan: Blue toes and hands worsening.     Problem/Plan - 7:  ·  Problem: Closed fracture of left hip, initial encounter.  Plan: S/p L Hip IM Nailing on Dec 27  Pain control with Dilaudid.      Problem/Plan - 8:  ·  Problem: Acute systolic congestive heart failure.  Plan: Echo showed EF of 20% with G1 DD  Keep net negative with dialysis.      Problem/Plan - 9:  ·  Problem: Diabetes mellitus.  Plan: HbA1c of 6.      Problem/Plan - 10:  Problem: Prophylactic measure. Plan; Heparin sq for VTE ppx  Protonix for GI ppx    for hospice

## 2019-01-11 NOTE — PROGRESS NOTE ADULT - ASSESSMENT
78 F pt with HTN, DM2, PMHx presents to ED c/o L hip pain s/p trip and fall. Patient admitted to medicine floor for left hip fracture. Underwent left IM nailing on 12/27/18. Patient brought to ICU for post op monitoring and multi-organ failure.       Awaiting in house hospice referal

## 2019-01-11 NOTE — PROGRESS NOTE ADULT - PROBLEM SELECTOR PLAN 2
likely due to severe septic and cardiogenic shock in setting of advanced CHF.  Despite aggressive medical management in the ICU, pt appears to have suffered anoxic encephalopathy due to poor perfusion. CT head did not show any gross pathology.  Hypoxia may contribute to worsening mental status.  Ativan 2 mg IV q 1 hr PRN (agitation) ordered.

## 2019-01-11 NOTE — PROGRESS NOTE ADULT - PROBLEM SELECTOR PLAN 4
Shift to a care plan to manage patient's symptoms only.  Hospice discussed with family; family is amenable.  Consult placed.  Appropriate for hospice IPU.

## 2019-01-11 NOTE — H&P ADULT - PROBLEM SELECTOR PLAN 1
due to pt's inability to recover from cardiogenic/septic shock, liver and renal failure despite full resuscitative measures including dialysis - s/p palliative extubation 1/10/19, and now only symptom management - dilaudid 1mg IVP PRN, ativan 2 mg IVP PRN, NC Oxygen

## 2019-01-12 NOTE — PROGRESS NOTE ADULT - SUBJECTIVE AND OBJECTIVE BOX
MARICEL SLAUGHTER                    79y  Female    Allergies    No Known Allergies    Intolerances    Symptoms:  Symptom Requiring Inpatient Hospice Admission: Progressive multiorgan failure    Overnight events/interim history: No overnight events. Pt appears comfortable. Received 1 dose of dilaudid since midnight    HPI:  79 y/o F pt with HTN, DM2, PMHx presents to ED c/o L hip pain s/p trip and fall from a curb today. Pt describes pain as sharp, mild and constant, 10/10 in severity. Pain of the left hip increases with ROM. Pt unable to ambulate nor bear weight. Patient denies head strike, LOC, N/V, vision changes, neurological deficits chest pain, palpitations, shortness of breath, or any other complaints. XR hip showed nondisplaced intertrochanteric fracture left femoral neck and marked osteoporosis. XR hip showed nondisplaced intertrochanteric fracture left femoral neck and marked osteoporosis. S/P Left hip nailing IM POD #14.   However pt was unable to be weaned s/p surgery and quickly developed  cardiogenic shock due to underlying endstage CHF (20% - undiagnosed) and septic shock from b/l pneumonia, with shock liver and renal failure. Despite full resuscitative measures for two weeks including dialysis, pt was unable to be recover from encephalopathy and be weaned, family decided on palliative extubation 1/10/19, and only symptom management and to allow natural death. Family also chose inpatient hospice for continued care at end of life. (11 Jan 2019 22:36)          PPSV2:     Code Status:    MEDICATIONS  (STANDING):    MEDICATIONS  (PRN):  acetaminophen  Suppository .. 650 milliGRAM(s) Rectal every 6 hours PRN Temp greater or equal to 38C (100.4F)  bisacodyl Suppository 10 milliGRAM(s) Rectal daily PRN Constipation  glycopyrrolate Injectable 0.4 milliGRAM(s) IV Push every 4 hours PRN secretions  HYDROmorphone  Injectable 1 milliGRAM(s) IV Push every 30 minutes PRN respiratory distress, labored breathing  LORazepam   Injectable 2 milliGRAM(s) IV Push every 1 hour PRN Agitation                 Vital Signs Last 24 Hrs  T(C): 37.1 (12 Jan 2019 07:35), Max: 39.1 (11 Jan 2019 22:26)  T(F): 98.8 (12 Jan 2019 07:35), Max: 102.3 (11 Jan 2019 22:26)  HR: 86 (12 Jan 2019 07:35) (50 - 94)  BP: 79/32 (12 Jan 2019 07:35) (62/24 - 112/35)  BP(mean): 46 (11 Jan 2019 21:00) (46 - 54)  RR: 80 (12 Jan 2019 07:35) (20 - 80)  SpO2: 98% (12 Jan 2019 07:35) (82% - 98%)    PHYSICAL EXAM:    Unresponsive  Heart regular  Shallow breathing  Abdomen soft  Extremities edematous with necrotic toes bilaterally

## 2019-01-13 NOTE — DISCHARGE NOTE FOR THE EXPIRED PATIENT - HOSPITAL COURSE
77 y/o F pt with HTN, DM2, PMHx presents to ED c/o L hip pain s/p trip and fall from a curb today. Pt describes pain as sharp, mild and constant, 10/10 in severity. Pain of the left hip increases with ROM. Pt unable to ambulate nor bear weight. Patient denies head strike, LOC, N/V, vision changes, neurological deficits chest pain, palpitations, shortness of breath, or any other complaints. XR hip showed nondisplaced intertrochanteric fracture left femoral neck and marked osteoporosis. XR hip showed nondisplaced intertrochanteric fracture left femoral neck and marked osteoporosis. S/P Left hip nailing IM POD #14.   However pt was unable to be weaned s/p surgery and quickly developed  cardiogenic shock due to underlying endstage CHF (20% - undiagnosed) and septic shock from b/l pneumonia, with shock liver and renal failure. Despite full resuscitative measures for two weeks including dialysis, pt was unable to be recover from encephalopathy and be weaned, family decided on palliative extubation 1/10/19, and only symptom management and to allow natural death. Family also chose inpatient hospice for continued care at end of life.    Pt was getting symptomatic treatment with Ativan, Dilaudid, Robinul and Bisacodyl.      MEDICINE NOTE    Called to bedside to evaluate the patient for .     On physical exam, patient did not respond to verbal or noxious stimuli.  No spontaneous respirations.  Absent heart and breath sounds.  Absent radial and carotid pulses. Pupils are fixed and dilated. Patient pronounced dead at 10:39 am. Attending notified. 77 y/o F pt with HTN, DM2, PMHx presents to ED c/o L hip pain s/p trip and fall from a curb today. Pt describes pain as sharp, mild and constant, 10/10 in severity. Pain of the left hip increases with ROM. Pt unable to ambulate nor bear weight. Patient denies head strike, LOC, N/V, vision changes, neurological deficits chest pain, palpitations, shortness of breath, or any other complaints. XR hip showed nondisplaced intertrochanteric fracture left femoral neck and marked osteoporosis. XR hip showed nondisplaced intertrochanteric fracture left femoral neck and marked osteoporosis. S/P Left hip nailing IM POD #14.   However pt was unable to be weaned s/p surgery and quickly developed  cardiogenic shock due to underlying endstage CHF (20% - undiagnosed) and septic shock from b/l pneumonia, with shock liver and renal failure. Despite full resuscitative measures for two weeks including dialysis, pt was unable to be recover from encephalopathy and be weaned, family decided on palliative extubation 1/10/19, and only symptom management and to allow natural death. Family also chose inpatient hospice for continued care at end of life.    Pt was getting symptomatic treatment with Ativan, Dilaudid, Robinul and Bisacodyl.      MEDICINE NOTE    Called to bedside to evaluate the patient for .     On physical exam, patient did not respond to verbal or noxious stimuli.  No spontaneous respirations.  Absent heart and breath sounds.  Absent radial and carotid pulses. Pupils are fixed and dilated. Patient pronounced dead at 10:39 am. Attending notified. Family at bedside, no autopsy requested    Spoke with [facility] investigator Freda Rico - Ref #[id number]-, was determined to not be an ME case

## 2021-03-18 NOTE — H&P ADULT - PROBLEM SELECTOR PLAN 4
Subjective





This is a pleasant 60 years old male with past medical history of hypertension, 

hyperlipidemia, gastroesophageal reflux disease.  He follows up at the [facility].  He presents because of upper abdominal pain and tenderness of 2 days' 

duration that wake him up yesterday, it is across the upper abdomen and lower 

chest but his tenderness mainly in the epigastric.  He tried several 

over-the-counter medication like this mass, and antiacids with no help, enter on

each right sodium bicarb and baking soda which helped a little bit.  He describe

s the pain as burning, 3/10 in severity associated with nausea and vomiting 

twice.


No other chest pain or dyspnea.  No coughing.  Both complains from some cramping

in the both legs


He denies smoking, alcohol or illicit drug.





On admission he developed fever of 101, blood pressure is 1:30/93.  Restoril 

vitals are stable and he is saturating 98% on room air


He had mild leukocytosis of 13.1 K, BMP and liver enzymes are unremarkable but 

troponin is elevated 6.8 and 7.2, Lipase is normal at 76


Urine analysis looks dehydrated sample but not very suggestive of infection.  

Corona virus not detected


Patient had CT of the abdomen and pelvis with IV contrast which was unremarkable


CT of the chest without contrast showing mild atelectasis, no consolidation, no 

suspicious pulmonary mass, no evidence of bronchopneumonia


EKG showing normal sinus rhythm at 76 with poor R-wave progression in V1 to V2, 

T-wave inversion in V1 to V6.  QTC is 454


in emergency room cardiologist team evaluated the patient and they were planning

for cardiac cath today however patient developed significant fever of 101 so ca

rdiac cath was held and patient was placed on heparin drip





03/17/2021


Patient underwent cardiac cath today with stent placement in the LAD, status 

post stent placement.  Today his upper abdominal pain is feeling better and 

currently rated as 0/10.  Vitals are stable.  Leukocytosis improving down to 

10.8 gait.  procalcitonin is normal at 0.05.  He had low-grade temperature this

morning and 8.8.  Patient is already started on Zosyn however it looks like his 

fever and leukocytosis related to his cardiac disease rather than infection.  

Infectious disease on the case hour consultant and we are waiting for their 

final recommendation.


Creatinine went up a little bit 1.39, patient yesterday got some contrast with 

CT of the abdomen.  CT of the chest was done without contrast.  Also patient 

today Cardiac cath so we'll monitor his creatinine tomorrow.





03/18/2021


Patient presents with upper abdominal pain secondary to non-STEMI he underwent 

cardiac cath with Dr. Churchill with stent placement in mid LAD and major diagonal 

branch.  Patient was started on aspirin and Brillinta and importance of met

astasis is explained to the patient.  There was suspicion of sepsis upon 

admission with fever leukocytosis, mostly is a due to his non-STEMI, however his

blood culture is still pending final results and in the meantime he remains on 

Zosyn.  Infectious disease input is appreciated.


Creatinine stable at 1.3.  D-dimer is negative.





Objective





- Vital Signs


Vital signs: 


                                   Vital Signs











Temp  98.7 F   03/18/21 12:00


 


Pulse  74   03/18/21 12:00


 


Resp  16   03/18/21 12:00


 


BP  122/74   03/18/21 12:00


 


Pulse Ox  97   03/18/21 12:00








                                 Intake & Output











 03/17/21 03/18/21 03/18/21





 18:59 06:59 18:59


 


Intake Total 1102.5 1840 925


 


Balance 1102.5 1840 925


 


Weight  68.2 kg 


 


Intake:   


 


  .5 1300 375


 


    Piperacillin-Tazobactam 3 100 100 





    .375 gm In Sodium   





    Chloride 0.9% 100 ml @ 25   





    mls/hr IVPB Q8H [facility] Rx#:   





    [id number]   


 


    Sodium Chloride 0.9% 1, 600 1200 375





    000 ml @ 75 mls/hr IV .   





    [id number] [facility] Rx#:[id number]   


 


  Oral 375 540 550


 


Other:   


 


  Voiding Method Urinal Urinal Urinal


 


  # Voids  1 














- Exam





GENERAL: The patient is alert and oriented x3, not in any acute distress. Well 

developed, well nourished. 


HEENT: Pupils are round and equally reacting to light. EOMI. No scleral icterus.

No conjunctival pallor. Normocephalic, atraumatic. No pharyngeal erythema. No 

thyromegaly. 


CARDIOVASCULAR: S1 and S2 present. No murmurs, rubs, or gallops. 


PULMONARY: Chest is clear to auscultation, no wheezing or crackles. 


ABDOMEN: Soft, nontender, nondistended, normoactive bowel sounds. No palpable 

organomegaly. 


MUSCULOSKELETAL: No joint swelling or deformity. 


EXTREMITIES: No cyanosis, clubbing, or pedal edema. 


NEUROLOGICAL: Gross neurological examination did not reveal any focal deficits. 


SKIN: No rashes. no petechiae.





- Labs


CBC & Chem 7: 


                                 03/18/21 07:51





                                 03/18/21 07:51


Labs: 


                  Abnormal Lab Results - Last 24 Hours (Table)











  03/18/21 03/18/21 Range/Units





  07:51 07:51 


 


RBC  4.24 L   (4.30-5.90)  m/uL


 


Hct  38.1 L   (39.0-53.0)  %


 


ESR  18 H   (0-15)  mm/hr


 


Sodium   135 L  (137-145)  mmol/L


 


Carbon Dioxide   21 L  (22-30)  mmol/L


 


Creatinine   1.34 H  (0.66-1.25)  mg/dL


 


Glucose   111 H  (74-99)  mg/dL


 


C-Reactive Protein   26.7 H  (<10.0)  mg/L








                      Microbiology - Last 24 Hours (Table)











 03/16/21 20:17 Blood Culture - Preliminary





 Blood    No Growth after 24 hours














Assessment and Plan


Assessment: 





Elevated troponin with epigastric pain concerning for non-STEMI, status post 

cardiac cath and stent of the LAD branches


leukocytosis and fever.  Unknown source.  Corona Virus is negative.  Most likely

related to his cardiac disease.  Rule out infection


Hypertension


Hyperlipidemia


History of GERD








Plan: 





This is a pleasant 60 years old male who presents with non-STEMI and fever, 

improvement.  Continue with aspirin and brilintta, and metoprolol and follow up 

with cardiologist recommendation.  Follow-up infectious disease recommendation 

and culture results continue with the patient empirically on Zosyn.  Follow-up 

blood culture results


Labs and medication were reviewed..  Continue same treatment.  Continue with 

symptomatic treatment.  Resume home medication.  Monitor lytes and vitals.  DVT 

and GI prophylaxis.  Further recommendations depends on the clinical course of 

the patient


DVT prophylaxis:  heparin


GI Prophylaxis: Ppi


Prognosis is guarded
Subjective





This is a pleasant 60 years old male with past medical history of hypertension, 

hyperlipidemia, gastroesophageal reflux disease.  He follows up at the [facility].  He presents because of upper abdominal pain and tenderness of 2 days' 

duration that wake him up yesterday, it is across the upper abdomen and lower 

chest but his tenderness mainly in the epigastric.  He tried several 

over-the-counter medication like this mass, and antiacids with no help, enter on

each right sodium bicarb and baking soda which helped a little bit.  He describe

s the pain as burning, 3/10 in severity associated with nausea and vomiting 

twice.


No other chest pain or dyspnea.  No coughing.  Both complains from some cramping

in the both legs


He denies smoking, alcohol or illicit drug.





On admission he developed fever of 101, blood pressure is 1:30/93.  Restoril 

vitals are stable and he is saturating 98% on room air


He had mild leukocytosis of 13.1 K, BMP and liver enzymes are unremarkable but 

troponin is elevated 6.8 and 7.2, Lipase is normal at 76


Urine analysis looks dehydrated sample but not very suggestive of infection.  

Corona virus not detected


Patient had CT of the abdomen and pelvis with IV contrast which was unremarkable


CT of the chest without contrast showing mild atelectasis, no consolidation, no 

suspicious pulmonary mass, no evidence of bronchopneumonia


EKG showing normal sinus rhythm at 76 with poor R-wave progression in V1 to V2, 

T-wave inversion in V1 to V6.  QTC is 454


in emergency room cardiologist team evaluated the patient and they were planning

for cardiac cath today however patient developed significant fever of 101 so ca

rdiac cath was held and patient was placed on heparin drip





03/17/2021


Patient underwent cardiac cath today with stent placement in the LAD, status 

post stent placement.  Today his upper abdominal pain is feeling better and 

currently rated as 0/10.  Vitals are stable.  Leukocytosis improving down to 

10.8 gait.  procalcitonin is normal at 0.05.  He had low-grade temperature this

morning and 8.8.  Patient is already started on Zosyn however it looks like his 

fever and leukocytosis related to his cardiac disease rather than infection.  

Infectious disease on the case hour consultant and we are waiting for their 

final recommendation.


Creatinine went up a little bit 1.39, patient yesterday got some contrast with 

CT of the abdomen.  CT of the chest was done without contrast.  Also patient 

today Cardiac cath so we'll monitor his creatinine tomorrow.








Review of Systems


CONSTITUTIONAL: No fever, no malaise, no fatigue. 


HEENT: No recent visual problems or hearing problems. Denied any sore throat. 


CARDIOVASCULAR: No  orthopnea, PND, no palpitations, no syncope. 


PULMONARY: No shortness of breath, no cough, no hemoptysis. 


GASTROINTESTINAL: No diarrhea. Normoactive bowel sounds. 


NEUROLOGICAL: No headaches, no weakness, no numbness. 


                               Active Medications











Generic Name Dose Route Start Last Admin





  Trade Name Freq  PRN Reason Stop Dose Admin


 


Aspirin  81 mg  03/17/21 09:00  03/17/21 05:25





  Aspirin 81 Mg  PO   81 mg





  DAILY [facility]   Administration


 


Atorvastatin Calcium  80 mg  03/18/21 09:00 





  Atorvastatin 80 Mg Tab  PO  





  DAILY [facility]  


 


Heparin Sodium (Porcine)  0 unit  03/16/21 13:19 





  Heparin Sodium,Porcine 5,000 Unit/Ml 1 Ml Vial  IV  





  PER PROTOCOL PRN  





  Low PTT  





  Protocol  


 


Sodium Chloride  1,000 mls @ 75 mls/hr  03/16/21 16:15  03/17/21 03:44





  Saline 0.9%  IV  03/17/21 16:30  75 mls/hr





  [id number] [facility]   Administration


 


Piperacillin Sod/Tazobactam  100 mls @ 25 mls/hr  03/16/21 20:00  03/17/21 11:54





  Sod 3.375 gm/ Sodium Chloride  IVPB   25 mls/hr





  Q8H [facility]   Administration


 


Iopamidol  30 ml  03/16/21 15:19 





  Iopamidol Contrast (Oral Use) Vial  PO  03/17/21 15:21 





  Q60M PRN  





  CT Scan  


 


Lisinopril  20 mg  03/18/21 09:00 





  Lisinopril 20 Mg Tab  PO  





  DAILY [facility]  


 


Metoprolol Tartrate  50 mg  03/18/21 09:00 





  Metoprolol Tartrate 50 Mg Tab  PO  





  DAILY [facility]  


 


Metoprolol Tartrate  25 mg  03/17/21 21:00 





  Metoprolol Tartrate 25 Mg Tab  PO  





  HS [facility]  


 


Nitroglycerin  0.4 mg  03/16/21 13:24 





  Nitroglycerin Sl Tabs 0.4 Mg Tab  SUBLINGUAL  





  Q5M PRN  





  Chest Pain  


 


Pantoprazole Sodium  40 mg  03/18/21 09:00 





  Pantoprazole 40 Mg Tablet  PO  





  DAILY [facility]  


 


Ticagrelor  90 mg  03/18/21 09:00 





  Ticagrelor 90 Mg Tab  PO  





  BID [facility]  














Objective





- Vital Signs


Vital signs: 


                                   Vital Signs











Temp  98.2 F   03/17/21 12:00


 


Pulse  67   03/17/21 12:17


 


Resp  18   03/17/21 12:17


 


BP  124/79   03/17/21 12:17


 


Pulse Ox  96   03/17/21 13:24








                                 Intake & Output











 03/16/21 03/17/21 03/17/21





 18:59 06:59 18:59


 


Intake Total  1487.272 852.5


 


Balance  1487.272 852.5


 


Weight 71.214 kg 68.1 kg 


 


Intake:   


 


  IV  400 727.5


 


    Piperacillin-Tazobactam 3   100





    .375 gm In Sodium   





    Chloride 0.9% 100 ml @ 25   





    mls/hr IVPB Q8H [facility] Rx#:   





    [id number]   


 


    Sodium Chloride 0.9% 1,   600





    000 ml @ 75 mls/hr IV .   





    [id number] [facility] Rx#:[id number]   


 


  Intake, IV Titration  847.272 





  Amount   


 


    Heparin Sod,Pork in 0.45%  147.272 





    NaCl 25,000 unit In 0.45   





    % NaCl 1 250ml.bag @ 12   





    UNITS/KG/HR 8.546 mls/hr   





    IV .Q24H [facility] Rx#:   





    [id number]   


 


    Piperacillin-Tazobactam 3  100 





    .375 gm In Sodium   





    Chloride 0.9% 100 ml @ 25   





    mls/hr IVPB Q8H [facility] Rx#:   





    [id number]   


 


    Sodium Chloride 0.9% 1,  600 





    000 ml @ 75 mls/hr IV .   





    [id number] [facility] Rx#:[id number]   


 


  Oral  240 125


 


Other:   


 


  Voiding Method  Toilet Urinal


 


  # Voids  1 














- Exam





GENERAL: The patient is alert and oriented x3, not in any acute distress. Well 

developed, well nourished. 


HEENT: Pupils are round and equally reacting to light. EOMI. No scleral icterus.

No conjunctival pallor. Normocephalic, atraumatic. No pharyngeal erythema. No 

thyromegaly. 


CARDIOVASCULAR: S1 and S2 present. No murmurs, rubs, or gallops. 


PULMONARY: Chest is clear to auscultation, no wheezing or crackles. 


ABDOMEN: Soft, nontender, nondistended, normoactive bowel sounds. No palpable 

organomegaly. 


MUSCULOSKELETAL: No joint swelling or deformity. 


EXTREMITIES: No cyanosis, clubbing, or pedal edema. 


NEUROLOGICAL: Gross neurological examination did not reveal any focal deficits. 


SKIN: No rashes. no petechiae.





- Labs


CBC & Chem 7: 


                                 03/17/21 03:47





                                 03/17/21 03:47


Labs: 


                  Abnormal Lab Results - Last 24 Hours (Table)











  03/16/21 03/16/21 03/17/21 Range/Units





  15:51 19:00 03:47 


 


WBC     (3.8-10.6)  k/uL


 


APTT    45.1 H  (22.0-30.0)  sec


 


Chloride     ()  mmol/L


 


Creatinine     (0.66-1.25)  mg/dL


 


Glucose     (74-99)  mg/dL


 


Troponin I  7.250 H*  7.430 H*   (0.000-0.034)  ng/mL


 


Total Protein     (6.3-8.2)  g/dL














  03/17/21 03/17/21 Range/Units





  03:47 03:47 


 


WBC   10.8 H  (3.8-10.6)  k/uL


 


APTT    (22.0-30.0)  sec


 


Chloride  108 H   ()  mmol/L


 


Creatinine  1.39 H   (0.66-1.25)  mg/dL


 


Glucose  112 H   (74-99)  mg/dL


 


Troponin I    (0.000-0.034)  ng/mL


 


Total Protein  6.0 L   (6.3-8.2)  g/dL














Assessment and Plan


Assessment: 





Elevated troponin with epigastric pain concerning for non-STEMI, status post 

cardiac cath and stent of the LAD branches


leukocytosis and fever.  Unknown source.  Corona Virus is negative.  Most likely

related to his cardiac disease.  Rule out infection


Hypertension


Hyperlipidemia


History of GERD








Plan: 





This is a pleasant 60 years old male who presents with non-STEMI and fever, 

improvement.  Continue with aspirin and brilintta, and metoprolol and follow up 

with cardiologist recommendation.  Follow-up infectious disease recommendation 

and culture results continue with the patient empirically on Zosyn.  


Labs and medication were reviewed..  Continue same treatment.  Continue with 

symptomatic treatment.  Resume home medication.  Monitor lytes and vitals.  DVT 

and GI prophylaxis.  Further recommendations depends on the clinical course of 

the patient


DVT prophylaxis:  heparin


GI Prophylaxis: Ppi


Prognosis is guarded
Subjective


Progress Note Date: 03/18/21





HISTORY OF PRESENT ILLNESS:  





3/16/2021


This is a 60-year-old male with a past medical history significant for 

hypertension and hyperlipidemia. Patient does not follow with a cardiologist. We

have been asked to see the patient in consultation for abnormal troponins. 

Patient examined at the bedside in the emergency room.  Patient states a few 

days ago he went to the store and got a 44 ounce Pepsi in the evening and then 

went to bed. He reports epigastric discomfort in the following morning which he 

attributed to drinking a large Pepsi the night before. He reports some mild 

discomfort across his upper left and right abdominal quadrants as well.  He 

reports taking multiple over-the-counter medications including Tums and sodium 

bicarb without much relief.  He also reports intermittent left-sided chest 

discomfort since yesterday afternoon.  He states yesterday afternoon he decided 

to go for a walk to see if it would help the discomfort.  He states he became 

nauseated and had an episode of emesis.  He denies shortness of breath.  Denies 

cough or congestion.  Patient reports over the last 2 days his blood pressure 

has been elevated with a systolic in the 150s.  Patient states his blood 

pressure usually runs with a systolic in the 120s.





EKG reveals sinus mechanism with T-wave inversions in V3 through V5


Chest xray negative for acute process


Laboratory data: WBC 13.1.  Hemoglobin 14.9.  Platelet count 216.  Sodium 137.  

Potassium 3.7.  BUN 19.  Creatinine 1.25.  Troponin 6.880.


Current home cardiac medications include amlodipine 10 mg daily, simvastatin 40 

mg daily, and lisinopril 41 g daily





3/18/2021


Patient underwent cardiac cath yesterday with Dr. Churchill with PCI of the mid LAD 

and major diagonal branch.  Patient denies chest pain or pressure.  He denies 

shortness of breath.  Blood pressure 122/74.  Heart rate in the 70s.  He is on 

room air with oxygen saturations greater than 92%.  Echocardiogram revealed 

ejection fraction 45-50%, trace mitral regurgitation, and trace tricuspid 

regurgitation.





PHYSICAL EXAM: 


VITAL SIGNS: Reviewed.


GENERAL: Well-developed in no acute distress. 


HEENT: Head is normocephalic. Pupils are equal, round. Sclerae anicteric. Mucous

membranes of the mouth are moist. Neck supple. No JVD or thyromegaly


LUNGS: Respirations even and unlabored. Lungs essentially clear to auscultation 

bilaterally.


HEART: Regular rate and rhythm.  S1 and S2 heard.  Soft systolic murmur noted.


ABDOMEN: Soft. Nondistended. Nontender.


EXTREMITIES: Normal range of motion.  No clubbing or cyanosis.  Peripheral 

pulses intact.  No lower extremity edema.  Right radial cath site with pulse 

present.


NEUROLOGIC: Awake and alert. Oriented x 3. 





ASSESSMENT: 


Epigastric pain


Non-ST elevated myocardial infarction, status post cardiac cath with PCI


Hypertension


Hyperlipidemia


Fever with leukocytosis





PLAN: 


Continue current cardiac medications


Brilinta not covered by patient's insurance. Will DC Brilinta.


Give plavix 300mg x 1 dose now. Begin 75mg tomorrow morning


Patient is stable for discharge home today from a cardiac standpoint





Nurse practitioner note has been reviewed by physician. Signing provider agrees 

with the documented findings, assessment, and plan of care. 














Objective





- Vital Signs


Vital signs: 


                                   Vital Signs











Temp  98.7 F   03/18/21 12:00


 


Pulse  74   03/18/21 12:00


 


Resp  16   03/18/21 12:00


 


BP  122/74   03/18/21 12:00


 


Pulse Ox  97   03/18/21 12:00








                                 Intake & Output











 03/17/21 03/18/21 03/18/21





 18:59 06:59 18:59


 


Intake Total 1102.5 1840 925


 


Balance 1102.5 1840 925


 


Weight  68.2 kg 


 


Intake:   


 


  .5 1300 375


 


    Piperacillin-Tazobactam 3 100 100 





    .375 gm In Sodium   





    Chloride 0.9% 100 ml @ 25   





    mls/hr IVPB Q8H [facility] Rx#:   





    [id number]   


 


    Sodium Chloride 0.9% 1, 600 1200 375





    000 ml @ 75 mls/hr IV .   





    [id number] [facility] Rx#:[id number]   


 


  Oral 375 540 550


 


Other:   


 


  Voiding Method Urinal Urinal Urinal


 


  # Voids  1 














- Labs


CBC & Chem 7: 


                                 03/18/21 07:51





                                 03/18/21 07:51


Labs: 


                  Abnormal Lab Results - Last 24 Hours (Table)











  03/18/21 03/18/21 Range/Units





  07:51 07:51 


 


RBC  4.24 L   (4.30-5.90)  m/uL


 


Hct  38.1 L   (39.0-53.0)  %


 


ESR  18 H   (0-15)  mm/hr


 


Sodium   135 L  (137-145)  mmol/L


 


Carbon Dioxide   21 L  (22-30)  mmol/L


 


Creatinine   1.34 H  (0.66-1.25)  mg/dL


 


Glucose   111 H  (74-99)  mg/dL


 


C-Reactive Protein   26.7 H  (<10.0)  mg/L








                      Microbiology - Last 24 Hours (Table)











 03/16/21 20:17 Blood Culture - Preliminary





 Blood    No Growth after 24 hours
endstage CHF - eligible for inpt hospice, prognosis is likely hours to days. family aware

## 2022-02-03 NOTE — PROGRESS NOTE ADULT - SUBJECTIVE AND OBJECTIVE BOX
Message left for pt to call office back to inform of below.   PSA order entered  ----- Message from Aldo Workman MD sent at 2/3/2022  7:10 AM CST -----  Psa has improved, still elevated for age but much better.  Plan repeat PSA 4-6 weeks.       CHIEF COMPLAINT:Patient is a 79y old  Female who presents with a chief complaint of fall.Pt remains intubated.    	  REVIEW OF SYSTEMS:    [x ] Unable to obtain    PHYSICAL EXAM:  T(C): 37.7 (01-07-19 @ 05:00), Max: 37.7 (01-07-19 @ 05:00)  HR: 97 (01-07-19 @ 10:00) (71 - 98)  BP: 141/61 (01-07-19 @ 10:00) (94/44 - 141/61)  RR: 29 (01-07-19 @ 10:00) (7 - 31)  SpO2: 92% (01-07-19 @ 10:00) (92% - 100%)  Wt(kg): --  I&O's Summary    06 Jan 2019 07:01  -  07 Jan 2019 07:00  --------------------------------------------------------  IN: 1186.4 mL / OUT: 265 mL / NET: 921.4 mL        Appearance: Normal	  HEENT:   Normal oral mucosa, PERRL, EOMI	  Lymphatic: No lymphadenopathy  Cardiovascular: Normal S1 S2, No JVD, No murmurs, No edema  Respiratory: Lungs clear to auscultation	  Psychiatry: A & O x 3, Mood & affect appropriate  Gastrointestinal:  Soft, Non-tender, + BS	  Skin: No rashes, No ecchymoses, +jaundice  Neurologic: Non-focal  Extremities: Normal range of motion, No clubbing, cyanosis or edema      MEDICATIONS  (STANDING):  ascorbic acid 500 milliGRAM(s) Oral two times a day  calcium acetate 667 milliGRAM(s) Oral three times a day with meals  chlorhexidine 4% Liquid 1 Application(s) Topical two times a day  cyanocobalamin Injectable 1000 MICROGram(s) SubCutaneous daily  dexmedetomidine Infusion 0.5 MICROgram(s)/kG/Hr (10.262 mL/Hr) IV Continuous <Continuous>  digoxin  Injectable 0.125 milliGRAM(s) IV Push every other day  docusate sodium Liquid 100 milliGRAM(s) Oral two times a day  epoetin monique Injectable 6000 Unit(s) IV Push <User Schedule>  folic acid 1 milliGRAM(s) Oral daily  heparin  Injectable 5000 Unit(s) SubCutaneous every 12 hours  insulin glargine Injectable (LANTUS) 10 Unit(s) SubCutaneous at bedtime  insulin lispro (HumaLOG) corrective regimen sliding scale   SubCutaneous every 6 hours  linezolid  IVPB 600 milliGRAM(s) IV Intermittent every 12 hours  meropenem  IVPB 500 milliGRAM(s) IV Intermittent every 12 hours  pantoprazole  Injectable 40 milliGRAM(s) IV Push daily      	  LABS:	 	                     9.1    20.1  )-----------( 84       ( 07 Jan 2019 06:11 )             28.6     01-07    136  |  99  |  127<H>  ----------------------------<  175<H>  4.8   |  18<L>  |  6.18<H>    Ca    8.5      07 Jan 2019 06:11  Phos  8.5     01-07  Mg     2.5     01-07    TPro  4.6<L>  /  Alb  1.7<L>  /  TBili  16.7<H>  /  DBili  x   /  AST  48<H>  /  ALT  72<H>  /  AlkPhos  136<H>  01-07    proBNP:   Lipid Profile: Cholesterol --  LDL --  HDL --    Cholesterol 82  LDL 22  HDL 40      HgA1c: Hemoglobin A1C, Whole Blood: 6.1 % (12-27 @ 09:51)  Hemoglobin A1C, Whole Blood: 6.2 % (12-26 @ 23:12)    TSH: Thyroid Stimulating Hormone, Serum: 1.19 uU/mL (12-27 @ 06:50)

## 2022-03-09 NOTE — PROGRESS NOTE ADULT - PROBLEM SELECTOR PROBLEM 1
Left a detailed message for patient to call back and schedule an appointment.  
Acute respiratory failure with hypoxia and hypercapnia

## 2022-12-29 NOTE — PROGRESS NOTE ADULT - PROBLEM SELECTOR PROBLEM 6
Malaika Moreno called requesting a refill of the below medication which has been pended for you:    [facility] from [place], [place], and [place] reviewed. ADDERALL XR 15 mg last filled 11/28/22 #30/30 days     Requested Prescriptions     Pending Prescriptions Disp Refills    amphetamine-dextroamphetamine (ADDERALL XR) 15 MG extended release capsule 30 capsule 0     Sig: Take 1 capsule by mouth every morning for 30 days.        Last Appointment Date: 9/6/2022  Next Appointment Date: Visit date not found    Allergies   Allergen Reactions    Seasonal Closed fracture of left hip, initial encounter

## 2023-01-28 NOTE — ED PROVIDER NOTE - PRINCIPAL DIAGNOSIS
If you develop any new or worsening symptoms please immediately return to your nearest emergency department  Please call Dr Archie Tafoya office for an appointment as soon as possible 
Closed fracture of left hip, initial encounter

## 2023-02-21 NOTE — PATIENT PROFILE ADULT - BRADEN MOISTURE
[FreeTextEntry3] : I personally saw and examined TRACY BAHENA in detail. I spoke to FLO Mckeon regarding the assessment and plan of care. I reviewed the above assessment and plan of care, and agree. I have made changes in changes in the body of the note where appropriate.I personally reviewed the HPI, PMH, FH, SH, ROS and medications/allergies. I have spoken to FLO Mckeon regarding the history and have personally determined the assessment and plan of care, and documented this myself. I was present and participated in all key portions of the encounter and all procedures noted above. I have made changes in the body of the note where appropriate.\par \par Attesting Faculty: See Attending Signature Below \par \par \par  [Time Spent: ___ minutes] : I have spent [unfilled] minutes of time on the encounter. (4) rarely moist

## 2023-04-13 NOTE — ED ADULT TRIAGE NOTE - BSA (M2)
April 13, 2023       Khari Patino MD  [address] [facility] [address]  Via In Basket      Patient: Aurelio Stover   YOB: 1949   Date of Visit: 4/13/2023       Dear Dr. Patino:    Thank you for referring Gustavo Stover to me for evaluation. Below are my notes for this visit with him.    If you have questions, please do not hesitate to call me. I look forward to following your patient along with you.      Sincerely,        Haider Mendez MD        CC: No Recipients  Haider Mendez MD  4/13/2023  9:27 AM  Sign when Signing Visit    Patient ID: Gustavo is a 73 year old male.    Chief Complaint   Patient presents with   • Office Visit   • New Patient     Spouse - Sharri GIVENS  Patient presents for evaluation of intermittent left groin pain.  Of note, he had an open left inguinal hernia repair approximately 10 years ago.  Other significant surgeries include a heart transplant as well as lung resection for cancer.  For some time now, the patient has noted intermittent pain in his left groin that has been increasing in severity.  The pain seems to be exacerbated by physical exertion.  In reviewing his chart, he did have a CT scan in August of last year demonstrating a left inguinal hernia.  No obstructive type symptoms were endorsed.    ALLERGIES:   Allergen Reactions   • Penicillins Other (See Comments)     Unknown       Current Outpatient Medications   Medication Sig Dispense Refill   • azithromycin (ZITHROMAX) 250 MG tablet Take 2 tablets by mouth daily for 1 day, THEN 1 tablet daily for 4 days. 6 tablet 0   • Vitamin D, Ergocalciferol, 1.25 mg (50,000 units) capsule TAKE 1 CAPSULE BY MOUTH EVERY OTHER WEEK 6 capsule 3   • calcium (OYST-GAEL) 500 MG tablet Calcium 500 MG Oral Tablet QTY: 0 tablet Days: 0 Refills: 0  Written: 03/10/22 Patient Instructions: take one tablet three times daily     • amLODIPine (NORVASC) 5 MG tablet Take 1 tablet by mouth daily. 9AM - BLOOD PRESSURE 90  tablet 3   • furosemide (LASIX) 20 MG tablet Take 1 tablet by mouth daily. 9AM - REDUCES EDEMA 90 tablet 3   • brimonidine (ALPHAGAN) 0.2 % ophthalmic solution Place 1 drop into both eyes 2 times daily. 9AM, 9PM - LOWERS EYE PRESSURES     • Calcium Carbonate 500 MG Chew Tab Chew 500 mg by mouth 3 times daily. 9AM, 1PM, 9PM - BONE HEALTH 90 tablet 3   • Multiple Vitamins-Minerals (Ocuvite Adult 50+) capsule Take 1 capsule by mouth daily. 9AM - SUPPLEMENT 30 capsule 11   • aspirin 81 MG EC tablet Take 1 tablet by mouth daily. 9AM - BLOOD THINNER/ANTI-INFLAMMATORY 90 tablet 3   • budesonide-formoterol (SYMBICORT) 160-4.5 MCG/ACT inhaler Inhale 2 puffs into the lungs in the morning and 2 puffs in the evening. 9AM, 9PM - OPEN LUNG AIRWAYS 10.2 g 12   • latanoprost (XALATAN) 0.005 % ophthalmic solution Place 1 drop into both eyes daily. 9PM - LOWERS EYE PRESSURE 2.5 mL 12   • magnesium oxide (MAG-OX) 400 MG tablet Take 2 tablets by mouth 4 times daily. 9AM, 1PM, 5PM, 9PM - SUPPLEMENT 180 tablet 3   • omeprazole (PriLOSEC) 40 MG capsule Take 1 capsule by mouth nightly. 9PM - REDUCES STOMACH ACID 90 capsule 3   • pravastatin (PRAVACHOL) 80 MG tablet Take 1 tablet by mouth at bedtime. 9PM - LOWER CHOLESTEROL/PROTECTS HEART GRAFT FUNCTION 90 tablet 12   • TACROlimus (PROGRAF) 1 MG capsule Take 1 capsule by mouth every morning. 7AM - ANTIREJECTION 180 capsule 3   • TACROlimus (PROGRAF) 0.5 MG capsule Take 1 capsule by mouth every evening. 7PM - ANTIREJECTION 90 capsule 3   • umeclidinium bromide (Incruse Ellipta) 62.5 MCG/ACT inhaler Inhale 1 puff into the lungs every 24 hours. 9AM - OPEN AIRWAYS 30 each 3   • alendronate (FOSAMAX) 70 MG tablet TAKE 1 TABLET EVERY 7 DAYS 12 tablet 1   • albuterol 108 (90 Base) MCG/ACT inhaler Inhale 1 puff into the lungs every 4 hours as needed for Shortness of Breath.     • Multiple Vitamin (MULTIVITAMIN ADULT PO) Take 1 tablet by mouth daily. 9AM; SUPPLEMENT     • guaiFENesin (MUCINEX) 600  MG 12 hr tablet Take 600 mg by mouth every 12 hours as needed for Congestion. Indications: Cough      • acetaminophen (TYLENOL) 500 MG tablet Take 500 mg by mouth 2 times daily as needed for Pain. TAKE 1 TABLET BY MOUTH TWICE A DAY AS NEEDED FOR PAIN/FEVER     • oxygen (O2) gas Inhale 3 L/min into the lungs nightly. USING 3L AT BEDTIME WITH CPAP       No current facility-administered medications for this visit.       Past Medical History:   Diagnosis Date   • Atrial fibrillation (CMD)    • Blood glucose elevated    • Cardiac arrest (CMD)    • Corticosteroid-induced glaucoma, residual stage    • Healed perforation of ear drum    • Heart failure, unspecified (CMD)    • Malignant neoplasm (CMD)     lung ca   • Obstructive chronic bronchitis without exacerbation (CMD)    • Personal history of nutritional deficiency    • Primary osteoarthritis of knee    • Pulmonary nodule    • S/P orthotopic heart transplant (CMD) 08/16/2011   • Sleep apnea        Past Surgical History:   Procedure Laterality Date   • ------------other-------------      Cardio-Defib Pulse Gen Venous Attach Electrode To Prev Placed Defibrillator   • Heart transplant  08/16/2011   • Hernia repair     • Lung removal, partial Right     upper lobe   • Open access colonoscopy  03/06/2019    KRYSTLE Palacios, repeat in 10 years   • Total hip replacement         Family History   Problem Relation Age of Onset   • Coronary Artery Disease Other    • Hypertension Other    • Diabetes Other    • Heart disease Mother    • Heart disease Father        Social History     Tobacco Use   • Smoking status: Former   • Smokeless tobacco: Former   Vaping Use   • Vaping Use: never used   Substance Use Topics   • Alcohol use: Yes     Comment: socially   • Drug use: Never       Review of Systems   Constitutional: Negative for appetite change, fatigue, fever and unexpected weight change.   HENT: Negative for trouble swallowing.    Eyes: Negative for redness.   Respiratory: Negative for  cough, chest tightness and shortness of breath.    Cardiovascular: Negative for chest pain.   Gastrointestinal: Negative for abdominal distention, abdominal pain, blood in stool, constipation, nausea and vomiting.   Genitourinary: Negative for difficulty urinating.   Musculoskeletal: Negative for back pain.   Skin: Negative for color change and wound.   Allergic/Immunologic: Negative for environmental allergies.   Neurological: Negative for dizziness, weakness and light-headedness.   Psychiatric/Behavioral: Negative for behavioral problems.       Physical Exam  Vitals and nursing note reviewed.   Constitutional:       General: He is not in acute distress.     Appearance: He is well-developed. He is not diaphoretic.   HENT:      Head: Normocephalic.      Nose: Nose normal.      Neck: Normal range of motion.   Eyes:      General: No scleral icterus.     Conjunctiva/sclera: Conjunctivae normal.      Pupils: Pupils are equal, round, and reactive to light.   Cardiovascular:      Rate and Rhythm: Regular rhythm.   Pulmonary:      Effort: Pulmonary effort is normal. No respiratory distress.      Breath sounds: No stridor.   Abdominal:      General: There is no distension.      Palpations: Abdomen is soft.      Tenderness: There is no abdominal tenderness.      Hernia: A hernia is present.      Comments: Left inguinal hernia without scrotal component, I did not try to completely reduce it as the patient had discomfort with palpation   Musculoskeletal:         General: Normal range of motion.   Skin:     General: Skin is warm and dry.      Findings: No rash.   Neurological:      Mental Status: He is alert and oriented to person, place, and time.      Cranial Nerves: No cranial nerve deficit.   Psychiatric:         Behavior: Behavior normal.         ASSESSMENT/PLAN  Problem List Items Addressed This Visit    None  Visit Diagnoses     Unilateral inguinal hernia without obstruction or gangrene, recurrence not specified    -   Primary    Relevant Orders    Electrocardiogram 12-Lead        Patient presents with a symptomatic recurrent left inguinal hernia.  Given the symptomatic nature, elective repair is advised.  My typical operative approach and expected postoperative course and restrictions were discussed with him and his wife in the office today.  As the patient has had a prior heart transplant, we will request cardiac anesthesia and tentatively plan for at least a 23-hour observation stay postoperatively.  The risks of surgery were discussed.  The patient voiced his understanding of those risks and would like to proceed with elective repair.  He will be scheduled for May 15th.       Haider Mendez MD , FACS     1.81

## 2023-05-02 NOTE — PROGRESS NOTE ADULT - PROBLEM SELECTOR PLAN 5
Blue toes  Unable to anticoagulate due to anemia  F/u Arterial doppler when stable  Consulted Vascular Surgery Anesthesia Volume In Cc: 4

## 2023-05-23 NOTE — PROGRESS NOTE ADULT - REASON FOR ADMISSION
[facility] - [facility] HASMUKH Melendez [address] [place] [id number]       Mary Carmen Maxwell  [phone]  Pretty Rdz  Authorized by: Pretty Rdz     Patient Location:  Clinic  [place] Protocol:  Consent: Verbal consent obtained    Consent given by: patient  Patient identity confirmed: verbally with patient      Injury location:  Shoulder  Location details:  Right shoulder  Injury type:  Fracture  Neurovascular status: Neurovascularly intact    Local anesthesia used?: No    Manipulation performed?: No    Immobilization:  Sling  Neurovascular status: Neurovascularly intact fall

## 2023-10-04 NOTE — PROGRESS NOTE ADULT - REASON FOR ADMISSION
fall Rhomboid Transposition Flap Text: The defect edges were debeveled with a #15 scalpel blade. Given the location of the defect and the proximity to free margins a rhomboid transposition flap was deemed most appropriate. Using a sterile surgical marker, an appropriate rhomboid flap was drawn incorporating the defect. The area thus outlined was incised deep to adipose tissue with a #15 scalpel blade. The skin margins were undermined to an appropriate distance in all directions utilizing iris scissors. Following this, the designed flap was placed into the primary defect and sutured into place.

## 2023-11-09 NOTE — PROGRESS NOTE ADULT - SUBJECTIVE AND OBJECTIVE BOX
INTERVAL HPI/OVERNIGHT EVENTS: Pt having acute liver failure and renal failure likely due to shock, on pressors, bicarb drip, intubated and sedated. Pt worsening in clinical condition. Pt is planned for shiley followed by dialysis but due to high INR unable to get shiley. S/P transfusion of 4 FFP, Transfusing 4 more FFP and vit k 10mg. Pt having no urine output overnight with bladder scan only 30ml. Also gave kayxalate for hyperkalemia	    PRESSORS: [ x] YES [ ] NO  WHICH:    ANTIBIOTICS:                  DATE STARTED:  ANTIBIOTICS:                  DATE STARTED:  ANTIBIOTICS:                  DATE STARTED:    Antimicrobial:  piperacillin/tazobactam IVPB. 3.375 Gram(s) IV Intermittent every 12 hours    Cardiovascular:  digoxin  Injectable 0.125 milliGRAM(s) IV Push every other day  norepinephrine Infusion 0.05 MICROgram(s)/kG/Min IV Continuous <Continuous>    Pulmonary:    Hematalogic:    Other:  ascorbic acid 500 milliGRAM(s) Oral two times a day  chlorhexidine 4% Liquid 1 Application(s) Topical two times a day  cyanocobalamin Injectable 1000 MICROGram(s) SubCutaneous daily  docusate sodium 100 milliGRAM(s) Oral three times a day  famotidine    Tablet 20 milliGRAM(s) Oral every 12 hours  fentaNYL   Infusion. 0.5 MICROgram(s)/kG/Hr IV Continuous <Continuous>  ferrous    sulfate Liquid 300 milliGRAM(s) Enteral Tube three times a day with meals  folic acid 1 milliGRAM(s) Oral daily  insulin lispro (HumaLOG) corrective regimen sliding scale   SubCutaneous every 6 hours  morphine  - Injectable 2 milliGRAM(s) IV Push every 4 hours PRN  ondansetron Injectable 4 milliGRAM(s) IV Push every 6 hours PRN  sodium bicarbonate  Infusion 0.183 mEq/kG/Hr IV Continuous <Continuous>  vasopressin Infusion 0.04 Unit(s)/Min IV Continuous <Continuous>    ascorbic acid 500 milliGRAM(s) Oral two times a day  chlorhexidine 4% Liquid 1 Application(s) Topical two times a day  cyanocobalamin Injectable 1000 MICROGram(s) SubCutaneous daily  digoxin  Injectable 0.125 milliGRAM(s) IV Push every other day  docusate sodium 100 milliGRAM(s) Oral three times a day  famotidine    Tablet 20 milliGRAM(s) Oral every 12 hours  fentaNYL   Infusion. 0.5 MICROgram(s)/kG/Hr IV Continuous <Continuous>  ferrous    sulfate Liquid 300 milliGRAM(s) Enteral Tube three times a day with meals  folic acid 1 milliGRAM(s) Oral daily  insulin lispro (HumaLOG) corrective regimen sliding scale   SubCutaneous every 6 hours  morphine  - Injectable 2 milliGRAM(s) IV Push every 4 hours PRN  norepinephrine Infusion 0.05 MICROgram(s)/kG/Min IV Continuous <Continuous>  ondansetron Injectable 4 milliGRAM(s) IV Push every 6 hours PRN  piperacillin/tazobactam IVPB. 3.375 Gram(s) IV Intermittent every 12 hours  sodium bicarbonate  Infusion 0.183 mEq/kG/Hr IV Continuous <Continuous>  vasopressin Infusion 0.04 Unit(s)/Min IV Continuous <Continuous>    Drug Dosing Weight  Height (cm): 154.94 (28 Dec 2018 01:00)  Weight (kg): 82.1 (28 Dec 2018 01:00)  BMI (kg/m2): 34.2 (28 Dec 2018 01:)  BSA (m2): 1.81 (28 Dec 2018 01:)    CENTRAL LINE: [ ] YES [ ] NO  LOCATION:   DATE INSERTED:   REMOVE: [ ] YES [ ] NO  EXPLAIN:    DE SOUZA: [x ] YES [ ] NO    DATE INSERTED:  REMOVE:  [ ] YES [ ] NO  EXPLAIN:    A-LINE:  [ x] YES [ ] NO  LOCATION:   DATE INSERTED:  REMOVE:  [ ] YES [ ] NO  EXPLAIN:        ICU Vital Signs Last 24 Hrs  T(C): 37.2 (29 Dec 2018 20:05), Max: 37.6 (29 Dec 2018 07:00)  T(F): 99 (29 Dec 2018 20:05), Max: 99.7 (29 Dec 2018 07:00)  HR: 100 (30 Dec 2018 00:00) (74 - 110)  BP: 92/50 (30 Dec 2018 00:00) (72/34 - 118/58)  BP(mean): 60 (30 Dec 2018 00:00) (42 - 73)  ABP: 119/54 (30 Dec 2018 00:00) (88/39 - 192/65)  ABP(mean): 72 (30 Dec 2018 00:00) (49 - 100)  RR: 6 (30 Dec 2018 00:00) (0 - 303)  SpO2: 100% (30 Dec 2018 00:00) (73% - 100%)      ABG - ( 29 Dec 2018 20:10 )  pH, Arterial: 7.23  pH, Blood: x     /  pCO2: 28    /  pO2: 92    / HCO3: 11    / Base Excess: -14.9 /  SaO2: 96                     @ 07:01  -   @ 07:00  --------------------------------------------------------  IN: 3789.4 mL / OUT: 405 mL / NET: 3384.4 mL        Mode: AC/ CMV (Assist Control/ Continuous Mandatory Ventilation)  RR (machine): 30  TV (machine): 450  FiO2: 50  PEEP: 6  ITime: 1  MAP: 16  PIP: 24      PHYSICAL EXAM:    GENERAL:Pt lying on bed intubated, appears in distress, slightly agitated when awake  HEENT:  Normocephalic/Atraumatic, reactive light reflex, moist mucous membranes  NECK: Supple, no JVD  RESP: Symmetric movement of the chest, clear to auscultation bilaterally, minimal bilateral basal rales   CVS: Tachycardic, S1 and S2 audible, no murmur, rubs or gallops  GI: Normal active bowel sounds present, abdomen soft, non tender, non distended  EXTREMITIES:  No edema, no clubbing, cyanosis  MSK: 0/5 strength bilateral upper and lower extremities, Left hip scar/band   PSYCH: Sedated  NEURO: Alert and oriented x 0      LABS:  CBC Full  -  ( 29 Dec 2018 04:43 )  WBC Count : 26.4 K/uL  Hemoglobin : 11.2 g/dL  Hematocrit : 39.5 %  Platelet Count - Automated : 187 K/uL  Mean Cell Volume : 66.2 fl  Mean Cell Hemoglobin : 18.7 pg  Mean Cell Hemoglobin Concentration : 28.3 gm/dL  Auto Neutrophil # : x  Auto Lymphocyte # : x  Auto Monocyte # : x  Auto Eosinophil # : x  Auto Basophil # : x  Auto Neutrophil % : 85.0 %  Auto Lymphocyte % : 11.0 %  Auto Monocyte % : 4.0 %  Auto Eosinophil % : x  Auto Basophil % : x        142  |  107  |  48<H>  ----------------------------<  321<H>  5.1   |  13<L>  |  3.04<H>    Ca    7.7<L>      29 Dec 2018 22:27  Phos  7.2       Mg     2.3         TPro  5.7<L>  /  Alb  2.6<L>  /  TBili  1.3<H>  /  DBili  x   /  AST  5339<H>  /  ALT  2372<H>  /  AlkPhos  81      PT/INR - ( 29 Dec 2018 22:27 )   PT: 31.7 sec;   INR: 2.77 ratio         PTT - ( 29 Dec 2018 22:28 )  PTT:31.4 sec  Urinalysis Basic - ( 29 Dec 2018 16:20 )    Color: Yellow / Appearance: Slightly Turbid / S.020 / pH: x  Gluc: x / Ketone: Trace  / Bili: Negative / Urobili: Negative   Blood: x / Protein: 500 mg/dL / Nitrite: Negative   Leuk Esterase: Small / RBC: 25-50 /HPF / WBC 6-10 /HPF   Sq Epi: x / Non Sq Epi: Few /HPF / Bacteria: Many /HPF          RADIOLOGY & ADDITIONAL STUDIES REVIEWED:  ***    [ ]GOALS OF CARE DISCUSSION WITH PATIENT/FAMILY/PROXY:    CRITICAL CARE TIME SPENT: 35 minutes 15 INTERVAL HPI/OVERNIGHT EVENTS: Pt having acute liver failure and renal failure likely due to shock, on pressors, bicarb drip, intubated and sedated. Pt worsening in clinical condition. Pt is planned for shiley followed by dialysis but due to high INR unable to get shiley. S/P transfusion of 4 FFP, Transfusing 4 more FFP and vit k 10mg. Pt having no urine output overnight with bladder scan only 30ml. Also gave kayxalate for hyperkalemia	    PRESSORS: [ x] YES [ ] NO  WHICH:    ANTIBIOTICS:                  DATE STARTED:  ANTIBIOTICS:                  DATE STARTED:  ANTIBIOTICS:                  DATE STARTED:    Antimicrobial:  piperacillin/tazobactam IVPB. 3.375 Gram(s) IV Intermittent every 12 hours    Cardiovascular:  digoxin  Injectable 0.125 milliGRAM(s) IV Push every other day  norepinephrine Infusion 0.05 MICROgram(s)/kG/Min IV Continuous <Continuous>    Pulmonary:    Hematalogic:    Other:  ascorbic acid 500 milliGRAM(s) Oral two times a day  chlorhexidine 4% Liquid 1 Application(s) Topical two times a day  cyanocobalamin Injectable 1000 MICROGram(s) SubCutaneous daily  docusate sodium 100 milliGRAM(s) Oral three times a day  famotidine    Tablet 20 milliGRAM(s) Oral every 12 hours  fentaNYL   Infusion. 0.5 MICROgram(s)/kG/Hr IV Continuous <Continuous>  ferrous    sulfate Liquid 300 milliGRAM(s) Enteral Tube three times a day with meals  folic acid 1 milliGRAM(s) Oral daily  insulin lispro (HumaLOG) corrective regimen sliding scale   SubCutaneous every 6 hours  morphine  - Injectable 2 milliGRAM(s) IV Push every 4 hours PRN  ondansetron Injectable 4 milliGRAM(s) IV Push every 6 hours PRN  sodium bicarbonate  Infusion 0.183 mEq/kG/Hr IV Continuous <Continuous>  vasopressin Infusion 0.04 Unit(s)/Min IV Continuous <Continuous>    ascorbic acid 500 milliGRAM(s) Oral two times a day  chlorhexidine 4% Liquid 1 Application(s) Topical two times a day  cyanocobalamin Injectable 1000 MICROGram(s) SubCutaneous daily  digoxin  Injectable 0.125 milliGRAM(s) IV Push every other day  docusate sodium 100 milliGRAM(s) Oral three times a day  famotidine    Tablet 20 milliGRAM(s) Oral every 12 hours  fentaNYL   Infusion. 0.5 MICROgram(s)/kG/Hr IV Continuous <Continuous>  ferrous    sulfate Liquid 300 milliGRAM(s) Enteral Tube three times a day with meals  folic acid 1 milliGRAM(s) Oral daily  insulin lispro (HumaLOG) corrective regimen sliding scale   SubCutaneous every 6 hours  morphine  - Injectable 2 milliGRAM(s) IV Push every 4 hours PRN  norepinephrine Infusion 0.05 MICROgram(s)/kG/Min IV Continuous <Continuous>  ondansetron Injectable 4 milliGRAM(s) IV Push every 6 hours PRN  piperacillin/tazobactam IVPB. 3.375 Gram(s) IV Intermittent every 12 hours  sodium bicarbonate  Infusion 0.183 mEq/kG/Hr IV Continuous <Continuous>  vasopressin Infusion 0.04 Unit(s)/Min IV Continuous <Continuous>    Drug Dosing Weight  Height (cm): 154.94 (28 Dec 2018 01:00)  Weight (kg): 82.1 (28 Dec 2018 01:00)  BMI (kg/m2): 34.2 (28 Dec 2018 01:)  BSA (m2): 1.81 (28 Dec 2018 01:)    CENTRAL LINE: [ ] YES [ ] NO  LOCATION:   DATE INSERTED:   REMOVE: [ ] YES [ ] NO  EXPLAIN:    DE SOUZA: [x ] YES [ ] NO    DATE INSERTED:  REMOVE:  [ ] YES [ ] NO  EXPLAIN:    A-LINE:  [ x] YES [ ] NO  LOCATION:   DATE INSERTED:  REMOVE:  [ ] YES [ ] NO  EXPLAIN:        ICU Vital Signs Last 24 Hrs  T(C): 37.2 (29 Dec 2018 20:05), Max: 37.6 (29 Dec 2018 07:00)  T(F): 99 (29 Dec 2018 20:05), Max: 99.7 (29 Dec 2018 07:00)  HR: 100 (30 Dec 2018 00:00) (74 - 110)  BP: 92/50 (30 Dec 2018 00:00) (72/34 - 118/58)  BP(mean): 60 (30 Dec 2018 00:00) (42 - 73)  ABP: 119/54 (30 Dec 2018 00:00) (88/39 - 192/65)  ABP(mean): 72 (30 Dec 2018 00:00) (49 - 100)  RR: 6 (30 Dec 2018 00:00) (0 - 303)  SpO2: 100% (30 Dec 2018 00:00) (73% - 100%)      ABG - ( 29 Dec 2018 20:10 )  pH, Arterial: 7.23  pH, Blood: x     /  pCO2: 28    /  pO2: 92    / HCO3: 11    / Base Excess: -14.9 /  SaO2: 96                     @ 07:01  -   @ 07:00  --------------------------------------------------------  IN: 3789.4 mL / OUT: 405 mL / NET: 3384.4 mL        Mode: AC/ CMV (Assist Control/ Continuous Mandatory Ventilation)  RR (machine): 30  TV (machine): 450  FiO2: 50  PEEP: 6  ITime: 1  MAP: 16  PIP: 24      PHYSICAL EXAM:    GENERAL:Pt lying on bed intubated, appears in distress, slightly agitated when awake  HEENT:  Normocephalic/Atraumatic, reactive light reflex, moist mucous membranes  NECK: Supple, no JVD  RESP: Symmetric movement of the chest, decreased breath sound b/l  CVS: Tachycardic, S1 and S2 audible, no murmur, rubs or gallops  GI: Normal active bowel sounds present, abdomen soft, non tender, non distended  EXTREMITIES:  No edema, no clubbing, cyanosis  MSK: 0/5 strength bilateral upper and lower extremities, Left hip scar/band   PSYCH: Sedated  NEURO: Alert and oriented x 0      LABS:  CBC Full  -  ( 29 Dec 2018 04:43 )  WBC Count : 26.4 K/uL  Hemoglobin : 11.2 g/dL  Hematocrit : 39.5 %  Platelet Count - Automated : 187 K/uL  Mean Cell Volume : 66.2 fl  Mean Cell Hemoglobin : 18.7 pg  Mean Cell Hemoglobin Concentration : 28.3 gm/dL  Auto Neutrophil # : x  Auto Lymphocyte # : x  Auto Monocyte # : x  Auto Eosinophil # : x  Auto Basophil # : x  Auto Neutrophil % : 85.0 %  Auto Lymphocyte % : 11.0 %  Auto Monocyte % : 4.0 %  Auto Eosinophil % : x  Auto Basophil % : x        142  |  107  |  48<H>  ----------------------------<  321<H>  5.1   |  13<L>  |  3.04<H>    Ca    7.7<L>      29 Dec 2018 22:27  Phos  7.2       Mg     2.3         TPro  5.7<L>  /  Alb  2.6<L>  /  TBili  1.3<H>  /  DBili  x   /  AST  5339<H>  /  ALT  2372<H>  /  AlkPhos  81      PT/INR - ( 29 Dec 2018 22:27 )   PT: 31.7 sec;   INR: 2.77 ratio         PTT - ( 29 Dec 2018 22:28 )  PTT:31.4 sec  Urinalysis Basic - ( 29 Dec 2018 16:20 )    Color: Yellow / Appearance: Slightly Turbid / S.020 / pH: x  Gluc: x / Ketone: Trace  / Bili: Negative / Urobili: Negative   Blood: x / Protein: 500 mg/dL / Nitrite: Negative   Leuk Esterase: Small / RBC: 25-50 /HPF / WBC 6-10 /HPF   Sq Epi: x / Non Sq Epi: Few /HPF / Bacteria: Many /HPF          RADIOLOGY & ADDITIONAL STUDIES REVIEWED:  ***    [ ]GOALS OF CARE DISCUSSION WITH PATIENT/FAMILY/PROXY:    CRITICAL CARE TIME SPENT: 35 minutes INTERVAL HPI/OVERNIGHT EVENTS: Pt having acute liver failure and renal failure likely due to shock, on pressors, bicarb drip, intubated and sedated. Pt worsening in clinical condition. Pt is planned for shiley followed by dialysis but due to high INR unable to get shiley. S/P transfusion of 4 FFP, Transfusing 4 more FFP and vit k 10mg. Pt having no urine output overnight with bladder scan only 30ml. Also gave kayxalate for hyperkalemia	    PRESSORS: [ x] YES [ ] NO  Levophed, vasopression      Antimicrobial:  piperacillin/tazobactam IVPB. 3.375 Gram(s) IV Intermittent every 12 hours  -Vanco dosed conrad;y according to trough due to renal failure    Cardiovascular:  digoxin  Injectable 0.125 milliGRAM(s) IV Push every other day  norepinephrine Infusion 0.05 MICROgram(s)/kG/Min IV Continuous <Continuous>    Other:  ascorbic acid 500 milliGRAM(s) Oral two times a day  chlorhexidine 4% Liquid 1 Application(s) Topical two times a day  cyanocobalamin Injectable 1000 MICROGram(s) SubCutaneous daily  docusate sodium 100 milliGRAM(s) Oral three times a day  famotidine    Tablet 20 milliGRAM(s) Oral every 12 hours  fentaNYL   Infusion. 0.5 MICROgram(s)/kG/Hr IV Continuous <Continuous>  ferrous    sulfate Liquid 300 milliGRAM(s) Enteral Tube three times a day with meals  folic acid 1 milliGRAM(s) Oral daily  insulin lispro (HumaLOG) corrective regimen sliding scale   SubCutaneous every 6 hours  morphine  - Injectable 2 milliGRAM(s) IV Push every 4 hours PRN  ondansetron Injectable 4 milliGRAM(s) IV Push every 6 hours PRN  sodium bicarbonate  Infusion 0.183 mEq/kG/Hr IV Continuous <Continuous>  vasopressin Infusion 0.04 Unit(s)/Min IV Continuous <Continuous>    ascorbic acid 500 milliGRAM(s) Oral two times a day  chlorhexidine 4% Liquid 1 Application(s) Topical two times a day  cyanocobalamin Injectable 1000 MICROGram(s) SubCutaneous daily  digoxin  Injectable 0.125 milliGRAM(s) IV Push every other day  docusate sodium 100 milliGRAM(s) Oral three times a day  famotidine    Tablet 20 milliGRAM(s) Oral every 12 hours  fentaNYL   Infusion. 0.5 MICROgram(s)/kG/Hr IV Continuous <Continuous>  ferrous    sulfate Liquid 300 milliGRAM(s) Enteral Tube three times a day with meals  folic acid 1 milliGRAM(s) Oral daily  insulin lispro (HumaLOG) corrective regimen sliding scale   SubCutaneous every 6 hours  morphine  - Injectable 2 milliGRAM(s) IV Push every 4 hours PRN  norepinephrine Infusion 0.05 MICROgram(s)/kG/Min IV Continuous <Continuous>  ondansetron Injectable 4 milliGRAM(s) IV Push every 6 hours PRN  piperacillin/tazobactam IVPB. 3.375 Gram(s) IV Intermittent every 12 hours  sodium bicarbonate  Infusion 0.183 mEq/kG/Hr IV Continuous <Continuous>  vasopressin Infusion 0.04 Unit(s)/Min IV Continuous <Continuous>    Drug Dosing Weight  Height (cm): 154.94 (28 Dec 2018 01:00)  Weight (kg): 82.1 (28 Dec 2018 01:00)  BMI (kg/m2): 34.2 (28 Dec 2018 01:00)  BSA (m2): 1.81 (28 Dec 2018 01:)    CENTRAL LINE: x[ ] YES [ ] NO  LOCATION:   DATE INSERTED:   REMOVE: [ ] YES [ ] NO  EXPLAIN:    DE SOUZA: [x ] YES [ ] NO    DATE INSERTED:  REMOVE:  [ ] YES [ ] NO  EXPLAIN:    A-LINE:  [ x] YES [ ] NO  LOCATION:   DATE INSERTED:  REMOVE:  [ ] YES [ ] NO  EXPLAIN:    ICU Vital Signs Last 24 Hrs  T(C): 37.2 (29 Dec 2018 20:05), Max: 37.6 (29 Dec 2018 07:00)  T(F): 99 (29 Dec 2018 20:05), Max: 99.7 (29 Dec 2018 07:00)  HR: 100 (30 Dec 2018 00:00) (74 - 110)  BP: 92/50 (30 Dec 2018 00:00) (72/34 - 118/58)  BP(mean): 60 (30 Dec 2018 00:00) (42 - 73)  ABP: 119/54 (30 Dec 2018 00:00) (88/39 - 192/65)  ABP(mean): 72 (30 Dec 2018 00:00) (49 - 100)  RR: 6 (30 Dec 2018 00:00) (0 - 303)  SpO2: 100% (30 Dec 2018 00:00) (73% - 100%)      ABG - ( 29 Dec 2018 20:10 )  pH, Arterial: 7.23  pH, Blood: x     /  pCO2: 28    /  pO2: 92    / HCO3: 11    / Base Excess: -14.9 /  SaO2: 96           @ 07:01  -   @ 07:00  --------------------------------------------------------  IN: 3789.4 mL / OUT: 405 mL / NET: 3384.4 mL        Mode: AC/ CMV (Assist Control/ Continuous Mandatory Ventilation)  RR (machine): 30  TV (machine): 450  FiO2: 50  PEEP: 6  ITime: 1  MAP: 16  PIP: 24      PHYSICAL EXAM:    GENERAL:Pt lying on bed intubated, appears in distress, slightly agitated when awake  HEENT:  Normocephalic/Atraumatic, reactive light reflex, moist mucous membranes  NECK: Supple, no JVD  RESP: Symmetric movement of the chest, decreased breath sound b/l  CVS: Tachycardic, S1 and S2 audible, no murmur, rubs or gallops  GI: Normal active bowel sounds present, abdomen soft, non tender, non distended  EXTREMITIES:  No edema, no clubbing, cyanosis  MSK: 0/5 strength bilateral upper and lower extremities, Left hip scar/band   PSYCH: Sedated  NEURO: Alert and oriented x 0      LABS:  CBC Full  -  ( 29 Dec 2018 04:43 )  WBC Count : 26.4 K/uL  Hemoglobin : 11.2 g/dL  Hematocrit : 39.5 %  Platelet Count - Automated : 187 K/uL  Mean Cell Volume : 66.2 fl  Mean Cell Hemoglobin : 18.7 pg  Mean Cell Hemoglobin Concentration : 28.3 gm/dL  Auto Neutrophil # : x  Auto Lymphocyte # : x  Auto Monocyte # : x  Auto Eosinophil # : x  Auto Basophil # : x  Auto Neutrophil % : 85.0 %  Auto Lymphocyte % : 11.0 %  Auto Monocyte % : 4.0 %  Auto Eosinophil % : x  Auto Basophil % : x        142  |  107  |  48<H>  ----------------------------<  321<H>  5.1   |  13<L>  |  3.04<H>    Ca    7.7<L>      29 Dec 2018 22:27  Phos  7.2       Mg     2.3         TPro  5.7<L>  /  Alb  2.6<L>  /  TBili  1.3<H>  /  DBili  x   /  AST  5339<H>  /  ALT  2372<H>  /  AlkPhos  81      PT/INR - ( 29 Dec 2018 22:27 )   PT: 31.7 sec;   INR: 2.77 ratio         PTT - ( 29 Dec 2018 22:28 )  PTT:31.4 sec  Urinalysis Basic - ( 29 Dec 2018 16:20 )    Color: Yellow / Appearance: Slightly Turbid / S.020 / pH: x  Gluc: x / Ketone: Trace  / Bili: Negative / Urobili: Negative   Blood: x / Protein: 500 mg/dL / Nitrite: Negative   Leuk Esterase: Small / RBC: 25-50 /HPF / WBC 6-10 /HPF   Sq Epi: x / Non Sq Epi: Few /HPF / Bacteria: Many /HPF          RADIOLOGY & ADDITIONAL STUDIES REVIEWED:  ***    [ ]GOALS OF CARE DISCUSSION WITH PATIENT/FAMILY/PROXY:    CRITICAL CARE TIME SPENT: 35 minutes INTERVAL HPI/OVERNIGHT EVENTS: Pt having acute liver failure and renal failure likely due to shock, on pressors, bicarb drip, intubated and sedated. Pt worsening in clinical condition. Pt is planned for shiley followed by dialysis but due to high INR unable to get shiley. S/P transfusion of 4 FFP, Transfusing 4 more FFP and vit k 10mg. Pt having no urine output overnight with bladder scan only 30ml. Also gave kayxalate for hyperkalemia	    PRESSORS: [ x] YES [ ] NO  Levophed, vasopression    Antimicrobial:  piperacillin/tazobactam IVPB. 3.375 Gram(s) IV Intermittent every 12 hours  -Vanco dosed conrad;y according to trough due to renal failure    Cardiovascular:  digoxin  Injectable 0.125 milliGRAM(s) IV Push every other day  norepinephrine Infusion 0.05 MICROgram(s)/kG/Min IV Continuous <Continuous>    Other:  ascorbic acid 500 milliGRAM(s) Oral two times a day  chlorhexidine 4% Liquid 1 Application(s) Topical two times a day  cyanocobalamin Injectable 1000 MICROGram(s) SubCutaneous daily  docusate sodium 100 milliGRAM(s) Oral three times a day  famotidine    Tablet 20 milliGRAM(s) Oral every 12 hours  fentaNYL   Infusion. 0.5 MICROgram(s)/kG/Hr IV Continuous <Continuous>  ferrous    sulfate Liquid 300 milliGRAM(s) Enteral Tube three times a day with meals  folic acid 1 milliGRAM(s) Oral daily  insulin lispro (HumaLOG) corrective regimen sliding scale   SubCutaneous every 6 hours  morphine  - Injectable 2 milliGRAM(s) IV Push every 4 hours PRN  ondansetron Injectable 4 milliGRAM(s) IV Push every 6 hours PRN  sodium bicarbonate  Infusion 0.183 mEq/kG/Hr IV Continuous <Continuous>  vasopressin Infusion 0.04 Unit(s)/Min IV Continuous <Continuous>    ascorbic acid 500 milliGRAM(s) Oral two times a day  chlorhexidine 4% Liquid 1 Application(s) Topical two times a day  cyanocobalamin Injectable 1000 MICROGram(s) SubCutaneous daily  digoxin  Injectable 0.125 milliGRAM(s) IV Push every other day  docusate sodium 100 milliGRAM(s) Oral three times a day  famotidine    Tablet 20 milliGRAM(s) Oral every 12 hours  fentaNYL   Infusion. 0.5 MICROgram(s)/kG/Hr IV Continuous <Continuous>  ferrous    sulfate Liquid 300 milliGRAM(s) Enteral Tube three times a day with meals  folic acid 1 milliGRAM(s) Oral daily  insulin lispro (HumaLOG) corrective regimen sliding scale   SubCutaneous every 6 hours  morphine  - Injectable 2 milliGRAM(s) IV Push every 4 hours PRN  norepinephrine Infusion 0.05 MICROgram(s)/kG/Min IV Continuous <Continuous>  ondansetron Injectable 4 milliGRAM(s) IV Push every 6 hours PRN  piperacillin/tazobactam IVPB. 3.375 Gram(s) IV Intermittent every 12 hours  sodium bicarbonate  Infusion 0.183 mEq/kG/Hr IV Continuous <Continuous>  vasopressin Infusion 0.04 Unit(s)/Min IV Continuous <Continuous>    Drug Dosing Weight  Height (cm): 154.94 (28 Dec 2018 01:00)  Weight (kg): 82.1 (28 Dec 2018 01:00)  BMI (kg/m2): 34.2 (28 Dec 2018 01:00)  BSA (m2): 1.81 (28 Dec 2018 01:)    CENTRAL LINE: x[ ] YES [ ] NO  LOCATION:   DATE INSERTED:     DE SOUZA: [x ] YES [ ] NO    DATE INSERTED:    A-LINE:  [ x] YES [ ] NO  LOCATION:   DATE INSERTED:      ICU Vital Signs Last 24 Hrs  T(C): 37.2 (29 Dec 2018 20:05), Max: 37.6 (29 Dec 2018 07:00)  T(F): 99 (29 Dec 2018 20:05), Max: 99.7 (29 Dec 2018 07:00)  HR: 100 (30 Dec 2018 00:00) (74 - 110)  BP: 92/50 (30 Dec 2018 00:00) (72/34 - 118/58)  BP(mean): 60 (30 Dec 2018 00:00) (42 - 73)  ABP: 119/54 (30 Dec 2018 00:00) (88/39 - 192/65)  ABP(mean): 72 (30 Dec 2018 00:00) (49 - 100)  RR: 6 (30 Dec 2018 00:00) (0 - 303)  SpO2: 100% (30 Dec 2018 00:00) (73% - 100%)      ABG - ( 29 Dec 2018 20:10 )  pH, Arterial: 7.23  pH, Blood: x     /  pCO2: 28    /  pO2: 92    / HCO3: 11    / Base Excess: -14.9 /  SaO2: 96           @ 07:01  -   @ 07:00  --------------------------------------------------------  IN: 3789.4 mL / OUT: 405 mL / NET: 3384.4 mL        Mode: AC/ CMV (Assist Control/ Continuous Mandatory Ventilation)  RR (machine): 30  TV (machine): 450  FiO2: 50  PEEP: 6  ITime: 1  MAP: 16  PIP: 24      PHYSICAL EXAM:    GENERAL:Pt lying on bed intubated, appears in distress, slightly agitated when awake  HEENT:  Normocephalic/Atraumatic, reactive light reflex, moist mucous membranes  NECK: Supple, no JVD  RESP: Symmetric movement of the chest, decreased breath sound b/l  CVS: Tachycardic, S1 and S2 audible, no murmur, rubs or gallops  GI: Normal active bowel sounds present, abdomen soft, non tender, non distended  EXTREMITIES:  No edema, no clubbing, cyanosis  MSK: 0/5 strength bilateral upper and lower extremities, Left hip scar/band   PSYCH: Sedated  NEURO: Alert and oriented x 0      LABS:  CBC Full  -  ( 29 Dec 2018 04:43 )  WBC Count : 26.4 K/uL  Hemoglobin : 11.2 g/dL  Hematocrit : 39.5 %  Platelet Count - Automated : 187 K/uL  Mean Cell Volume : 66.2 fl  Mean Cell Hemoglobin : 18.7 pg  Mean Cell Hemoglobin Concentration : 28.3 gm/dL  Auto Neutrophil # : x  Auto Lymphocyte # : x  Auto Monocyte # : x  Auto Eosinophil # : x  Auto Basophil # : x  Auto Neutrophil % : 85.0 %  Auto Lymphocyte % : 11.0 %  Auto Monocyte % : 4.0 %  Auto Eosinophil % : x  Auto Basophil % : x        142  |  107  |  48<H>  ----------------------------<  321<H>  5.1   |  13<L>  |  3.04<H>    Ca    7.7<L>      29 Dec 2018 22:27  Phos  7.2       Mg     2.3         TPro  5.7<L>  /  Alb  2.6<L>  /  TBili  1.3<H>  /  DBili  x   /  AST  5339<H>  /  ALT  2372<H>  /  AlkPhos  81      PT/INR - ( 29 Dec 2018 22:27 )   PT: 31.7 sec;   INR: 2.77 ratio         PTT - ( 29 Dec 2018 22:28 )  PTT:31.4 sec  Urinalysis Basic - ( 29 Dec 2018 16:20 )    Color: Yellow / Appearance: Slightly Turbid / S.020 / pH: x  Gluc: x / Ketone: Trace  / Bili: Negative / Urobili: Negative   Blood: x / Protein: 500 mg/dL / Nitrite: Negative   Leuk Esterase: Small / RBC: 25-50 /HPF / WBC 6-10 /HPF   Sq Epi: x / Non Sq Epi: Few /HPF / Bacteria: Many /HPF      CRITICAL CARE TIME SPENT: 35 minutes

## 2024-03-04 NOTE — PATIENT PROFILE ADULT - LOCATION #2
Chart reviewed, no urgent indication for HD today. Planned for next HD tomorrow. Patient bradycardic, zoll pads in place. Anticipate that patient will need telemetry monitoring during HD and will have to be dialyzed at the bedside. Please move patient to HD compatible room for scheduled dialysis tomorrow. Discussed with Cardiology team and attending Dr. Aguirre. Chart reviewed, no urgent indication for HD today. Planned for next HD tomorrow. Patient bradycardic, zoll pads in place. Anticipate that patient will need telemetry monitoring during HD and will have to be dialyzed at the bedside. Please move patient to HD compatible telemetry bed for scheduled dialysis tomorrow. Discussed with Cardiology team and attending Dr. Aguirre. Chart reviewed, no urgent indication for HD today. Planned for next HD tomorrow. Patient bradycardic, zoll pads in place. Anticipate that patient will need telemetry monitoring during HD and will have to be dialyzed at the bedside. Please move patient to HD compatible telemetry room for scheduled dialysis tomorrow. Discussed with Cardiology team and attending Dr. Aguirre. Right hip

## 2024-04-02 NOTE — PROGRESS NOTE ADULT - SUBJECTIVE AND OBJECTIVE BOX
MARICEL SLAUGHTER  MR# [id number]  78yFemale        Patient is a 78y old  Female who presents with a chief complaint of fall (29 Dec 2018 16:57)      INTERVAL HPI/OVERNIGHT EVENTS:  Patient seen and examined at bedside. Continues on vent support.       ALLERGIES  No Known Allergies      MEDICATIONS  acetaminophen   Tablet .. 650 milliGRAM(s) Oral every 6 hours PRN Mild Pain (1 - 3)  ascorbic acid 500 milliGRAM(s) Oral two times a day  chlorhexidine 4% Liquid 1 Application(s) Topical two times a day  cyanocobalamin Injectable 1000 MICROGram(s) SubCutaneous daily  digoxin  Injectable 0.125 milliGRAM(s) IV Push every other day  docusate sodium 100 milliGRAM(s) Oral three times a day  famotidine    Tablet 20 milliGRAM(s) Oral every 12 hours  fentaNYL   Infusion. 0.5 MICROgram(s)/kG/Hr IV Continuous <Continuous>  ferrous    sulfate Liquid 300 milliGRAM(s) Enteral Tube three times a day with meals  folic acid 1 milliGRAM(s) Oral daily  insulin lispro (HumaLOG) corrective regimen sliding scale   SubCutaneous every 6 hours  morphine  - Injectable 2 milliGRAM(s) IV Push every 4 hours PRN Severe Pain (7 - 10)  norepinephrine Infusion 0.05 MICROgram(s)/kG/Min IV Continuous <Continuous>  ondansetron Injectable 4 milliGRAM(s) IV Push every 6 hours PRN Nausea and/or Vomiting  piperacillin/tazobactam IVPB. 3.375 Gram(s) IV Intermittent every 12 hours  sodium bicarbonate  Infusion 0.183 mEq/kG/Hr IV Continuous <Continuous>  vasopressin Infusion 0.04 Unit(s)/Min IV Continuous <Continuous>              REVIEW OF SYSTEMS:  CONSTITUTIONAL: No fever, weight loss, or fatigue  EYES: No eye pain, visual disturbances, or discharge  ENT:  No difficulty hearing, tinnitus, vertigo; No sinus or throat pain  NECK: No pain or stiffness  RESPIRATORY: No cough, wheezing, chills or hemoptysis; No Shortness of Breath  CARDIOVASCULAR: No chest pain, palpitations, passing out, dizziness, or leg swelling  GASTROINTESTINAL: No abdominal or epigastric pain. No nausea, vomiting, or hematemesis; No diarrhea or constipation. No melena or hematochezia.  GENITOURINARY: No dysuria, frequency, hematuria, or incontinence  NEUROLOGICAL: No headaches, memory loss, loss of strength, numbness, or tremors  SKIN: No itching, burning, rashes, or lesions   LYMPH Nodes: No enlarged glands  ENDOCRINE: No heat or cold intolerance; No hair loss  MUSCULOSKELETAL: No joint pain or swelling; No muscle, back, or extremity pain  PSYCHIATRIC: No depression, anxiety, mood swings, or difficulty sleeping  HEME/LYMPH: No easy bruising, or bleeding gums  ALLERGY AND IMMUNOLOGIC: No hives or eczema	    [ ] All others negative	  [ ] Unable to obtain      T(C): 37.2 (18 @ 20:05), Max: 40.4 (18 @ 21:52)  T(F): 99 (18 @ 20:05), Max: 104.8 (18 @ 21:52)  HR: 102 (18 @ 20:00) (74 - 123)  BP: 111/52 (18 @ 20:00) (71/55 - 118/58)  RR: 0 (18 @ 20:00) (0 - 303)  SpO2: 98% (18 @ 20:00) (73% - 100%)  Wt(kg): --    I&O's Summary    28 Dec 2018 07:  -  29 Dec 2018 07:00  --------------------------------------------------------  IN: 3789.4 mL / OUT: 405 mL / NET: 3384.4 mL    29 Dec 2018 07:  -  29 Dec 2018 20:19  --------------------------------------------------------  IN: 5442.5 mL / OUT: 25 mL / NET: 5417.5 mL          PHYSICAL EXAM:  A X O x  HEAD:  Atraumatic, Normocephalic  EYES: EOMI, PERRLA, conjunctiva and sclera clear  NECK: Supple, No JVD, Normal thyroid  Resp: CTAB, No crackles, wheezing,   CVS: Regular rate and rhythm; No discernable murmurs, rubs, or gallops  ABD: Soft, Nontender, Nondistended; Bowel sounds present  EXTREMITIES:  2+ Peripheral Pulses, No edema  LYMPH: No dicernable lymphadenopathy noted  GENERAL: NAD, well-groomed, well-developed      LABS:                        11.2   26.4  )-----------( 187      ( 29 Dec 2018 04:43 )             39.5         144  |  116<H>  |  47<H>  ----------------------------<  237<H>  5.5<H>   |  14<L>  |  2.48<H>    Ca    8.2<L>      29 Dec 2018 04:43  Phos  7.2       Mg     2.3         TPro  5.7<L>  /  Alb  2.6<L>  /  TBili  1.3<H>  /  DBili  x   /  AST  5339<H>  /  ALT  2372<H>  /  AlkPhos  81      PT/INR - ( 29 Dec 2018 16:20 )   PT: 38.9 sec;   INR: 3.38 ratio         PTT - ( 29 Dec 2018 16:20 )  PTT:34.0 sec  Urinalysis Basic - ( 29 Dec 2018 16:20 )    Color: Yellow / Appearance: Slightly Turbid / S.020 / pH: x  Gluc: x / Ketone: Trace  / Bili: Negative / Urobili: Negative   Blood: x / Protein: 500 mg/dL / Nitrite: Negative   Leuk Esterase: Small / RBC: 25-50 /HPF / WBC 6-10 /HPF   Sq Epi: x / Non Sq Epi: Few /HPF / Bacteria: Many /HPF      CAPILLARY BLOOD GLUCOSE      POCT Blood Glucose.: 323 mg/dL (29 Dec 2018 17:43)  POCT Blood Glucose.: 199 mg/dL (29 Dec 2018 11:48)  POCT Blood Glucose.: 193 mg/dL (29 Dec 2018 05:22)      Troponins:  ProBNP:  Lipid Profile:   HgA1c:  TSH:     ABG - ( 29 Dec 2018 20:10 )  pH, Arterial: 7.23  pH, Blood: x     /  pCO2: 28    /  pO2: 92    / HCO3: 11    / Base Excess: -14.9 /  SaO2: 96                    RADIOLOGY & ADDITIONAL TESTS:    Imaging Personally Reviewed:  [ ] YES  [ ] NO      Consultant(s) Notes Reviewed:  [x ] YES  [ ] NO    Care Discussed with Consultants/Other Providers [ x] YES  [ ] NO          PAST MEDICAL & SURGICAL HISTORY:  No pertinent past medical history  No significant past surgical history        Closed fracture of neck of left femur  No pertinent family history in first degree relatives  Handoff  MEWS Score  No pertinent past medical history  Closed fracture of left hip, initial encounter  Septic shock  Acute post-operative pain  Hypotension  Fever  Acute systolic congestive heart failure  Cardiogenic shock  Prophylactic measure  Diabetes mellitus  HTN (hypertension)  Heart failure with reduced ejection fraction  PSVT (paroxysmal supraventricular tachycardia)  Left ventricular dysfunction  Need for prophylactic measure  Closed fracture of left hip, initial encounter  No significant past surgical history  A S/P FALL PAST SURGICAL HISTORY:  S/P D&C (status post dilation and curettage) X 2, - for missed  2017, 2017, 10/2020

## 2025-06-17 NOTE — PROGRESS NOTE ADULT - PROBLEM SELECTOR PROBLEM 5
Patient needs an appointment to be scheduled.    Provider: Dr. Pete    Reason for visit: med check    Visit type: PC Office Visit    Timeframe: ASAP    Peripheral arterial disease